# Patient Record
Sex: MALE | Race: WHITE | NOT HISPANIC OR LATINO | Employment: OTHER | URBAN - METROPOLITAN AREA
[De-identification: names, ages, dates, MRNs, and addresses within clinical notes are randomized per-mention and may not be internally consistent; named-entity substitution may affect disease eponyms.]

---

## 2019-09-11 RX ORDER — TRAMADOL HYDROCHLORIDE 50 MG/1
TABLET ORAL
Refills: 0
Start: 2019-09-11 | End: 2019-09-12 | Stop reason: SDUPTHER

## 2019-09-11 RX ORDER — GLIPIZIDE 10 MG/1
10 TABLET ORAL
Start: 2019-09-11 | End: 2019-12-09 | Stop reason: SDUPTHER

## 2019-09-11 RX ORDER — SIMVASTATIN 40 MG
TABLET ORAL
Start: 2019-09-11 | End: 2019-09-12 | Stop reason: SDUPTHER

## 2019-09-11 RX ORDER — NITROGLYCERIN 0.4 MG/1
0.4 TABLET SUBLINGUAL
Refills: 12
Start: 2019-09-11 | End: 2021-06-03 | Stop reason: SDUPTHER

## 2019-09-11 RX ORDER — RAMIPRIL 10 MG/1
10 CAPSULE ORAL DAILY
Qty: 90 CAPSULE | Refills: 1
Start: 2019-09-11 | End: 2019-12-09 | Stop reason: SDUPTHER

## 2019-09-11 RX ORDER — GABAPENTIN 300 MG/1
600 CAPSULE ORAL 3 TIMES DAILY
Start: 2019-09-11 | End: 2019-09-12 | Stop reason: SDUPTHER

## 2019-09-11 RX ORDER — AMLODIPINE BESYLATE 10 MG/1
10 TABLET ORAL DAILY
Qty: 30 TABLET | Refills: 1
Start: 2019-09-11 | End: 2019-12-09 | Stop reason: SDUPTHER

## 2019-09-11 RX ORDER — HYDROCHLOROTHIAZIDE 25 MG/1
25 TABLET ORAL DAILY
Start: 2019-09-11 | End: 2019-12-09 | Stop reason: SDUPTHER

## 2019-09-11 RX ORDER — ALPRAZOLAM 2 MG/1
2 TABLET ORAL NIGHTLY PRN
Start: 2019-09-11 | End: 2019-12-09 | Stop reason: SDUPTHER

## 2019-09-12 ENCOUNTER — OFFICE VISIT (OUTPATIENT)
Dept: FAMILY MEDICINE CLINIC | Facility: CLINIC | Age: 79
End: 2019-09-12

## 2019-09-12 ENCOUNTER — TELEPHONE (OUTPATIENT)
Dept: OTHER | Facility: OTHER | Age: 79
End: 2019-09-12

## 2019-09-12 VITALS
DIASTOLIC BLOOD PRESSURE: 74 MMHG | BODY MASS INDEX: 29.52 KG/M2 | SYSTOLIC BLOOD PRESSURE: 157 MMHG | WEIGHT: 230 LBS | HEART RATE: 73 BPM | HEIGHT: 74 IN | TEMPERATURE: 97.8 F | OXYGEN SATURATION: 91 %

## 2019-09-12 DIAGNOSIS — Z09 HOSPITAL DISCHARGE FOLLOW-UP: ICD-10-CM

## 2019-09-12 DIAGNOSIS — R78.81 MRSA BACTEREMIA: ICD-10-CM

## 2019-09-12 DIAGNOSIS — E08.621 DIABETIC ULCER OF TOE OF RIGHT FOOT ASSOCIATED WITH DIABETES MELLITUS DUE TO UNDERLYING CONDITION, LIMITED TO BREAKDOWN OF SKIN (HCC): ICD-10-CM

## 2019-09-12 DIAGNOSIS — B95.62 MRSA BACTEREMIA: ICD-10-CM

## 2019-09-12 DIAGNOSIS — L97.511 DIABETIC ULCER OF TOE OF RIGHT FOOT ASSOCIATED WITH DIABETES MELLITUS DUE TO UNDERLYING CONDITION, LIMITED TO BREAKDOWN OF SKIN (HCC): ICD-10-CM

## 2019-09-12 DIAGNOSIS — E11.8 TYPE 2 DIABETES MELLITUS WITH COMPLICATION, WITHOUT LONG-TERM CURRENT USE OF INSULIN (HCC): Primary | ICD-10-CM

## 2019-09-12 PROCEDURE — 99495 TRANSJ CARE MGMT MOD F2F 14D: CPT | Performed by: FAMILY MEDICINE

## 2019-09-12 RX ORDER — ASPIRIN 325 MG
325 TABLET ORAL DAILY
COMMUNITY

## 2019-09-12 RX ORDER — GABAPENTIN 300 MG/1
600 CAPSULE ORAL 3 TIMES DAILY
Qty: 180 CAPSULE | Refills: 4 | Status: SHIPPED | OUTPATIENT
Start: 2019-09-12 | End: 2019-12-09 | Stop reason: SDUPTHER

## 2019-09-12 RX ORDER — TRAMADOL HYDROCHLORIDE 50 MG/1
TABLET ORAL
Qty: 450 TABLET | Refills: 0 | Status: SHIPPED | OUTPATIENT
Start: 2019-09-12 | End: 2019-12-09 | Stop reason: SDUPTHER

## 2019-09-12 RX ORDER — SIMVASTATIN 40 MG
TABLET ORAL
Qty: 90 TABLET | Refills: 3 | Status: SHIPPED | OUTPATIENT
Start: 2019-09-12 | End: 2020-05-13 | Stop reason: SDUPTHER

## 2019-09-12 RX ORDER — VANCOMYCIN HYDROCHLORIDE 1 G/20ML
INJECTION, POWDER, LYOPHILIZED, FOR SOLUTION INTRAVENOUS
COMMUNITY
Start: 2019-09-11 | End: 2019-12-09

## 2019-09-12 NOTE — PROGRESS NOTES
Transitional Care Follow Up Visit  Subjective   FU hospitalization of toe of R foot 2nd toe.  Admitted 9/5/19 to 9/11/19.  D/c last night  Eric Kramer is a 79 y.o. male who presents for a transitional care management visit.    Within 48 business hours after discharge our office contacted him via telephone to coordinate his care and needs.     HPI:  Hospital course:  Seen me day after discharge.  D/c summary reviewed. Pt with confusion with it. Noted to have MRSA bacteremia,  Had amputation of second toe by Dr Joseph Sultana after xray suspicious for osteomyelitis..  To have IV vanc for 4 to 6 more weeks followed by Dr Lott.  Picc line place.  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 R lower U/s with no DVT.  ABIs bilaterally normal.  No med changes other than vanc addition.    F/U DM2.  A1C 6.4 in hospital.    FU hyperlipidemia.   from May 2019.     F/u HTN.  No orhtostasis.  Doing well with meds.  Has not been on hctz in hospital.       I reviewed and discussed the details of that call along with the discharge summary, hospital problems, inpatient lab results, inpatient diagnostic studies, and consultation reports with Eric.     Current outpatient and discharge medications have been reconciled for the patient.  Reviewed by: Elder Block MD      No flowsheet data found.  Risk for Readmission (LACE) No Data Recorded     The following portions of the patient's history were reviewed and updated as appropriate: allergies, current medications, past family history, past medical history, past social history, past surgical history and problem list.    Review of Systems   Constitutional: Negative for activity change, appetite change and fatigue.   HENT: Negative for hearing loss and postnasal drip.    Eyes: Negative for discharge and itching.   Respiratory: Negative for cough and shortness of breath.    Cardiovascular: Negative for chest pain and leg swelling.   Gastrointestinal: Negative for  abdominal distention and abdominal pain.   Endocrine: Negative for cold intolerance and heat intolerance.   Genitourinary: Negative for difficulty urinating and flank pain.   Musculoskeletal: Negative for arthralgias and myalgias.   Skin: Negative for color change.   Neurological: Negative for dizziness and facial asymmetry.   Hematological: Negative for adenopathy.   Psychiatric/Behavioral: Negative for agitation and confusion.       Objective   Physical Exam   Constitutional: He appears well-developed and well-nourished.   HENT:   Head: Normocephalic and atraumatic.   Right Ear: External ear normal.   Left Ear: External ear normal.   Nose: Nose normal.   Mouth/Throat: Oropharynx is clear and moist.   Eyes: Conjunctivae and EOM are normal. Pupils are equal, round, and reactive to light. Right eye exhibits no discharge. Left eye exhibits no discharge. No scleral icterus.   Neck: Normal range of motion. Neck supple. No JVD present. No tracheal deviation present. No thyromegaly present.   Cardiovascular: Normal rate, regular rhythm, normal heart sounds and intact distal pulses. Exam reveals no gallop and no friction rub.   No murmur heard.  Pulmonary/Chest: Effort normal and breath sounds normal. No respiratory distress. He has no wheezes. He has no rales. He exhibits no tenderness.   Abdominal: Soft. Bowel sounds are normal. He exhibits no distension and no mass. There is no tenderness. There is no rebound and no guarding. No hernia.   Musculoskeletal: Normal range of motion. He exhibits no edema or tenderness.   Lymphadenopathy:     He has no cervical adenopathy.   Neurological: He displays normal reflexes. No cranial nerve deficit or sensory deficit. He exhibits normal muscle tone. Coordination normal.   Skin: Skin is warm and dry.   R foot wrapped in ace bandage and gauze.  No leg swelling.   Psychiatric: He has a normal mood and affect. His behavior is normal. Judgment and thought content normal.   Nursing note  and vitals reviewed.      Assessment/Plan   Eric was seen today for diabetes, hypertension, hyperlipidemia and anxiety.    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  -     amLODIPine (NORVASC) 10 MG tablet; Take 1 tablet by mouth Daily.  -     Discontinue: aspirin 81 MG tablet; Take 1 tablet by mouth Daily.  -     Discontinue: gabapentin (NEURONTIN) 300 MG capsule; Take 2 capsules by mouth 3 (Three) Times a Day.  -     glipiZIDE (GLUCOTROL) 10 MG tablet; Take 1 tablet by mouth 2 (Two) Times a Day Before Meals.  -     hydrochlorothiazide (HYDRODIURIL) 25 MG tablet; Take 1 tablet by mouth Daily.  -     metFORMIN (GLUCOPHAGE) 1000 MG tablet; Take 1 tablet by mouth 2 (Two) Times a Day With Meals.  -     metoprolol tartrate (LOPRESSOR) 25 MG tablet; Take 1 tablet by mouth 2 (Two) Times a Day.  -     nitroglycerin (NITROSTAT) 0.4 MG SL tablet; Place 1 tablet under the tongue Every 5 (Five) Minutes As Needed for Chest Pain. Take no more than 3 doses in 15 minutes.  -     ramipril (ALTACE) 10 MG capsule; Take 1 capsule by mouth Daily.  -     Discontinue: simvastatin (ZOCOR) 40 MG tablet; One three times a week  -     Discontinue: traMADol (ULTRAM) 50 MG tablet; One to two tablets QID prn pain.  -     simvastatin (ZOCOR) 40 MG tablet; One three times a week  -     gabapentin (NEURONTIN) 300 MG capsule; Take 2 capsules by mouth 3 (Three) Times a Day.  -     traMADol (ULTRAM) 50 MG tablet; One to two tablets QID prn pain.    Taking vanc IV now followed by ID.  Finish 4-6 week IV tx.

## 2019-09-12 NOTE — TELEPHONE ENCOUNTER
Capri from [employer] left voicemail 9/12 at 3:15pm requesting a call back from verbal start of care orders. Skilled nursing picked pt up today, need to be seen 3 times this week for IV infusion, next week 2, and then cut down to once a week. Four PRN visits for wound care and IV status. Physical therapy and occ therapy are also going to be seeing him. She can be reached at [phone]

## 2019-09-13 ENCOUNTER — TELEPHONE (OUTPATIENT)
Dept: FAMILY MEDICINE CLINIC | Facility: CLINIC | Age: 79
End: 2019-09-13

## 2019-09-13 NOTE — TELEPHONE ENCOUNTER
MARTHA FROM [facility] CALLED SHE SAW PT TODAY 9-13-19 AND WANTED TO REPORT PT HAS REFUSED VISITS AND HE IS DOING WELL.

## 2019-09-16 NOTE — TELEPHONE ENCOUNTER
Maria Dolores for [facility] with verbal auth, told her to call back if she needs anything else from me

## 2019-12-09 ENCOUNTER — OFFICE VISIT (OUTPATIENT)
Dept: FAMILY MEDICINE CLINIC | Facility: CLINIC | Age: 79
End: 2019-12-09

## 2019-12-09 VITALS
OXYGEN SATURATION: 94 % | DIASTOLIC BLOOD PRESSURE: 75 MMHG | HEIGHT: 74 IN | TEMPERATURE: 98.1 F | WEIGHT: 270.6 LBS | HEART RATE: 63 BPM | SYSTOLIC BLOOD PRESSURE: 123 MMHG | BODY MASS INDEX: 34.73 KG/M2

## 2019-12-09 DIAGNOSIS — E11.42 DIABETIC PERIPHERAL NEUROPATHY (HCC): ICD-10-CM

## 2019-12-09 DIAGNOSIS — I10 ESSENTIAL HYPERTENSION: ICD-10-CM

## 2019-12-09 DIAGNOSIS — Z00.00 MEDICARE ANNUAL WELLNESS VISIT, SUBSEQUENT: ICD-10-CM

## 2019-12-09 DIAGNOSIS — E11.42 TYPE 2 DIABETES MELLITUS WITH DIABETIC POLYNEUROPATHY, WITHOUT LONG-TERM CURRENT USE OF INSULIN (HCC): Primary | ICD-10-CM

## 2019-12-09 DIAGNOSIS — I25.10 ARTERIOSCLEROSIS OF CORONARY ARTERY: ICD-10-CM

## 2019-12-09 DIAGNOSIS — N40.1 BENIGN PROSTATIC HYPERPLASIA WITH URINARY FREQUENCY: ICD-10-CM

## 2019-12-09 DIAGNOSIS — R35.0 BENIGN PROSTATIC HYPERPLASIA WITH URINARY FREQUENCY: ICD-10-CM

## 2019-12-09 PROCEDURE — 99214 OFFICE O/P EST MOD 30 MIN: CPT | Performed by: FAMILY MEDICINE

## 2019-12-09 PROCEDURE — G0439 PPPS, SUBSEQ VISIT: HCPCS | Performed by: FAMILY MEDICINE

## 2019-12-09 RX ORDER — AMLODIPINE BESYLATE 10 MG/1
10 TABLET ORAL DAILY
Qty: 90 TABLET | Refills: 2 | Status: SHIPPED | OUTPATIENT
Start: 2019-12-09 | End: 2020-05-13 | Stop reason: SDUPTHER

## 2019-12-09 RX ORDER — ALPRAZOLAM 2 MG/1
2 TABLET ORAL NIGHTLY PRN
Qty: 90 TABLET | Refills: 1 | Status: SHIPPED | OUTPATIENT
Start: 2019-12-09 | End: 2020-05-13 | Stop reason: SDUPTHER

## 2019-12-09 RX ORDER — TRAMADOL HYDROCHLORIDE 50 MG/1
TABLET ORAL
Qty: 450 TABLET | Refills: 1 | Status: SHIPPED | OUTPATIENT
Start: 2019-12-09 | End: 2020-05-13 | Stop reason: SDUPTHER

## 2019-12-09 RX ORDER — GLIPIZIDE 10 MG/1
10 TABLET ORAL
Qty: 180 TABLET | Refills: 2 | Status: SHIPPED | OUTPATIENT
Start: 2019-12-09 | End: 2020-05-13 | Stop reason: SDUPTHER

## 2019-12-09 RX ORDER — HYDROCHLOROTHIAZIDE 25 MG/1
25 TABLET ORAL DAILY
Qty: 90 TABLET | Refills: 3 | Status: SHIPPED | OUTPATIENT
Start: 2019-12-09 | End: 2020-05-13 | Stop reason: SDUPTHER

## 2019-12-09 RX ORDER — RAMIPRIL 10 MG/1
10 CAPSULE ORAL DAILY
Qty: 90 CAPSULE | Refills: 2 | Status: SHIPPED | OUTPATIENT
Start: 2019-12-09 | End: 2020-09-21

## 2019-12-09 RX ORDER — GABAPENTIN 300 MG/1
600 CAPSULE ORAL 3 TIMES DAILY
Qty: 180 CAPSULE | Refills: 4 | Status: SHIPPED | OUTPATIENT
Start: 2019-12-09 | End: 2020-05-13 | Stop reason: SDUPTHER

## 2019-12-09 NOTE — PROGRESS NOTES
The ABCs of the Annual Wellness Visit  Subsequent Medicare Wellness Visit    Chief Complaint   Patient presents with   • Diabetes   • Hyperlipidemia   • Anxiety       Subjective   History of Present Illness:  Eric Kramer is a 79 y.o. male who presents for a Subsequent Medicare Wellness Visit.  F?U HTn.  No orthostasis.  Doing well iwht meds.   F/U DM2.  ON meds regularly.  No SE.  No BS checks.    F/u peripheral neuropathy.  Going well with gabapentin. I use alprazolam 2mg at night to help me sleep.  NO Se.  JON appropriate.     HEALTH RISK ASSESSMENT    Recent Hospitalizations:  Recently treated at the following:  Other: [facility]    Current Medical Providers:  Patient Care Team:  Elder Block MD as PCP - General (Family Medicine)    Smoking Status:  Social History     Tobacco Use   Smoking Status Former Smoker   Smokeless Tobacco Never Used       Alcohol Consumption:  Social History     Substance and Sexual Activity   Alcohol Use Yes    Comment: 1-2 a month--Caffeine use-2 reg coffee daily       Depression Screen:   PHQ-2/PHQ-9 Depression Screening 12/9/2019   Little interest or pleasure in doing things 0   Feeling down, depressed, or hopeless 0   Total Score 0       Fall Risk Screen:  STEADI Fall Risk Assessment was completed, and patient is at LOW risk for falls.Assessment completed on:12/9/2019    Health Habits and Functional and Cognitive Screening:  Functional & Cognitive Status 12/9/2019   Do you have difficulty preparing food and eating? No   Do you have difficulty bathing yourself, getting dressed or grooming yourself? No   Do you have difficulty using the toilet? No   Do you have difficulty moving around from place to place? No   Do you have trouble with steps or getting out of a bed or a chair? No   Current Diet Well Balanced Diet   Dental Exam Up to date   Eye Exam Up to date   Exercise (times per week) 0 times per week   Current Exercise Activities Include None   Do you need help using the  phone?  No   Are you deaf or do you have serious difficulty hearing?  No   Do you need help with transportation? No   Do you need help shopping? No   Do you need help preparing meals?  No   Do you need help with housework?  No   Do you need help with laundry? No   Do you need help taking your medications? No   Do you need help managing money? No   Do you ever drive or ride in a car without wearing a seat belt? No   Have you felt unusual stress, anger or loneliness in the last month? No   Who do you live with? Spouse   If you need help, do you have trouble finding someone available to you? No   Have you been bothered in the last four weeks by sexual problems? No   Do you have difficulty concentrating, remembering or making decisions? No         Does the patient have evidence of cognitive impairment? No    Asprin use counseling:Taking ASA appropriately as indicated    Age-appropriate Screening Schedule:  Refer to the list below for future screening recommendations based on patient's age, sex and/or medical conditions. Orders for these recommended tests are listed in the plan section. The patient has been provided with a written plan.    Health Maintenance   Topic Date Due   • URINE MICROALBUMIN  1940   • TDAP/TD VACCINES (1 - Tdap) 04/26/1952   • ZOSTER VACCINE (1 of 2) 04/26/1991   • DIABETIC EYE EXAM  07/19/2019   • INFLUENZA VACCINE  08/01/2019   • LIPID PANEL  09/10/2019   • HEMOGLOBIN A1C  03/07/2020   • PNEUMOCOCCAL VACCINE (65+ HIGH RISK)  Completed          The following portions of the patient's history were reviewed and updated as appropriate: allergies, current medications, past family history, past medical history, past social history, past surgical history and problem list.    Outpatient Medications Prior to Visit   Medication Sig Dispense Refill   • ALPRAZolam (XANAX) 2 MG tablet Take 1 tablet by mouth At Night As Needed for Sleep.     • aspirin 325 MG tablet Take 650 mg by mouth Daily.     •  gabapentin (NEURONTIN) 300 MG capsule Take 2 capsules by mouth 3 (Three) Times a Day. 180 capsule 4   • metoprolol tartrate (LOPRESSOR) 25 MG tablet Take 1 tablet by mouth 2 (Two) Times a Day.     • nitroglycerin (NITROSTAT) 0.4 MG SL tablet Place 1 tablet under the tongue Every 5 (Five) Minutes As Needed for Chest Pain. Take no more than 3 doses in 15 minutes.  12   • simvastatin (ZOCOR) 40 MG tablet One three times a week 90 tablet 3   • traMADol (ULTRAM) 50 MG tablet One to two tablets QID prn pain. 450 tablet 0   • amLODIPine (NORVASC) 10 MG tablet Take 1 tablet by mouth Daily. 30 tablet 1   • glipiZIDE (GLUCOTROL) 10 MG tablet Take 1 tablet by mouth 2 (Two) Times a Day Before Meals.     • hydrochlorothiazide (HYDRODIURIL) 25 MG tablet Take 1 tablet by mouth Daily.     • metFORMIN (GLUCOPHAGE) 1000 MG tablet Take 1 tablet by mouth 2 (Two) Times a Day With Meals.     • ramipril (ALTACE) 10 MG capsule Take 1 capsule by mouth Daily. 90 capsule 1   • vancomycin (VANCOCIN) 1 g injection        No facility-administered medications prior to visit.        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   HENT:  "Negative for hearing loss and postnasal drip.    Eyes: Negative for discharge and itching.   Respiratory: Negative for cough and shortness of breath.    Cardiovascular: Negative for chest pain and leg swelling.   Gastrointestinal: Negative for abdominal distention and abdominal pain.   Endocrine: Negative for cold intolerance and heat intolerance.   Genitourinary: Negative for difficulty urinating and flank pain.   Musculoskeletal: Negative for arthralgias and myalgias.   Skin: Negative for color change.   Neurological: Negative for dizziness and facial asymmetry.   Hematological: Negative for adenopathy.   Psychiatric/Behavioral: Negative for agitation and confusion.       Compared to one year ago, the patient feels his physical health is the same.  Compared to one year ago, the patient feels his mental health is the same.    Reviewed chart for potential of high risk medication in the elderly: yes  Reviewed chart for potential of harmful drug interactions in the elderly:yes    Objective         Vitals:    12/09/19 0749   BP: 123/75   Pulse: 63   Temp: 98.1 °F (36.7 °C)   SpO2: 94%   Weight: 123 kg (270 lb 9.6 oz)   Height: 188 cm (74\")       Body mass index is 34.74 kg/m².  Discussed the patient's BMI with him. The BMI is above average; BMI management plan is completed.    Physical Exam   Constitutional: He appears well-developed and well-nourished.   HENT:   Head: Normocephalic and atraumatic.   Right Ear: External ear normal.   Left Ear: External ear normal.   Nose: Nose normal.   Mouth/Throat: Oropharynx is clear and moist.   Eyes: Pupils are equal, round, and reactive to light. Conjunctivae and EOM are normal. Right eye exhibits no discharge. Left eye exhibits no discharge. No scleral icterus.   Neck: Normal range of motion. Neck supple. No JVD present. No tracheal deviation present. No thyromegaly present.   Cardiovascular: Normal rate, regular rhythm, normal heart sounds and intact distal pulses. Exam reveals " no gallop and no friction rub.   No murmur heard.  Pulmonary/Chest: Effort normal and breath sounds normal. No respiratory distress. He has no wheezes. He has no rales. He exhibits no tenderness.   Abdominal: Soft. Bowel sounds are normal. He exhibits no distension and no mass. There is no tenderness. There is no rebound and no guarding. No hernia.   Musculoskeletal: Normal range of motion. He exhibits no edema or tenderness.   Second toe on R foot removed.  No lesions seen.     Lymphadenopathy:     He has no cervical adenopathy.   Neurological: He displays normal reflexes. No cranial nerve deficit or sensory deficit. He exhibits normal muscle tone. Coordination normal.   Skin: Skin is warm and dry.   Psychiatric: He has a normal mood and affect. His behavior is normal. Judgment and thought content normal.   Nursing note and vitals reviewed.            Assessment/Plan   Medicare Risks and Personalized Health Plan  CMS Preventative Services Quick Reference  Inactivity/Sedentary    The above risks/problems have been discussed with the patient.  Pertinent information has been shared with the patient in the After Visit Summary.  Follow up plans and orders are seen below in the Assessment/Plan Section.    Diagnoses and all orders for this visit:    1. Type 2 diabetes mellitus with diabetic polyneuropathy, without long-term current use of insulin (CMS/[facility]) (Primary)  -     Comprehensive Metabolic Panel  -     Lipid Panel With / Chol / HDL Ratio  -     Hemoglobin A1c    2. Essential hypertension  -     TSH Rfx On Abnormal To Free T4    3. Arteriosclerosis of coronary artery    4. Medicare annual wellness visit, subsequent    5. Benign prostatic hyperplasia with urinary frequency  -     PSA DIAGNOSTIC    6. Diabetic peripheral neuropathy (CMS/[facility])    Other orders  -     ramipril (ALTACE) 10 MG capsule; Take 1 capsule by mouth Daily.  Dispense: 90 capsule; Refill: 2  -     metFORMIN (GLUCOPHAGE) 1000 MG tablet; Take 1 tablet  by mouth 2 (Two) Times a Day With Meals.  Dispense: 180 tablet; Refill: 2  -     amLODIPine (NORVASC) 10 MG tablet; Take 1 tablet by mouth Daily.  Dispense: 90 tablet; Refill: 2  -     glipizide (GLUCOTROL) 10 MG tablet; Take 1 tablet by mouth 2 (Two) Times a Day Before Meals.  Dispense: 180 tablet; Refill: 2  -     hydroCHLOROthiazide (HYDRODIURIL) 25 MG tablet; Take 1 tablet by mouth Daily.  Dispense: 90 tablet; Refill: 3      Follow Up:  Return in about 4 months (around 4/9/2020).     An After Visit Summary and PPPS were given to the patient.     Pt declines flu shot.  Continue statin for CAD.   Increase exercise.

## 2019-12-10 DIAGNOSIS — E03.9 HYPOTHYROIDISM, UNSPECIFIED TYPE: Primary | ICD-10-CM

## 2019-12-10 PROBLEM — E03.4 HYPOTHYROIDISM DUE TO ACQUIRED ATROPHY OF THYROID: Status: ACTIVE | Noted: 2019-12-10

## 2019-12-10 LAB
ALBUMIN SERPL-MCNC: 4.1 G/DL (ref 3.5–5.2)
ALBUMIN/GLOB SERPL: 1.4 G/DL
ALP SERPL-CCNC: 52 U/L (ref 39–117)
ALT SERPL-CCNC: 11 U/L (ref 1–41)
AST SERPL-CCNC: 13 U/L (ref 1–40)
BILIRUB SERPL-MCNC: 0.5 MG/DL (ref 0.2–1.2)
BUN SERPL-MCNC: 27 MG/DL (ref 8–23)
BUN/CREAT SERPL: 18.4 (ref 7–25)
CALCIUM SERPL-MCNC: 9.6 MG/DL (ref 8.6–10.5)
CHLORIDE SERPL-SCNC: 99 MMOL/L (ref 98–107)
CHOLEST SERPL-MCNC: 175 MG/DL (ref 0–200)
CHOLEST/HDLC SERPL: 5 {RATIO}
CO2 SERPL-SCNC: 29.1 MMOL/L (ref 22–29)
CREAT SERPL-MCNC: 1.47 MG/DL (ref 0.76–1.27)
GLOBULIN SER CALC-MCNC: 2.9 GM/DL
GLUCOSE SERPL-MCNC: 87 MG/DL (ref 65–99)
HBA1C MFR BLD: 6.3 % (ref 4.8–5.6)
HDLC SERPL-MCNC: 35 MG/DL (ref 40–60)
LDLC SERPL CALC-MCNC: 99 MG/DL (ref 0–100)
POTASSIUM SERPL-SCNC: 4.2 MMOL/L (ref 3.5–5.2)
PROT SERPL-MCNC: 7 G/DL (ref 6–8.5)
PSA SERPL-MCNC: 2.67 NG/ML (ref 0–4)
SODIUM SERPL-SCNC: 141 MMOL/L (ref 136–145)
T4 FREE SERPL-MCNC: 0.9 NG/DL (ref 0.93–1.7)
TRIGL SERPL-MCNC: 207 MG/DL (ref 0–150)
TSH SERPL DL<=0.005 MIU/L-ACNC: 5.08 UIU/ML (ref 0.27–4.2)
VLDLC SERPL CALC-MCNC: 41.4 MG/DL

## 2019-12-10 RX ORDER — LEVOTHYROXINE SODIUM 0.03 MG/1
25 TABLET ORAL DAILY
Qty: 90 TABLET | Refills: 3 | Status: SHIPPED | OUTPATIENT
Start: 2019-12-10 | End: 2020-12-02 | Stop reason: SDUPTHER

## 2020-01-06 ENCOUNTER — TELEPHONE (OUTPATIENT)
Dept: FAMILY MEDICINE CLINIC | Facility: CLINIC | Age: 80
End: 2020-01-06

## 2020-01-06 NOTE — TELEPHONE ENCOUNTER
S/w Randal from pharmacy in [place] where patient stays during the winter and confirmed that he is still taking all of his controlled substances so that the pharmacy can fill them

## 2020-05-13 ENCOUNTER — OFFICE VISIT (OUTPATIENT)
Dept: FAMILY MEDICINE CLINIC | Facility: CLINIC | Age: 80
End: 2020-05-13

## 2020-05-13 VITALS
HEART RATE: 63 BPM | BODY MASS INDEX: 30.16 KG/M2 | HEIGHT: 74 IN | WEIGHT: 235 LBS | TEMPERATURE: 97.6 F | SYSTOLIC BLOOD PRESSURE: 123 MMHG | DIASTOLIC BLOOD PRESSURE: 75 MMHG

## 2020-05-13 DIAGNOSIS — I10 ESSENTIAL HYPERTENSION: ICD-10-CM

## 2020-05-13 DIAGNOSIS — E03.4 HYPOTHYROIDISM DUE TO ACQUIRED ATROPHY OF THYROID: ICD-10-CM

## 2020-05-13 DIAGNOSIS — E11.42 TYPE 2 DIABETES MELLITUS WITH DIABETIC POLYNEUROPATHY, WITHOUT LONG-TERM CURRENT USE OF INSULIN (HCC): ICD-10-CM

## 2020-05-13 DIAGNOSIS — E11.42 DIABETIC PERIPHERAL NEUROPATHY (HCC): ICD-10-CM

## 2020-05-13 DIAGNOSIS — E11.42 TYPE 2 DIABETES MELLITUS WITH DIABETIC POLYNEUROPATHY, WITHOUT LONG-TERM CURRENT USE OF INSULIN (HCC): Primary | ICD-10-CM

## 2020-05-13 PROCEDURE — 99443 PR PHYS/QHP TELEPHONE EVALUATION 21-30 MIN: CPT | Performed by: FAMILY MEDICINE

## 2020-05-13 RX ORDER — GABAPENTIN 300 MG/1
600 CAPSULE ORAL 3 TIMES DAILY
Qty: 180 CAPSULE | Refills: 4 | Status: SHIPPED | OUTPATIENT
Start: 2020-05-13 | End: 2020-12-02 | Stop reason: SDUPTHER

## 2020-05-13 RX ORDER — AMLODIPINE BESYLATE 10 MG/1
10 TABLET ORAL DAILY
Qty: 90 TABLET | Refills: 3 | Status: SHIPPED | OUTPATIENT
Start: 2020-05-13 | End: 2020-12-02 | Stop reason: SDUPTHER

## 2020-05-13 RX ORDER — TRAMADOL HYDROCHLORIDE 50 MG/1
TABLET ORAL
Qty: 360 TABLET | Refills: 3 | Status: SHIPPED | OUTPATIENT
Start: 2020-05-13 | End: 2020-12-02 | Stop reason: SDUPTHER

## 2020-05-13 RX ORDER — ALPRAZOLAM 2 MG/1
2 TABLET ORAL NIGHTLY PRN
Qty: 90 TABLET | Refills: 1 | Status: SHIPPED | OUTPATIENT
Start: 2020-05-13 | End: 2020-12-02 | Stop reason: SDUPTHER

## 2020-05-13 RX ORDER — HYDROCHLOROTHIAZIDE 25 MG/1
25 TABLET ORAL DAILY
Qty: 90 TABLET | Refills: 3 | Status: SHIPPED | OUTPATIENT
Start: 2020-05-13 | End: 2020-12-02 | Stop reason: SDUPTHER

## 2020-05-13 RX ORDER — GLIPIZIDE 10 MG/1
10 TABLET ORAL
Qty: 180 TABLET | Refills: 3 | Status: SHIPPED | OUTPATIENT
Start: 2020-05-13 | End: 2020-12-02 | Stop reason: SDUPTHER

## 2020-05-13 RX ORDER — SIMVASTATIN 40 MG
TABLET ORAL
Qty: 90 TABLET | Refills: 3 | Status: SHIPPED | OUTPATIENT
Start: 2020-05-13 | End: 2021-06-03 | Stop reason: SDUPTHER

## 2020-05-13 NOTE — PROGRESS NOTES
F/U DM2.   F/U hypothyroidism.      Subjective   Eric Kramer is a 80 y.o. male.     History of Present Illness   Telephone visit due to covid.  Consent obtained.  22 minute visit.     F/U DM2.   BS running 130 s in AM.  On meds regularly.    F/U HTN.  Doing well with meds.    F/U hyperlipidemia.  LDL 99 5months ago.     F/U hypothyroidism.  On levothyroxine 25 once a day.    Started last visit.       The following portions of the patient's history were reviewed and updated as appropriate: allergies, current medications, past family history, past medical history, past social history, past surgical history and problem list.    Review of Systems   Constitutional: Negative for appetite change and fatigue.   HENT: Negative for nosebleeds and sore throat.    Eyes: Negative for blurred vision and visual disturbance.   Respiratory: Negative for shortness of breath and wheezing.    Cardiovascular: Negative for chest pain and leg swelling.   Gastrointestinal: Negative for abdominal distention and abdominal pain.   Endocrine: Negative for cold intolerance and polyuria.   Genitourinary: Negative for dysuria and hematuria.   Musculoskeletal: Negative for arthralgias and myalgias.   Skin: Negative for color change and rash.   Neurological: Negative for weakness and confusion.   Psychiatric/Behavioral: Negative for agitation and depressed mood.       Patient Active Problem List   Diagnosis   • Type 2 diabetes mellitus, without long-term current use of insulin (CMS/HCC)   • Stroke (CMS/HCC)   • Prostatitis   • Osteoarthritis   • Nephrolithiasis   • Memory impairment   • Malignant neoplasm in situ of colon   • Insomnia   • Hypertension   • Diabetic peripheral neuropathy (CMS/HCC)   • Benign enlargement of prostate   • Arteriosclerosis of coronary artery   • Diabetic ulcer of toe of right foot associated with diabetes mellitus due to underlying condition, limited to breakdown of skin (CMS/HCC)   • MRSA bacteremia   • Hospital  discharge follow-up   • Medicare annual wellness visit, subsequent   • Hypothyroidism due to acquired atrophy of thyroid       No Known Allergies      Current Outpatient Medications:   •  ALPRAZolam (XANAX) 2 MG tablet, Take 1 tablet by mouth At Night As Needed for Sleep., Disp: 90 tablet, Rfl: 1  •  amLODIPine (NORVASC) 10 MG tablet, Take 1 tablet by mouth Daily., Disp: 90 tablet, Rfl: 3  •  aspirin 325 MG tablet, Take 325 mg by mouth Daily., Disp: , Rfl:   •  gabapentin (NEURONTIN) 300 MG capsule, Take 2 capsules by mouth 3 (Three) Times a Day., Disp: 180 capsule, Rfl: 4  •  glipizide (Glucotrol) 10 MG tablet, Take 1 tablet by mouth 2 (Two) Times a Day Before Meals., Disp: 180 tablet, Rfl: 3  •  hydroCHLOROthiazide (HYDRODIURIL) 25 MG tablet, Take 1 tablet by mouth Daily., Disp: 90 tablet, Rfl: 3  •  levothyroxine (SYNTHROID, LEVOTHROID) 25 MCG tablet, Take 1 tablet by mouth Daily., Disp: 90 tablet, Rfl: 3  •  metFORMIN (Glucophage) 1000 MG tablet, Take 1 tablet by mouth 2 (Two) Times a Day With Meals., Disp: 180 tablet, Rfl: 3  •  metoprolol tartrate (LOPRESSOR) 25 MG tablet, Take 1 tablet by mouth 2 (Two) Times a Day., Disp: 180 tablet, Rfl: 3  •  nitroglycerin (NITROSTAT) 0.4 MG SL tablet, Place 1 tablet under the tongue Every 5 (Five) Minutes As Needed for Chest Pain. Take no more than 3 doses in 15 minutes., Disp: , Rfl: 12  •  ramipril (ALTACE) 10 MG capsule, Take 1 capsule by mouth Daily., Disp: 90 capsule, Rfl: 2  •  simvastatin (Zocor) 40 MG tablet, One three times a week, Disp: 90 tablet, Rfl: 3  •  traMADol (ULTRAM) 50 MG tablet, One to two tablets QID prn pain., Disp: 360 tablet, Rfl: 3    Past Medical History:   Diagnosis Date   • Acute foot pain, left    • Acute UTI    • Arteriosclerosis of coronary artery    • Basal cell carcinoma (BCC) of face    • Benign enlargement of prostate    • Cataract    • Chest pain    • Diabetic peripheral neuropathy (CMS/HCC)    • Edema    • Hyperlipidemia    •  Hypertension    • Insomnia    • Malignant neoplasm in situ of colon    • Memory impairment     MMSE 27/30-5/20/2019   • Nephrolithiasis    • Osteoarthritis    • Prostatitis    • Renal azotemia    • Stroke (CMS/HCC)     stroke syndrome   • Type 2 diabetes mellitus (CMS/HCC)        Past Surgical History:   Procedure Laterality Date   • ANGIOPLASTY Right     Cath Laser Angioplasty/right ventricle   • APPENDECTOMY     • CATARACT EXTRACTION Left    • COLECTOMY PARTIAL / TOTAL      due to colon cancer, removed 13 inches of colon   • CORONARY ANGIOPLASTY WITH STENT PLACEMENT         Family History   Problem Relation Age of Onset   • Colon cancer Mother    • Hypertension Mother    • Osteoarthritis Mother    • Emphysema Father         smoker/tobacco use   • Hypertension Father    • Osteoarthritis Father    • Rheum arthritis Sister    • Colon cancer Maternal Grandmother    • Cataracts Maternal Grandmother    • Colon cancer Other        Social History     Tobacco Use   • Smoking status: Former Smoker   • Smokeless tobacco: Never Used   Substance Use Topics   • Alcohol use: Yes     Comment: 1-2 a month--Caffeine use-2 reg coffee daily            Objective     Vitals:    05/13/20 0941   BP: 123/75   Pulse:    Temp:      Body mass index is 30.17 kg/m².    Physical Exam   Constitutional: He appears well-developed and well-nourished.   HENT:   Head: Normocephalic and atraumatic.   Mouth/Throat: Oropharynx is clear and moist.   Eyes: Pupils are equal, round, and reactive to light. No scleral icterus.   Neck: No thyromegaly present.   Cardiovascular: Normal rate and regular rhythm. Exam reveals no gallop and no friction rub.   No murmur heard.  Pulmonary/Chest: Effort normal. No respiratory distress. He has no wheezes. He has no rales. He exhibits no tenderness.   Abdominal: Soft. Bowel sounds are normal. He exhibits no distension. There is no tenderness.   Musculoskeletal: Normal range of motion. He exhibits no edema or deformity.    Lymphadenopathy:     He has no cervical adenopathy.   Neurological: No cranial nerve deficit. He exhibits normal muscle tone.   Skin: Skin is warm and dry. No rash noted. He is not diaphoretic.   Vitals reviewed.      Lab Results   Component Value Date    BUN 27 (H) 12/09/2019    CREATININE 1.47 (H) 12/09/2019    EGFRIFNONA 46 (L) 12/09/2019    EGFRIFAFRI 56 (L) 12/09/2019    BCR 18.4 12/09/2019    K 4.2 12/09/2019    CO2 29.1 (H) 12/09/2019    CALCIUM 9.6 12/09/2019    PROTENTOTREF 7.0 12/09/2019    ALBUMIN 4.10 12/09/2019    LABIL2 1.4 12/09/2019    AST 13 12/09/2019    ALT 11 12/09/2019       WBC   Date Value Ref Range Status   09/10/2019 8.27 4.5 - 11.0 10*3/uL Final     RBC   Date Value Ref Range Status   09/10/2019 4.26 (L) 4.5 - 5.9 10*6/uL Final     Hemoglobin   Date Value Ref Range Status   09/10/2019 12.5 (L) 13.5 - 17.5 g/dL Final     Hematocrit   Date Value Ref Range Status   09/10/2019 39.2 (L) 41.0 - 53.0 % Final     MCV   Date Value Ref Range Status   09/10/2019 92.0 80.0 - 100.0 fL Final     MCH   Date Value Ref Range Status   09/10/2019 29.3 26.0 - 34.0 pg Final     MCHC   Date Value Ref Range Status   09/10/2019 31.9 31.0 - 37.0 g/dL Final     RDW   Date Value Ref Range Status   09/10/2019 14.2 12.0 - 16.8 % Final     MPV   Date Value Ref Range Status   09/10/2019 9.0 6.7 - 10.8 fL Final     Platelets   Date Value Ref Range Status   09/10/2019 363 140 - 440 10*3/uL Final     Neutrophil Rel %   Date Value Ref Range Status   09/10/2019 74.3 45 - 80 % Final     Lymphocyte Rel %   Date Value Ref Range Status   09/10/2019 17.2 15 - 50 % Final     Monocyte Rel %   Date Value Ref Range Status   09/10/2019 7.7 0 - 15 % Final     Eosinophil %   Date Value Ref Range Status   09/10/2019 0.4 0 - 7 % Final     Basophil Rel %   Date Value Ref Range Status   09/10/2019 0.2 0 - 2 % Final     Immature Grans %   Date Value Ref Range Status   09/10/2019 0.2 (H) 0 % Final     Neutrophils Absolute   Date Value Ref  Range Status   09/10/2019 6.14 2.0 - 8.8 10*3/uL Final     Lymphocytes Absolute   Date Value Ref Range Status   09/10/2019 1.42 0.7 - 5.5 10*3/uL Final     Monocytes Absolute   Date Value Ref Range Status   09/10/2019 0.64 0.0 - 1.7 10*3/uL Final     Eosinophils Absolute   Date Value Ref Range Status   09/10/2019 0.03 0.0 - 0.8 10*3/uL Final     Basophils Absolute   Date Value Ref Range Status   09/10/2019 0.02 0.0 - 0.2 10*3/uL Final     Immature Grans, Absolute   Date Value Ref Range Status   09/10/2019 0.02 <1 10*3/uL Final     nRBC   Date Value Ref Range Status   09/10/2019 0 0 /100(WBC) Final       Lab Results   Component Value Date    HGBA1C 6.30 (H) 12/09/2019       No results found for: [id number]    TSH   Date Value Ref Range Status   12/09/2019 5.080 (H) 0.270 - 4.200 uIU/mL Final       No results found for: CHOL  Lab Results   Component Value Date    TRIG 207 (H) 12/09/2019     Lab Results   Component Value Date    HDL 35 (L) 12/09/2019     Lab Results   Component Value Date    LDL 99 12/09/2019     Lab Results   Component Value Date    VLDL 41.4 12/09/2019     No results found for: LDLHDL      Procedures    Assessment/Plan   Problems Addressed this Visit        Cardiovascular and Mediastinum    Hypertension    Relevant Medications    metoprolol tartrate (LOPRESSOR) 25 MG tablet    hydroCHLOROthiazide (HYDRODIURIL) 25 MG tablet    amLODIPine (NORVASC) 10 MG tablet       Endocrine    Type 2 diabetes mellitus, without long-term current use of insulin (CMS/[facility]) - Primary    Relevant Medications    glipizide (Glucotrol) 10 MG tablet    metFORMIN (Glucophage) 1000 MG tablet    Other Relevant Orders    Comprehensive Metabolic Panel    Hemoglobin A1c    Hypothyroidism due to acquired atrophy of thyroid    Relevant Medications    metoprolol tartrate (LOPRESSOR) 25 MG tablet    Other Relevant Orders    TSH       Nervous and Auditory    Diabetic peripheral neuropathy (CMS/HCC)    Relevant Medications    ALPRAZolam  (XANAX) 2 MG tablet    traMADol (ULTRAM) 50 MG tablet    gabapentin (NEURONTIN) 300 MG capsule      HTN controlled.  Continue meds.    DM2.  Controlled.  Continue meds.      Orders Placed This Encounter   Procedures   • Comprehensive Metabolic Panel     Standing Status:   Future     Standing Expiration Date:   5/13/2021   • Hemoglobin A1c     Standing Status:   Future     Standing Expiration Date:   5/13/2021   • TSH     Standing Status:   Future     Standing Expiration Date:   5/13/2021       Current Outpatient Medications   Medication Sig Dispense Refill   • ALPRAZolam (XANAX) 2 MG tablet Take 1 tablet by mouth At Night As Needed for Sleep. 90 tablet 1   • amLODIPine (NORVASC) 10 MG tablet Take 1 tablet by mouth Daily. 90 tablet 3   • aspirin 325 MG tablet Take 325 mg by mouth Daily.     • gabapentin (NEURONTIN) 300 MG capsule Take 2 capsules by mouth 3 (Three) Times a Day. 180 capsule 4   • glipizide (Glucotrol) 10 MG tablet Take 1 tablet by mouth 2 (Two) Times a Day Before Meals. 180 tablet 3   • hydroCHLOROthiazide (HYDRODIURIL) 25 MG tablet Take 1 tablet by mouth Daily. 90 tablet 3   • levothyroxine (SYNTHROID, LEVOTHROID) 25 MCG tablet Take 1 tablet by mouth Daily. 90 tablet 3   • metFORMIN (Glucophage) 1000 MG tablet Take 1 tablet by mouth 2 (Two) Times a Day With Meals. 180 tablet 3   • metoprolol tartrate (LOPRESSOR) 25 MG tablet Take 1 tablet by mouth 2 (Two) Times a Day. 180 tablet 3   • nitroglycerin (NITROSTAT) 0.4 MG SL tablet Place 1 tablet under the tongue Every 5 (Five) Minutes As Needed for Chest Pain. Take no more than 3 doses in 15 minutes.  12   • ramipril (ALTACE) 10 MG capsule Take 1 capsule by mouth Daily. 90 capsule 2   • simvastatin (Zocor) 40 MG tablet One three times a week 90 tablet 3   • traMADol (ULTRAM) 50 MG tablet One to two tablets QID prn pain. 360 tablet 3     No current facility-administered medications for this visit.        Eric Nia had no medications administered during  this visit.    Return in about 3 months (around 8/13/2020).    There are no Patient Instructions on file for this visit.

## 2020-05-14 LAB
ALBUMIN SERPL-MCNC: 4.5 G/DL (ref 3.5–5.2)
ALBUMIN/GLOB SERPL: 1.4 G/DL
ALP SERPL-CCNC: 56 U/L (ref 39–117)
ALT SERPL-CCNC: 13 U/L (ref 1–41)
AST SERPL-CCNC: 10 U/L (ref 1–40)
BILIRUB SERPL-MCNC: 0.4 MG/DL (ref 0.2–1.2)
BUN SERPL-MCNC: 23 MG/DL (ref 8–23)
BUN/CREAT SERPL: 15.3 (ref 7–25)
CALCIUM SERPL-MCNC: 10.6 MG/DL (ref 8.6–10.5)
CHLORIDE SERPL-SCNC: 99 MMOL/L (ref 98–107)
CO2 SERPL-SCNC: 30.9 MMOL/L (ref 22–29)
CREAT SERPL-MCNC: 1.5 MG/DL (ref 0.76–1.27)
GLOBULIN SER CALC-MCNC: 3.3 GM/DL
GLUCOSE SERPL-MCNC: 205 MG/DL (ref 65–99)
HBA1C MFR BLD: 6.2 % (ref 4.8–5.6)
POTASSIUM SERPL-SCNC: 4.3 MMOL/L (ref 3.5–5.2)
PROT SERPL-MCNC: 7.8 G/DL (ref 6–8.5)
SODIUM SERPL-SCNC: 141 MMOL/L (ref 136–145)
TSH SERPL DL<=0.005 MIU/L-ACNC: 1.53 UIU/ML (ref 0.27–4.2)

## 2020-05-14 NOTE — PROGRESS NOTES
Tell him his labs look good.  TSH is normal.  Stay on the same plan as we discussed and try to reduce gabapentin to as low of a dose as possible to help with fatigue. (Decrease to bid for 1 week then only once a day).  This may help with his memory as well.

## 2020-09-21 RX ORDER — RAMIPRIL 10 MG/1
CAPSULE ORAL
Qty: 90 CAPSULE | Refills: 0 | Status: SHIPPED | OUTPATIENT
Start: 2020-09-21 | End: 2020-12-02 | Stop reason: SDUPTHER

## 2020-12-02 ENCOUNTER — OFFICE VISIT (OUTPATIENT)
Dept: FAMILY MEDICINE CLINIC | Facility: CLINIC | Age: 80
End: 2020-12-02

## 2020-12-02 VITALS
TEMPERATURE: 98.7 F | HEIGHT: 74 IN | WEIGHT: 241 LBS | BODY MASS INDEX: 30.93 KG/M2 | SYSTOLIC BLOOD PRESSURE: 128 MMHG | DIASTOLIC BLOOD PRESSURE: 74 MMHG | HEART RATE: 59 BPM | OXYGEN SATURATION: 95 %

## 2020-12-02 DIAGNOSIS — E03.4 HYPOTHYROIDISM DUE TO ACQUIRED ATROPHY OF THYROID: ICD-10-CM

## 2020-12-02 DIAGNOSIS — E03.9 HYPOTHYROIDISM, UNSPECIFIED TYPE: ICD-10-CM

## 2020-12-02 DIAGNOSIS — E11.42 DIABETIC PERIPHERAL NEUROPATHY (HCC): ICD-10-CM

## 2020-12-02 DIAGNOSIS — E11.42 TYPE 2 DIABETES MELLITUS WITH DIABETIC POLYNEUROPATHY, WITHOUT LONG-TERM CURRENT USE OF INSULIN (HCC): ICD-10-CM

## 2020-12-02 DIAGNOSIS — F51.01 PRIMARY INSOMNIA: ICD-10-CM

## 2020-12-02 DIAGNOSIS — I10 ESSENTIAL HYPERTENSION: Primary | ICD-10-CM

## 2020-12-02 PROBLEM — E78.49 OTHER HYPERLIPIDEMIA: Status: ACTIVE | Noted: 2020-12-02

## 2020-12-02 PROCEDURE — 99214 OFFICE O/P EST MOD 30 MIN: CPT | Performed by: FAMILY MEDICINE

## 2020-12-02 RX ORDER — LEVOTHYROXINE SODIUM 0.03 MG/1
25 TABLET ORAL DAILY
Qty: 90 TABLET | Refills: 3 | Status: SHIPPED | OUTPATIENT
Start: 2020-12-02 | End: 2021-06-03 | Stop reason: SDUPTHER

## 2020-12-02 RX ORDER — AMLODIPINE BESYLATE 10 MG/1
10 TABLET ORAL DAILY
Qty: 90 TABLET | Refills: 3 | Status: SHIPPED | OUTPATIENT
Start: 2020-12-02 | End: 2021-06-03 | Stop reason: SDUPTHER

## 2020-12-02 RX ORDER — GLIPIZIDE 10 MG/1
10 TABLET ORAL
Qty: 180 TABLET | Refills: 3 | Status: SHIPPED | OUTPATIENT
Start: 2020-12-02 | End: 2021-06-03 | Stop reason: SDUPTHER

## 2020-12-02 RX ORDER — RAMIPRIL 10 MG/1
10 CAPSULE ORAL DAILY
Qty: 90 CAPSULE | Refills: 3 | Status: SHIPPED | OUTPATIENT
Start: 2020-12-02 | End: 2021-06-03 | Stop reason: SDUPTHER

## 2020-12-02 RX ORDER — GABAPENTIN 300 MG/1
600 CAPSULE ORAL 3 TIMES DAILY
Qty: 180 CAPSULE | Refills: 4 | Status: SHIPPED | OUTPATIENT
Start: 2020-12-02 | End: 2020-12-02 | Stop reason: SDUPTHER

## 2020-12-02 RX ORDER — TRAMADOL HYDROCHLORIDE 50 MG/1
TABLET ORAL
Qty: 360 TABLET | Refills: 3 | Status: SHIPPED | OUTPATIENT
Start: 2020-12-02 | End: 2021-06-03 | Stop reason: SDUPTHER

## 2020-12-02 RX ORDER — GABAPENTIN 300 MG/1
600 CAPSULE ORAL 3 TIMES DAILY
Qty: 180 CAPSULE | Refills: 4 | Status: SHIPPED | OUTPATIENT
Start: 2020-12-02 | End: 2021-06-03 | Stop reason: SDUPTHER

## 2020-12-02 RX ORDER — HYDROCHLOROTHIAZIDE 25 MG/1
25 TABLET ORAL DAILY
Qty: 90 TABLET | Refills: 3 | Status: SHIPPED | OUTPATIENT
Start: 2020-12-02 | End: 2021-06-03 | Stop reason: SDUPTHER

## 2020-12-02 RX ORDER — ALPRAZOLAM 2 MG/1
2 TABLET ORAL NIGHTLY PRN
Qty: 90 TABLET | Refills: 1 | Status: SHIPPED | OUTPATIENT
Start: 2020-12-02 | End: 2021-06-03 | Stop reason: SDUPTHER

## 2020-12-02 NOTE — PROGRESS NOTES
Chief Complaint   Patient presents with   • Diabetes       Subjective   Eric Kramer is a 80 y.o. male.     History of Present Illness   F/u dm2.  DOING WELL WITH MEDS.    F/u HYPOTHYROIDISM.   dOING WELL WITH MEDS.  F/U HTN.   Doing wel lwith meds.  Needs refills.    fU peripheral neuropahty.  I take tramadol 50 2 TID to QID.  It does well.    fU insomnia.  Doing well with alprazolam 2mg at night.   I am trying to decrease to 1/2 a night.    The following portions of the patient's history were reviewed and updated as appropriate: allergies, current medications, past family history, past medical history, past social history, past surgical history and problem list.    Review of Systems   Constitutional: Negative for appetite change and fatigue.   HENT: Negative for nosebleeds and sore throat.    Eyes: Negative for blurred vision and visual disturbance.   Respiratory: Negative for shortness of breath and wheezing.    Cardiovascular: Negative for chest pain and leg swelling.   Gastrointestinal: Negative for abdominal distention and abdominal pain.   Endocrine: Negative for cold intolerance and polyuria.   Genitourinary: Negative for dysuria and hematuria.   Musculoskeletal: Negative for arthralgias and myalgias.   Skin: Negative for color change and rash.   Neurological: Negative for weakness and confusion.   Psychiatric/Behavioral: Negative for agitation and depressed mood.       Patient Active Problem List   Diagnosis   • Type 2 diabetes mellitus, without long-term current use of insulin (CMS/[facility])   • Stroke (CMS/HCC)   • Prostatitis   • Osteoarthritis   • Nephrolithiasis   • Memory impairment   • Malignant neoplasm in situ of colon   • Insomnia   • Hypertension   • Diabetic peripheral neuropathy (CMS/HCC)   • Benign enlargement of prostate   • Arteriosclerosis of coronary artery   • Diabetic ulcer of toe of right foot associated with diabetes mellitus due to underlying condition, limited to breakdown of skin  (CMS/[facility])   • MRSA bacteremia   • Hospital discharge follow-up   • Medicare annual wellness visit, subsequent   • Hypothyroidism due to acquired atrophy of thyroid   • Other hyperlipidemia       No Known Allergies      Current Outpatient Medications:   •  ALPRAZolam (XANAX) 2 MG tablet, Take 1 tablet by mouth At Night As Needed for Sleep., Disp: 90 tablet, Rfl: 1  •  amLODIPine (NORVASC) 10 MG tablet, Take 1 tablet by mouth Daily., Disp: 90 tablet, Rfl: 3  •  aspirin 325 MG tablet, Take 325 mg by mouth Daily., Disp: , Rfl:   •  gabapentin (NEURONTIN) 300 MG capsule, Take 2 capsules by mouth 3 (Three) Times a Day., Disp: 180 capsule, Rfl: 4  •  glipizide (Glucotrol) 10 MG tablet, Take 1 tablet by mouth 2 (Two) Times a Day Before Meals., Disp: 180 tablet, Rfl: 3  •  hydroCHLOROthiazide (HYDRODIURIL) 25 MG tablet, Take 1 tablet by mouth Daily., Disp: 90 tablet, Rfl: 3  •  levothyroxine (SYNTHROID, LEVOTHROID) 25 MCG tablet, Take 1 tablet by mouth Daily., Disp: 90 tablet, Rfl: 3  •  metFORMIN (Glucophage) 1000 MG tablet, Take 1 tablet by mouth 2 (Two) Times a Day With Meals., Disp: 180 tablet, Rfl: 3  •  metoprolol tartrate (LOPRESSOR) 25 MG tablet, Take 1 tablet by mouth 2 (Two) Times a Day., Disp: 180 tablet, Rfl: 3  •  nitroglycerin (NITROSTAT) 0.4 MG SL tablet, Place 1 tablet under the tongue Every 5 (Five) Minutes As Needed for Chest Pain. Take no more than 3 doses in 15 minutes., Disp: , Rfl: 12  •  ramipril (ALTACE) 10 MG capsule, Take 1 capsule by mouth Daily., Disp: 90 capsule, Rfl: 3  •  simvastatin (Zocor) 40 MG tablet, One three times a week, Disp: 90 tablet, Rfl: 3  •  traMADol (ULTRAM) 50 MG tablet, One to two tablets QID prn pain., Disp: 360 tablet, Rfl: 3    Past Medical History:   Diagnosis Date   • Acute foot pain, left    • Acute UTI    • Arteriosclerosis of coronary artery    • Basal cell carcinoma (BCC) of face    • Benign enlargement of prostate    • Cataract    • Chest pain    • Diabetic  peripheral neuropathy (CMS/HCC)    • Edema    • Hyperlipidemia    • Hypertension    • Insomnia    • Malignant neoplasm in situ of colon    • Memory impairment     MMSE 27/30-5/20/2019   • Nephrolithiasis    • Osteoarthritis    • Prostatitis    • Renal azotemia    • Stroke (CMS/HCC)     stroke syndrome   • Type 2 diabetes mellitus (CMS/HCC)        Past Surgical History:   Procedure Laterality Date   • ANGIOPLASTY Right     Cath Laser Angioplasty/right ventricle   • APPENDECTOMY     • CATARACT EXTRACTION Left    • COLECTOMY PARTIAL / TOTAL      due to colon cancer, removed 13 inches of colon   • CORONARY ANGIOPLASTY WITH STENT PLACEMENT         Family History   Problem Relation Age of Onset   • Colon cancer Mother    • Hypertension Mother    • Osteoarthritis Mother    • Emphysema Father         smoker/tobacco use   • Hypertension Father    • Osteoarthritis Father    • Rheum arthritis Sister    • Colon cancer Maternal Grandmother    • Cataracts Maternal Grandmother    • Colon cancer Other        Social History     Tobacco Use   • Smoking status: Former Smoker   • Smokeless tobacco: Never Used   Substance Use Topics   • Alcohol use: Yes     Comment: 1-2 a month--Caffeine use-2 reg coffee daily            Objective     Vitals:    12/02/20 0935   BP: 128/74   Pulse: 59   Temp: 98.7 °F (37.1 °C)   SpO2: 95%     Body mass index is 30.93 kg/m².    Physical Exam  Vitals signs reviewed.   Constitutional:       Appearance: He is well-developed. He is not diaphoretic.   HENT:      Head: Normocephalic and atraumatic.   Eyes:      General: No scleral icterus.     Pupils: Pupils are equal, round, and reactive to light.   Neck:      Thyroid: No thyromegaly.   Cardiovascular:      Rate and Rhythm: Normal rate and regular rhythm.      Heart sounds: No murmur. No friction rub. No gallop.    Pulmonary:      Effort: Pulmonary effort is normal. No respiratory distress.      Breath sounds: No wheezing or rales.   Chest:      Chest wall: No  tenderness.   Abdominal:      General: Bowel sounds are normal. There is no distension.      Palpations: Abdomen is soft.      Tenderness: There is no abdominal tenderness.   Musculoskeletal: Normal range of motion.         General: No deformity.   Lymphadenopathy:      Cervical: No cervical adenopathy.   Skin:     General: Skin is warm and dry.      Findings: No rash.   Neurological:      Cranial Nerves: No cranial nerve deficit.      Motor: No abnormal muscle tone.         Lab Results   Component Value Date    BUN 23 05/13/2020    CREATININE 1.50 (H) 05/13/2020    EGFRIFNONA 45 (L) 05/13/2020    EGFRIFAFRI 55 (L) 05/13/2020    BCR 15.3 05/13/2020    K 4.3 05/13/2020    CO2 30.9 (H) 05/13/2020    CALCIUM 10.6 (H) 05/13/2020    PROTENTOTREF 7.8 05/13/2020    ALBUMIN 4.50 05/13/2020    LABIL2 1.4 05/13/2020    AST 10 05/13/2020    ALT 13 05/13/2020       WBC   Date Value Ref Range Status   09/10/2019 8.27 4.5 - 11.0 10*3/uL Final     RBC   Date Value Ref Range Status   09/10/2019 4.26 (L) 4.5 - 5.9 10*6/uL Final     Hemoglobin   Date Value Ref Range Status   09/10/2019 12.5 (L) 13.5 - 17.5 g/dL Final     Hematocrit   Date Value Ref Range Status   09/10/2019 39.2 (L) 41.0 - 53.0 % Final     MCV   Date Value Ref Range Status   09/10/2019 92.0 80.0 - 100.0 fL Final     MCH   Date Value Ref Range Status   09/10/2019 29.3 26.0 - 34.0 pg Final     MCHC   Date Value Ref Range Status   09/10/2019 31.9 31.0 - 37.0 g/dL Final     RDW   Date Value Ref Range Status   09/10/2019 14.2 12.0 - 16.8 % Final     MPV   Date Value Ref Range Status   09/10/2019 9.0 6.7 - 10.8 fL Final     Platelets   Date Value Ref Range Status   09/10/2019 363 140 - 440 10*3/uL Final     Neutrophil Rel %   Date Value Ref Range Status   09/10/2019 74.3 45 - 80 % Final     Lymphocyte Rel %   Date Value Ref Range Status   09/10/2019 17.2 15 - 50 % Final     Monocyte Rel %   Date Value Ref Range Status   09/10/2019 7.7 0 - 15 % Final     Eosinophil %   Date  Value Ref Range Status   09/10/2019 0.4 0 - 7 % Final     Basophil Rel %   Date Value Ref Range Status   09/10/2019 0.2 0 - 2 % Final     Immature Grans %   Date Value Ref Range Status   09/10/2019 0.2 (H) 0 % Final     Neutrophils Absolute   Date Value Ref Range Status   09/10/2019 6.14 2.0 - 8.8 10*3/uL Final     Lymphocytes Absolute   Date Value Ref Range Status   09/10/2019 1.42 0.7 - 5.5 10*3/uL Final     Monocytes Absolute   Date Value Ref Range Status   09/10/2019 0.64 0.0 - 1.7 10*3/uL Final     Eosinophils Absolute   Date Value Ref Range Status   09/10/2019 0.03 0.0 - 0.8 10*3/uL Final     Basophils Absolute   Date Value Ref Range Status   09/10/2019 0.02 0.0 - 0.2 10*3/uL Final     Immature Grans, Absolute   Date Value Ref Range Status   09/10/2019 0.02 <1 10*3/uL Final     nRBC   Date Value Ref Range Status   09/10/2019 0 0 /100(WBC) Final       Lab Results   Component Value Date    HGBA1C 6.20 (H) 05/13/2020       No results found for: [id number]    TSH   Date Value Ref Range Status   05/13/2020 1.530 0.270 - 4.200 uIU/mL Final     Comment:     Results may be falsely decreased if patient taking Biotin       No results found for: CHOL  Lab Results   Component Value Date    TRIG 207 (H) 12/09/2019     Lab Results   Component Value Date    HDL 35 (L) 12/09/2019     Lab Results   Component Value Date    LDL 99 12/09/2019     Lab Results   Component Value Date    VLDL 41.4 12/09/2019     No results found for: LDLHDL      Procedures    Assessment/Plan   Problems Addressed this Visit        Cardiovascular and Mediastinum    Hypertension - Primary    Relevant Medications    ramipril (ALTACE) 10 MG capsule    amLODIPine (NORVASC) 10 MG tablet    hydroCHLOROthiazide (HYDRODIURIL) 25 MG tablet    metoprolol tartrate (LOPRESSOR) 25 MG tablet    Other Relevant Orders    Lipid Panel With / Chol / HDL Ratio       Endocrine    Type 2 diabetes mellitus, without long-term current use of insulin (CMS/[facility])    Relevant  Medications    metFORMIN (Glucophage) 1000 MG tablet    glipizide (Glucotrol) 10 MG tablet    Other Relevant Orders    Lipid Panel With / Chol / HDL Ratio    Comprehensive Metabolic Panel    Hemoglobin A1c    Diabetic peripheral neuropathy (CMS/HCC)    Relevant Medications    metFORMIN (Glucophage) 1000 MG tablet    glipizide (Glucotrol) 10 MG tablet    traMADol (ULTRAM) 50 MG tablet    ALPRAZolam (XANAX) 2 MG tablet    gabapentin (NEURONTIN) 300 MG capsule    Hypothyroidism due to acquired atrophy of thyroid    Relevant Medications    levothyroxine (SYNTHROID, LEVOTHROID) 25 MCG tablet    metoprolol tartrate (LOPRESSOR) 25 MG tablet    Other Relevant Orders    TSH    T4, free       Other    Insomnia      Other Visit Diagnoses     Hypothyroidism, unspecified type        Relevant Medications    levothyroxine (SYNTHROID, LEVOTHROID) 25 MCG tablet    metoprolol tartrate (LOPRESSOR) 25 MG tablet      Diagnoses       Codes Comments    Essential hypertension    -  Primary ICD-10-CM: I10  ICD-9-CM: 401.9     Type 2 diabetes mellitus with diabetic polyneuropathy, without long-term current use of insulin (CMS/[facility])     ICD-10-CM: E11.42  ICD-9-CM: 250.60, 357.2     Hypothyroidism due to acquired atrophy of thyroid     ICD-10-CM: E03.4  ICD-9-CM: 244.8, 246.8     Diabetic peripheral neuropathy (CMS/[facility])     ICD-10-CM: E11.42  ICD-9-CM: 250.60, 357.2     Primary insomnia     ICD-10-CM: F51.01  ICD-9-CM: 307.42     Hypothyroidism, unspecified type     ICD-10-CM: E03.9  ICD-9-CM: 244.9         Decrease alprazolam 2mg1/2 at night.     Orders Placed This Encounter   Procedures   • TSH   • T4, free   • Lipid Panel With / Chol / HDL Ratio   • Comprehensive Metabolic Panel   • Hemoglobin A1c       Current Outpatient Medications   Medication Sig Dispense Refill   • ALPRAZolam (XANAX) 2 MG tablet Take 1 tablet by mouth At Night As Needed for Sleep. 90 tablet 1   • amLODIPine (NORVASC) 10 MG tablet Take 1 tablet by mouth Daily. 90 tablet  3   • aspirin 325 MG tablet Take 325 mg by mouth Daily.     • gabapentin (NEURONTIN) 300 MG capsule Take 2 capsules by mouth 3 (Three) Times a Day. 180 capsule 4   • glipizide (Glucotrol) 10 MG tablet Take 1 tablet by mouth 2 (Two) Times a Day Before Meals. 180 tablet 3   • hydroCHLOROthiazide (HYDRODIURIL) 25 MG tablet Take 1 tablet by mouth Daily. 90 tablet 3   • levothyroxine (SYNTHROID, LEVOTHROID) 25 MCG tablet Take 1 tablet by mouth Daily. 90 tablet 3   • metFORMIN (Glucophage) 1000 MG tablet Take 1 tablet by mouth 2 (Two) Times a Day With Meals. 180 tablet 3   • metoprolol tartrate (LOPRESSOR) 25 MG tablet Take 1 tablet by mouth 2 (Two) Times a Day. 180 tablet 3   • nitroglycerin (NITROSTAT) 0.4 MG SL tablet Place 1 tablet under the tongue Every 5 (Five) Minutes As Needed for Chest Pain. Take no more than 3 doses in 15 minutes.  12   • ramipril (ALTACE) 10 MG capsule Take 1 capsule by mouth Daily. 90 capsule 3   • simvastatin (Zocor) 40 MG tablet One three times a week 90 tablet 3   • traMADol (ULTRAM) 50 MG tablet One to two tablets QID prn pain. 360 tablet 3     No current facility-administered medications for this visit.        Eric Kramer had no medications administered during this visit.    Return in about 6 months (around 6/2/2021).    There are no Patient Instructions on file for this visit.

## 2020-12-03 LAB
ALBUMIN SERPL-MCNC: 4 G/DL (ref 3.5–5.2)
ALBUMIN/GLOB SERPL: 1.5 G/DL
ALP SERPL-CCNC: 58 U/L (ref 39–117)
ALT SERPL-CCNC: 13 U/L (ref 1–41)
AST SERPL-CCNC: 13 U/L (ref 1–40)
BILIRUB SERPL-MCNC: 0.5 MG/DL (ref 0–1.2)
BUN SERPL-MCNC: 32 MG/DL (ref 8–23)
BUN/CREAT SERPL: 20 (ref 7–25)
CALCIUM SERPL-MCNC: 9.8 MG/DL (ref 8.6–10.5)
CHLORIDE SERPL-SCNC: 97 MMOL/L (ref 98–107)
CHOLEST SERPL-MCNC: 189 MG/DL (ref 0–200)
CHOLEST/HDLC SERPL: 4.85 {RATIO}
CO2 SERPL-SCNC: 29.2 MMOL/L (ref 22–29)
CREAT SERPL-MCNC: 1.6 MG/DL (ref 0.76–1.27)
GLOBULIN SER CALC-MCNC: 2.7 GM/DL
GLUCOSE SERPL-MCNC: 129 MG/DL (ref 65–99)
HBA1C MFR BLD: 6.4 % (ref 4.8–5.6)
HDLC SERPL-MCNC: 39 MG/DL (ref 40–60)
LDLC SERPL CALC-MCNC: 115 MG/DL (ref 0–100)
POTASSIUM SERPL-SCNC: 4.3 MMOL/L (ref 3.5–5.2)
PROT SERPL-MCNC: 6.7 G/DL (ref 6–8.5)
SODIUM SERPL-SCNC: 139 MMOL/L (ref 136–145)
T4 FREE SERPL-MCNC: 1.12 NG/DL (ref 0.93–1.7)
TRIGL SERPL-MCNC: 199 MG/DL (ref 0–150)
TSH SERPL DL<=0.005 MIU/L-ACNC: 1.96 UIU/ML (ref 0.27–4.2)
VLDLC SERPL CALC-MCNC: 35 MG/DL (ref 5–40)

## 2021-06-03 ENCOUNTER — OFFICE VISIT (OUTPATIENT)
Dept: FAMILY MEDICINE CLINIC | Facility: CLINIC | Age: 81
End: 2021-06-03

## 2021-06-03 VITALS
BODY MASS INDEX: 31.57 KG/M2 | TEMPERATURE: 97.3 F | HEIGHT: 74 IN | OXYGEN SATURATION: 97 % | HEART RATE: 60 BPM | WEIGHT: 246 LBS | DIASTOLIC BLOOD PRESSURE: 74 MMHG | SYSTOLIC BLOOD PRESSURE: 142 MMHG

## 2021-06-03 DIAGNOSIS — E11.42 TYPE 2 DIABETES MELLITUS WITH DIABETIC POLYNEUROPATHY, WITHOUT LONG-TERM CURRENT USE OF INSULIN (HCC): ICD-10-CM

## 2021-06-03 DIAGNOSIS — Z00.00 MEDICARE ANNUAL WELLNESS VISIT, SUBSEQUENT: Primary | ICD-10-CM

## 2021-06-03 DIAGNOSIS — I10 ESSENTIAL HYPERTENSION: ICD-10-CM

## 2021-06-03 DIAGNOSIS — E78.49 OTHER HYPERLIPIDEMIA: ICD-10-CM

## 2021-06-03 DIAGNOSIS — E03.9 HYPOTHYROIDISM, UNSPECIFIED TYPE: ICD-10-CM

## 2021-06-03 DIAGNOSIS — E11.42 DIABETIC PERIPHERAL NEUROPATHY (HCC): ICD-10-CM

## 2021-06-03 PROCEDURE — G0439 PPPS, SUBSEQ VISIT: HCPCS | Performed by: FAMILY MEDICINE

## 2021-06-03 PROCEDURE — 99214 OFFICE O/P EST MOD 30 MIN: CPT | Performed by: FAMILY MEDICINE

## 2021-06-03 RX ORDER — HYDROCHLOROTHIAZIDE 25 MG/1
25 TABLET ORAL DAILY
Qty: 90 TABLET | Refills: 3 | Status: SHIPPED | OUTPATIENT
Start: 2021-06-03 | End: 2021-12-07 | Stop reason: SDUPTHER

## 2021-06-03 RX ORDER — GABAPENTIN 300 MG/1
CAPSULE ORAL
Qty: 180 CAPSULE | Refills: 3 | Status: SHIPPED | OUTPATIENT
Start: 2021-06-03 | End: 2021-06-03 | Stop reason: SDUPTHER

## 2021-06-03 RX ORDER — GABAPENTIN 300 MG/1
CAPSULE ORAL
Qty: 180 CAPSULE | Refills: 3 | Status: SHIPPED | OUTPATIENT
Start: 2021-06-03 | End: 2021-12-07 | Stop reason: SDUPTHER

## 2021-06-03 RX ORDER — TRAMADOL HYDROCHLORIDE 50 MG/1
TABLET ORAL
Qty: 240 TABLET | Refills: 3 | Status: SHIPPED | OUTPATIENT
Start: 2021-06-03 | End: 2021-12-07 | Stop reason: SDUPTHER

## 2021-06-03 RX ORDER — RAMIPRIL 10 MG/1
10 CAPSULE ORAL DAILY
Qty: 90 CAPSULE | Refills: 3 | Status: SHIPPED | OUTPATIENT
Start: 2021-06-03 | End: 2021-12-07 | Stop reason: SDUPTHER

## 2021-06-03 RX ORDER — GLIPIZIDE 10 MG/1
10 TABLET ORAL
Qty: 180 TABLET | Refills: 3 | Status: SHIPPED | OUTPATIENT
Start: 2021-06-03 | End: 2021-12-07 | Stop reason: SDUPTHER

## 2021-06-03 RX ORDER — NITROGLYCERIN 0.4 MG/1
0.4 TABLET SUBLINGUAL
Qty: 25 TABLET | Refills: 12 | Status: SHIPPED | OUTPATIENT
Start: 2021-06-03

## 2021-06-03 RX ORDER — AMLODIPINE BESYLATE 10 MG/1
10 TABLET ORAL DAILY
Qty: 90 TABLET | Refills: 3 | Status: SHIPPED | OUTPATIENT
Start: 2021-06-03 | End: 2021-12-07 | Stop reason: SDUPTHER

## 2021-06-03 RX ORDER — ALPRAZOLAM 2 MG/1
2 TABLET ORAL NIGHTLY PRN
Qty: 90 TABLET | Refills: 1 | Status: SHIPPED | OUTPATIENT
Start: 2021-06-03 | End: 2021-12-07 | Stop reason: SDUPTHER

## 2021-06-03 RX ORDER — SIMVASTATIN 40 MG
TABLET ORAL
Qty: 90 TABLET | Refills: 3 | Status: SHIPPED | OUTPATIENT
Start: 2021-06-03 | End: 2022-06-08 | Stop reason: SDUPTHER

## 2021-06-03 RX ORDER — LEVOTHYROXINE SODIUM 0.03 MG/1
25 TABLET ORAL DAILY
Qty: 90 TABLET | Refills: 3 | Status: SHIPPED | OUTPATIENT
Start: 2021-06-03 | End: 2021-12-07 | Stop reason: SDUPTHER

## 2021-06-03 NOTE — PROGRESS NOTES
The ABCs of the Annual Wellness Visit  Subsequent Medicare Wellness Visit    Chief Complaint   Patient presents with   • Medicare Wellness-subsequent       Subjective   History of Present Illness:  Eric Kramer is a 81 y.o. male who presents for a Subsequent Medicare Wellness Visit.  F/U HTN.  No orhtostasis.  Doing well with meds.    F/U DM2.   Doing weel with meds.    FU peripheral neuropathy.  Doing well with gabapentin 300 2 BID to TId.  Tramadol 50 2 TID to QID.    F/U hypothyroiidsm.  Doing wel with meds.    HEALTH RISK ASSESSMENT    Recent Hospitalizations:  No hospitalization(s) within the last year.    Current Medical Providers:  Patient Care Team:  Elder Block MD as PCP - General (Family Medicine)    Smoking Status:  Social History     Tobacco Use   Smoking Status Former Smoker   Smokeless Tobacco Never Used       Alcohol Consumption:  Social History     Substance and Sexual Activity   Alcohol Use Yes    Comment: 1-2 a month--Caffeine use-2 reg coffee daily       Depression Screen:   PHQ-2/PHQ-9 Depression Screening 12/9/2019   Little interest or pleasure in doing things 0   Feeling down, depressed, or hopeless 0   Total Score 0       Fall Risk Screen:  STEADI Fall Risk Assessment has not been completed.    Health Habits and Functional and Cognitive Screening:  Functional & Cognitive Status 6/3/2021   Do you have difficulty preparing food and eating? No   Do you have difficulty bathing yourself, getting dressed or grooming yourself? No   Do you have difficulty using the toilet? No   Do you have difficulty moving around from place to place? No   Do you have trouble with steps or getting out of a bed or a chair? No   Current Diet Well Balanced Diet   Dental Exam Not up to date   Eye Exam Not up to date   Exercise (times per week) 3 times per week   Current Exercises Include Yard Work   Current Exercise Activities Include -   Do you need help using the phone?  No   Are you deaf or do you have serious  difficulty hearing?  No   Do you need help with transportation? No   Do you need help shopping? No   Do you need help preparing meals?  No   Do you need help with housework?  No   Do you need help with laundry? No   Do you need help taking your medications? No   Do you need help managing money? No   Do you ever drive or ride in a car without wearing a seat belt? No   Have you felt unusual stress, anger or loneliness in the last month? No   Who do you live with? Spouse   If you need help, do you have trouble finding someone available to you? No   Have you been bothered in the last four weeks by sexual problems? No   Do you have difficulty concentrating, remembering or making decisions? Yes         Does the patient have evidence of cognitive impairment? No    Asprin use counseling:Taking ASA appropriately as indicated    Age-appropriate Screening Schedule:  Refer to the list below for future screening recommendations based on patient's age, sex and/or medical conditions. Orders for these recommended tests are listed in the plan section. The patient has been provided with a written plan.    Health Maintenance   Topic Date Due   • URINE MICROALBUMIN  Never done   • TDAP/TD VACCINES (1 - Tdap) Never done   • ZOSTER VACCINE (1 of 2) Never done   • DIABETIC EYE EXAM  Never done   • HEMOGLOBIN A1C  06/02/2021   • INFLUENZA VACCINE  08/01/2021   • LIPID PANEL  12/02/2021          The following portions of the patient's history were reviewed and updated as appropriate: allergies, current medications, past family history, past medical history, past social history, past surgical history and problem list.    Outpatient Medications Prior to Visit   Medication Sig Dispense Refill   • ALPRAZolam (XANAX) 2 MG tablet Take 1 tablet by mouth At Night As Needed for Sleep. 90 tablet 1   • amLODIPine (NORVASC) 10 MG tablet Take 1 tablet by mouth Daily. 90 tablet 3   • aspirin 325 MG tablet Take 325 mg by mouth Daily.     • gabapentin  (NEURONTIN) 300 MG capsule Take 2 capsules by mouth 3 (Three) Times a Day. 180 capsule 4   • glipizide (Glucotrol) 10 MG tablet Take 1 tablet by mouth 2 (Two) Times a Day Before Meals. 180 tablet 3   • hydroCHLOROthiazide (HYDRODIURIL) 25 MG tablet Take 1 tablet by mouth Daily. 90 tablet 3   • levothyroxine (SYNTHROID, LEVOTHROID) 25 MCG tablet Take 1 tablet by mouth Daily. 90 tablet 3   • metFORMIN (Glucophage) 1000 MG tablet Take 1 tablet by mouth 2 (Two) Times a Day With Meals. 180 tablet 3   • metoprolol tartrate (LOPRESSOR) 25 MG tablet Take 1 tablet by mouth 2 (Two) Times a Day. 180 tablet 3   • nitroglycerin (NITROSTAT) 0.4 MG SL tablet Place 1 tablet under the tongue Every 5 (Five) Minutes As Needed for Chest Pain. Take no more than 3 doses in 15 minutes.  12   • ramipril (ALTACE) 10 MG capsule Take 1 capsule by mouth Daily. 90 capsule 3   • simvastatin (Zocor) 40 MG tablet One three times a week 90 tablet 3   • traMADol (ULTRAM) 50 MG tablet One to two tablets QID prn pain. 360 tablet 3     No facility-administered medications prior to visit.       Patient Active Problem List   Diagnosis   • Type 2 diabetes mellitus, without long-term current use of insulin (CMS/HCC)   • Stroke (CMS/HCC)   • Prostatitis   • Osteoarthritis   • Nephrolithiasis   • Memory impairment   • Malignant neoplasm in situ of colon   • Insomnia   • Hypertension   • Diabetic peripheral neuropathy (CMS/HCC)   • Benign enlargement of prostate   • Arteriosclerosis of coronary artery   • Diabetic ulcer of toe of right foot associated with diabetes mellitus due to underlying condition, limited to breakdown of skin (CMS/HCC)   • MRSA bacteremia   • Hospital discharge follow-up   • Medicare annual wellness visit, subsequent   • Hypothyroidism due to acquired atrophy of thyroid   • Other hyperlipidemia       Advanced Care Planning:  ACP discussion was held with the patient during this visit. Patient has an advance directive (not in EMR), copy  "requested.    Review of Systems   Constitutional: Negative for activity change, appetite change and fatigue.   HENT: Negative for hearing loss and postnasal drip.    Eyes: Negative for discharge and itching.   Respiratory: Negative for cough and shortness of breath.    Cardiovascular: Negative for chest pain and leg swelling.   Gastrointestinal: Negative for abdominal distention and abdominal pain.   Endocrine: Negative for cold intolerance and heat intolerance.   Genitourinary: Negative for difficulty urinating and flank pain.   Musculoskeletal: Negative for arthralgias and myalgias.   Skin: Negative for color change.   Neurological: Positive for numbness. Negative for dizziness and facial asymmetry.   Hematological: Negative for adenopathy.   Psychiatric/Behavioral: Negative for agitation and confusion.       Compared to one year ago, the patient feels his physical health is the same.  Compared to one year ago, the patient feels his mental health is the same.    Reviewed chart for potential of high risk medication in the elderly: yes  Reviewed chart for potential of harmful drug interactions in the elderly:yes    Objective         Vitals:    06/03/21 0902   BP: 142/74   BP Location: Right arm   Patient Position: Sitting   Pulse: 60   Temp: 97.3 °F (36.3 °C)   TempSrc: Temporal   SpO2: 97%   Weight: 112 kg (246 lb)   Height: 188 cm (74.02\")       Body mass index is 31.57 kg/m².  Discussed the patient's BMI with him. The BMI is in the acceptable range.    Physical Exam  Vitals reviewed.   Constitutional:       Appearance: He is well-developed. He is not diaphoretic.   HENT:      Head: Normocephalic and atraumatic.   Eyes:      General: No scleral icterus.     Pupils: Pupils are equal, round, and reactive to light.   Neck:      Thyroid: No thyromegaly.   Cardiovascular:      Rate and Rhythm: Normal rate and regular rhythm.      Heart sounds: No murmur heard.   No friction rub. No gallop.    Pulmonary:      Effort: " Pulmonary effort is normal. No respiratory distress.      Breath sounds: No wheezing or rales.   Chest:      Chest wall: No tenderness.   Abdominal:      General: Bowel sounds are normal. There is no distension.      Palpations: Abdomen is soft.      Tenderness: There is no abdominal tenderness.   Musculoskeletal:         General: No deformity. Normal range of motion.   Lymphadenopathy:      Cervical: No cervical adenopathy.   Skin:     General: Skin is warm and dry.      Findings: No rash.   Neurological:      Cranial Nerves: No cranial nerve deficit.      Motor: No abnormal muscle tone.               Assessment/Plan   Medicare Risks and Personalized Health Plan  CMS Preventative Services Quick Reference  Inactivity/Sedentary    The above risks/problems have been discussed with the patient.  Pertinent information has been shared with the patient in the After Visit Summary.  Follow up plans and orders are seen below in the Assessment/Plan Section.    Diagnoses and all orders for this visit:    1. Medicare annual wellness visit, subsequent (Primary)    2. Type 2 diabetes mellitus with diabetic polyneuropathy, without long-term current use of insulin (CMS/[facility])    3. Other hyperlipidemia    4. Essential hypertension    5. Diabetic peripheral neuropathy (CMS/[facility])    DM2.  Check labs.  RF glipizide 10 BID, cointinue metformin.    Diabetic neuropathy.  Uncontrolled.  RF gabapentin and tramadol. Decrease tramadol to 50 two BID prn pain.   Kesha appropriate today.  Responsible use noted.    HTN.  Controlled.  Continue amlodipine, hctz,  ramipril and metoprolol.     Follow Up:  No follow-ups on file.     An After Visit Summary and PPPS were given to the patient.     Preventive Counseling:  Encouraged to stay active.  Encouraged to see optho and dentis.  Covid utd.  Pneumovax utd.      Medicare wellness portion 12 minutes.    [id number] portion 11 minutes.

## 2021-06-04 LAB
ALBUMIN SERPL-MCNC: 4.4 G/DL (ref 3.5–5.2)
ALBUMIN/GLOB SERPL: 1.6 G/DL
ALP SERPL-CCNC: 63 U/L (ref 39–117)
ALT SERPL-CCNC: 12 U/L (ref 1–41)
AST SERPL-CCNC: 11 U/L (ref 1–40)
BILIRUB SERPL-MCNC: 0.5 MG/DL (ref 0–1.2)
BUN SERPL-MCNC: 23 MG/DL (ref 8–23)
BUN/CREAT SERPL: 14.7 (ref 7–25)
CALCIUM SERPL-MCNC: 9.9 MG/DL (ref 8.6–10.5)
CHLORIDE SERPL-SCNC: 100 MMOL/L (ref 98–107)
CHOLEST SERPL-MCNC: 193 MG/DL (ref 0–200)
CHOLEST/HDLC SERPL: 5.22 {RATIO}
CO2 SERPL-SCNC: 30.5 MMOL/L (ref 22–29)
CREAT SERPL-MCNC: 1.56 MG/DL (ref 0.76–1.27)
GLOBULIN SER CALC-MCNC: 2.7 GM/DL
GLUCOSE SERPL-MCNC: 117 MG/DL (ref 65–99)
HBA1C MFR BLD: 6.5 % (ref 4.8–5.6)
HDLC SERPL-MCNC: 37 MG/DL (ref 40–60)
LDLC SERPL CALC-MCNC: 114 MG/DL (ref 0–100)
POTASSIUM SERPL-SCNC: 4.6 MMOL/L (ref 3.5–5.2)
PROT SERPL-MCNC: 7.1 G/DL (ref 6–8.5)
SODIUM SERPL-SCNC: 140 MMOL/L (ref 136–145)
TRIGL SERPL-MCNC: 242 MG/DL (ref 0–150)
TSH SERPL DL<=0.005 MIU/L-ACNC: 2.81 UIU/ML (ref 0.27–4.2)
VLDLC SERPL CALC-MCNC: 42 MG/DL (ref 5–40)

## 2021-07-09 ENCOUNTER — TELEPHONE (OUTPATIENT)
Dept: FAMILY MEDICINE CLINIC | Facility: CLINIC | Age: 81
End: 2021-07-09

## 2021-07-09 NOTE — TELEPHONE ENCOUNTER
Caller: GEOFF KARIMI    Relationship: Emergency Contact    Best call back number: [phone] (H)    What is the best time to reach you: ANYTIME    Who are you requesting to speak with (clinical staff, provider,  specific staff member): DR BURGOS    What was the call regarding: PATIENT WIFE CALLING IN REGARDS TO RECEIVING A CALL FROM [facility] STATING THAT PATIENT IS NEEDING TO HAVE A CARDIAC SALIVA TEST MAILED TO THEM PATIENT WIFE IS CALLING TO VERIFY FROM DR BURGOS IF HE NEEDS TO HAVE THIS DONE    Do you require a callback: YES

## 2021-07-09 NOTE — TELEPHONE ENCOUNTER
PATIENTS WIFE CALLING STATING  OFFICE ATTENTIONED TO [facility] FOR MEDICAL CLEARANCE FOR SURGERY OF HIS TOE      PLEASE ADVISE  [phone]

## 2021-07-09 NOTE — TELEPHONE ENCOUNTER
PATIENT'S WIFE, GEOFF CALLED AND STATED THAT SHE WOULD LIKE TO DISREGARD THIS MESSAGE, AS THEY HAVE DECIDED TO GO ANOTHER WAY BESIDES THE CARDIAC SALIVA TEST.

## 2021-07-12 ENCOUNTER — TELEPHONE (OUTPATIENT)
Dept: FAMILY MEDICINE CLINIC | Facility: CLINIC | Age: 81
End: 2021-07-12

## 2021-07-12 NOTE — TELEPHONE ENCOUNTER
PATIENTS SPOUSE CALLED IN WANTING TO KNOW IF THE PATIENT CAN GET A WRITTEN CLEARANCE FOR HIS TOE SURGERY OR DOES HE NEED TO BE SEEN?    BEST CALL BACK # [phone]

## 2021-07-12 NOTE — TELEPHONE ENCOUNTER
Surgery clearance paperwork received and per Dr Seb rose to schedule patient. Patient scheduled 7/13/2021.

## 2021-07-13 ENCOUNTER — OFFICE VISIT (OUTPATIENT)
Dept: FAMILY MEDICINE CLINIC | Facility: CLINIC | Age: 81
End: 2021-07-13

## 2021-07-13 VITALS
TEMPERATURE: 97.9 F | OXYGEN SATURATION: 95 % | DIASTOLIC BLOOD PRESSURE: 70 MMHG | HEART RATE: 66 BPM | SYSTOLIC BLOOD PRESSURE: 130 MMHG | HEIGHT: 74 IN | BODY MASS INDEX: 31.57 KG/M2 | WEIGHT: 246 LBS

## 2021-07-13 DIAGNOSIS — E11.01 TYPE 2 DIABETES MELLITUS WITH HYPEROSMOLAR COMA, WITHOUT LONG-TERM CURRENT USE OF INSULIN (HCC): Primary | ICD-10-CM

## 2021-07-13 DIAGNOSIS — E78.49 OTHER HYPERLIPIDEMIA: ICD-10-CM

## 2021-07-13 DIAGNOSIS — I10 ESSENTIAL HYPERTENSION: ICD-10-CM

## 2021-07-13 PROCEDURE — 99214 OFFICE O/P EST MOD 30 MIN: CPT | Performed by: FAMILY MEDICINE

## 2021-07-13 PROCEDURE — 93000 ELECTROCARDIOGRAM COMPLETE: CPT | Performed by: FAMILY MEDICINE

## 2021-07-13 NOTE — PROGRESS NOTES
"Chief Complaint  Surgical Clearance    Subjective          Eric Kramer presents to [facility]  History of Present Illness this is a new pt to me.    PT is having rt foot surgery in few weeks and needs a clearance.  Surgery will be on big toe and last about 30 minutes.    DM- last a1c was 6.5%  HTN-No CP, HA or blurred vision  HLD- no myalgia  Objective   Vital Signs:   /70   Pulse 66   Temp 97.9 °F (36.6 °C)   Ht 188 cm (74.02\")   Wt 112 kg (246 lb)   SpO2 95%   BMI 31.57 kg/m²     Physical Exam  Constitutional:       General: He is not in acute distress.     Appearance: Normal appearance. He is not ill-appearing, toxic-appearing or diaphoretic.   HENT:      Head: Normocephalic and atraumatic.      Nose: Nose normal.      Mouth/Throat:      Mouth: Mucous membranes are moist.      Pharynx: Oropharynx is clear.   Eyes:      Extraocular Movements: Extraocular movements intact.      Conjunctiva/sclera: Conjunctivae normal.      Pupils: Pupils are equal, round, and reactive to light.   Neck:      Vascular: No carotid bruit.   Cardiovascular:      Rate and Rhythm: Normal rate and regular rhythm.      Heart sounds: Normal heart sounds. No murmur heard.     Pulmonary:      Effort: Pulmonary effort is normal. No respiratory distress.      Breath sounds: Normal breath sounds. No stridor. No wheezing, rhonchi or rales.   Chest:      Chest wall: No tenderness.   Abdominal:      Palpations: Abdomen is soft.   Musculoskeletal:      Cervical back: Normal range of motion and neck supple. No rigidity or tenderness.   Lymphadenopathy:      Cervical: No cervical adenopathy.   Neurological:      General: No focal deficit present.      Mental Status: He is alert and oriented to person, place, and time. Mental status is at baseline.      Gait: Gait normal.   Psychiatric:         Mood and Affect: Mood normal.         Thought Content: Thought content normal.         Judgment: Judgment normal. "        Result Review :            ECG 12 Lead    Date/Time: 7/13/2021 1:52 PM  Performed by: Sandra Grigsby MD  Authorized by: Sandra Grigsby MD   Comparison: not compared with previous ECG   Rhythm: sinus rhythm  Rate: normal  Conduction: conduction normal  ST Segments: ST segments normal  T Waves: T waves normal  QRS axis: normal  Other: no other findings    Clinical impression: normal ECG              Assessment and Plan    Diagnoses and all orders for this visit:    1. Type 2 diabetes mellitus with hyperosmolar coma, without long-term current use of insulin (CMS/[facility]) (Primary)    2. Other hyperlipidemia    3. Essential hypertension  -     ECG 12 Lead  -     Basic Metabolic Panel        Follow Up   No follow-ups on file.  Patient was given instructions and counseling regarding his condition or for health maintenance advice. Please see specific information pulled into the AVS if appropriate.     PT is medically cleared for surgery as long as normal labs.  Refilled meds. And reviewed his old records.

## 2021-07-14 ENCOUNTER — TELEPHONE (OUTPATIENT)
Dept: FAMILY MEDICINE CLINIC | Facility: CLINIC | Age: 81
End: 2021-07-14

## 2021-07-14 LAB
BUN SERPL-MCNC: 26 MG/DL (ref 8–23)
BUN/CREAT SERPL: 17 (ref 7–25)
CALCIUM SERPL-MCNC: 10 MG/DL (ref 8.6–10.5)
CHLORIDE SERPL-SCNC: 99 MMOL/L (ref 98–107)
CO2 SERPL-SCNC: 28.3 MMOL/L (ref 22–29)
CREAT SERPL-MCNC: 1.53 MG/DL (ref 0.76–1.27)
GLUCOSE SERPL-MCNC: 143 MG/DL (ref 65–99)
POTASSIUM SERPL-SCNC: 4.7 MMOL/L (ref 3.5–5.2)
SODIUM SERPL-SCNC: 142 MMOL/L (ref 136–145)

## 2021-07-14 NOTE — TELEPHONE ENCOUNTER
Perfecto from [facility] called regarding pre authorization form that was sent over on 07/08.  His surgery is next week and they are needing that please.  Her number is [phone].

## 2021-12-07 ENCOUNTER — OFFICE VISIT (OUTPATIENT)
Dept: FAMILY MEDICINE CLINIC | Facility: CLINIC | Age: 81
End: 2021-12-07

## 2021-12-07 VITALS
OXYGEN SATURATION: 98 % | TEMPERATURE: 98.6 F | DIASTOLIC BLOOD PRESSURE: 72 MMHG | BODY MASS INDEX: 30.54 KG/M2 | HEIGHT: 74 IN | WEIGHT: 238 LBS | HEART RATE: 68 BPM | SYSTOLIC BLOOD PRESSURE: 130 MMHG

## 2021-12-07 DIAGNOSIS — I10 PRIMARY HYPERTENSION: Primary | ICD-10-CM

## 2021-12-07 DIAGNOSIS — E11.42 DIABETIC PERIPHERAL NEUROPATHY (HCC): ICD-10-CM

## 2021-12-07 DIAGNOSIS — E03.9 HYPOTHYROIDISM, UNSPECIFIED TYPE: ICD-10-CM

## 2021-12-07 DIAGNOSIS — E78.49 OTHER HYPERLIPIDEMIA: ICD-10-CM

## 2021-12-07 DIAGNOSIS — E11.01 TYPE 2 DIABETES MELLITUS WITH HYPEROSMOLAR COMA, WITHOUT LONG-TERM CURRENT USE OF INSULIN (HCC): ICD-10-CM

## 2021-12-07 PROCEDURE — 99214 OFFICE O/P EST MOD 30 MIN: CPT | Performed by: FAMILY MEDICINE

## 2021-12-07 RX ORDER — ALPRAZOLAM 2 MG/1
2 TABLET ORAL NIGHTLY PRN
Qty: 90 TABLET | Refills: 1 | Status: SHIPPED | OUTPATIENT
Start: 2021-12-07 | End: 2022-06-08 | Stop reason: SDUPTHER

## 2021-12-07 RX ORDER — RAMIPRIL 10 MG/1
10 CAPSULE ORAL DAILY
Qty: 90 CAPSULE | Refills: 3 | Status: SHIPPED | OUTPATIENT
Start: 2021-12-07 | End: 2022-12-01 | Stop reason: SDUPTHER

## 2021-12-07 RX ORDER — TRAMADOL HYDROCHLORIDE 50 MG/1
TABLET ORAL
Qty: 240 TABLET | Refills: 3 | Status: SHIPPED | OUTPATIENT
Start: 2021-12-07 | End: 2022-06-08 | Stop reason: SDUPTHER

## 2021-12-07 RX ORDER — GABAPENTIN 300 MG/1
CAPSULE ORAL
Qty: 180 CAPSULE | Refills: 3 | Status: SHIPPED | OUTPATIENT
Start: 2021-12-07 | End: 2022-06-08 | Stop reason: SDUPTHER

## 2021-12-07 RX ORDER — LEVOTHYROXINE SODIUM 0.03 MG/1
25 TABLET ORAL DAILY
Qty: 90 TABLET | Refills: 3 | Status: SHIPPED | OUTPATIENT
Start: 2021-12-07 | End: 2022-12-01 | Stop reason: SDUPTHER

## 2021-12-07 RX ORDER — HYDROCHLOROTHIAZIDE 25 MG/1
25 TABLET ORAL DAILY
Qty: 90 TABLET | Refills: 3 | Status: SHIPPED | OUTPATIENT
Start: 2021-12-07 | End: 2022-06-10

## 2021-12-07 RX ORDER — AMLODIPINE BESYLATE 10 MG/1
10 TABLET ORAL DAILY
Qty: 90 TABLET | Refills: 3 | Status: SHIPPED | OUTPATIENT
Start: 2021-12-07 | End: 2022-12-01 | Stop reason: SDUPTHER

## 2021-12-07 RX ORDER — GLIPIZIDE 10 MG/1
10 TABLET ORAL
Qty: 180 TABLET | Refills: 3 | Status: SHIPPED | OUTPATIENT
Start: 2021-12-07 | End: 2022-12-01 | Stop reason: SDUPTHER

## 2021-12-07 NOTE — PROGRESS NOTES
Chief Complaint   Patient presents with   • Diabetes     does not do flu shot       Subjective   Eric Kramer is a 81 y.o. male.     History of Present Illness   F/U DM2.  Doing well with meds.    F/u HTN.  No orthostasis.  Needs refills.   F?u diabetic peripheral neuropathy.  Doing well with meds.  Needs refills.      The following portions of the patient's history were reviewed and updated as appropriate: allergies, current medications, past family history, past medical history, past social history, past surgical history and problem list.    Review of Systems   Constitutional: Negative for appetite change and fatigue.   HENT: Negative for nosebleeds and sore throat.    Eyes: Negative for blurred vision and visual disturbance.   Respiratory: Negative for shortness of breath and wheezing.    Cardiovascular: Negative for chest pain and leg swelling.   Gastrointestinal: Negative for abdominal distention and abdominal pain.   Endocrine: Negative for cold intolerance and polyuria.   Genitourinary: Negative for dysuria and hematuria.   Musculoskeletal: Negative for arthralgias and myalgias.   Skin: Negative for color change and rash.   Neurological: Negative for weakness and confusion.   Psychiatric/Behavioral: Negative for agitation and depressed mood.       Patient Active Problem List   Diagnosis   • Type 2 diabetes mellitus, without long-term current use of insulin (HCC)   • Stroke (HCC)   • Prostatitis   • Osteoarthritis   • Nephrolithiasis   • Memory impairment   • Malignant neoplasm in situ of colon   • Insomnia   • Hypertension   • Diabetic peripheral neuropathy (HCC)   • Benign enlargement of prostate   • Arteriosclerosis of coronary artery   • Diabetic ulcer of toe of right foot associated with diabetes mellitus due to underlying condition, limited to breakdown of skin (HCC)   • MRSA bacteremia   • Hospital discharge follow-up   • Medicare annual wellness visit, subsequent   • Hypothyroidism due to acquired  atrophy of thyroid   • Other hyperlipidemia       No Known Allergies      Current Outpatient Medications:   •  ALPRAZolam (XANAX) 2 MG tablet, Take 1 tablet by mouth At Night As Needed for Sleep., Disp: 90 tablet, Rfl: 1  •  amLODIPine (NORVASC) 10 MG tablet, Take 1 tablet by mouth Daily., Disp: 90 tablet, Rfl: 3  •  aspirin 325 MG tablet, Take 325 mg by mouth Daily., Disp: , Rfl:   •  gabapentin (NEURONTIN) 300 MG capsule, 2 capsules BID, Disp: 180 capsule, Rfl: 3  •  glipizide (Glucotrol) 10 MG tablet, Take 1 tablet by mouth 2 (Two) Times a Day Before Meals., Disp: 180 tablet, Rfl: 3  •  hydroCHLOROthiazide (HYDRODIURIL) 25 MG tablet, Take 1 tablet by mouth Daily., Disp: 90 tablet, Rfl: 3  •  levothyroxine (SYNTHROID, LEVOTHROID) 25 MCG tablet, Take 1 tablet by mouth Daily., Disp: 90 tablet, Rfl: 3  •  metFORMIN (Glucophage) 1000 MG tablet, Take 1 tablet by mouth 2 (Two) Times a Day With Meals., Disp: 180 tablet, Rfl: 3  •  metoprolol tartrate (LOPRESSOR) 25 MG tablet, Take 1 tablet by mouth 2 (Two) Times a Day., Disp: 180 tablet, Rfl: 3  •  nitroglycerin (Nitrostat) 0.4 MG SL tablet, Place 1 tablet under the tongue Every 5 (Five) Minutes As Needed for Chest Pain. Take no more than 3 doses in 15 minutes., Disp: 25 tablet, Rfl: 12  •  ramipril (ALTACE) 10 MG capsule, Take 1 capsule by mouth Daily., Disp: 90 capsule, Rfl: 3  •  simvastatin (Zocor) 40 MG tablet, One three times a week, Disp: 90 tablet, Rfl: 3  •  traMADol (ULTRAM) 50 MG tablet, One to two tablets QID prn pain., Disp: 240 tablet, Rfl: 3    Past Medical History:   Diagnosis Date   • Acute foot pain, left    • Acute UTI    • Arteriosclerosis of coronary artery    • Basal cell carcinoma (BCC) of face    • Benign enlargement of prostate    • Cataract    • Chest pain    • Diabetic peripheral neuropathy (HCC)    • Edema    • Hyperlipidemia    • Hypertension    • Insomnia    • Malignant neoplasm in situ of colon    • Memory impairment     MMSE  27/30-5/20/2019   • Nephrolithiasis    • Osteoarthritis    • Prostatitis    • Renal azotemia    • Stroke (HCC)     stroke syndrome   • Type 2 diabetes mellitus (HCC)        Past Surgical History:   Procedure Laterality Date   • ANGIOPLASTY Right     Cath Laser Angioplasty/right ventricle   • APPENDECTOMY     • CATARACT EXTRACTION Left    • COLECTOMY PARTIAL / TOTAL      due to colon cancer, removed 13 inches of colon   • CORONARY ANGIOPLASTY WITH STENT PLACEMENT     • TOE SURGERY Right 07/2021       Family History   Problem Relation Age of Onset   • Colon cancer Mother    • Hypertension Mother    • Osteoarthritis Mother    • Emphysema Father         smoker/tobacco use   • Hypertension Father    • Osteoarthritis Father    • Rheum arthritis Sister    • Colon cancer Maternal Grandmother    • Cataracts Maternal Grandmother    • Colon cancer Other        Social History     Tobacco Use   • Smoking status: Former Smoker   • Smokeless tobacco: Never Used   Substance Use Topics   • Alcohol use: Yes     Comment: 1-2 a month--Caffeine use-2 reg coffee daily            Objective     Vitals:    12/07/21 0954   BP: 130/72   Pulse: 68   Temp: 98.6 °F (37 °C)   SpO2: 98%     Body mass index is 30.56 kg/m².    Physical Exam  Vitals reviewed.   Constitutional:       Appearance: He is well-developed. He is not diaphoretic.   HENT:      Head: Normocephalic and atraumatic.   Eyes:      General: No scleral icterus.     Pupils: Pupils are equal, round, and reactive to light.   Neck:      Thyroid: No thyromegaly.   Cardiovascular:      Rate and Rhythm: Normal rate and regular rhythm.      Heart sounds: No murmur heard.  No friction rub. No gallop.    Pulmonary:      Effort: Pulmonary effort is normal. No respiratory distress.      Breath sounds: No wheezing or rales.   Chest:      Chest wall: No tenderness.   Abdominal:      General: Bowel sounds are normal. There is no distension.      Palpations: Abdomen is soft.      Tenderness: There  is no abdominal tenderness.   Musculoskeletal:         General: No deformity. Normal range of motion.   Lymphadenopathy:      Cervical: No cervical adenopathy.   Skin:     General: Skin is warm and dry.      Findings: No rash.   Neurological:      Cranial Nerves: No cranial nerve deficit.      Motor: No abnormal muscle tone.         Lab Results   Component Value Date    GLUCOSE 143 (H) 07/13/2021    BUN 26 (H) 07/13/2021    CREATININE 1.53 (H) 07/13/2021    EGFRIFNONA 44 (L) 07/13/2021    EGFRIFAFRI 53 (L) 07/13/2021    BCR 17.0 07/13/2021    K 4.7 07/13/2021    CO2 28.3 07/13/2021    CALCIUM 10.0 07/13/2021    PROTENTOTREF 7.1 06/03/2021    ALBUMIN 4.40 06/03/2021    LABIL2 1.6 06/03/2021    AST 11 06/03/2021    ALT 12 06/03/2021       WBC   Date Value Ref Range Status   09/10/2019 8.27 4.5 - 11.0 10*3/uL Final     RBC   Date Value Ref Range Status   09/10/2019 4.26 (L) 4.5 - 5.9 10*6/uL Final     Hemoglobin   Date Value Ref Range Status   09/10/2019 12.5 (L) 13.5 - 17.5 g/dL Final     Hematocrit   Date Value Ref Range Status   09/10/2019 39.2 (L) 41.0 - 53.0 % Final     MCV   Date Value Ref Range Status   09/10/2019 92.0 80.0 - 100.0 fL Final     MCH   Date Value Ref Range Status   09/10/2019 29.3 26.0 - 34.0 pg Final     MCHC   Date Value Ref Range Status   09/10/2019 31.9 31.0 - 37.0 g/dL Final     RDW   Date Value Ref Range Status   09/10/2019 14.2 12.0 - 16.8 % Final     MPV   Date Value Ref Range Status   09/10/2019 9.0 6.7 - 10.8 fL Final     Platelets   Date Value Ref Range Status   09/10/2019 363 140 - 440 10*3/uL Final     Neutrophil Rel %   Date Value Ref Range Status   09/10/2019 74.3 45 - 80 % Final     Lymphocyte Rel %   Date Value Ref Range Status   09/10/2019 17.2 15 - 50 % Final     Monocyte Rel %   Date Value Ref Range Status   09/10/2019 7.7 0 - 15 % Final     Eosinophil %   Date Value Ref Range Status   09/10/2019 0.4 0 - 7 % Final     Basophil Rel %   Date Value Ref Range Status   09/10/2019  0.2 0 - 2 % Final     Immature Grans %   Date Value Ref Range Status   09/10/2019 0.2 (H) 0 % Final     Neutrophils Absolute   Date Value Ref Range Status   09/10/2019 6.14 2.0 - 8.8 10*3/uL Final     Lymphocytes Absolute   Date Value Ref Range Status   09/10/2019 1.42 0.7 - 5.5 10*3/uL Final     Monocytes Absolute   Date Value Ref Range Status   09/10/2019 0.64 0.0 - 1.7 10*3/uL Final     Eosinophils Absolute   Date Value Ref Range Status   09/10/2019 0.03 0.0 - 0.8 10*3/uL Final     Basophils Absolute   Date Value Ref Range Status   09/10/2019 0.02 0.0 - 0.2 10*3/uL Final     Immature Grans, Absolute   Date Value Ref Range Status   09/10/2019 0.02 <1 10*3/uL Final     nRBC   Date Value Ref Range Status   09/10/2019 0 0 /100(WBC) Final       Lab Results   Component Value Date    HGBA1C 6.50 (H) 06/03/2021       No results found for: [id number]    TSH   Date Value Ref Range Status   06/03/2021 2.810 0.270 - 4.200 uIU/mL Final       No results found for: CHOL  Lab Results   Component Value Date    TRIG 242 (H) 06/03/2021     Lab Results   Component Value Date    HDL 37 (L) 06/03/2021     Lab Results   Component Value Date     (H) 06/03/2021     Lab Results   Component Value Date    VLDL 42 (H) 06/03/2021     No results found for: LDLHDL      Procedures    Assessment/Plan   Problems Addressed this Visit     Diabetic peripheral neuropathy (HCC)    Relevant Medications    gabapentin (NEURONTIN) 300 MG capsule    ALPRAZolam (XANAX) 2 MG tablet    traMADol (ULTRAM) 50 MG tablet    glipizide (Glucotrol) 10 MG tablet    metFORMIN (Glucophage) 1000 MG tablet    Hypertension - Primary    Relevant Medications    amLODIPine (NORVASC) 10 MG tablet    hydroCHLOROthiazide (HYDRODIURIL) 25 MG tablet    metoprolol tartrate (LOPRESSOR) 25 MG tablet    ramipril (ALTACE) 10 MG capsule    Other Relevant Orders    Comprehensive Metabolic Panel    Other hyperlipidemia    Relevant Orders    Comprehensive Metabolic Panel    Lipid  Panel With / Chol / HDL Ratio    TSH Rfx On Abnormal To Free T4    Type 2 diabetes mellitus, without long-term current use of insulin (HCC)    Relevant Medications    glipizide (Glucotrol) 10 MG tablet    metFORMIN (Glucophage) 1000 MG tablet    Other Relevant Orders    Comprehensive Metabolic Panel    Hemoglobin A1c    Vitamin B12      Other Visit Diagnoses     Hypothyroidism, unspecified type        Relevant Medications    levothyroxine (SYNTHROID, LEVOTHROID) 25 MCG tablet    metoprolol tartrate (LOPRESSOR) 25 MG tablet      Diagnoses       Codes Comments    Primary hypertension    -  Primary ICD-10-CM: I10  ICD-9-CM: 401.9     Type 2 diabetes mellitus with hyperosmolar coma, without long-term current use of insulin (HCC)     ICD-10-CM: E11.01  ICD-9-CM: 250.20     Other hyperlipidemia     ICD-10-CM: E78.49  ICD-9-CM: 272.4     Diabetic peripheral neuropathy (HCC)     ICD-10-CM: E11.42  ICD-9-CM: 250.60, 357.2     Hypothyroidism, unspecified type     ICD-10-CM: E03.9  ICD-9-CM: 244.9         HTN.  Controlled.  Continue meds.    DM2.  Check A1c.  Check B12., CMP.  RF glipizide, metformin.    Hyperlipidmeia.  Check FLP.     Orders Placed This Encounter   Procedures   • Comprehensive Metabolic Panel     Order Specific Question:   Release to patient     Answer:   Immediate   • Lipid Panel With / Chol / HDL Ratio     Order Specific Question:   Release to patient     Answer:   Immediate   • TSH Rfx On Abnormal To Free T4     Order Specific Question:   Release to patient     Answer:   Immediate   • Hemoglobin A1c     Order Specific Question:   Release to patient     Answer:   Immediate   • Vitamin B12     Order Specific Question:   Release to patient     Answer:   Immediate       Current Outpatient Medications   Medication Sig Dispense Refill   • ALPRAZolam (XANAX) 2 MG tablet Take 1 tablet by mouth At Night As Needed for Sleep. 90 tablet 1   • amLODIPine (NORVASC) 10 MG tablet Take 1 tablet by mouth Daily. 90 tablet 3    • aspirin 325 MG tablet Take 325 mg by mouth Daily.     • gabapentin (NEURONTIN) 300 MG capsule 2 capsules  capsule 3   • glipizide (Glucotrol) 10 MG tablet Take 1 tablet by mouth 2 (Two) Times a Day Before Meals. 180 tablet 3   • hydroCHLOROthiazide (HYDRODIURIL) 25 MG tablet Take 1 tablet by mouth Daily. 90 tablet 3   • levothyroxine (SYNTHROID, LEVOTHROID) 25 MCG tablet Take 1 tablet by mouth Daily. 90 tablet 3   • metFORMIN (Glucophage) 1000 MG tablet Take 1 tablet by mouth 2 (Two) Times a Day With Meals. 180 tablet 3   • metoprolol tartrate (LOPRESSOR) 25 MG tablet Take 1 tablet by mouth 2 (Two) Times a Day. 180 tablet 3   • nitroglycerin (Nitrostat) 0.4 MG SL tablet Place 1 tablet under the tongue Every 5 (Five) Minutes As Needed for Chest Pain. Take no more than 3 doses in 15 minutes. 25 tablet 12   • ramipril (ALTACE) 10 MG capsule Take 1 capsule by mouth Daily. 90 capsule 3   • simvastatin (Zocor) 40 MG tablet One three times a week 90 tablet 3   • traMADol (ULTRAM) 50 MG tablet One to two tablets QID prn pain. 240 tablet 3     No current facility-administered medications for this visit.       Eric Kramer had no medications administered during this visit.    Return in about 6 months (around 6/7/2022).    There are no Patient Instructions on file for this visit.

## 2021-12-08 LAB
ALBUMIN SERPL-MCNC: 4.3 G/DL (ref 3.7–4.7)
ALBUMIN/GLOB SERPL: 1.4 {RATIO} (ref 1.2–2.2)
ALP SERPL-CCNC: 70 IU/L (ref 44–121)
ALT SERPL-CCNC: 12 IU/L (ref 0–44)
AST SERPL-CCNC: 13 IU/L (ref 0–40)
BILIRUB SERPL-MCNC: 0.6 MG/DL (ref 0–1.2)
BUN SERPL-MCNC: 27 MG/DL (ref 8–27)
BUN/CREAT SERPL: 17 (ref 10–24)
CALCIUM SERPL-MCNC: 10.3 MG/DL (ref 8.6–10.2)
CHLORIDE SERPL-SCNC: 99 MMOL/L (ref 96–106)
CHOLEST SERPL-MCNC: 163 MG/DL (ref 100–199)
CHOLEST/HDLC SERPL: 4.3 RATIO (ref 0–5)
CO2 SERPL-SCNC: 28 MMOL/L (ref 20–29)
CREAT SERPL-MCNC: 1.62 MG/DL (ref 0.76–1.27)
GLOBULIN SER CALC-MCNC: 3.1 G/DL (ref 1.5–4.5)
GLUCOSE SERPL-MCNC: 146 MG/DL (ref 65–99)
HBA1C MFR BLD: 6.8 % (ref 4.8–5.6)
HDLC SERPL-MCNC: 38 MG/DL
LDLC SERPL CALC-MCNC: 98 MG/DL (ref 0–99)
POTASSIUM SERPL-SCNC: 4.6 MMOL/L (ref 3.5–5.2)
PROT SERPL-MCNC: 7.4 G/DL (ref 6–8.5)
SODIUM SERPL-SCNC: 141 MMOL/L (ref 134–144)
TRIGL SERPL-MCNC: 156 MG/DL (ref 0–149)
TSH SERPL DL<=0.005 MIU/L-ACNC: 2.07 UIU/ML (ref 0.45–4.5)
VIT B12 SERPL-MCNC: 305 PG/ML (ref 232–1245)
VLDLC SERPL CALC-MCNC: 27 MG/DL (ref 5–40)

## 2022-03-04 ENCOUNTER — TELEPHONE (OUTPATIENT)
Dept: FAMILY MEDICINE CLINIC | Facility: CLINIC | Age: 82
End: 2022-03-04

## 2022-03-04 NOTE — TELEPHONE ENCOUNTER
Caller: GEOFF KARIMI    Relationship: Emergency Contact    Best call back number: [phone]    What medication are you requesting: ANTIOBIOTICS    What are your current symptoms: BLOOD IN URINE, FREQUENCY, FEELING LIKE GOING TO BATHROOM BUT NOTHING COMING OUT    How long have you been experiencing symptoms: TODAY    Have you had these symptoms before:    [x] Yes  [] No    Have you been treated for these symptoms before:   [x] Yes  [] No     If a prescription is needed, what is your preferred pharmacy and phone number:      [address] [phone] [facility] [phone]         Additional notes: PATIENT'S WIFE STATED PATIENT HAS NEVER PASSED BLOOD IN URINE BEFORE, BUT HAS HAD A UTI BEFORE. PATIENT'S WIFE STATED THAT THEY ARE IN [place] AND CANNOT SEE HIS UROLOGIST THERE UNTIL NEXT WEEK. PATIENT'S WIFE WANTED TO KNOW IF PATIENT CAN GET ENOUGH MEDICATION TO LAST HIM THROUGH THE WEEKEND. PATIENT'S WIFE IS CONCERNED AND STATED THAT PATIENT USUALLY LOSES SOME OF HIS SANITY WHEN HE HAS A UTI AND PATIENT'S WIFE WANTED HIM TO BE TREATED NOW TO AVOID THAT. PLEASE ADVISE.

## 2022-03-07 NOTE — TELEPHONE ENCOUNTER
Patient's wife called back, she has been informed.  She states she has some antibiotics that she has been giving him that seems to help.

## 2022-05-10 ENCOUNTER — TELEPHONE (OUTPATIENT)
Dept: FAMILY MEDICINE CLINIC | Facility: CLINIC | Age: 82
End: 2022-05-10

## 2022-05-10 NOTE — TELEPHONE ENCOUNTER
Caller: GEOFF KARIMI    Relationship to patient: Emergency Contact    Best call back number: [phone]    Chief complaint: POTENTIAL BLADDER CANCER OR INFECTION (PATIENT WAS SEEN IN [place], [place] REGARDING THIS ISSUE AND WAS TOLD TO RECEIVE A BIOPSY)    Type of visit: OFFICE VISIT    Requested date: ASAP     If rescheduling, when is the original appointment: 06/08/2022

## 2022-05-11 NOTE — TELEPHONE ENCOUNTER
FYI            Patient's wife states they were able to get appointment with urologist for tomorrow and would like to keep their original appointment for June 8.

## 2022-06-08 ENCOUNTER — OFFICE VISIT (OUTPATIENT)
Dept: FAMILY MEDICINE CLINIC | Facility: CLINIC | Age: 82
End: 2022-06-08

## 2022-06-08 VITALS
WEIGHT: 244 LBS | BODY MASS INDEX: 31.32 KG/M2 | TEMPERATURE: 98.2 F | DIASTOLIC BLOOD PRESSURE: 70 MMHG | OXYGEN SATURATION: 97 % | HEART RATE: 51 BPM | SYSTOLIC BLOOD PRESSURE: 120 MMHG | HEIGHT: 74 IN

## 2022-06-08 DIAGNOSIS — E03.4 HYPOTHYROIDISM DUE TO ACQUIRED ATROPHY OF THYROID: ICD-10-CM

## 2022-06-08 DIAGNOSIS — E11.01 TYPE 2 DIABETES MELLITUS WITH HYPEROSMOLAR COMA, WITHOUT LONG-TERM CURRENT USE OF INSULIN: ICD-10-CM

## 2022-06-08 DIAGNOSIS — E11.42 DIABETIC PERIPHERAL NEUROPATHY: ICD-10-CM

## 2022-06-08 DIAGNOSIS — Z00.00 MEDICARE ANNUAL WELLNESS VISIT, SUBSEQUENT: Primary | ICD-10-CM

## 2022-06-08 DIAGNOSIS — I10 PRIMARY HYPERTENSION: ICD-10-CM

## 2022-06-08 PROCEDURE — 1170F FXNL STATUS ASSESSED: CPT | Performed by: FAMILY MEDICINE

## 2022-06-08 PROCEDURE — 99214 OFFICE O/P EST MOD 30 MIN: CPT | Performed by: FAMILY MEDICINE

## 2022-06-08 PROCEDURE — G0439 PPPS, SUBSEQ VISIT: HCPCS | Performed by: FAMILY MEDICINE

## 2022-06-08 PROCEDURE — 1159F MED LIST DOCD IN RCRD: CPT | Performed by: FAMILY MEDICINE

## 2022-06-08 RX ORDER — ALPRAZOLAM 2 MG/1
2 TABLET ORAL NIGHTLY PRN
Qty: 90 TABLET | Refills: 1 | Status: SHIPPED | OUTPATIENT
Start: 2022-06-08 | End: 2022-06-08

## 2022-06-08 RX ORDER — SIMVASTATIN 40 MG
TABLET ORAL
Qty: 90 TABLET | Refills: 3 | Status: SHIPPED | OUTPATIENT
Start: 2022-06-08 | End: 2022-12-01 | Stop reason: SDUPTHER

## 2022-06-08 RX ORDER — TAMSULOSIN HYDROCHLORIDE 0.4 MG/1
CAPSULE ORAL
COMMUNITY
Start: 2022-05-16 | End: 2022-12-01 | Stop reason: SDUPTHER

## 2022-06-08 RX ORDER — ALPRAZOLAM 2 MG/1
2 TABLET ORAL NIGHTLY PRN
Qty: 90 TABLET | Refills: 1 | Status: SHIPPED | OUTPATIENT
Start: 2022-06-08 | End: 2022-12-01 | Stop reason: SDUPTHER

## 2022-06-08 RX ORDER — TRAMADOL HYDROCHLORIDE 50 MG/1
TABLET ORAL
Qty: 240 TABLET | Refills: 3 | Status: SHIPPED | OUTPATIENT
Start: 2022-06-08 | End: 2022-06-08

## 2022-06-08 RX ORDER — TRAMADOL HYDROCHLORIDE 50 MG/1
TABLET ORAL
Qty: 240 TABLET | Refills: 3 | Status: SHIPPED | OUTPATIENT
Start: 2022-06-08 | End: 2022-12-01 | Stop reason: SDUPTHER

## 2022-06-08 RX ORDER — GABAPENTIN 300 MG/1
CAPSULE ORAL
Qty: 180 CAPSULE | Refills: 3 | Status: SHIPPED | OUTPATIENT
Start: 2022-06-08 | End: 2022-06-08

## 2022-06-08 RX ORDER — GABAPENTIN 300 MG/1
CAPSULE ORAL
Qty: 180 CAPSULE | Refills: 3 | Status: SHIPPED | OUTPATIENT
Start: 2022-06-08 | End: 2022-12-01 | Stop reason: SDUPTHER

## 2022-06-08 NOTE — PROGRESS NOTES
The ABCs of the Annual Wellness Visit  Subsequent Medicare Wellness Visit    Chief Complaint   Patient presents with   • Medicare Wellness-subsequent   F/U HTN.    FU DM2.    Subjective    History of Present Illness:  Eric Kramer is a 82 y.o. male who presents for a Subsequent Medicare Wellness Visit.  F/U HTN.  No orthostasis.  No orthostasis.  fU DM2.  Doing well with meds.    F/U hyperlipidemia.  No  Myalgias.   On simvastatin 40 once a day.   F/U diabetic peripheral neuropathy.  Doing well with gabapentin and tramadol. Uses tramadol 2 at a time.  Takes gabapentin 2 at night.   No sedation.     The following portions of the patient's history were reviewed and   updated as appropriate: allergies, current medications, past family history, past medical history, past social history, past surgical history and problem list.    Compared to one year ago, the patient feels his physical   health is the same.    Compared to one year ago, the patient feels his mental   health is the same.    Recent Hospitalizations:  He was not admitted to the hospital during the last year.       Current Medical Providers:  Patient Care Team:  Elder Block MD as PCP - General (Family Medicine)    Outpatient Medications Prior to Visit   Medication Sig Dispense Refill   • amLODIPine (NORVASC) 10 MG tablet Take 1 tablet by mouth Daily. 90 tablet 3   • aspirin 325 MG tablet Take 325 mg by mouth Daily.     • glipizide (Glucotrol) 10 MG tablet Take 1 tablet by mouth 2 (Two) Times a Day Before Meals. 180 tablet 3   • hydroCHLOROthiazide (HYDRODIURIL) 25 MG tablet Take 1 tablet by mouth Daily. 90 tablet 3   • levothyroxine (SYNTHROID, LEVOTHROID) 25 MCG tablet Take 1 tablet by mouth Daily. 90 tablet 3   • metFORMIN (Glucophage) 1000 MG tablet Take 1 tablet by mouth 2 (Two) Times a Day With Meals. 180 tablet 3   • metoprolol tartrate (LOPRESSOR) 25 MG tablet Take 1 tablet by mouth 2 (Two) Times a Day. 180 tablet 3   • nitroglycerin  (Nitrostat) 0.4 MG SL tablet Place 1 tablet under the tongue Every 5 (Five) Minutes As Needed for Chest Pain. Take no more than 3 doses in 15 minutes. 25 tablet 12   • ramipril (ALTACE) 10 MG capsule Take 1 capsule by mouth Daily. 90 capsule 3   • tamsulosin (FLOMAX) 0.4 MG capsule 24 hr capsule TAKE 1 CAPSULE BY MOUTH AT BEDTIME (MONITOR BLOOD PRESSURE AND DISCONTINUE WITH DIZZINESS)     • ALPRAZolam (XANAX) 2 MG tablet Take 1 tablet by mouth At Night As Needed for Sleep. 90 tablet 1   • gabapentin (NEURONTIN) 300 MG capsule 2 capsules  capsule 3   • simvastatin (Zocor) 40 MG tablet One three times a week 90 tablet 3   • traMADol (ULTRAM) 50 MG tablet One to two tablets QID prn pain. 240 tablet 3     No facility-administered medications prior to visit.       Opioid medication/s are on active medication list.  and I have evaluated his active treatment plan and pain score trends (see table).  There were no vitals filed for this visit.  I have reviewed the chart for potential of high risk medication and harmful drug interactions in the elderly.            Aspirin is on active medication list. Aspirin use is indicated based on review of current medical condition/s. Pros and cons of this therapy have been discussed today. Benefits of this medication outweigh potential harm.  Patient has been encouraged to continue taking this medication.  .      Patient Active Problem List   Diagnosis   • Type 2 diabetes mellitus, without long-term current use of insulin (HCC)   • Stroke (HCC)   • Prostatitis   • Osteoarthritis   • Nephrolithiasis   • Memory impairment   • Malignant neoplasm in situ of colon   • Insomnia   • Hypertension   • Diabetic peripheral neuropathy (HCC)   • Benign enlargement of prostate   • Arteriosclerosis of coronary artery   • Diabetic ulcer of toe of right foot associated with diabetes mellitus due to underlying condition, limited to breakdown of skin (HCC)   • MRSA bacteremia   • Hospital discharge  "follow-up   • Medicare annual wellness visit, subsequent   • Hypothyroidism due to acquired atrophy of thyroid   • Other hyperlipidemia     Advance Care Planning  Advance Directive is not on file.  ACP discussion was held with the patient during this visit. Patient has an advance directive (not in EMR), copy requested.    Review of Systems   Constitutional: Negative for diaphoresis and fatigue.   HENT: Negative for rhinorrhea.    Respiratory: Negative for shortness of breath and stridor.    Cardiovascular: Negative for chest pain and leg swelling.        Objective    Vitals:    06/08/22 0857   BP: 120/70   BP Location: Right arm   Patient Position: Sitting   Pulse: 51   Temp: 98.2 °F (36.8 °C)   TempSrc: Temporal   SpO2: 97%   Weight: 111 kg (244 lb)   Height: 188 cm (74\")     Estimated body mass index is 31.33 kg/m² as calculated from the following:    Height as of this encounter: 188 cm (74\").    Weight as of this encounter: 111 kg (244 lb).    BMI is >= 30 and <35. (Class 1 Obesity). The following options were offered after discussion;: exercise counseling/recommendations      Does the patient have evidence of cognitive impairment? No    Physical Exam  Vitals reviewed.   Constitutional:       Appearance: He is well-developed. He is not diaphoretic.   HENT:      Head: Normocephalic and atraumatic.   Eyes:      General: No scleral icterus.     Pupils: Pupils are equal, round, and reactive to light.   Neck:      Thyroid: No thyromegaly.   Cardiovascular:      Rate and Rhythm: Normal rate and regular rhythm.      Heart sounds: No murmur heard.    No friction rub. No gallop.   Pulmonary:      Effort: Pulmonary effort is normal. No respiratory distress.      Breath sounds: No wheezing or rales.   Chest:      Chest wall: No tenderness.   Abdominal:      General: Bowel sounds are normal. There is no distension.      Palpations: Abdomen is soft.      Tenderness: There is no abdominal tenderness.   Musculoskeletal:         " General: No deformity. Normal range of motion.   Lymphadenopathy:      Cervical: No cervical adenopathy.   Skin:     General: Skin is warm and dry.      Findings: No rash.   Neurological:      Cranial Nerves: No cranial nerve deficit.      Motor: No abnormal muscle tone.                 HEALTH RISK ASSESSMENT    Smoking Status:  Social History     Tobacco Use   Smoking Status Former Smoker   Smokeless Tobacco Never Used     Alcohol Consumption:  Social History     Substance and Sexual Activity   Alcohol Use Yes    Comment: 1-2 a month--Caffeine use-2 reg coffee daily     Fall Risk Screen:    [facility].    Depression Screening:  PHQ-2/PHQ-9 Depression Screening 6/8/2022   Retired PHQ-9 Total Score -   Retired Total Score -   Little Interest or Pleasure in Doing Things 0-->not at all   Feeling Down, Depressed or Hopeless 0-->not at all   PHQ-9: Brief Depression Severity Measure Score 0       Health Habits and Functional and Cognitive Screening:  Functional & Cognitive Status 6/8/2022   Do you have difficulty preparing food and eating? No   Do you have difficulty bathing yourself, getting dressed or grooming yourself? No   Do you have difficulty using the toilet? No   Do you have difficulty moving around from place to place? No   Do you have trouble with steps or getting out of a bed or a chair? No   Current Diet Unhealthy Diet   Dental Exam Not up to date   Eye Exam Not up to date   Exercise (times per week) 7 times per week   Current Exercises Include Yard Work   Current Exercise Activities Include -   Do you need help using the phone?  No   Are you deaf or do you have serious difficulty hearing?  No   Do you need help with transportation? No   Do you need help shopping? No   Do you need help preparing meals?  No   Do you need help with housework?  No   Do you need help with laundry? No   Do you need help taking your medications? No   Do you need help managing money? No   Do  you ever drive or ride in a car without wearing a seat belt? No   Have you felt unusual stress, anger or loneliness in the last month? No   Who do you live with? Spouse   If you need help, do you have trouble finding someone available to you? No   Have you been bothered in the last four weeks by sexual problems? -   Do you have difficulty concentrating, remembering or making decisions? No       Age-appropriate Screening Schedule:  Refer to the list below for future screening recommendations based on patient's age, sex and/or medical conditions. Orders for these recommended tests are listed in the plan section. The patient has been provided with a written plan.    Health Maintenance   Topic Date Due   • URINE MICROALBUMIN  Never done   • TDAP/TD VACCINES (1 - Tdap) Never done   • ZOSTER VACCINE (1 of 2) Never done   • DIABETIC EYE EXAM  Never done   • HEMOGLOBIN A1C  06/07/2022   • INFLUENZA VACCINE  08/01/2022   • LIPID PANEL  12/07/2022              Assessment & Plan   CMS Preventative Services Quick Reference  Risk Factors Identified During Encounter  Inactivity/Sedentary  The above risks/problems have been discussed with the patient.  Follow up actions/plans if indicated are seen below in the Assessment/Plan Section.  Pertinent information has been shared with the patient in the After Visit Summary.    Diagnoses and all orders for this visit:    1. Medicare annual wellness visit, subsequent (Primary)  -     CBC & Differential    2. Type 2 diabetes mellitus with hyperosmolar coma, without long-term current use of insulin (HCC)  -     Comprehensive Metabolic Panel  -     Lipid Panel With / Chol / HDL Ratio  -     Hemoglobin A1c    3. Primary hypertension    4. Diabetic peripheral neuropathy (HCC)  -     Discontinue: traMADol (ULTRAM) 50 MG tablet; One to two tablets QID prn pain.  Dispense: 240 tablet; Refill: 3  -     Discontinue: ALPRAZolam (XANAX) 2 MG tablet; Take 1 tablet by mouth At Night As Needed for Sleep.   Dispense: 90 tablet; Refill: 1  -     Discontinue: gabapentin (NEURONTIN) 300 MG capsule; 2 capsules BID  Dispense: 180 capsule; Refill: 3  -     ALPRAZolam (XANAX) 2 MG tablet; Take 1 tablet by mouth At Night As Needed for Sleep.  Dispense: 90 tablet; Refill: 1  -     gabapentin (NEURONTIN) 300 MG capsule; 2 capsules BID  Dispense: 180 capsule; Refill: 3  -     traMADol (ULTRAM) 50 MG tablet; One to two tablets QID prn pain.  Dispense: 240 tablet; Refill: 3    5. Hypothyroidism due to acquired atrophy of thyroid  -     TSH    Other orders  -     simvastatin (Zocor) 40 MG tablet; One three times a week  Dispense: 90 tablet; Refill: 3    HTN.  Controlled.  Continue meds.    Hyperlipidemia.  Check CMP, FLP.  Continue simvastatin.  RF today.    Diabetic peripheral neuropathy. Controlled. RF tramadol/gabapentin.   Hypothyroidism.  Check TSH.  Continue replacement.         Preventive Counseling:  Encouraged to stay active.  Covid vaccine UTD. Pneumovax UTD.  Colonoscopy UTD.    Dentist/optho recommended.      Follow Up:   Return in about 6 months (around 12/8/2022).     An After Visit Summary and PPPS were made available to the patient.

## 2022-06-09 ENCOUNTER — TELEPHONE (OUTPATIENT)
Dept: FAMILY MEDICINE CLINIC | Facility: CLINIC | Age: 82
End: 2022-06-09

## 2022-06-09 LAB
ALBUMIN SERPL-MCNC: 4.1 G/DL (ref 3.6–4.6)
ALBUMIN/GLOB SERPL: 1.4 {RATIO} (ref 1.2–2.2)
ALP SERPL-CCNC: 61 IU/L (ref 44–121)
ALT SERPL-CCNC: 8 IU/L (ref 0–44)
AST SERPL-CCNC: 12 IU/L (ref 0–40)
BASOPHILS # BLD AUTO: 0.1 X10E3/UL (ref 0–0.2)
BASOPHILS NFR BLD AUTO: 1 %
BILIRUB SERPL-MCNC: 0.5 MG/DL (ref 0–1.2)
BUN SERPL-MCNC: 22 MG/DL (ref 8–27)
BUN/CREAT SERPL: 15 (ref 10–24)
CALCIUM SERPL-MCNC: 9.9 MG/DL (ref 8.6–10.2)
CHLORIDE SERPL-SCNC: 100 MMOL/L (ref 96–106)
CHOLEST SERPL-MCNC: 161 MG/DL (ref 100–199)
CHOLEST/HDLC SERPL: 4 RATIO (ref 0–5)
CO2 SERPL-SCNC: 26 MMOL/L (ref 20–29)
CREAT SERPL-MCNC: 1.47 MG/DL (ref 0.76–1.27)
EGFRCR SERPLBLD CKD-EPI 2021: 48 ML/MIN/1.73
EOSINOPHIL # BLD AUTO: 0.2 X10E3/UL (ref 0–0.4)
EOSINOPHIL NFR BLD AUTO: 3 %
ERYTHROCYTE [DISTWIDTH] IN BLOOD BY AUTOMATED COUNT: 14.3 % (ref 11.6–15.4)
GLOBULIN SER CALC-MCNC: 3 G/DL (ref 1.5–4.5)
GLUCOSE SERPL-MCNC: 120 MG/DL (ref 65–99)
HBA1C MFR BLD: 6.6 % (ref 4.8–5.6)
HCT VFR BLD AUTO: 42.7 % (ref 37.5–51)
HDLC SERPL-MCNC: 40 MG/DL
HGB BLD-MCNC: 13.5 G/DL (ref 13–17.7)
IMM GRANULOCYTES # BLD AUTO: 0 X10E3/UL (ref 0–0.1)
IMM GRANULOCYTES NFR BLD AUTO: 0 %
LDLC SERPL CALC-MCNC: 94 MG/DL (ref 0–99)
LYMPHOCYTES # BLD AUTO: 2.2 X10E3/UL (ref 0.7–3.1)
LYMPHOCYTES NFR BLD AUTO: 30 %
MCH RBC QN AUTO: 28.5 PG (ref 26.6–33)
MCHC RBC AUTO-ENTMCNC: 31.6 G/DL (ref 31.5–35.7)
MCV RBC AUTO: 90 FL (ref 79–97)
MONOCYTES # BLD AUTO: 0.7 X10E3/UL (ref 0.1–0.9)
MONOCYTES NFR BLD AUTO: 9 %
NEUTROPHILS # BLD AUTO: 4.2 X10E3/UL (ref 1.4–7)
NEUTROPHILS NFR BLD AUTO: 57 %
PLATELET # BLD AUTO: 310 X10E3/UL (ref 150–450)
POTASSIUM SERPL-SCNC: 4.6 MMOL/L (ref 3.5–5.2)
PROT SERPL-MCNC: 7.1 G/DL (ref 6–8.5)
RBC # BLD AUTO: 4.74 X10E6/UL (ref 4.14–5.8)
SODIUM SERPL-SCNC: 140 MMOL/L (ref 134–144)
TRIGL SERPL-MCNC: 153 MG/DL (ref 0–149)
TSH SERPL DL<=0.005 MIU/L-ACNC: 3.57 UIU/ML (ref 0.45–4.5)
VLDLC SERPL CALC-MCNC: 27 MG/DL (ref 5–40)
WBC # BLD AUTO: 7.5 X10E3/UL (ref 3.4–10.8)

## 2022-06-09 NOTE — TELEPHONE ENCOUNTER
Pharmacy sent a message to verify gabapentin 300mg  2BID??? 45 day supply??    Please resend if needed.

## 2022-06-10 RX ORDER — HYDROCHLOROTHIAZIDE 25 MG/1
TABLET ORAL
Qty: 90 TABLET | Refills: 0 | Status: SHIPPED | OUTPATIENT
Start: 2022-06-10 | End: 2022-12-01 | Stop reason: SDUPTHER

## 2022-12-01 ENCOUNTER — OFFICE VISIT (OUTPATIENT)
Dept: FAMILY MEDICINE CLINIC | Facility: CLINIC | Age: 82
End: 2022-12-01

## 2022-12-01 VITALS
SYSTOLIC BLOOD PRESSURE: 142 MMHG | BODY MASS INDEX: 31.73 KG/M2 | WEIGHT: 247.2 LBS | HEART RATE: 70 BPM | DIASTOLIC BLOOD PRESSURE: 78 MMHG | HEIGHT: 74 IN | OXYGEN SATURATION: 92 % | TEMPERATURE: 96 F

## 2022-12-01 DIAGNOSIS — I10 PRIMARY HYPERTENSION: Primary | ICD-10-CM

## 2022-12-01 DIAGNOSIS — E78.49 OTHER HYPERLIPIDEMIA: ICD-10-CM

## 2022-12-01 DIAGNOSIS — E11.42 DIABETIC PERIPHERAL NEUROPATHY: ICD-10-CM

## 2022-12-01 DIAGNOSIS — E11.01 TYPE 2 DIABETES MELLITUS WITH HYPEROSMOLAR COMA, WITHOUT LONG-TERM CURRENT USE OF INSULIN: ICD-10-CM

## 2022-12-01 DIAGNOSIS — E03.9 HYPOTHYROIDISM, UNSPECIFIED TYPE: ICD-10-CM

## 2022-12-01 PROCEDURE — 99214 OFFICE O/P EST MOD 30 MIN: CPT | Performed by: FAMILY MEDICINE

## 2022-12-01 RX ORDER — RAMIPRIL 10 MG/1
10 CAPSULE ORAL DAILY
Qty: 90 CAPSULE | Refills: 3 | Status: SHIPPED | OUTPATIENT
Start: 2022-12-01

## 2022-12-01 RX ORDER — TAMSULOSIN HYDROCHLORIDE 0.4 MG/1
1 CAPSULE ORAL DAILY
Qty: 90 CAPSULE | Refills: 3 | Status: SHIPPED | OUTPATIENT
Start: 2022-12-01

## 2022-12-01 RX ORDER — LEVOTHYROXINE SODIUM 0.03 MG/1
25 TABLET ORAL DAILY
Qty: 90 TABLET | Refills: 3 | Status: SHIPPED | OUTPATIENT
Start: 2022-12-01

## 2022-12-01 RX ORDER — GABAPENTIN 300 MG/1
CAPSULE ORAL
Qty: 180 CAPSULE | Refills: 3 | Status: SHIPPED | OUTPATIENT
Start: 2022-12-01

## 2022-12-01 RX ORDER — ALPRAZOLAM 2 MG/1
2 TABLET ORAL NIGHTLY PRN
Qty: 90 TABLET | Refills: 1 | Status: SHIPPED | OUTPATIENT
Start: 2022-12-01

## 2022-12-01 RX ORDER — SIMVASTATIN 40 MG
TABLET ORAL
Qty: 90 TABLET | Refills: 3 | Status: SHIPPED | OUTPATIENT
Start: 2022-12-01

## 2022-12-01 RX ORDER — GLIPIZIDE 10 MG/1
10 TABLET ORAL
Qty: 180 TABLET | Refills: 3 | Status: SHIPPED | OUTPATIENT
Start: 2022-12-01

## 2022-12-01 RX ORDER — AMLODIPINE BESYLATE 10 MG/1
10 TABLET ORAL DAILY
Qty: 90 TABLET | Refills: 3 | Status: SHIPPED | OUTPATIENT
Start: 2022-12-01

## 2022-12-01 RX ORDER — HYDROCHLOROTHIAZIDE 25 MG/1
25 TABLET ORAL DAILY
Qty: 90 TABLET | Refills: 3 | Status: SHIPPED | OUTPATIENT
Start: 2022-12-01

## 2022-12-01 RX ORDER — TRAMADOL HYDROCHLORIDE 50 MG/1
TABLET ORAL
Qty: 240 TABLET | Refills: 3 | Status: SHIPPED | OUTPATIENT
Start: 2022-12-01

## 2022-12-01 NOTE — PROGRESS NOTES
Chief Complaint   Patient presents with   • Hypertension       Subjective   Eric Kramer is a 82 y.o. male.     History of Present Illness   F/U HTN.  On meds regulalry.    F/U DM2.  On meds regulalry.    F/U hyperlipidemia.  Doing wel [facility] meds.      The following portions of the patient's history were reviewed and updated as appropriate: allergies, current medications, past family history, past medical history, past social history, past surgical history and problem list.    Review of Systems   Constitutional: Negative for appetite change and fatigue.   HENT: Negative for nosebleeds and sore throat.    Eyes: Negative for blurred vision and visual disturbance.   Respiratory: Negative for shortness of breath and wheezing.    Cardiovascular: Negative for chest pain and leg swelling.   Gastrointestinal: Negative for abdominal distention and abdominal pain.   Endocrine: Negative for cold intolerance and polyuria.   Genitourinary: Negative for dysuria and hematuria.   Musculoskeletal: Negative for arthralgias and myalgias.   Skin: Negative for color change and rash.   Neurological: Negative for weakness and confusion.   Psychiatric/Behavioral: Negative for agitation and depressed mood.       Patient Active Problem List   Diagnosis   • Type 2 diabetes mellitus, without long-term current use of insulin (HCC)   • Stroke (HCC)   • Prostatitis   • Osteoarthritis   • Nephrolithiasis   • Memory impairment   • Malignant neoplasm in situ of colon   • Insomnia   • Hypertension   • Diabetic peripheral neuropathy (HCC)   • Benign enlargement of prostate   • Arteriosclerosis of coronary artery   • Diabetic ulcer of toe of right foot associated with diabetes mellitus due to underlying condition, limited to breakdown of skin (HCC)   • MRSA bacteremia   • Hospital discharge follow-up   • Medicare annual wellness visit, subsequent   • Hypothyroidism due to acquired atrophy of thyroid   • Other hyperlipidemia       No Known  Allergies      Current Outpatient Medications:   •  ALPRAZolam (XANAX) 2 MG tablet, Take 1 tablet by mouth At Night As Needed for Sleep., Disp: 90 tablet, Rfl: 1  •  amLODIPine (NORVASC) 10 MG tablet, Take 1 tablet by mouth Daily., Disp: 90 tablet, Rfl: 3  •  aspirin 325 MG tablet, Take 325 mg by mouth Daily., Disp: , Rfl:   •  gabapentin (NEURONTIN) 300 MG capsule, 2 capsules BID, Disp: 180 capsule, Rfl: 3  •  glipizide (Glucotrol) 10 MG tablet, Take 1 tablet by mouth 2 (Two) Times a Day Before Meals., Disp: 180 tablet, Rfl: 3  •  hydroCHLOROthiazide (HYDRODIURIL) 25 MG tablet, Take 1 tablet by mouth Daily., Disp: 90 tablet, Rfl: 3  •  levothyroxine (SYNTHROID, LEVOTHROID) 25 MCG tablet, Take 1 tablet by mouth Daily., Disp: 90 tablet, Rfl: 3  •  metFORMIN (Glucophage) 1000 MG tablet, Take 1 tablet by mouth 2 (Two) Times a Day With Meals., Disp: 180 tablet, Rfl: 3  •  metoprolol tartrate (LOPRESSOR) 25 MG tablet, Take 1 tablet by mouth 2 (Two) Times a Day., Disp: 180 tablet, Rfl: 3  •  nitroglycerin (Nitrostat) 0.4 MG SL tablet, Place 1 tablet under the tongue Every 5 (Five) Minutes As Needed for Chest Pain. Take no more than 3 doses in 15 minutes., Disp: 25 tablet, Rfl: 12  •  ramipril (ALTACE) 10 MG capsule, Take 1 capsule by mouth Daily., Disp: 90 capsule, Rfl: 3  •  simvastatin (Zocor) 40 MG tablet, One three times a week, Disp: 90 tablet, Rfl: 3  •  tamsulosin (FLOMAX) 0.4 MG capsule 24 hr capsule, Take 1 capsule by mouth Daily., Disp: 90 capsule, Rfl: 3  •  traMADol (ULTRAM) 50 MG tablet, One to two tablets QID prn pain., Disp: 240 tablet, Rfl: 3    Past Medical History:   Diagnosis Date   • Acute foot pain, left    • Acute UTI    • Arteriosclerosis of coronary artery    • Basal cell carcinoma (BCC) of face    • Benign enlargement of prostate    • Cataract    • Chest pain    • Diabetic peripheral neuropathy (HCC)    • Edema    • Hyperlipidemia    • Hypertension    • Insomnia    • Malignant neoplasm in situ of  colon    • Memory impairment     MMSE 27/30-5/20/2019   • Nephrolithiasis    • Osteoarthritis    • Prostatitis    • Renal azotemia    • Stroke (HCC)     stroke syndrome   • Type 2 diabetes mellitus (HCC)        Past Surgical History:   Procedure Laterality Date   • ANGIOPLASTY Right     Cath Laser Angioplasty/right ventricle   • APPENDECTOMY     • CATARACT EXTRACTION Left    • COLECTOMY PARTIAL / TOTAL      due to colon cancer, removed 13 inches of colon   • CORONARY ANGIOPLASTY WITH STENT PLACEMENT     • TOE SURGERY Right 07/2021       Family History   Problem Relation Age of Onset   • Colon cancer Mother    • Hypertension Mother    • Osteoarthritis Mother    • Emphysema Father         smoker/tobacco use   • Hypertension Father    • Osteoarthritis Father    • Rheum arthritis Sister    • Colon cancer Maternal Grandmother    • Cataracts Maternal Grandmother    • Colon cancer Other        Social History     Tobacco Use   • Smoking status: Former   • Smokeless tobacco: Never   Substance Use Topics   • Alcohol use: Yes     Comment: 1-2 a month--Caffeine use-2 reg coffee daily            Objective     Vitals:    12/01/22 0746   BP: 142/78   Pulse: 70   Temp: 96 °F (35.6 °C)   SpO2: 92%     Body mass index is 31.72 kg/m².    Physical Exam  Vitals reviewed.   Constitutional:       Appearance: He is well-developed. He is not diaphoretic.   HENT:      Head: Normocephalic and atraumatic.   Eyes:      General: No scleral icterus.     Pupils: Pupils are equal, round, and reactive to light.   Neck:      Thyroid: No thyromegaly.   Cardiovascular:      Rate and Rhythm: Normal rate and regular rhythm.      Heart sounds: No murmur heard.    No friction rub. No gallop.   Pulmonary:      Effort: Pulmonary effort is normal. No respiratory distress.      Breath sounds: No wheezing or rales.   Chest:      Chest wall: No tenderness.   Abdominal:      General: Bowel sounds are normal. There is no distension.      Palpations: Abdomen is  soft.      Tenderness: There is no abdominal tenderness.   Musculoskeletal:         General: No deformity. Normal range of motion.   Lymphadenopathy:      Cervical: No cervical adenopathy.   Skin:     General: Skin is warm and dry.      Findings: No rash.   Neurological:      Cranial Nerves: No cranial nerve deficit.      Motor: No abnormal muscle tone.         Lab Results   Component Value Date    GLUCOSE 120 (H) 06/08/2022    BUN 22 06/08/2022    CREATININE 1.47 (H) 06/08/2022    EGFRIFNONA 39 (L) 12/07/2021    EGFRIFAFRI 46 (L) 12/07/2021    BCR 15 06/08/2022    K 4.6 06/08/2022    CO2 26 06/08/2022    CALCIUM 9.9 06/08/2022    PROTENTOTREF 7.1 06/08/2022    ALBUMIN 4.1 06/08/2022    LABIL2 1.4 06/08/2022    AST 12 06/08/2022    ALT 8 06/08/2022       WBC   Date Value Ref Range Status   06/08/2022 7.5 3.4 - 10.8 x10E3/uL Final   09/10/2019 8.27 4.5 - 11.0 10*3/uL Final     RBC   Date Value Ref Range Status   06/08/2022 4.74 4.14 - 5.80 x10E6/uL Final   09/10/2019 4.26 (L) 4.5 - 5.9 10*6/uL Final     Hemoglobin   Date Value Ref Range Status   06/08/2022 13.5 13.0 - 17.7 g/dL Final   09/10/2019 12.5 (L) 13.5 - 17.5 g/dL Final     Hematocrit   Date Value Ref Range Status   06/08/2022 42.7 37.5 - 51.0 % Final   09/10/2019 39.2 (L) 41.0 - 53.0 % Final     MCV   Date Value Ref Range Status   06/08/2022 90 79 - 97 fL Final   09/10/2019 92.0 80.0 - 100.0 fL Final     MCH   Date Value Ref Range Status   06/08/2022 28.5 26.6 - 33.0 pg Final   09/10/2019 29.3 26.0 - 34.0 pg Final     MCHC   Date Value Ref Range Status   06/08/2022 31.6 31.5 - 35.7 g/dL Final   09/10/2019 31.9 31.0 - 37.0 g/dL Final     RDW   Date Value Ref Range Status   06/08/2022 14.3 11.6 - 15.4 % Final   09/10/2019 14.2 12.0 - 16.8 % Final     MPV   Date Value Ref Range Status   09/10/2019 9.0 6.7 - 10.8 fL Final     Platelets   Date Value Ref Range Status   06/08/2022 310 150 - 450 x10E3/uL Final   09/10/2019 363 140 - 440 10*3/uL Final     Neutrophil  Rel %   Date Value Ref Range Status   06/08/2022 57 Not Estab. % Final   09/10/2019 74.3 45 - 80 % Final     Lymphocyte Rel %   Date Value Ref Range Status   06/08/2022 30 Not Estab. % Final   09/10/2019 17.2 15 - 50 % Final     Monocyte Rel %   Date Value Ref Range Status   06/08/2022 9 Not Estab. % Final   09/10/2019 7.7 0 - 15 % Final     Eosinophil Rel %   Date Value Ref Range Status   06/08/2022 3 Not Estab. % Final     Eosinophil %   Date Value Ref Range Status   09/10/2019 0.4 0 - 7 % Final     Basophil Rel %   Date Value Ref Range Status   06/08/2022 1 Not Estab. % Final   09/10/2019 0.2 0 - 2 % Final     Immature Grans %   Date Value Ref Range Status   09/10/2019 0.2 (H) 0 % Final     Neutrophils Absolute   Date Value Ref Range Status   06/08/2022 4.2 1.4 - 7.0 x10E3/uL Final   09/10/2019 6.14 2.0 - 8.8 10*3/uL Final     Lymphocytes Absolute   Date Value Ref Range Status   06/08/2022 2.2 0.7 - 3.1 x10E3/uL Final   09/10/2019 1.42 0.7 - 5.5 10*3/uL Final     Monocytes Absolute   Date Value Ref Range Status   06/08/2022 0.7 0.1 - 0.9 x10E3/uL Final   09/10/2019 0.64 0.0 - 1.7 10*3/uL Final     Eosinophils Absolute   Date Value Ref Range Status   06/08/2022 0.2 0.0 - 0.4 x10E3/uL Final   09/10/2019 0.03 0.0 - 0.8 10*3/uL Final     Basophils Absolute   Date Value Ref Range Status   06/08/2022 0.1 0.0 - 0.2 x10E3/uL Final   09/10/2019 0.02 0.0 - 0.2 10*3/uL Final     Immature Grans, Absolute   Date Value Ref Range Status   09/10/2019 0.02 <1 10*3/uL Final     nRBC   Date Value Ref Range Status   09/10/2019 0 0 /100(WBC) Final       Lab Results   Component Value Date    HGBA1C 6.6 (H) 06/08/2022       Lab Results   Component Value Date    [id number] 305 12/07/2021       TSH   Date Value Ref Range Status   06/08/2022 3.570 0.450 - 4.500 uIU/mL Final       No results found for: CHOL  Lab Results   Component Value Date    TRIG 153 (H) 06/08/2022     Lab Results   Component Value Date    HDL 40 06/08/2022     Lab  Results   Component Value Date    LDL 94 06/08/2022     Lab Results   Component Value Date    VLDL 27 06/08/2022     No results found for: LDLHDL      Procedures    Assessment & Plan   Problems Addressed this Visit     Diabetic peripheral neuropathy (HCC)    Relevant Medications    ALPRAZolam (XANAX) 2 MG tablet    gabapentin (NEURONTIN) 300 MG capsule    traMADol (ULTRAM) 50 MG tablet    glipizide (Glucotrol) 10 MG tablet    metFORMIN (Glucophage) 1000 MG tablet    Hypertension - Primary    Relevant Medications    amLODIPine (NORVASC) 10 MG tablet    ramipril (ALTACE) 10 MG capsule    hydroCHLOROthiazide (HYDRODIURIL) 25 MG tablet    metoprolol tartrate (LOPRESSOR) 25 MG tablet    Other Relevant Orders    Comprehensive Metabolic Panel    Other hyperlipidemia    Relevant Medications    simvastatin (Zocor) 40 MG tablet    Other Relevant Orders    Comprehensive Metabolic Panel    Lipid Panel With / Chol / HDL Ratio    Type 2 diabetes mellitus, without long-term current use of insulin (HCC)    Relevant Medications    glipizide (Glucotrol) 10 MG tablet    metFORMIN (Glucophage) 1000 MG tablet    Other Relevant Orders    Hemoglobin A1c   Other Visit Diagnoses     Hypothyroidism, unspecified type        Relevant Medications    levothyroxine (SYNTHROID, LEVOTHROID) 25 MCG tablet    metoprolol tartrate (LOPRESSOR) 25 MG tablet      Diagnoses       Codes Comments    Primary hypertension    -  Primary ICD-10-CM: I10  ICD-9-CM: 401.9     Type 2 diabetes mellitus with hyperosmolar coma, without long-term current use of insulin (HCC)     ICD-10-CM: E11.01  ICD-9-CM: 250.20     Other hyperlipidemia     ICD-10-CM: E78.49  ICD-9-CM: 272.4     Diabetic peripheral neuropathy (HCC)     ICD-10-CM: E11.42  ICD-9-CM: 250.60, 357.2     Hypothyroidism, unspecified type     ICD-10-CM: E03.9  ICD-9-CM: 244.9         HTN.  Controlled.  RF ramipril, amlodipine.    Hyperlipidmeia.  Controlled.  Check FLP.  Check CMP.    Hypothyroidism.  TSh  normal form 5 months ago.    DM2.  Check A1c. Continue meds.    Pt declines flu shot.     Orders Placed This Encounter   Procedures   • Comprehensive Metabolic Panel     Order Specific Question:   Release to patient     Answer:   Routine Release   • Lipid Panel With / Chol / HDL Ratio     Order Specific Question:   Release to patient     Answer:   Routine Release   • Hemoglobin A1c     Order Specific Question:   Release to patient     Answer:   Routine Release       Current Outpatient Medications   Medication Sig Dispense Refill   • ALPRAZolam (XANAX) 2 MG tablet Take 1 tablet by mouth At Night As Needed for Sleep. 90 tablet 1   • amLODIPine (NORVASC) 10 MG tablet Take 1 tablet by mouth Daily. 90 tablet 3   • aspirin 325 MG tablet Take 325 mg by mouth Daily.     • gabapentin (NEURONTIN) 300 MG capsule 2 capsules  capsule 3   • glipizide (Glucotrol) 10 MG tablet Take 1 tablet by mouth 2 (Two) Times a Day Before Meals. 180 tablet 3   • hydroCHLOROthiazide (HYDRODIURIL) 25 MG tablet Take 1 tablet by mouth Daily. 90 tablet 3   • levothyroxine (SYNTHROID, LEVOTHROID) 25 MCG tablet Take 1 tablet by mouth Daily. 90 tablet 3   • metFORMIN (Glucophage) 1000 MG tablet Take 1 tablet by mouth 2 (Two) Times a Day With Meals. 180 tablet 3   • metoprolol tartrate (LOPRESSOR) 25 MG tablet Take 1 tablet by mouth 2 (Two) Times a Day. 180 tablet 3   • nitroglycerin (Nitrostat) 0.4 MG SL tablet Place 1 tablet under the tongue Every 5 (Five) Minutes As Needed for Chest Pain. Take no more than 3 doses in 15 minutes. 25 tablet 12   • ramipril (ALTACE) 10 MG capsule Take 1 capsule by mouth Daily. 90 capsule 3   • simvastatin (Zocor) 40 MG tablet One three times a week 90 tablet 3   • tamsulosin (FLOMAX) 0.4 MG capsule 24 hr capsule Take 1 capsule by mouth Daily. 90 capsule 3   • traMADol (ULTRAM) 50 MG tablet One to two tablets QID prn pain. 240 tablet 3     No current facility-administered medications for this visit.       Eric  Nia had no medications administered during this visit.    No follow-ups on file.    There are no Patient Instructions on file for this visit.

## 2022-12-02 LAB
ALBUMIN SERPL-MCNC: 4.1 G/DL (ref 3.5–5.2)
ALBUMIN/GLOB SERPL: 1.4 G/DL
ALP SERPL-CCNC: 62 U/L (ref 39–117)
ALT SERPL-CCNC: 9 U/L (ref 1–41)
AST SERPL-CCNC: 10 U/L (ref 1–40)
BILIRUB SERPL-MCNC: 0.5 MG/DL (ref 0–1.2)
BUN SERPL-MCNC: 24 MG/DL (ref 8–23)
BUN/CREAT SERPL: 15.9 (ref 7–25)
CALCIUM SERPL-MCNC: 10.3 MG/DL (ref 8.6–10.5)
CHLORIDE SERPL-SCNC: 100 MMOL/L (ref 98–107)
CHOLEST SERPL-MCNC: 172 MG/DL (ref 0–200)
CHOLEST/HDLC SERPL: 4.3 {RATIO}
CO2 SERPL-SCNC: 32.9 MMOL/L (ref 22–29)
CREAT SERPL-MCNC: 1.51 MG/DL (ref 0.76–1.27)
EGFRCR SERPLBLD CKD-EPI 2021: 46.1 ML/MIN/1.73
GLOBULIN SER CALC-MCNC: 3 GM/DL
GLUCOSE SERPL-MCNC: 109 MG/DL (ref 65–99)
HBA1C MFR BLD: 6.5 % (ref 4.8–5.6)
HDLC SERPL-MCNC: 40 MG/DL (ref 40–60)
LDLC SERPL CALC-MCNC: 108 MG/DL (ref 0–100)
POTASSIUM SERPL-SCNC: 4.4 MMOL/L (ref 3.5–5.2)
PROT SERPL-MCNC: 7.1 G/DL (ref 6–8.5)
SODIUM SERPL-SCNC: 142 MMOL/L (ref 136–145)
TRIGL SERPL-MCNC: 137 MG/DL (ref 0–150)
VLDLC SERPL CALC-MCNC: 24 MG/DL (ref 5–40)

## 2023-06-05 ENCOUNTER — OFFICE VISIT (OUTPATIENT)
Dept: FAMILY MEDICINE CLINIC | Facility: CLINIC | Age: 83
End: 2023-06-05
Payer: MEDICARE

## 2023-06-05 VITALS
HEART RATE: 58 BPM | BODY MASS INDEX: 30.98 KG/M2 | SYSTOLIC BLOOD PRESSURE: 138 MMHG | HEIGHT: 74 IN | WEIGHT: 241.4 LBS | DIASTOLIC BLOOD PRESSURE: 80 MMHG | TEMPERATURE: 97.8 F | OXYGEN SATURATION: 98 %

## 2023-06-05 DIAGNOSIS — Z79.899 HIGH RISK MEDICATION USE: ICD-10-CM

## 2023-06-05 DIAGNOSIS — E78.49 OTHER HYPERLIPIDEMIA: ICD-10-CM

## 2023-06-05 DIAGNOSIS — E11.01 TYPE 2 DIABETES MELLITUS WITH HYPEROSMOLAR COMA, WITHOUT LONG-TERM CURRENT USE OF INSULIN: Primary | ICD-10-CM

## 2023-06-05 DIAGNOSIS — F32.89 OTHER DEPRESSION: ICD-10-CM

## 2023-06-05 DIAGNOSIS — E03.9 HYPOTHYROIDISM, UNSPECIFIED TYPE: ICD-10-CM

## 2023-06-05 DIAGNOSIS — E11.42 DIABETIC PERIPHERAL NEUROPATHY: ICD-10-CM

## 2023-06-05 DIAGNOSIS — I10 PRIMARY HYPERTENSION: ICD-10-CM

## 2023-06-05 PROCEDURE — 99214 OFFICE O/P EST MOD 30 MIN: CPT | Performed by: FAMILY MEDICINE

## 2023-06-05 PROCEDURE — 3079F DIAST BP 80-89 MM HG: CPT | Performed by: FAMILY MEDICINE

## 2023-06-05 PROCEDURE — 3075F SYST BP GE 130 - 139MM HG: CPT | Performed by: FAMILY MEDICINE

## 2023-06-05 RX ORDER — GLIPIZIDE 10 MG/1
10 TABLET ORAL
Qty: 180 TABLET | Refills: 3 | Status: SHIPPED | OUTPATIENT
Start: 2023-06-05 | End: 2023-06-06 | Stop reason: SDUPTHER

## 2023-06-05 RX ORDER — ALPRAZOLAM 2 MG/1
2 TABLET ORAL NIGHTLY PRN
Qty: 90 TABLET | Refills: 1 | Status: SHIPPED | OUTPATIENT
Start: 2023-06-05

## 2023-06-05 RX ORDER — AMLODIPINE BESYLATE 10 MG/1
10 TABLET ORAL DAILY
Qty: 90 TABLET | Refills: 3 | Status: SHIPPED | OUTPATIENT
Start: 2023-06-05

## 2023-06-05 RX ORDER — HYDROCHLOROTHIAZIDE 25 MG/1
25 TABLET ORAL DAILY
Qty: 90 TABLET | Refills: 3 | Status: SHIPPED | OUTPATIENT
Start: 2023-06-05

## 2023-06-05 RX ORDER — GABAPENTIN 300 MG/1
CAPSULE ORAL
Qty: 180 CAPSULE | Refills: 3 | Status: SHIPPED | OUTPATIENT
Start: 2023-06-05

## 2023-06-05 RX ORDER — TRAMADOL HYDROCHLORIDE 50 MG/1
TABLET ORAL
Qty: 240 TABLET | Refills: 3 | Status: SHIPPED | OUTPATIENT
Start: 2023-06-05

## 2023-06-05 RX ORDER — DULOXETIN HYDROCHLORIDE 30 MG/1
30 CAPSULE, DELAYED RELEASE ORAL DAILY
Qty: 90 CAPSULE | Refills: 3 | Status: SHIPPED | OUTPATIENT
Start: 2023-06-05

## 2023-06-05 RX ORDER — RAMIPRIL 10 MG/1
10 CAPSULE ORAL DAILY
Qty: 90 CAPSULE | Refills: 0 | Status: SHIPPED | OUTPATIENT
Start: 2023-06-05

## 2023-06-05 RX ORDER — LEVOTHYROXINE SODIUM 0.03 MG/1
25 TABLET ORAL DAILY
Qty: 90 TABLET | Refills: 3 | Status: SHIPPED | OUTPATIENT
Start: 2023-06-05

## 2023-06-05 NOTE — PROGRESS NOTES
Chief Complaint   Patient presents with    Hypertension       Subjective   Eric Kramer is a 83 y.o. male.     History of Present Illness   F?u HTN.  No orthostasis.    C/o memory impairment.  Going on for months.    C/o trouble with mood.  Low mood.    The following portions of the patient's history were reviewed and updated as appropriate: allergies, current medications, past family history, past medical history, past social history, past surgical history and problem list.    Review of Systems   Constitutional:  Negative for appetite change and fatigue.   HENT:  Negative for nosebleeds and sore throat.    Eyes:  Negative for blurred vision and visual disturbance.   Respiratory:  Negative for shortness of breath and wheezing.    Cardiovascular:  Negative for chest pain and leg swelling.   Gastrointestinal:  Negative for abdominal distention and abdominal pain.   Endocrine: Negative for cold intolerance and polyuria.   Genitourinary:  Negative for dysuria and hematuria.   Musculoskeletal:  Negative for arthralgias and myalgias.   Skin:  Negative for color change and rash.   Neurological:  Positive for numbness. Negative for weakness and confusion.   Psychiatric/Behavioral:  Positive for dysphoric mood. Negative for agitation and depressed mood.      Patient Active Problem List   Diagnosis    Type 2 diabetes mellitus, without long-term current use of insulin    Stroke    Prostatitis    Osteoarthritis    Nephrolithiasis    Memory impairment    Malignant neoplasm in situ of colon    Insomnia    Hypertension    Diabetic peripheral neuropathy    Benign enlargement of prostate    Arteriosclerosis of coronary artery    Diabetic ulcer of toe of right foot associated with diabetes mellitus due to underlying condition, limited to breakdown of skin    MRSA bacteremia    Hospital discharge follow-up    Medicare annual wellness visit, subsequent    Hypothyroidism due to acquired atrophy of thyroid    Other hyperlipidemia     High risk medication use       No Known Allergies      Current Outpatient Medications:     aspirin 325 MG tablet, Take 1 tablet by mouth Daily., Disp: , Rfl:     metFORMIN (Glucophage) 1000 MG tablet, Take 1 tablet by mouth 2 (Two) Times a Day With Meals., Disp: 180 tablet, Rfl: 3    nitroglycerin (Nitrostat) 0.4 MG SL tablet, Place 1 tablet under the tongue Every 5 (Five) Minutes As Needed for Chest Pain. Take no more than 3 doses in 15 minutes., Disp: 25 tablet, Rfl: 12    ramipril (ALTACE) 10 MG capsule, Take 1 capsule by mouth Daily., Disp: 90 capsule, Rfl: 3    simvastatin (Zocor) 40 MG tablet, One three times a week, Disp: 90 tablet, Rfl: 3    ALPRAZolam (XANAX) 2 MG tablet, Take 1 tablet by mouth At Night As Needed for Sleep., Disp: 90 tablet, Rfl: 1    amLODIPine (NORVASC) 10 MG tablet, Take 1 tablet by mouth Daily., Disp: 90 tablet, Rfl: 3    DULoxetine (CYMBALTA) 30 MG capsule, Take 1 capsule by mouth Daily., Disp: 90 capsule, Rfl: 3    gabapentin (NEURONTIN) 300 MG capsule, 2 capsules BID, Disp: 180 capsule, Rfl: 3    glipizide (Glucotrol) 10 MG tablet, Take 1 tablet by mouth 2 (Two) Times a Day Before Meals., Disp: 180 tablet, Rfl: 3    hydroCHLOROthiazide (HYDRODIURIL) 25 MG tablet, Take 1 tablet by mouth Daily., Disp: 90 tablet, Rfl: 3    levothyroxine (SYNTHROID, LEVOTHROID) 25 MCG tablet, Take 1 tablet by mouth Daily., Disp: 90 tablet, Rfl: 3    metoprolol tartrate (LOPRESSOR) 25 MG tablet, Take 1 tablet by mouth 2 (Two) Times a Day., Disp: 180 tablet, Rfl: 3    traMADol (ULTRAM) 50 MG tablet, One to two tablets QID prn pain., Disp: 240 tablet, Rfl: 3    Past Medical History:   Diagnosis Date    Acute foot pain, left     Acute UTI     Arteriosclerosis of coronary artery     Basal cell carcinoma (BCC) of face     Benign enlargement of prostate     Cataract     Chest pain     Diabetic peripheral neuropathy     Edema     Hyperlipidemia     Hypertension     Insomnia     Malignant neoplasm in situ of  colon     Memory impairment     MMSE 27/30-5/20/2019    Nephrolithiasis     Osteoarthritis     Prostatitis     Renal azotemia     Stroke     stroke syndrome    Type 2 diabetes mellitus        Past Surgical History:   Procedure Laterality Date    ANGIOPLASTY Right     Cath Laser Angioplasty/right ventricle    APPENDECTOMY      CATARACT EXTRACTION Left     COLECTOMY PARTIAL / TOTAL      due to colon cancer, removed 13 inches of colon    CORONARY ANGIOPLASTY WITH STENT PLACEMENT      TOE SURGERY Right 07/2021       Family History   Problem Relation Age of Onset    Colon cancer Mother     Hypertension Mother     Osteoarthritis Mother     Emphysema Father         smoker/tobacco use    Hypertension Father     Osteoarthritis Father     Rheum arthritis Sister     Colon cancer Maternal Grandmother     Cataracts Maternal Grandmother     Colon cancer Other        Social History     Tobacco Use    Smoking status: Former    Smokeless tobacco: Never   Substance Use Topics    Alcohol use: Yes     Comment: 1-2 a month--Caffeine use-2 reg coffee daily            Objective     Vitals:    06/05/23 0755   BP: 138/80   Pulse: 58   Temp: 97.8 °F (36.6 °C)   SpO2: 98%     Body mass index is 30.98 kg/m².    Physical Exam  Vitals reviewed.   Constitutional:       Appearance: He is well-developed. He is not diaphoretic.   HENT:      Head: Normocephalic and atraumatic.   Eyes:      General: No scleral icterus.     Pupils: Pupils are equal, round, and reactive to light.   Neck:      Thyroid: No thyromegaly.   Cardiovascular:      Rate and Rhythm: Normal rate and regular rhythm.      Heart sounds: No murmur heard.    No friction rub. No gallop.   Pulmonary:      Effort: Pulmonary effort is normal. No respiratory distress.      Breath sounds: No wheezing or rales.   Chest:      Chest wall: No tenderness.   Abdominal:      General: Bowel sounds are normal. There is no distension.      Palpations: Abdomen is soft.      Tenderness: There is no  abdominal tenderness.   Musculoskeletal:         General: No deformity. Normal range of motion.   Lymphadenopathy:      Cervical: No cervical adenopathy.   Skin:     General: Skin is warm and dry.      Findings: No rash.   Neurological:      Cranial Nerves: No cranial nerve deficit.      Motor: No abnormal muscle tone.       Lab Results   Component Value Date    GLUCOSE 109 (H) 12/01/2022    BUN 24 (H) 12/01/2022    CREATININE 1.51 (H) 12/01/2022    EGFRIFNONA 39 (L) 12/07/2021    EGFRIFAFRI 46 (L) 12/07/2021    BCR 15.9 12/01/2022    K 4.4 12/01/2022    CO2 32.9 (H) 12/01/2022    CALCIUM 10.3 12/01/2022    PROTENTOTREF 7.1 12/01/2022    ALBUMIN 4.10 12/01/2022    LABIL2 1.4 12/01/2022    AST 10 12/01/2022    ALT 9 12/01/2022       WBC   Date Value Ref Range Status   06/08/2022 7.5 3.4 - 10.8 x10E3/uL Final   09/10/2019 8.27 4.5 - 11.0 10*3/uL Final     RBC   Date Value Ref Range Status   06/08/2022 4.74 4.14 - 5.80 x10E6/uL Final   09/10/2019 4.26 (L) 4.5 - 5.9 10*6/uL Final     Hemoglobin   Date Value Ref Range Status   06/08/2022 13.5 13.0 - 17.7 g/dL Final   09/10/2019 12.5 (L) 13.5 - 17.5 g/dL Final     Hematocrit   Date Value Ref Range Status   06/08/2022 42.7 37.5 - 51.0 % Final   09/10/2019 39.2 (L) 41.0 - 53.0 % Final     MCV   Date Value Ref Range Status   06/08/2022 90 79 - 97 fL Final   09/10/2019 92.0 80.0 - 100.0 fL Final     MCH   Date Value Ref Range Status   06/08/2022 28.5 26.6 - 33.0 pg Final   09/10/2019 29.3 26.0 - 34.0 pg Final     MCHC   Date Value Ref Range Status   06/08/2022 31.6 31.5 - 35.7 g/dL Final   09/10/2019 31.9 31.0 - 37.0 g/dL Final     RDW   Date Value Ref Range Status   06/08/2022 14.3 11.6 - 15.4 % Final   09/10/2019 14.2 12.0 - 16.8 % Final     MPV   Date Value Ref Range Status   09/10/2019 9.0 6.7 - 10.8 fL Final     Platelets   Date Value Ref Range Status   06/08/2022 310 150 - 450 x10E3/uL Final   09/10/2019 363 140 - 440 10*3/uL Final     Neutrophil Rel %   Date Value Ref  Range Status   06/08/2022 57 Not Estab. % Final   09/10/2019 74.3 45 - 80 % Final     Lymphocyte Rel %   Date Value Ref Range Status   06/08/2022 30 Not Estab. % Final   09/10/2019 17.2 15 - 50 % Final     Monocyte Rel %   Date Value Ref Range Status   06/08/2022 9 Not Estab. % Final   09/10/2019 7.7 0 - 15 % Final     Eosinophil Rel %   Date Value Ref Range Status   06/08/2022 3 Not Estab. % Final     Eosinophil %   Date Value Ref Range Status   09/10/2019 0.4 0 - 7 % Final     Basophil Rel %   Date Value Ref Range Status   06/08/2022 1 Not Estab. % Final   09/10/2019 0.2 0 - 2 % Final     Immature Grans %   Date Value Ref Range Status   09/10/2019 0.2 (H) 0 % Final     Neutrophils Absolute   Date Value Ref Range Status   06/08/2022 4.2 1.4 - 7.0 x10E3/uL Final   09/10/2019 6.14 2.0 - 8.8 10*3/uL Final     Lymphocytes Absolute   Date Value Ref Range Status   06/08/2022 2.2 0.7 - 3.1 x10E3/uL Final   09/10/2019 1.42 0.7 - 5.5 10*3/uL Final     Monocytes Absolute   Date Value Ref Range Status   06/08/2022 0.7 0.1 - 0.9 x10E3/uL Final   09/10/2019 0.64 0.0 - 1.7 10*3/uL Final     Eosinophils Absolute   Date Value Ref Range Status   06/08/2022 0.2 0.0 - 0.4 x10E3/uL Final   09/10/2019 0.03 0.0 - 0.8 10*3/uL Final     Basophils Absolute   Date Value Ref Range Status   06/08/2022 0.1 0.0 - 0.2 x10E3/uL Final   09/10/2019 0.02 0.0 - 0.2 10*3/uL Final     Immature Grans, Absolute   Date Value Ref Range Status   09/10/2019 0.02 <1 10*3/uL Final     nRBC   Date Value Ref Range Status   09/10/2019 0 0 /100(WBC) Final       Lab Results   Component Value Date    HGBA1C 6.50 (H) 12/01/2022       Lab Results   Component Value Date    KPCRQPIU80 305 12/07/2021       TSH   Date Value Ref Range Status   06/08/2022 3.570 0.450 - 4.500 uIU/mL Final       No results found for: CHOL  Lab Results   Component Value Date    TRIG 137 12/01/2022     Lab Results   Component Value Date    HDL 40 12/01/2022     Lab Results   Component Value  Date     (H) 12/01/2022     Lab Results   Component Value Date    VLDL 24 12/01/2022     No results found for: LDLHDL      Procedures    Assessment & Plan   Problems Addressed this Visit       Diabetic peripheral neuropathy    Relevant Medications    gabapentin (NEURONTIN) 300 MG capsule    traMADol (ULTRAM) 50 MG tablet    ALPRAZolam (XANAX) 2 MG tablet    glipizide (Glucotrol) 10 MG tablet    DULoxetine (CYMBALTA) 30 MG capsule    Other Relevant Orders    Microalbumin / Creatinine Urine Ratio - Urine, Clean Catch    High risk medication use    Relevant Orders    Vitamin B12    Hypertension    Relevant Medications    metoprolol tartrate (LOPRESSOR) 25 MG tablet    hydroCHLOROthiazide (HYDRODIURIL) 25 MG tablet    amLODIPine (NORVASC) 10 MG tablet    Other hyperlipidemia    Type 2 diabetes mellitus, without long-term current use of insulin - Primary    Relevant Medications    glipizide (Glucotrol) 10 MG tablet    Other Relevant Orders    Microalbumin / Creatinine Urine Ratio - Urine, Clean Catch    Hemoglobin A1c    Lipid Panel With / Chol / HDL Ratio    Vitamin B12     Other Visit Diagnoses       Hypothyroidism, unspecified type        Relevant Medications    levothyroxine (SYNTHROID, LEVOTHROID) 25 MCG tablet    metoprolol tartrate (LOPRESSOR) 25 MG tablet    Other Relevant Orders    TSH Rfx On Abnormal To Free T4    Other depression        Relevant Medications    ALPRAZolam (XANAX) 2 MG tablet    DULoxetine (CYMBALTA) 30 MG capsule    Other Relevant Orders    CBC & Differential    Comprehensive Metabolic Panel          Diagnoses         Codes Comments    Type 2 diabetes mellitus with hyperosmolar coma, without long-term current use of insulin    -  Primary ICD-10-CM: E11.01  ICD-9-CM: 250.20     Diabetic peripheral neuropathy     ICD-10-CM: E11.42  ICD-9-CM: 250.60, 357.2     Hypothyroidism, unspecified type     ICD-10-CM: E03.9  ICD-9-CM: 244.9     Other hyperlipidemia     ICD-10-CM: E78.49  ICD-9-CM:  272.4     Primary hypertension     ICD-10-CM: I10  ICD-9-CM: 401.9     High risk medication use     ICD-10-CM: Z79.899  ICD-9-CM: V58.69     Other depression     ICD-10-CM: F32.89  ICD-9-CM: 311           DM2.  Check A1c, Urine micro.  CMP.  B12 level.    Memory impairment.  New problem.  Check CBC, CMP, B12, TSH.     Hyperlipidmeia.  Check FLP.  Continue statin.      Orders Placed This Encounter   Procedures    Comprehensive Metabolic Panel     Order Specific Question:   Release to patient     Answer:   Routine Release    Microalbumin / Creatinine Urine Ratio - Urine, Clean Catch     Order Specific Question:   Release to patient     Answer:   Routine Release    Hemoglobin A1c     Order Specific Question:   Release to patient     Answer:   Routine Release    Lipid Panel With / Chol / HDL Ratio     Order Specific Question:   Release to patient     Answer:   Routine Release    Vitamin B12     Order Specific Question:   Release to patient     Answer:   Routine Release    TSH Rfx On Abnormal To Free T4     Order Specific Question:   Release to patient     Answer:   Routine Release    CBC & Differential     Order Specific Question:   Manual Differential     Answer:   No       Current Outpatient Medications   Medication Sig Dispense Refill    ALPRAZolam (XANAX) 2 MG tablet Take 1 tablet by mouth At Night As Needed for Sleep. 90 tablet 1    amLODIPine (NORVASC) 10 MG tablet Take 1 tablet by mouth Daily. 90 tablet 3    aspirin 325 MG tablet Take 1 tablet by mouth Daily.      gabapentin (NEURONTIN) 300 MG capsule 2 capsules  capsule 3    glipizide (Glucotrol) 10 MG tablet Take 1 tablet by mouth 2 (Two) Times a Day Before Meals. 180 tablet 3    hydroCHLOROthiazide (HYDRODIURIL) 25 MG tablet Take 1 tablet by mouth Daily. 90 tablet 3    levothyroxine (SYNTHROID, LEVOTHROID) 25 MCG tablet Take 1 tablet by mouth Daily. 90 tablet 3    metFORMIN (Glucophage) 1000 MG tablet Take 1 tablet by mouth 2 (Two) Times a Day With  Meals. 180 tablet 3    metoprolol tartrate (LOPRESSOR) 25 MG tablet Take 1 tablet by mouth 2 (Two) Times a Day. 180 tablet 3    nitroglycerin (Nitrostat) 0.4 MG SL tablet Place 1 tablet under the tongue Every 5 (Five) Minutes As Needed for Chest Pain. Take no more than 3 doses in 15 minutes. 25 tablet 12    ramipril (ALTACE) 10 MG capsule Take 1 capsule by mouth Daily. 90 capsule 3    simvastatin (Zocor) 40 MG tablet One three times a week 90 tablet 3    traMADol (ULTRAM) 50 MG tablet One to two tablets QID prn pain. 240 tablet 3    DULoxetine (CYMBALTA) 30 MG capsule Take 1 capsule by mouth Daily. 90 capsule 3     No current facility-administered medications for this visit.       Eric Kramer had no medications administered during this visit.    No follow-ups on file.    There are no Patient Instructions on file for this visit.

## 2023-06-06 ENCOUNTER — TELEPHONE (OUTPATIENT)
Dept: FAMILY MEDICINE CLINIC | Facility: CLINIC | Age: 83
End: 2023-06-06
Payer: MEDICARE

## 2023-06-06 LAB
ALBUMIN SERPL-MCNC: 4.4 G/DL (ref 3.5–5.2)
ALBUMIN/CREAT UR: 150 MG/G CREAT (ref 0–29)
ALBUMIN/GLOB SERPL: 1.4 G/DL
ALP SERPL-CCNC: 69 U/L (ref 39–117)
ALT SERPL-CCNC: 9 U/L (ref 1–41)
AST SERPL-CCNC: 12 U/L (ref 1–40)
BASOPHILS # BLD AUTO: 0.06 10*3/MM3 (ref 0–0.2)
BASOPHILS NFR BLD AUTO: 0.8 % (ref 0–1.5)
BILIRUB SERPL-MCNC: 0.5 MG/DL (ref 0–1.2)
BUN SERPL-MCNC: 28 MG/DL (ref 8–23)
BUN/CREAT SERPL: 17.4 (ref 7–25)
CALCIUM SERPL-MCNC: 10.7 MG/DL (ref 8.6–10.5)
CHLORIDE SERPL-SCNC: 97 MMOL/L (ref 98–107)
CHOLEST SERPL-MCNC: 187 MG/DL (ref 0–200)
CHOLEST/HDLC SERPL: 5.5 {RATIO}
CO2 SERPL-SCNC: 29.6 MMOL/L (ref 22–29)
CREAT SERPL-MCNC: 1.61 MG/DL (ref 0.76–1.27)
CREAT UR-MCNC: 98.3 MG/DL
EGFRCR SERPLBLD CKD-EPI 2021: 42.4 ML/MIN/1.73
EOSINOPHIL # BLD AUTO: 0.07 10*3/MM3 (ref 0–0.4)
EOSINOPHIL NFR BLD AUTO: 1 % (ref 0.3–6.2)
ERYTHROCYTE [DISTWIDTH] IN BLOOD BY AUTOMATED COUNT: 16.8 % (ref 12.3–15.4)
GLOBULIN SER CALC-MCNC: 3.1 GM/DL
GLUCOSE SERPL-MCNC: 46 MG/DL (ref 65–99)
HBA1C MFR BLD: 6.9 % (ref 4.8–5.6)
HCT VFR BLD AUTO: 46.2 % (ref 37.5–51)
HDLC SERPL-MCNC: 34 MG/DL (ref 40–60)
HGB BLD-MCNC: 14.1 G/DL (ref 13–17.7)
IMM GRANULOCYTES # BLD AUTO: 0.01 10*3/MM3 (ref 0–0.05)
IMM GRANULOCYTES NFR BLD AUTO: 0.1 % (ref 0–0.5)
LDLC SERPL CALC-MCNC: 113 MG/DL (ref 0–100)
LYMPHOCYTES # BLD AUTO: 2.19 10*3/MM3 (ref 0.7–3.1)
LYMPHOCYTES NFR BLD AUTO: 30.5 % (ref 19.6–45.3)
MCH RBC QN AUTO: 26.4 PG (ref 26.6–33)
MCHC RBC AUTO-ENTMCNC: 30.5 G/DL (ref 31.5–35.7)
MCV RBC AUTO: 86.4 FL (ref 79–97)
MICROALBUMIN UR-MCNC: 147.1 UG/ML
MONOCYTES # BLD AUTO: 0.61 10*3/MM3 (ref 0.1–0.9)
MONOCYTES NFR BLD AUTO: 8.5 % (ref 5–12)
NEUTROPHILS # BLD AUTO: 4.24 10*3/MM3 (ref 1.7–7)
NEUTROPHILS NFR BLD AUTO: 59.1 % (ref 42.7–76)
NRBC BLD AUTO-RTO: 0 /100 WBC (ref 0–0.2)
PLATELET # BLD AUTO: 344 10*3/MM3 (ref 140–450)
POTASSIUM SERPL-SCNC: 4.7 MMOL/L (ref 3.5–5.2)
PROT SERPL-MCNC: 7.5 G/DL (ref 6–8.5)
RBC # BLD AUTO: 5.35 10*6/MM3 (ref 4.14–5.8)
SODIUM SERPL-SCNC: 140 MMOL/L (ref 136–145)
TRIGL SERPL-MCNC: 228 MG/DL (ref 0–150)
TSH SERPL DL<=0.005 MIU/L-ACNC: 2.23 UIU/ML (ref 0.27–4.2)
VIT B12 SERPL-MCNC: 219 PG/ML (ref 211–946)
VLDLC SERPL CALC-MCNC: 40 MG/DL (ref 5–40)
WBC # BLD AUTO: 7.18 10*3/MM3 (ref 3.4–10.8)

## 2023-06-06 RX ORDER — GLIPIZIDE 10 MG/1
5 TABLET ORAL
Qty: 90 TABLET | Refills: 3 | Status: SHIPPED | OUTPATIENT
Start: 2023-06-06

## 2023-06-06 RX ORDER — MAGNESIUM 200 MG
TABLET ORAL
Qty: 90 EACH | Refills: 3 | Status: SHIPPED | OUTPATIENT
Start: 2023-06-06

## 2023-06-06 NOTE — TELEPHONE ENCOUNTER
SPOKE WITH PATIENT HE WANTED HIS RESULTS SENT TO HIM IN A LETTER     Tell him his blood sugars are too low with a blood sugar down to 46.  Decrease glipizide to 10mg 1/2 tablet with largest meal only.  Also B12 is low.  I am sending over a B12 tablet to take daily.  All other labs look great.

## 2023-06-06 NOTE — TELEPHONE ENCOUNTER
[facility] lab called with critical lab result; blood sugar was 46 on labs drawn earlier today; tried to reach patient to discuss; left message to call back.

## 2023-08-23 RX ORDER — RAMIPRIL 10 MG/1
10 CAPSULE ORAL DAILY
Qty: 90 CAPSULE | Refills: 3 | Status: SHIPPED | OUTPATIENT
Start: 2023-08-23

## 2023-10-11 ENCOUNTER — OFFICE VISIT (OUTPATIENT)
Dept: FAMILY MEDICINE CLINIC | Facility: CLINIC | Age: 83
End: 2023-10-11
Payer: MEDICARE

## 2023-10-11 VITALS
WEIGHT: 240 LBS | SYSTOLIC BLOOD PRESSURE: 100 MMHG | OXYGEN SATURATION: 90 % | BODY MASS INDEX: 30.8 KG/M2 | HEART RATE: 52 BPM | TEMPERATURE: 98.5 F | DIASTOLIC BLOOD PRESSURE: 80 MMHG | HEIGHT: 74 IN

## 2023-10-11 DIAGNOSIS — E11.01 TYPE 2 DIABETES MELLITUS WITH HYPEROSMOLAR COMA, WITHOUT LONG-TERM CURRENT USE OF INSULIN: ICD-10-CM

## 2023-10-11 DIAGNOSIS — E53.8 B12 DEFICIENCY: ICD-10-CM

## 2023-10-11 DIAGNOSIS — Z23 ENCOUNTER FOR IMMUNIZATION: ICD-10-CM

## 2023-10-11 DIAGNOSIS — L82.1 SEBORRHEIC KERATOSIS: ICD-10-CM

## 2023-10-11 DIAGNOSIS — M19.90 OSTEOARTHRITIS, UNSPECIFIED OSTEOARTHRITIS TYPE, UNSPECIFIED SITE: ICD-10-CM

## 2023-10-11 DIAGNOSIS — Z00.00 MEDICARE ANNUAL WELLNESS VISIT, SUBSEQUENT: Primary | ICD-10-CM

## 2023-10-11 DIAGNOSIS — E11.42 DIABETIC PERIPHERAL NEUROPATHY: ICD-10-CM

## 2023-10-11 RX ORDER — DULOXETIN HYDROCHLORIDE 60 MG/1
60 CAPSULE, DELAYED RELEASE ORAL DAILY
Qty: 90 CAPSULE | Refills: 3 | Status: SHIPPED | OUTPATIENT
Start: 2023-10-11

## 2023-10-11 RX ORDER — GLIPIZIDE 10 MG/1
10 TABLET ORAL
Qty: 90 TABLET | Refills: 3 | Status: SHIPPED | OUTPATIENT
Start: 2023-10-11

## 2023-10-11 NOTE — PROGRESS NOTES
The ABCs of the Annual Wellness Visit  Subsequent Medicare Wellness Visit    Subjective    Eric Kramer is a 83 y.o. male who presents for a Subsequent Medicare Wellness Visit.    The following portions of the patient's history were reviewed and   updated as appropriate: allergies, current medications, past family history, past medical history, past social history, past surgical history, and problem list.    Compared to one year ago, the patient feels his physical   health is the same.    Compared to one year ago, the patient feels his mental   health is the same.    Recent Hospitalizations:  He was not admitted to the hospital during the last year.       Current Medical Providers:  Patient Care Team:  Elder Block MD as PCP - General (Family Medicine)    Outpatient Medications Prior to Visit   Medication Sig Dispense Refill    ALPRAZolam (XANAX) 2 MG tablet Take 1 tablet by mouth At Night As Needed for Sleep. 90 tablet 1    amLODIPine (NORVASC) 10 MG tablet Take 1 tablet by mouth Daily. 90 tablet 3    aspirin 325 MG tablet Take 1 tablet by mouth Daily.      Cyanocobalamin (B-12) 1000 MCG sublingual tablet One sublingual tab dissolved on tongue daily 90 each 3    gabapentin (NEURONTIN) 300 MG capsule 2 capsules  capsule 3    hydroCHLOROthiazide (HYDRODIURIL) 25 MG tablet Take 1 tablet by mouth Daily. 90 tablet 3    levothyroxine (SYNTHROID, LEVOTHROID) 25 MCG tablet Take 1 tablet by mouth Daily. 90 tablet 3    metFORMIN (Glucophage) 1000 MG tablet Take 1 tablet by mouth 2 (Two) Times a Day With Meals. 180 tablet 3    metoprolol tartrate (LOPRESSOR) 25 MG tablet Take 1 tablet by mouth 2 (Two) Times a Day. 180 tablet 3    nitroglycerin (Nitrostat) 0.4 MG SL tablet Place 1 tablet under the tongue Every 5 (Five) Minutes As Needed for Chest Pain. Take no more than 3 doses in 15 minutes. 25 tablet 12    ramipril (ALTACE) 10 MG capsule Take 1 capsule by mouth once daily 90 capsule 3    simvastatin (Zocor) 40  MG tablet One three times a week 90 tablet 3    traMADol (ULTRAM) 50 MG tablet One to two tablets QID prn pain. 240 tablet 3    DULoxetine (CYMBALTA) 30 MG capsule Take 1 capsule by mouth Daily. 90 capsule 3    glipizide (Glucotrol) 10 MG tablet Take 0.5 tablets by mouth Daily With Breakfast. NOTE NEW DIRECTIONS!!! 90 tablet 3     No facility-administered medications prior to visit.       Opioid medication/s are on active medication list.  and I have evaluated his active treatment plan and pain score trends (see table).  There were no vitals filed for this visit.  I have reviewed the chart for potential of high risk medication and harmful drug interactions in the elderly.          Aspirin is on active medication list. Aspirin use is indicated based on review of current medical condition/s. Pros and cons of this therapy have been discussed today. Benefits of this medication outweigh potential harm.  Patient has been encouraged to continue taking this medication.  .      Patient Active Problem List   Diagnosis    Type 2 diabetes mellitus, without long-term current use of insulin    Stroke    Prostatitis    Osteoarthritis    Nephrolithiasis    Memory impairment    Malignant neoplasm in situ of colon    Insomnia    Hypertension    Diabetic peripheral neuropathy    Benign enlargement of prostate    Arteriosclerosis of coronary artery    Diabetic ulcer of toe of right foot associated with diabetes mellitus due to underlying condition, limited to breakdown of skin    MRSA bacteremia    Hospital discharge follow-up    Medicare annual wellness visit, subsequent    Hypothyroidism due to acquired atrophy of thyroid    Other hyperlipidemia    High risk medication use    B12 deficiency    Seborrheic keratosis     Advance Care Planning   Advance Care Planning     Advance Directive is not on file.  ACP discussion was held with the patient during this visit. Patient has an advance directive (not in EMR), copy requested.    "  Objective    Vitals:    10/11/23 0831   BP: 100/80   BP Location: Left arm   Patient Position: Sitting   Cuff Size: Adult   Pulse: (!) 45   Temp: 98.5 øF (36.9 øC)   SpO2: 90%   Weight: 109 kg (240 lb)   Height: 188 cm (74.02\")     Estimated body mass index is 30.8 kg/mý as calculated from the following:    Height as of this encounter: 188 cm (74.02\").    Weight as of this encounter: 109 kg (240 lb).           Does the patient have evidence of cognitive impairment? No          HEALTH RISK ASSESSMENT    Smoking Status:  Social History     Tobacco Use   Smoking Status Former   Smokeless Tobacco Never     Alcohol Consumption:  Social History     Substance and Sexual Activity   Alcohol Use Yes    Comment: 1-2 a month--Caffeine use-2 reg coffee daily     Fall Risk Screen:    GLADYS Fall Risk Assessment has not been completed.    Depression Screening:      10/11/2023     8:00 AM   PHQ-2/PHQ-9 Depression Screening   Little Interest or Pleasure in Doing Things 0-->not at all   PHQ-9: Brief Depression Severity Measure Score 0       Health Habits and Functional and Cognitive Screening:      10/11/2023     8:00 AM   Functional & Cognitive Status   Do you have difficulty preparing food and eating? No   Do you have difficulty bathing yourself, getting dressed or grooming yourself? No   Do you have difficulty using the toilet? No   Do you have difficulty moving around from place to place? No   Do you have trouble with steps or getting out of a bed or a chair? No   Current Diet Well Balanced Diet   Dental Exam Up to date   Eye Exam Up to date   Exercise (times per week) 0 times per week   Current Exercises Include No Regular Exercise   Do you need help using the phone?  No   Are you deaf or do you have serious difficulty hearing?  Yes   Do you need help to go to places out of walking distance? No   Do you need help shopping? No   Do you need help preparing meals?  No   Do you need help with housework?  No   Do you need help with " laundry? No   Do you need help taking your medications? No   Do you need help managing money? No   Do you ever drive or ride in a car without wearing a seat belt? No   Have you felt unusual stress, anger or loneliness in the last month? No   Who do you live with? Spouse   If you need help, do you have trouble finding someone available to you? No   Do you have difficulty concentrating, remembering or making decisions? No       Age-appropriate Screening Schedule:  Refer to the list below for future screening recommendations based on patient's age, sex and/or medical conditions. Orders for these recommended tests are listed in the plan section. The patient has been provided with a written plan.    Health Maintenance   Topic Date Due    BMI FOLLOWUP  Never done    TDAP/TD VACCINES (1 - Tdap) Never done    ZOSTER VACCINE (1 of 2) Never done    DIABETIC EYE EXAM  Never done    INFLUENZA VACCINE  03/31/2024 (Originally 8/1/2023)    HEMOGLOBIN A1C  12/05/2023    LIPID PANEL  06/05/2024    URINE MICROALBUMIN  06/05/2024    ANNUAL WELLNESS VISIT  10/11/2024    COVID-19 Vaccine  Completed    Pneumococcal Vaccine 65+  Completed                  CMS Preventative Services Quick Reference  Risk Factors Identified During Encounter  Inactivity/Sedentary: Patient was advised to exercise at least 150 minutes a week per CDC recommendations.  The above risks/problems have been discussed with the patient.  Pertinent information has been shared with the patient in the After Visit Summary.  An After Visit Summary and PPPS were made available to the patient.    Follow Up:   Next Medicare Wellness visit to be scheduled in 1 year.       Additional E&M Note during same encounter follows:  Patient has multiple medical problems which are significant and separately identifiable that require additional work above and beyond the Medicare Wellness Visit.      Chief Complaint  Medicare Wellness-subsequent    Subjective        HPI  Eric Kramer is  "also being seen today for spots on forehead that have grew.    C/o increased pain in joints.  Going on months.      Review of Systems   Constitutional:  Negative for chills, diaphoresis and fatigue.   HENT:  Negative for rhinorrhea.    Respiratory:  Negative for cough and shortness of breath.    Cardiovascular:  Negative for chest pain and leg swelling.   Gastrointestinal:  Negative for abdominal distention and abdominal pain.   Musculoskeletal:  Positive for arthralgias. Negative for back pain.   Skin:  Negative for rash.       Objective   Vital Signs:  /80 (BP Location: Left arm, Patient Position: Sitting, Cuff Size: Adult)   Pulse (!) 45   Temp 98.5 øF (36.9 øC)   Ht 188 cm (74.02\")   Wt 109 kg (240 lb)   SpO2 90%   BMI 30.80 kg/mý     Physical Exam  Vitals reviewed.   Constitutional:       Appearance: He is well-developed. He is not diaphoretic.   HENT:      Head: Normocephalic and atraumatic.   Eyes:      General: No scleral icterus.     Pupils: Pupils are equal, round, and reactive to light.   Neck:      Thyroid: No thyromegaly.   Cardiovascular:      Rate and Rhythm: Normal rate and regular rhythm.      Heart sounds: No murmur heard.     No friction rub. No gallop.   Pulmonary:      Effort: Pulmonary effort is normal. No respiratory distress.      Breath sounds: No wheezing or rales.   Chest:      Chest wall: No tenderness.   Abdominal:      General: Bowel sounds are normal. There is no distension.      Palpations: Abdomen is soft.      Tenderness: There is no abdominal tenderness.   Musculoskeletal:         General: No deformity. Normal range of motion.   Lymphadenopathy:      Cervical: No cervical adenopathy.   Skin:     General: Skin is warm and dry.      Findings: No rash.   Neurological:      Cranial Nerves: No cranial nerve deficit.      Motor: No abnormal muscle tone.                         Assessment and Plan   Diagnoses and all orders for this visit:    1. Medicare annual wellness visit, " subsequent (Primary)    2. Osteoarthritis, unspecified osteoarthritis type, unspecified site    3. Type 2 diabetes mellitus with hyperosmolar coma, without long-term current use of insulin  -     Pneumococcal Conjugate Vaccine 20-Valent All  -     Comprehensive Metabolic Panel  -     Hemoglobin A1c    4. B12 deficiency  -     Vitamin B12    5. Diabetic peripheral neuropathy  -     DULoxetine (CYMBALTA) 60 MG capsule; Take 1 capsule by mouth Daily.  Dispense: 90 capsule; Refill: 3    6. Seborrheic keratosis    7. Encounter for immunization  -     Pneumococcal Conjugate Vaccine 20-Valent All    Other orders  -     glipizide (Glucotrol) 10 MG tablet; Take 1 tablet by mouth Daily With Breakfast. NOTE NEW DIRECTIONS!!!  Dispense: 90 tablet; Refill: 3        Seborheic keratosis.  New problem.  Use vaseline at night.    OA.  Uncontrolled.  Increase cymbalta to 60 a day.      Preventive Counseling:  Encouraged to stay active.  Covid vaccine UTD. Flu declined.   HEP A UTD.  Prevnar 20 today.  Colonoscopy UTD.    Dentist UTD.  Optho UTD.           Follow Up   Return in about 4 months (around 2/11/2024).  Patient was given instructions and counseling regarding his condition or for health maintenance advice. Please see specific information pulled into the AVS if appropriate.

## 2023-10-12 LAB
ALBUMIN SERPL-MCNC: 4.6 G/DL (ref 3.5–5.2)
ALBUMIN/GLOB SERPL: 1.8 G/DL
ALP SERPL-CCNC: 65 U/L (ref 39–117)
ALT SERPL-CCNC: 8 U/L (ref 1–41)
AST SERPL-CCNC: 10 U/L (ref 1–40)
BILIRUB SERPL-MCNC: 0.5 MG/DL (ref 0–1.2)
BUN SERPL-MCNC: 27 MG/DL (ref 8–23)
BUN/CREAT SERPL: 18.9 (ref 7–25)
CALCIUM SERPL-MCNC: 10.3 MG/DL (ref 8.6–10.5)
CHLORIDE SERPL-SCNC: 100 MMOL/L (ref 98–107)
CO2 SERPL-SCNC: 32 MMOL/L (ref 22–29)
CREAT SERPL-MCNC: 1.43 MG/DL (ref 0.76–1.27)
EGFRCR SERPLBLD CKD-EPI 2021: 48.9 ML/MIN/1.73
GLOBULIN SER CALC-MCNC: 2.6 GM/DL
GLUCOSE SERPL-MCNC: 131 MG/DL (ref 65–99)
HBA1C MFR BLD: 6.6 % (ref 4.8–5.6)
POTASSIUM SERPL-SCNC: 4.5 MMOL/L (ref 3.5–5.2)
PROT SERPL-MCNC: 7.2 G/DL (ref 6–8.5)
SODIUM SERPL-SCNC: 142 MMOL/L (ref 136–145)
VIT B12 SERPL-MCNC: 656 PG/ML (ref 211–946)

## 2023-12-05 DIAGNOSIS — E11.42 DIABETIC PERIPHERAL NEUROPATHY: ICD-10-CM

## 2023-12-05 RX ORDER — TRAMADOL HYDROCHLORIDE 50 MG/1
TABLET ORAL
Qty: 240 TABLET | Refills: 3 | Status: SHIPPED | OUTPATIENT
Start: 2023-12-05

## 2023-12-12 ENCOUNTER — APPOINTMENT (OUTPATIENT)
Dept: GENERAL RADIOLOGY | Facility: HOSPITAL | Age: 83
End: 2023-12-12
Payer: MEDICARE

## 2023-12-12 ENCOUNTER — HOSPITAL ENCOUNTER (INPATIENT)
Facility: HOSPITAL | Age: 83
LOS: 8 days | Discharge: HOME HEALTH | End: 2023-12-20
Attending: STUDENT IN AN ORGANIZED HEALTH CARE EDUCATION/TRAINING PROGRAM | Admitting: INTERNAL MEDICINE
Payer: MEDICARE

## 2023-12-12 DIAGNOSIS — J96.01 ACUTE RESPIRATORY FAILURE WITH HYPOXIA AND HYPERCAPNIA: ICD-10-CM

## 2023-12-12 DIAGNOSIS — J18.9 PNEUMONIA OF RIGHT UPPER LOBE DUE TO INFECTIOUS ORGANISM: Primary | ICD-10-CM

## 2023-12-12 DIAGNOSIS — J96.02 ACUTE RESPIRATORY FAILURE WITH HYPOXIA AND HYPERCAPNIA: ICD-10-CM

## 2023-12-12 PROBLEM — J96.90 RESPIRATORY FAILURE: Status: ACTIVE | Noted: 2023-12-12

## 2023-12-12 LAB
ALBUMIN SERPL-MCNC: 3.6 G/DL (ref 3.5–5.2)
ALBUMIN/GLOB SERPL: 1 G/DL
ALP SERPL-CCNC: 54 U/L (ref 39–117)
ALT SERPL W P-5'-P-CCNC: 9 U/L (ref 1–41)
ANION GAP SERPL CALCULATED.3IONS-SCNC: 12 MMOL/L (ref 5–15)
ARTERIAL PATENCY WRIST A: POSITIVE
ARTERIAL PATENCY WRIST A: POSITIVE
AST SERPL-CCNC: 13 U/L (ref 1–40)
ATMOSPHERIC PRESS: 758.3 MMHG
ATMOSPHERIC PRESS: 759.6 MMHG
B PARAPERT DNA SPEC QL NAA+PROBE: NOT DETECTED
B PERT DNA SPEC QL NAA+PROBE: NOT DETECTED
BACTERIA UR QL AUTO: NORMAL /HPF
BASE EXCESS BLDA CALC-SCNC: 1.8 MMOL/L (ref 0–2)
BASE EXCESS BLDA CALC-SCNC: 2.4 MMOL/L (ref 0–2)
BASOPHILS # BLD AUTO: 0.04 10*3/MM3 (ref 0–0.2)
BASOPHILS NFR BLD AUTO: 0.5 % (ref 0–1.5)
BDY SITE: ABNORMAL
BDY SITE: ABNORMAL
BILIRUB SERPL-MCNC: 0.5 MG/DL (ref 0–1.2)
BILIRUB UR QL STRIP: NEGATIVE
BUN SERPL-MCNC: 28 MG/DL (ref 8–23)
BUN/CREAT SERPL: 14.2 (ref 7–25)
C PNEUM DNA NPH QL NAA+NON-PROBE: NOT DETECTED
CALCIUM SPEC-SCNC: 9.6 MG/DL (ref 8.6–10.5)
CHLORIDE SERPL-SCNC: 99 MMOL/L (ref 98–107)
CK SERPL-CCNC: 65 U/L (ref 20–200)
CLARITY UR: CLEAR
CO2 BLDA-SCNC: 31.3 MMOL/L (ref 23–27)
CO2 BLDA-SCNC: 32.2 MMOL/L (ref 23–27)
CO2 SERPL-SCNC: 26 MMOL/L (ref 22–29)
COLOR UR: ABNORMAL
CREAT SERPL-MCNC: 1.97 MG/DL (ref 0.76–1.27)
CREAT UR-MCNC: 253.3 MG/DL
D-LACTATE SERPL-SCNC: 1.9 MMOL/L (ref 0.5–2)
DEPRECATED RDW RBC AUTO: 48.3 FL (ref 37–54)
DEVICE COMMENT: ABNORMAL
EGFRCR SERPLBLD CKD-EPI 2021: 33.3 ML/MIN/1.73
EOSINOPHIL # BLD AUTO: 0.03 10*3/MM3 (ref 0–0.4)
EOSINOPHIL NFR BLD AUTO: 0.4 % (ref 0.3–6.2)
ERYTHROCYTE [DISTWIDTH] IN BLOOD BY AUTOMATED COUNT: 14.1 % (ref 12.3–15.4)
FLUAV SUBTYP SPEC NAA+PROBE: NOT DETECTED
FLUBV RNA ISLT QL NAA+PROBE: NOT DETECTED
GAS FLOW AIRWAY: 12 LPM
GEN 5 2HR TROPONIN T REFLEX: 70 NG/L
GLOBULIN UR ELPH-MCNC: 3.5 GM/DL
GLUCOSE BLDC GLUCOMTR-MCNC: 79 MG/DL (ref 70–130)
GLUCOSE BLDC GLUCOMTR-MCNC: 82 MG/DL (ref 70–130)
GLUCOSE SERPL-MCNC: 181 MG/DL (ref 65–99)
GLUCOSE UR STRIP-MCNC: NEGATIVE MG/DL
HADV DNA SPEC NAA+PROBE: NOT DETECTED
HCO3 BLDA-SCNC: 29.6 MMOL/L (ref 22–28)
HCO3 BLDA-SCNC: 30.3 MMOL/L (ref 22–28)
HCOV 229E RNA SPEC QL NAA+PROBE: NOT DETECTED
HCOV HKU1 RNA SPEC QL NAA+PROBE: NOT DETECTED
HCOV NL63 RNA SPEC QL NAA+PROBE: NOT DETECTED
HCOV OC43 RNA SPEC QL NAA+PROBE: NOT DETECTED
HCT VFR BLD AUTO: 41.1 % (ref 37.5–51)
HEMODILUTION: NO
HEMODILUTION: NO
HGB BLD-MCNC: 13.4 G/DL (ref 13–17.7)
HGB UR QL STRIP.AUTO: NEGATIVE
HMPV RNA NPH QL NAA+NON-PROBE: NOT DETECTED
HPIV1 RNA ISLT QL NAA+PROBE: NOT DETECTED
HPIV2 RNA SPEC QL NAA+PROBE: NOT DETECTED
HPIV3 RNA NPH QL NAA+PROBE: NOT DETECTED
HPIV4 P GENE NPH QL NAA+PROBE: NOT DETECTED
HYALINE CASTS UR QL AUTO: NORMAL /LPF
IMM GRANULOCYTES # BLD AUTO: 0.02 10*3/MM3 (ref 0–0.05)
IMM GRANULOCYTES NFR BLD AUTO: 0.3 % (ref 0–0.5)
INHALED O2 CONCENTRATION: 40 %
INSPIRATORY TIME: 1
KETONES UR QL STRIP: ABNORMAL
LEUKOCYTE ESTERASE UR QL STRIP.AUTO: NEGATIVE
LYMPHOCYTES # BLD AUTO: 1.48 10*3/MM3 (ref 0.7–3.1)
LYMPHOCYTES NFR BLD AUTO: 19.5 % (ref 19.6–45.3)
Lab: ABNORMAL
M PNEUMO IGG SER IA-ACNC: NOT DETECTED
MCH RBC QN AUTO: 30.6 PG (ref 26.6–33)
MCHC RBC AUTO-ENTMCNC: 32.6 G/DL (ref 31.5–35.7)
MCV RBC AUTO: 93.8 FL (ref 79–97)
MODALITY: ABNORMAL
MODALITY: ABNORMAL
MONOCYTES # BLD AUTO: 1.16 10*3/MM3 (ref 0.1–0.9)
MONOCYTES NFR BLD AUTO: 15.3 % (ref 5–12)
NEUTROPHILS NFR BLD AUTO: 4.86 10*3/MM3 (ref 1.7–7)
NEUTROPHILS NFR BLD AUTO: 64 % (ref 42.7–76)
NITRITE UR QL STRIP: NEGATIVE
NOTIFIED WHO: ABNORMAL
NRBC BLD AUTO-RTO: 0 /100 WBC (ref 0–0.2)
NT-PROBNP SERPL-MCNC: 810 PG/ML (ref 0–1800)
O2 A-A PPRESDIFF RESPIRATORY: 0.3 MMHG
PAW @ PEAK INSP FLOW SETTING VENT: 25 CMH2O
PCO2 BLDA: 54.9 MM HG (ref 35–45)
PCO2 BLDA: 62.6 MM HG (ref 35–45)
PH BLDA: 7.29 PH UNITS (ref 7.35–7.45)
PH BLDA: 7.34 PH UNITS (ref 7.35–7.45)
PH UR STRIP.AUTO: <=5 [PH] (ref 5–8)
PLATELET # BLD AUTO: 256 10*3/MM3 (ref 140–450)
PMV BLD AUTO: 9.6 FL (ref 6–12)
PO2 BLDA: 71.2 MM HG (ref 80–100)
PO2 BLDA: 72.3 MM HG (ref 80–100)
POTASSIUM SERPL-SCNC: 3.8 MMOL/L (ref 3.5–5.2)
PROT SERPL-MCNC: 7.1 G/DL (ref 6–8.5)
PROT UR QL STRIP: ABNORMAL
RBC # BLD AUTO: 4.38 10*6/MM3 (ref 4.14–5.8)
RBC # UR STRIP: NORMAL /HPF
READ BACK: NO
REF LAB TEST METHOD: NORMAL
RHINOVIRUS RNA SPEC NAA+PROBE: NOT DETECTED
RSV RNA NPH QL NAA+NON-PROBE: NOT DETECTED
SAO2 % BLDCOA: 91.4 % (ref 92–98.5)
SAO2 % BLDCOA: 92.9 % (ref 92–98.5)
SARS-COV-2 RNA NPH QL NAA+NON-PROBE: NOT DETECTED
SET MECH RESP RATE: 20
SODIUM SERPL-SCNC: 137 MMOL/L (ref 136–145)
SODIUM UR-SCNC: 23 MMOL/L
SP GR UR STRIP: 1.02 (ref 1–1.03)
SQUAMOUS #/AREA URNS HPF: NORMAL /HPF
TOTAL RATE: 16 BREATHS/MINUTE
TOTAL RATE: 22 BREATHS/MINUTE
TROPONIN T DELTA: 11 NG/L
TROPONIN T SERPL HS-MCNC: 59 NG/L
URATE SERPL-MCNC: 9.4 MG/DL (ref 3.4–7)
UROBILINOGEN UR QL STRIP: ABNORMAL
VENTILATOR MODE: ABNORMAL
WBC # UR STRIP: NORMAL /HPF
WBC NRBC COR # BLD AUTO: 7.59 10*3/MM3 (ref 3.4–10.8)

## 2023-12-12 PROCEDURE — 82570 ASSAY OF URINE CREATININE: CPT | Performed by: INTERNAL MEDICINE

## 2023-12-12 PROCEDURE — 84550 ASSAY OF BLOOD/URIC ACID: CPT | Performed by: INTERNAL MEDICINE

## 2023-12-12 PROCEDURE — 36415 COLL VENOUS BLD VENIPUNCTURE: CPT

## 2023-12-12 PROCEDURE — 83880 ASSAY OF NATRIURETIC PEPTIDE: CPT | Performed by: STUDENT IN AN ORGANIZED HEALTH CARE EDUCATION/TRAINING PROGRAM

## 2023-12-12 PROCEDURE — 82803 BLOOD GASES ANY COMBINATION: CPT

## 2023-12-12 PROCEDURE — 84300 ASSAY OF URINE SODIUM: CPT | Performed by: INTERNAL MEDICINE

## 2023-12-12 PROCEDURE — 99285 EMERGENCY DEPT VISIT HI MDM: CPT

## 2023-12-12 PROCEDURE — 84484 ASSAY OF TROPONIN QUANT: CPT | Performed by: STUDENT IN AN ORGANIZED HEALTH CARE EDUCATION/TRAINING PROGRAM

## 2023-12-12 PROCEDURE — 81001 URINALYSIS AUTO W/SCOPE: CPT | Performed by: INTERNAL MEDICINE

## 2023-12-12 PROCEDURE — 94799 UNLISTED PULMONARY SVC/PX: CPT

## 2023-12-12 PROCEDURE — 36600 WITHDRAWAL OF ARTERIAL BLOOD: CPT

## 2023-12-12 PROCEDURE — 80053 COMPREHEN METABOLIC PANEL: CPT | Performed by: STUDENT IN AN ORGANIZED HEALTH CARE EDUCATION/TRAINING PROGRAM

## 2023-12-12 PROCEDURE — 94642 AEROSOL INHALATION TREATMENT: CPT

## 2023-12-12 PROCEDURE — 82948 REAGENT STRIP/BLOOD GLUCOSE: CPT

## 2023-12-12 PROCEDURE — 25010000002 CEFTRIAXONE PER 250 MG: Performed by: STUDENT IN AN ORGANIZED HEALTH CARE EDUCATION/TRAINING PROGRAM

## 2023-12-12 PROCEDURE — 83605 ASSAY OF LACTIC ACID: CPT | Performed by: STUDENT IN AN ORGANIZED HEALTH CARE EDUCATION/TRAINING PROGRAM

## 2023-12-12 PROCEDURE — 94761 N-INVAS EAR/PLS OXIMETRY MLT: CPT

## 2023-12-12 PROCEDURE — 0202U NFCT DS 22 TRGT SARS-COV-2: CPT | Performed by: STUDENT IN AN ORGANIZED HEALTH CARE EDUCATION/TRAINING PROGRAM

## 2023-12-12 PROCEDURE — 87449 NOS EACH ORGANISM AG IA: CPT | Performed by: INTERNAL MEDICINE

## 2023-12-12 PROCEDURE — 94660 CPAP INITIATION&MGMT: CPT

## 2023-12-12 PROCEDURE — 25010000002 AZITHROMYCIN PER 500 MG: Performed by: STUDENT IN AN ORGANIZED HEALTH CARE EDUCATION/TRAINING PROGRAM

## 2023-12-12 PROCEDURE — 82550 ASSAY OF CK (CPK): CPT | Performed by: INTERNAL MEDICINE

## 2023-12-12 PROCEDURE — 87040 BLOOD CULTURE FOR BACTERIA: CPT | Performed by: STUDENT IN AN ORGANIZED HEALTH CARE EDUCATION/TRAINING PROGRAM

## 2023-12-12 PROCEDURE — 93010 ELECTROCARDIOGRAM REPORT: CPT | Performed by: INTERNAL MEDICINE

## 2023-12-12 PROCEDURE — 25810000003 SODIUM CHLORIDE 0.9 % SOLUTION 250 ML FLEX CONT: Performed by: STUDENT IN AN ORGANIZED HEALTH CARE EDUCATION/TRAINING PROGRAM

## 2023-12-12 PROCEDURE — 93005 ELECTROCARDIOGRAM TRACING: CPT | Performed by: STUDENT IN AN ORGANIZED HEALTH CARE EDUCATION/TRAINING PROGRAM

## 2023-12-12 PROCEDURE — 71045 X-RAY EXAM CHEST 1 VIEW: CPT

## 2023-12-12 PROCEDURE — 85025 COMPLETE CBC W/AUTO DIFF WBC: CPT | Performed by: STUDENT IN AN ORGANIZED HEALTH CARE EDUCATION/TRAINING PROGRAM

## 2023-12-12 PROCEDURE — 25010000002 HEPARIN (PORCINE) PER 1000 UNITS: Performed by: INTERNAL MEDICINE

## 2023-12-12 RX ORDER — DEXTROSE MONOHYDRATE 25 G/50ML
25 INJECTION, SOLUTION INTRAVENOUS
Status: DISCONTINUED | OUTPATIENT
Start: 2023-12-12 | End: 2023-12-20 | Stop reason: HOSPADM

## 2023-12-12 RX ORDER — BISACODYL 10 MG
10 SUPPOSITORY, RECTAL RECTAL DAILY PRN
Status: DISCONTINUED | OUTPATIENT
Start: 2023-12-12 | End: 2023-12-20 | Stop reason: HOSPADM

## 2023-12-12 RX ORDER — SODIUM CHLORIDE 0.9 % (FLUSH) 0.9 %
10 SYRINGE (ML) INJECTION EVERY 12 HOURS SCHEDULED
Status: DISCONTINUED | OUTPATIENT
Start: 2023-12-12 | End: 2023-12-20 | Stop reason: HOSPADM

## 2023-12-12 RX ORDER — HEPARIN SODIUM 5000 [USP'U]/ML
5000 INJECTION, SOLUTION INTRAVENOUS; SUBCUTANEOUS EVERY 8 HOURS SCHEDULED
Status: DISCONTINUED | OUTPATIENT
Start: 2023-12-12 | End: 2023-12-15

## 2023-12-12 RX ORDER — AMOXICILLIN 250 MG
2 CAPSULE ORAL 2 TIMES DAILY
Status: DISCONTINUED | OUTPATIENT
Start: 2023-12-12 | End: 2023-12-20 | Stop reason: HOSPADM

## 2023-12-12 RX ORDER — NICOTINE POLACRILEX 4 MG
15 LOZENGE BUCCAL
Status: DISCONTINUED | OUTPATIENT
Start: 2023-12-12 | End: 2023-12-20 | Stop reason: HOSPADM

## 2023-12-12 RX ORDER — IBUPROFEN 600 MG/1
1 TABLET ORAL
Status: DISCONTINUED | OUTPATIENT
Start: 2023-12-12 | End: 2023-12-20 | Stop reason: HOSPADM

## 2023-12-12 RX ORDER — NITROGLYCERIN 0.4 MG/1
0.4 TABLET SUBLINGUAL
Status: DISCONTINUED | OUTPATIENT
Start: 2023-12-12 | End: 2023-12-20 | Stop reason: HOSPADM

## 2023-12-12 RX ORDER — ACETAMINOPHEN 325 MG/1
650 TABLET ORAL EVERY 6 HOURS PRN
Status: DISCONTINUED | OUTPATIENT
Start: 2023-12-12 | End: 2023-12-20 | Stop reason: HOSPADM

## 2023-12-12 RX ORDER — SODIUM CHLORIDE 9 MG/ML
40 INJECTION, SOLUTION INTRAVENOUS AS NEEDED
Status: DISCONTINUED | OUTPATIENT
Start: 2023-12-12 | End: 2023-12-20 | Stop reason: HOSPADM

## 2023-12-12 RX ORDER — NOREPINEPHRINE BITARTRATE 0.03 MG/ML
.02-.3 INJECTION, SOLUTION INTRAVENOUS
Status: DISCONTINUED | OUTPATIENT
Start: 2023-12-12 | End: 2023-12-14

## 2023-12-12 RX ORDER — POLYETHYLENE GLYCOL 3350 17 G/17G
17 POWDER, FOR SOLUTION ORAL DAILY PRN
Status: DISCONTINUED | OUTPATIENT
Start: 2023-12-12 | End: 2023-12-20 | Stop reason: HOSPADM

## 2023-12-12 RX ORDER — BISACODYL 5 MG/1
5 TABLET, DELAYED RELEASE ORAL DAILY PRN
Status: DISCONTINUED | OUTPATIENT
Start: 2023-12-12 | End: 2023-12-20 | Stop reason: HOSPADM

## 2023-12-12 RX ORDER — IPRATROPIUM BROMIDE AND ALBUTEROL SULFATE 2.5; .5 MG/3ML; MG/3ML
3 SOLUTION RESPIRATORY (INHALATION)
Status: DISCONTINUED | OUTPATIENT
Start: 2023-12-12 | End: 2023-12-20 | Stop reason: HOSPADM

## 2023-12-12 RX ORDER — SODIUM CHLORIDE 0.9 % (FLUSH) 0.9 %
10 SYRINGE (ML) INJECTION AS NEEDED
Status: DISCONTINUED | OUTPATIENT
Start: 2023-12-12 | End: 2023-12-20 | Stop reason: HOSPADM

## 2023-12-12 RX ADMIN — IPRATROPIUM BROMIDE AND ALBUTEROL SULFATE 3 ML: 2.5; .5 SOLUTION RESPIRATORY (INHALATION) at 19:17

## 2023-12-12 RX ADMIN — AZITHROMYCIN MONOHYDRATE 500 MG: 500 INJECTION, POWDER, LYOPHILIZED, FOR SOLUTION INTRAVENOUS at 15:39

## 2023-12-12 RX ADMIN — CEFTRIAXONE 2000 MG: 2 INJECTION, POWDER, FOR SOLUTION INTRAMUSCULAR; INTRAVENOUS at 14:53

## 2023-12-12 RX ADMIN — HEPARIN SODIUM 5000 UNITS: 5000 INJECTION INTRAVENOUS; SUBCUTANEOUS at 21:00

## 2023-12-12 RX ADMIN — Medication 10 ML: at 20:44

## 2023-12-12 NOTE — ED TRIAGE NOTES
Pt from [facility] via ems, called for low sats. Wife took pt there for confusion for several days, found with RA sats of 79%, wheezing to RUL, lower ext edema, pt reports gen weakness/aching all over

## 2023-12-12 NOTE — ED NOTES
Nursing report ED to floor  Eric Kramer  83 y.o.  male    HPI :   Chief Complaint   Patient presents with    Low Sat       Admitting doctor:   Eugene Carter MD    Admitting diagnosis:   The primary encounter diagnosis was Pneumonia of right upper lobe due to infectious organism. A diagnosis of Acute respiratory failure with hypoxia and hypercapnia was also pertinent to this visit.    Code status:   Current Code Status       Date Active Code Status Order ID Comments User Context       Not on file            Allergies:   Patient has no known allergies.    Isolation:   No active isolations    Intake and Output  No intake or output data in the 24 hours ending 12/12/23 1643    Weight:       12/12/23  1233   Weight: 109 kg (240 lb)       Most recent vitals:   Vitals:    12/12/23 1243 12/12/23 1244 12/12/23 1343 12/12/23 1457   BP:  114/70  105/62   Pulse: 70 70 68 68   Resp:       Temp:       SpO2: (!) 85% 92% 92% 93%   Weight:       Height:           Active LDAs/IV Access:   Lines, Drains & Airways       Active LDAs       Name Placement date Placement time Site Days    Peripheral IV 12/12/23 1235 Left Antecubital 12/12/23  1235  Antecubital  less than 1    Peripheral IV 12/12/23 1456 Right Antecubital 12/12/23  1456  Antecubital  less than 1                    Labs (abnormal labs have a star):   Labs Reviewed   COMPREHENSIVE METABOLIC PANEL - Abnormal; Notable for the following components:       Result Value    Glucose 181 (*)     BUN 28 (*)     Creatinine 1.97 (*)     eGFR 33.3 (*)     All other components within normal limits    Narrative:     GFR Normal >60  Chronic Kidney Disease <60  Kidney Failure <15    The GFR formula is only valid for adults with stable renal function between ages 18 and 70.   SINGLE HSTROPONIN T - Abnormal; Notable for the following components:    HS Troponin T 59 (*)     All other components within normal limits    Narrative:     High Sensitive Troponin T Reference Range:  <14.0 ng/L-  Negative Female for AMI  <22.0 ng/L- Negative Male for AMI  >=14 - Abnormal Female indicating possible myocardial injury.  >=22 - Abnormal Male indicating possible myocardial injury.   Clinicians would have to utilize clinical acumen, EKG, Troponin, and serial changes to determine if it is an Acute Myocardial Infarction or myocardial injury due to an underlying chronic condition.        CBC WITH AUTO DIFFERENTIAL - Abnormal; Notable for the following components:    Lymphocyte % 19.5 (*)     Monocyte % 15.3 (*)     Monocytes, Absolute 1.16 (*)     All other components within normal limits   HIGH SENSITIVITIY TROPONIN T 2HR - Abnormal; Notable for the following components:    HS Troponin T 70 (*)     Troponin T Delta 11 (*)     All other components within normal limits    Narrative:     High Sensitive Troponin T Reference Range:  <14.0 ng/L- Negative Female for AMI  <22.0 ng/L- Negative Male for AMI  >=14 - Abnormal Female indicating possible myocardial injury.  >=22 - Abnormal Male indicating possible myocardial injury.   Clinicians would have to utilize clinical acumen, EKG, Troponin, and serial changes to determine if it is an Acute Myocardial Infarction or myocardial injury due to an underlying chronic condition.        BLOOD GAS, ARTERIAL - Abnormal; Notable for the following components:    pH, Arterial 7.293 (*)     pCO2, Arterial 62.6 (*)     pO2, Arterial 71.2 (*)     HCO3, Arterial 30.3 (*)     O2 Saturation, Arterial 91.4 (*)     CO2 Content 32.2 (*)     All other components within normal limits   RESPIRATORY PANEL PCR W/ COVID-19 (SARS-COV-2), NP SWAB IN UTM/VTP, 2 HR TAT - Normal    Narrative:     In the setting of a positive respiratory panel with a viral infection PLUS a negative procalcitonin without other underlying concern for bacterial infection, consider observing off antibiotics or discontinuation of antibiotics and continue supportive care. If the respiratory panel is positive for atypical  bacterial infection (Bordetella pertussis, Chlamydophila pneumoniae, or Mycoplasma pneumoniae), consider antibiotic de-escalation to target atypical bacterial infection.   BNP (IN-HOUSE) - Normal    Narrative:     This assay is used as an aid in the diagnosis of individuals suspected of having heart failure. It can be used as an aid in the diagnosis of acute decompensated heart failure (ADHF) in patients presenting with signs and symptoms of ADHF to the emergency department (ED). In addition, NT-proBNP of <300 pg/mL indicates ADHF is not likely.    Age Range Result Interpretation  NT-proBNP Concentration (pg/mL:      <50             Positive            >450                   Gray                 300-450                    Negative             <300    50-75           Positive            >900                  Gray                300-900                  Negative            <300      >75             Positive            >1800                  Gray                300-1800                  Negative            <300   LACTIC ACID, PLASMA - Normal   BLOOD CULTURE   BLOOD CULTURE   BLOOD GAS, ARTERIAL   CBC AND DIFFERENTIAL    Narrative:     The following orders were created for panel order CBC & Differential.  Procedure                               Abnormality         Status                     ---------                               -----------         ------                     CBC Auto Differential[[id number]]        Abnormal            Final result                 Please view results for these tests on the individual orders.       EKG:   ECG 12 Lead Dyspnea   Preliminary Result   HEART RATE= 65  bpm   RR Interval= 923  ms   WY Interval= 206  ms   P Horizontal Axis= 18  deg   P Front Axis= 31  deg   QRSD Interval= 106  ms   QT Interval= 381  ms   QTcB= 397  ms   QRS Axis= 27  deg   T Wave Axis= 53  deg   - ABNORMAL ECG -   Sinus rhythm   Anterior infarct, old   Electronically Signed By:    Date and Time of Study:  2023-12-12 13:46:59          Meds given in ED:   Medications   cefTRIAXone (ROCEPHIN) 2,000 mg in sodium chloride 0.9 % 100 mL IVPB-VTB (0 mg Intravenous Stopped 12/12/23 1530)   azithromycin (ZITHROMAX) 500 mg in sodium chloride 0.9 % 250 mL IVPB-VTB (500 mg Intravenous New Bag 12/12/23 1539)       Imaging results:  XR Chest 1 View    Result Date: 12/12/2023  1. Borderline cardiomegaly. 2. Moderately severe right upper lobe infiltrate consistent with pneumonia. Follow-up films recommended to assess clearing.   This report was finalized on 12/12/2023 1:29 PM by Dr. Pravin Santana M.D on Workstation: [id number]       Ambulatory status:   - bedrest    Social issues:   Social History     Socioeconomic History    Marital status:    Tobacco Use    Smoking status: Former    Smokeless tobacco: Never   Vaping Use    Vaping Use: Never used   Substance and Sexual Activity    Alcohol use: Yes     Comment: 1-2 a month--Caffeine use-2 reg coffee daily    Drug use: No    Sexual activity: Defer       NIH Stroke Scale:       Yaya Garner RN  12/12/23 16:43 EST

## 2023-12-12 NOTE — PLAN OF CARE
Goal Outcome Evaluation:      Pt arrived to the unit on 6L hi flow with O2 satsd stable.  ED RT informed this RN that the patient's ABG was improved from the previous one.  Pt Aox3, follows commands, and VSS.

## 2023-12-12 NOTE — H&P
Patient Care Team:  Elder Block MD as PCP - General (Family Medicine)    Chief complaint:  Weakness and confusion.    History of present illness:  Subjective   This is an 83-year-old male patient, former smoker (40 packs year) with no prior history of COPD or lung disease.    Patient was transferred from urgent care clinic due to hypoxia.  Per wife, patient has been confused for several days.  His SpO2 at the urgent care clinic was 79%.    He received Rocephin and azithromycin in the ED and was placed on NIPPV.    I interviewed the patient in the ED when he was on NIPPV.  He had a soft blood pressure and was relatively bradycardic.  He was able to tell me that he has been feeling sick for 2 days with dyspnea and cough but no fever and no sick contact.  He was not really elaborate more.  He stated that he has been taking his medications regularly.  His medications list include alprazolam 2 mg and tramadol.    Labs: Normal WBC.  Lactic acid normal.  AB.29/62/71.    Review of Systems:  Cannot obtain due to confusion.    History  Past Medical History:   Diagnosis Date    Acute foot pain, left     Acute UTI     Arteriosclerosis of coronary artery     Basal cell carcinoma (BCC) of face     Benign enlargement of prostate     Cataract     Chest pain     Diabetic peripheral neuropathy     Edema     Hyperlipidemia     Hypertension     Insomnia     Malignant neoplasm in situ of colon     Memory impairment     MMSE -2019    Nephrolithiasis     Osteoarthritis     Prostatitis     Renal azotemia     Stroke     stroke syndrome    Type 2 diabetes mellitus      Past Surgical History:   Procedure Laterality Date    ANGIOPLASTY Right     Cath Laser Angioplasty/right ventricle    APPENDECTOMY      CATARACT EXTRACTION Left     COLECTOMY PARTIAL / TOTAL      due to colon cancer, removed 13 inches of colon    CORONARY ANGIOPLASTY WITH STENT PLACEMENT      TOE SURGERY Right 2021     Family History    Problem Relation Age of Onset    Colon cancer Mother     Hypertension Mother     Osteoarthritis Mother     Emphysema Father         smoker/tobacco use    Hypertension Father     Osteoarthritis Father     Rheum arthritis Sister     Colon cancer Maternal Grandmother     Cataracts Maternal Grandmother     Colon cancer Other      Social History     Tobacco Use    Smoking status: Former    Smokeless tobacco: Never   Vaping Use    Vaping Use: Never used   Substance Use Topics    Alcohol use: Yes     Comment: 1-2 a month--Caffeine use-2 reg coffee daily    Drug use: No     E-cigarette/Vaping    E-cigarette/Vaping Use Never User      E-cigarette/Vaping Substances     (Not in a hospital admission)   Allergies:  Patient has no known allergies.    Objective   Vital Signs  Temp:  [97.5 °F (36.4 °C)-99.6 °F (37.6 °C)] 99.6 °F (37.6 °C)  Heart Rate:  [68-75] 68  Resp:  [16-22] 16  BP: (105-118)/(62-74) 105/62    PPE used per hospital policy    Physical Exam:  Constitutional: On NIPPV.  Not in acute distress.  Eyes: Injected conjunctivae, EOMI. Pupils equal and reactive to light.   ENMT: Becerra 3. No oral thrush. Tonsils grade  Neck: No thyromegaly.  Trachea midline  Heart: RRR, no murmur.  Grade 2 pitting edema in legs  Lungs: Equal but diminished air entry throughout.  Minimal crackles at the bases.  Abdomen: Obese. Soft. No tenderness or dullness.  Positive bowel sounds  Extremities: No cyanosis, clubbing. Moves all extremities.  Warm extremities and well-perfused  Neuro: Looks slightly somnolent but easily arousable.  Moves all extremities with symmetrical strength.  Psych: Cannot assess.  Integumentary: No rash or ecchymosis  Lymphatic: No palpable cervical or supraclavicular lymph nodes.            Diagnostic imaging:  I personally and independently reviewed the following images:   CXR 12/20/2023: RUL pulmonary consolidation.  Increased pulmonary vascular markings.  Cardiomegaly      Laboratory workup:  Results from last  7 days   Lab Units 12/12/23  1247   SODIUM mmol/L 137   POTASSIUM mmol/L 3.8   CHLORIDE mmol/L 99   CO2 mmol/L 26.0   BUN mg/dL 28*   CREATININE mg/dL 1.97*   GLUCOSE mg/dL 181*   CALCIUM mg/dL 9.6     Results from last 7 days   Lab Units 12/12/23  1450 12/12/23  1247   HSTROP T ng/L 70* 59*     Results from last 7 days   Lab Units 12/12/23  1247   WBC 10*3/mm3 7.59   HEMOGLOBIN g/dL 13.4   HEMATOCRIT % 41.1   PLATELETS 10*3/mm3 256         Results from last 7 days   Lab Units 12/12/23  1247   PROBNP pg/mL 810.0       Assessment     Acute hypercapnic and hypoxic respiratory failure, secondary to below and possibly the use of alprazolam and tramadol  Right upper lobe pneumonia  Pulmonary vascular congestion/mild interstitial edema  Mild left pleural effusion  KAREN on CKD 3      DM type II  HTN  Former smoker    Plan:  NIPPV: Settings reviewed and adjusted.  Continue NIPPV for now.  I think he would improve with therapy.  Antibiotics with Rocephin and azithromycin  Check Pro-Ba.  RVP negative.  Volume status difficult to determine clinically.  We will not administer more IV fluid for now as he appears to be congested on imaging but does not have a history of CHF.  Levophed to keep MAP >65.  Check an echocardiogram.  Urine lites, CK and uric acid  DuoNeb 4 times a day.  N.p.o. for now while on continuous NIPPV.  Accu-Chek every 6 hours and insulin sliding scale per protocol.    Eugene Carter MD  12/12/23  16:21 EST    Time: Critical care 45 min      This note was dictated utilizing Dragon dictation

## 2023-12-12 NOTE — ED PROVIDER NOTES
EMERGENCY DEPARTMENT ENCOUNTER    Room Number:  21/21  PCP: Elder Block MD  Historian: Patient, family member at bedside      HPI:  Chief Complaint: Shortness of breath  Context: Eric Kramer is a 83 y.o. male who presents to the ED c/o shortness of breath.  Patient initially went to urgent care where he was found to have O2 saturations of 79% which improved after administration of O2.  Wife states patient has had confusion and lethargy for 3 days.  Patient is mildly somnolent but does arouse to stimuli but cannot provide significant history.  Wife states has had chills but no fevers.          PAST MEDICAL HISTORY  Active Ambulatory Problems     Diagnosis Date Noted    Type 2 diabetes mellitus, without long-term current use of insulin     Stroke     Prostatitis     Osteoarthritis     Nephrolithiasis     Memory impairment     Malignant neoplasm in situ of colon     Insomnia     Hypertension     Diabetic peripheral neuropathy     Benign enlargement of prostate     Arteriosclerosis of coronary artery     Diabetic ulcer of toe of right foot associated with diabetes mellitus due to underlying condition, limited to breakdown of skin 09/06/2019    MRSA bacteremia 09/07/2019    Hospital discharge follow-up 09/12/2019    Medicare annual wellness visit, subsequent 12/09/2019    Hypothyroidism due to acquired atrophy of thyroid 12/10/2019    Other hyperlipidemia 12/02/2020    High risk medication use 06/05/2023    B12 deficiency 10/11/2023    Seborrheic keratosis 10/11/2023     Resolved Ambulatory Problems     Diagnosis Date Noted    No Resolved Ambulatory Problems     Past Medical History:   Diagnosis Date    Acute foot pain, left     Acute UTI     Basal cell carcinoma (BCC) of face     Cataract     Chest pain     Edema     Hyperlipidemia     Renal azotemia     Type 2 diabetes mellitus          PAST SURGICAL HISTORY  Past Surgical History:   Procedure Laterality Date    ANGIOPLASTY Right     Cath Laser  Angioplasty/right ventricle    APPENDECTOMY      CATARACT EXTRACTION Left     COLECTOMY PARTIAL / TOTAL      due to colon cancer, removed 13 inches of colon    CORONARY ANGIOPLASTY WITH STENT PLACEMENT      TOE SURGERY Right 07/2021         FAMILY HISTORY  Family History   Problem Relation Age of Onset    Colon cancer Mother     Hypertension Mother     Osteoarthritis Mother     Emphysema Father         smoker/tobacco use    Hypertension Father     Osteoarthritis Father     Rheum arthritis Sister     Colon cancer Maternal Grandmother     Cataracts Maternal Grandmother     Colon cancer Other          SOCIAL HISTORY  Social History     Socioeconomic History    Marital status:    Tobacco Use    Smoking status: Former    Smokeless tobacco: Never   Vaping Use    Vaping Use: Never used   Substance and Sexual Activity    Alcohol use: Yes     Comment: 1-2 a month--Caffeine use-2 reg coffee daily    Drug use: No    Sexual activity: Defer         ALLERGIES  Patient has no known allergies.        REVIEW OF SYSTEMS  Review of Systems   Respiratory:  Positive for cough and shortness of breath.    All other systems reviewed and are negative.         PHYSICAL EXAM  ED Triage Vitals [12/12/23 1233]   Temp Heart Rate Resp BP SpO2   99.6 °F (37.6 °C) 71 16 118/74 94 %      Temp src Heart Rate Source Patient Position BP Location FiO2 (%)   -- -- -- -- --       Physical Exam      GENERAL: Lethargic  HENT: nares patent  EYES: no scleral icterus  CV: regular rhythm, normal rate  RESPIRATORY: normal effort, coarse breath sounds  ABDOMEN: soft  MUSCULOSKELETAL: no deformity  NEURO: alert, moves all extremities, follows commands  PSYCH:  calm, cooperative  SKIN: warm, dry    Vital signs and nursing notes reviewed.          LAB RESULTS  Recent Results (from the past 24 hour(s))   POC Urinalysis Dipstick, Multipro (Automated Dipstick)    Collection Time: 12/12/23 11:24 AM    Specimen: Urine   Result Value Ref Range    Color Yellow  Yellow, Straw, Dark Yellow, Xiomy    Clarity, UA Clear Clear    Glucose, UA Negative Negative mg/dL    Bilirubin Negative Negative    Ketones, UA Trace (A) Negative    Specific Gravity  1.030 1.005 - 1.030    Blood, UA Trace (A) Negative    pH, Urine 5.5 5.0 - 8.0    Protein,  mg/dL (A) Negative mg/dL    Urobilinogen, UA Normal Normal, 0.2 E.U./dL    Nitrite, UA Negative Negative    Leukocytes Negative Negative   Comprehensive Metabolic Panel    Collection Time: 12/12/23 12:47 PM    Specimen: Blood   Result Value Ref Range    Glucose 181 (H) 65 - 99 mg/dL    BUN 28 (H) 8 - 23 mg/dL    Creatinine 1.97 (H) 0.76 - 1.27 mg/dL    Sodium 137 136 - 145 mmol/L    Potassium 3.8 3.5 - 5.2 mmol/L    Chloride 99 98 - 107 mmol/L    CO2 26.0 22.0 - 29.0 mmol/L    Calcium 9.6 8.6 - 10.5 mg/dL    Total Protein 7.1 6.0 - 8.5 g/dL    Albumin 3.6 3.5 - 5.2 g/dL    ALT (SGPT) 9 1 - 41 U/L    AST (SGOT) 13 1 - 40 U/L    Alkaline Phosphatase 54 39 - 117 U/L    Total Bilirubin 0.5 0.0 - 1.2 mg/dL    Globulin 3.5 gm/dL    A/G Ratio 1.0 g/dL    BUN/Creatinine Ratio 14.2 7.0 - 25.0    Anion Gap 12.0 5.0 - 15.0 mmol/L    eGFR 33.3 (L) >60.0 mL/min/1.73   BNP    Collection Time: 12/12/23 12:47 PM    Specimen: Blood   Result Value Ref Range    proBNP 810.0 0.0 - 1,800.0 pg/mL   Single High Sensitivity Troponin T    Collection Time: 12/12/23 12:47 PM    Specimen: Blood   Result Value Ref Range    HS Troponin T 59 (C) <22 ng/L   CBC Auto Differential    Collection Time: 12/12/23 12:47 PM    Specimen: Blood   Result Value Ref Range    WBC 7.59 3.40 - 10.80 10*3/mm3    RBC 4.38 4.14 - 5.80 10*6/mm3    Hemoglobin 13.4 13.0 - 17.7 g/dL    Hematocrit 41.1 37.5 - 51.0 %    MCV 93.8 79.0 - 97.0 fL    MCH 30.6 26.6 - 33.0 pg    MCHC 32.6 31.5 - 35.7 g/dL    RDW 14.1 12.3 - 15.4 %    RDW-SD 48.3 37.0 - 54.0 fl    MPV 9.6 6.0 - 12.0 fL    Platelets 256 140 - 450 10*3/mm3    Neutrophil % 64.0 42.7 - 76.0 %    Lymphocyte % 19.5 (L) 19.6 - 45.3 %     Monocyte % 15.3 (H) 5.0 - 12.0 %    Eosinophil % 0.4 0.3 - 6.2 %    Basophil % 0.5 0.0 - 1.5 %    Immature Grans % 0.3 0.0 - 0.5 %    Neutrophils, Absolute 4.86 1.70 - 7.00 10*3/mm3    Lymphocytes, Absolute 1.48 0.70 - 3.10 10*3/mm3    Monocytes, Absolute 1.16 (H) 0.10 - 0.90 10*3/mm3    Eosinophils, Absolute 0.03 0.00 - 0.40 10*3/mm3    Basophils, Absolute 0.04 0.00 - 0.20 10*3/mm3    Immature Grans, Absolute 0.02 0.00 - 0.05 10*3/mm3    nRBC 0.0 0.0 - 0.2 /100 WBC   Respiratory Panel PCR w/COVID-19(SARS-CoV-2) PRASANNA/AARON/MADELINE/PAD/COR/JORGE In-House, NP Swab in UTM/VTM, 2 HR TAT - Swab, Nasopharynx    Collection Time: 12/12/23 12:48 PM    Specimen: Nasopharynx; Swab   Result Value Ref Range    ADENOVIRUS, PCR Not Detected Not Detected    Coronavirus 229E Not Detected Not Detected    Coronavirus HKU1 Not Detected Not Detected    Coronavirus NL63 Not Detected Not Detected    Coronavirus OC43 Not Detected Not Detected    COVID19 Not Detected Not Detected - Ref. Range    Human Metapneumovirus Not Detected Not Detected    Human Rhinovirus/Enterovirus Not Detected Not Detected    Influenza A PCR Not Detected Not Detected    Influenza B PCR Not Detected Not Detected    Parainfluenza Virus 1 Not Detected Not Detected    Parainfluenza Virus 2 Not Detected Not Detected    Parainfluenza Virus 3 Not Detected Not Detected    Parainfluenza Virus 4 Not Detected Not Detected    RSV, PCR Not Detected Not Detected    Bordetella pertussis pcr Not Detected Not Detected    Bordetella parapertussis PCR Not Detected Not Detected    Chlamydophila pneumoniae PCR Not Detected Not Detected    Mycoplasma pneumo by PCR Not Detected Not Detected   ECG 12 Lead Dyspnea    Collection Time: 12/12/23  1:46 PM   Result Value Ref Range    QT Interval 381 ms    QTC Interval 397 ms   High Sensitivity Troponin T 2Hr    Collection Time: 12/12/23  2:50 PM    Specimen: Blood   Result Value Ref Range    HS Troponin T 70 (C) <22 ng/L    Troponin T Delta 11 (C) >=-4  - <+4 ng/L   Lactic Acid, Plasma    Collection Time: 12/12/23  2:50 PM    Specimen: Blood   Result Value Ref Range    Lactate 1.9 0.5 - 2.0 mmol/L   Blood Gas, Arterial -    Collection Time: 12/12/23  3:50 PM    Specimen: Arterial Blood   Result Value Ref Range    Site Arterial Line     Danny's Test Positive     pH, Arterial 7.293 (C) 7.350 - 7.450 pH units    pCO2, Arterial 62.6 (C) 35.0 - 45.0 mm Hg    pO2, Arterial 71.2 (L) 80.0 - 100.0 mm Hg    HCO3, Arterial 30.3 (H) 22.0 - 28.0 mmol/L    Base Excess, Arterial 1.8 0.0 - 2.0 mmol/L    O2 Saturation, Arterial 91.4 (L) 92.0 - 98.5 %    CO2 Content 32.2 (H) 23 - 27 mmol/L    Barometric Pressure for Blood Gas 759.6000 mmHg    Modality Cannula     Flow Rate 12.0000 lpm    Rate 16 Breaths/minute    Notified Who dr kuhn     Read Back No     Notified Time      Hemodilution No        Ordered the above labs and reviewed the results.        RADIOLOGY  XR Chest 1 View    Result Date: 12/12/2023  XR CHEST 1 VW-12/12/2023  HISTORY: Shortness of breath.  Heart size is at the upper limits of normal. There is moderately severe ill-defined increased density in the right upper to midlung. Left lung appears clear. Lungs are underinflated. There is some aortic calcification.      1. Borderline cardiomegaly. 2. Moderately severe right upper lobe infiltrate consistent with pneumonia. Follow-up films recommended to assess clearing.   This report was finalized on 12/12/2023 1:29 PM by Dr. Pravin Santana M.D on Workstation: [id number]       Ordered the above noted radiological studies. Reviewed by me in PACS.            PROCEDURES  Critical Care    Performed by: Kaiser Kuhn MD  Authorized by: Kaiser Kuhn MD    Critical care provider statement:     Critical care time (minutes):  35    Critical care was necessary to treat or prevent imminent or life-threatening deterioration of the following conditions:  Respiratory failure    Critical care was time spent personally by me  on the following activities:  Discussions with consultants, development of treatment plan with patient or surrogate, evaluation of patient's response to treatment, examination of patient, obtaining history from patient or surrogate, ordering and performing treatments and interventions, ordering and review of laboratory studies, ordering and review of radiographic studies, pulse oximetry, re-evaluation of patient's condition and review of old charts    Care discussed with: admitting provider            MEDICATIONS GIVEN IN ER  Medications   norepinephrine (LEVOPHED) 8 mg in 250 mL NS infusion (premix) (has no administration in time range)   nitroglycerin (NITROSTAT) SL tablet 0.4 mg (has no administration in time range)   sodium chloride 0.9 % flush 10 mL (has no administration in time range)   sodium chloride 0.9 % flush 10 mL (has no administration in time range)   sodium chloride 0.9 % infusion 40 mL (has no administration in time range)   sennosides-docusate (PERICOLACE) 8.6-50 MG per tablet 2 tablet (has no administration in time range)     And   polyethylene glycol (MIRALAX) packet 17 g (has no administration in time range)     And   bisacodyl (DULCOLAX) EC tablet 5 mg (has no administration in time range)     And   bisacodyl (DULCOLAX) suppository 10 mg (has no administration in time range)   heparin (porcine) 5000 UNIT/ML injection 5,000 Units (has no administration in time range)   ipratropium-albuterol (DUO-NEB) nebulizer solution 3 mL (has no administration in time range)   dextrose (GLUTOSE) oral gel 15 g (has no administration in time range)   dextrose (D50W) (25 g/50 mL) IV injection 25 g (has no administration in time range)   glucagon (GLUCAGEN) injection 1 mg (has no administration in time range)   insulin regular (humuLIN R,novoLIN R) injection 2-7 Units (has no administration in time range)   acetaminophen (TYLENOL) tablet 650 mg (has no administration in time range)   Potassium Replacement -  Follow Nurse / BPA Driven Protocol (has no administration in time range)   Magnesium Cardiology Dose Replacement - Follow Nurse / BPA Driven Protocol (has no administration in time range)   Phosphorus Replacement - Follow Nurse / BPA Driven Protocol (has no administration in time range)   Calcium Replacement - Follow Nurse / BPA Driven Protocol (has no administration in time range)   cefTRIAXone (ROCEPHIN) 2,000 mg in sodium chloride 0.9 % 100 mL IVPB-VTB (0 mg Intravenous Stopped 12/12/23 1530)   azithromycin (ZITHROMAX) 500 mg in sodium chloride 0.9 % 250 mL IVPB-VTB (500 mg Intravenous New Bag 12/12/23 1539)                   MEDICAL DECISION MAKING, PROGRESS, and CONSULTS    All labs have been independently reviewed by me.  All radiology studies have been reviewed by me and I have also reviewed the radiology report.   EKG's independently viewed and interpreted by me.  Discussion below represents my analysis of pertinent findings related to patient's condition, differential diagnosis, treatment plan and final disposition.      Additional sources:  - Discussed/ obtained information from independent historians:  Wife at bedside    - External (non-ED) record review: Office visit urgent care reviewed and notable for altered mental status and hypoxia.  Patient sent to ER for further evaluation    - Chronic or social conditions impacting care: Diabetes    - Shared decision making: Utilizing shared decision-making techniques we elected to proceed with inpatient management      Orders placed during this visit:  Orders Placed This Encounter   Procedures    Critical Care    Respiratory Panel PCR w/COVID-19(SARS-CoV-2) PRASANNA/AARON/MADELINE/PAD/COR/JORGE In-House, NP Swab in UTM/VTM, 2 HR TAT - Swab, Nasopharynx    Blood Culture - Blood,    Blood Culture - Blood,    XR Chest 1 View    Comprehensive Metabolic Panel    BNP    Single High Sensitivity Troponin T    CBC Auto Differential    High Sensitivity Troponin T 2Hr    Lactic Acid,  Plasma    Blood Gas, Arterial -    Blood Gas, Arterial -    Blood Gas, Arterial -    Renal Function Panel    Sodium, Urine, Random - Urine, Clean Catch    Creatinine Urine Random (kidney function) GFR component - Urine, Clean Catch    Uric Acid    Urinalysis With Microscopic If Indicated (No Culture) - Urine, Clean Catch    CK    Blood Gas, Arterial -    NPO Diet NPO Type: Sips with Meds, Ice Chips    Vital Signs Every Hour and Per Hospital Policy Based on Patient Condition    Telemetry - Place Orders & Notify Provider of Results When Patient Experiences Acute Chest Pain, Dysrhythmia or Respiratory Distress    Continuous Pulse Oximetry    Height & Weight    Daily Weights    Intake & Output    Oral Care - Patient Not on NPPV & Not Intubated    Target Arousal Level RASS 0 to -1    Use Mobility Guidelines for Advancement of Activity    Saline Lock & Maintain IV Access    Code Status and Medical Interventions:    Pulmonology (on-call MD unless specified)    NIPPV (CPAP or BIPAP)    POC Glucose Q6H    ECG 12 Lead Dyspnea    Adult Transthoracic Echo Complete W/ Cont if Necessary Per Protocol    Insert Peripheral IV    Inpatient Admission    CBC & Differential    CBC & Differential       Differential diagnosis includes but is not limited to:    Hypercapnic respiratory failure, pneumonia, encephalopathy      Independent interpretation of labs, radiology studies, and discussions with consultants:  ED Course as of 12/12/23 1739 Tue Dec 12, 2023   1419 Chest x-ray interpreted by me and notable for right upper lobe infiltrate consistent with pneumonia. [MW]   1627 EKG interpreted by me demonstrates sinus rhythm, rate 65, no OH/QT prolongation, no ST elevation [MW]   1627 pCO2, Arterial(!!): 62.6 [MW]   1627 HS Troponin T(!!): 70 [MW]   1627 HS Troponin T(!!): 59 [MW]   1627 Creatinine(!): 1.97  Patient noted to have CO2 retention with increasing somnolence.  Placed on BiPAP with close monitoring [MW]   1738 On reevaluation  patient has not improved mental status following noninvasive positive pressure ventilation. [MW]   173 Discussed with Dr. Carter who agrees to admission to the ICU [MW]      ED Course User Index  [MW] Kaiser Belcher MD           DIAGNOSIS  Final diagnoses:   Pneumonia of right upper lobe due to infectious organism   Acute respiratory failure with hypoxia and hypercapnia         DISPOSITION  ED Disposition       ED Disposition   Decision to Admit    Condition   --    Comment   Level of Care: Critical Care [6]   Diagnosis: Respiratory failure [018922]   Admitting Physician: DIANA CARTER [[id number]]   Attending Physician: DIANA CARTER [[id number]]   Certification: I Certify That Inpatient Hospital Services Are Medically Necessary For Greater Than 2 Midnights                           Latest Documented Vital Signs:  As of 17:39 EST  BP- 96/60 HR- 57 Temp- 99.6 °F (37.6 °C) O2 sat- 95%              --    Please note that portions of this were completed with a voice recognition program.       Note Disclaimer: At [facility], we believe that sharing information builds trust and better relationships. You are receiving this note because you are receiving care at [facility] or recently visited. It is possible you will see health information before a provider has talked with you about it. This kind of information can be easy to misunderstand. To help you fully understand what it means for your health, we urge you to discuss this note with your provider.             Kaiser Belcher MD  12/12/23 2751

## 2023-12-13 ENCOUNTER — APPOINTMENT (OUTPATIENT)
Dept: CARDIOLOGY | Facility: HOSPITAL | Age: 83
End: 2023-12-13
Payer: MEDICARE

## 2023-12-13 LAB
ALBUMIN SERPL-MCNC: 3.3 G/DL (ref 3.5–5.2)
ANION GAP SERPL CALCULATED.3IONS-SCNC: 8 MMOL/L (ref 5–15)
AORTIC DIMENSIONLESS INDEX: 0.8 (DI)
ARTERIAL PATENCY WRIST A: POSITIVE
ARTERIAL PATENCY WRIST A: POSITIVE
ASCENDING AORTA: 3.9 CM
ATMOSPHERIC PRESS: 765.3 MMHG
ATMOSPHERIC PRESS: 767.3 MMHG
BASE EXCESS BLDA CALC-SCNC: 2.9 MMOL/L (ref 0–2)
BASE EXCESS BLDA CALC-SCNC: 4 MMOL/L (ref 0–2)
BASOPHILS # BLD AUTO: 0.04 10*3/MM3 (ref 0–0.2)
BASOPHILS NFR BLD AUTO: 0.7 % (ref 0–1.5)
BDY SITE: ABNORMAL
BDY SITE: ABNORMAL
BH CV ECHO MEAS - ACS: 1.9 CM
BH CV ECHO MEAS - AO MAX PG: 12.3 MMHG
BH CV ECHO MEAS - AO MEAN PG: 7 MMHG
BH CV ECHO MEAS - AO ROOT DIAM: 3.6 CM
BH CV ECHO MEAS - AO V2 MAX: 175 CM/SEC
BH CV ECHO MEAS - AO V2 VTI: 35.3 CM
BH CV ECHO MEAS - AVA(I,D): 2.7 CM2
BH CV ECHO MEAS - EDV(CUBED): 117.6 ML
BH CV ECHO MEAS - EDV(MOD-SP2): 68 ML
BH CV ECHO MEAS - EDV(MOD-SP4): 66 ML
BH CV ECHO MEAS - EF(MOD-BP): 62.6 %
BH CV ECHO MEAS - EF(MOD-SP2): 63.2 %
BH CV ECHO MEAS - EF(MOD-SP4): 63.6 %
BH CV ECHO MEAS - ESV(CUBED): 35.9 ML
BH CV ECHO MEAS - ESV(MOD-SP2): 25 ML
BH CV ECHO MEAS - ESV(MOD-SP4): 24 ML
BH CV ECHO MEAS - FS: 32.7 %
BH CV ECHO MEAS - IVS/LVPW: 1 CM
BH CV ECHO MEAS - IVSD: 1.1 CM
BH CV ECHO MEAS - LAT PEAK E' VEL: 10 CM/SEC
BH CV ECHO MEAS - LV MASS(C)D: 200.5 GRAMS
BH CV ECHO MEAS - LV MAX PG: 7.4 MMHG
BH CV ECHO MEAS - LV MEAN PG: 4 MMHG
BH CV ECHO MEAS - LV V1 MAX: 136 CM/SEC
BH CV ECHO MEAS - LV V1 VTI: 29.9 CM
BH CV ECHO MEAS - LVIDD: 4.9 CM
BH CV ECHO MEAS - LVIDS: 3.3 CM
BH CV ECHO MEAS - LVOT AREA: 3.1 CM2
BH CV ECHO MEAS - LVOT DIAM: 2 CM
BH CV ECHO MEAS - LVPWD: 1.1 CM
BH CV ECHO MEAS - MED PEAK E' VEL: 5.3 CM/SEC
BH CV ECHO MEAS - MR MAX PG: 26.4 MMHG
BH CV ECHO MEAS - MR MAX VEL: 257 CM/SEC
BH CV ECHO MEAS - MV A DUR: 0.14 SEC
BH CV ECHO MEAS - MV A MAX VEL: 93.1 CM/SEC
BH CV ECHO MEAS - MV DEC SLOPE: 177 CM/SEC2
BH CV ECHO MEAS - MV DEC TIME: 0.33 SEC
BH CV ECHO MEAS - MV E MAX VEL: 65.7 CM/SEC
BH CV ECHO MEAS - MV E/A: 0.71
BH CV ECHO MEAS - MV MAX PG: 3.6 MMHG
BH CV ECHO MEAS - MV MEAN PG: 1 MMHG
BH CV ECHO MEAS - MV P1/2T: 122.5 MSEC
BH CV ECHO MEAS - MV V2 VTI: 29.6 CM
BH CV ECHO MEAS - MVA(P1/2T): 1.8 CM2
BH CV ECHO MEAS - MVA(VTI): 3.2 CM2
BH CV ECHO MEAS - PA ACC TIME: 0.07 SEC
BH CV ECHO MEAS - PA V2 MAX: 97.7 CM/SEC
BH CV ECHO MEAS - PULM A REVS DUR: 0.13 SEC
BH CV ECHO MEAS - PULM A REVS VEL: 37.5 CM/SEC
BH CV ECHO MEAS - PULM DIAS VEL: 33.6 CM/SEC
BH CV ECHO MEAS - PULM S/D: 1.49
BH CV ECHO MEAS - PULM SYS VEL: 50.1 CM/SEC
BH CV ECHO MEAS - RAP SYSTOLE: 3 MMHG
BH CV ECHO MEAS - RV MAX PG: 4.5 MMHG
BH CV ECHO MEAS - RV V1 MAX: 106 CM/SEC
BH CV ECHO MEAS - RV V1 VTI: 18.3 CM
BH CV ECHO MEAS - RVSP: 23.3 MMHG
BH CV ECHO MEAS - SUP REN AO DIAM: 2.1 CM
BH CV ECHO MEAS - SV(LVOT): 93.9 ML
BH CV ECHO MEAS - SV(MOD-SP2): 43 ML
BH CV ECHO MEAS - SV(MOD-SP4): 42 ML
BH CV ECHO MEAS - TR MAX PG: 20.3 MMHG
BH CV ECHO MEAS - TR MAX VEL: 225 CM/SEC
BH CV ECHO MEASUREMENTS AVERAGE E/E' RATIO: 8.59
BH CV XLRA - RV BASE: 3.2 CM
BH CV XLRA - RV LENGTH: 7.6 CM
BH CV XLRA - RV MID: 1.67 CM
BH CV XLRA - TDI S': 17.3 CM/SEC
BUN SERPL-MCNC: 32 MG/DL (ref 8–23)
BUN/CREAT SERPL: 18.7 (ref 7–25)
CALCIUM SPEC-SCNC: 9.2 MG/DL (ref 8.6–10.5)
CHLORIDE SERPL-SCNC: 103 MMOL/L (ref 98–107)
CO2 BLDA-SCNC: 31.1 MMOL/L (ref 23–27)
CO2 BLDA-SCNC: 32.8 MMOL/L (ref 23–27)
CO2 SERPL-SCNC: 28 MMOL/L (ref 22–29)
CREAT SERPL-MCNC: 1.71 MG/DL (ref 0.76–1.27)
DEPRECATED RDW RBC AUTO: 48 FL (ref 37–54)
DEVICE COMMENT: ABNORMAL
DEVICE COMMENT: ABNORMAL
EGFRCR SERPLBLD CKD-EPI 2021: 39.5 ML/MIN/1.73
EOSINOPHIL # BLD AUTO: 0.14 10*3/MM3 (ref 0–0.4)
EOSINOPHIL NFR BLD AUTO: 2.5 % (ref 0.3–6.2)
ERYTHROCYTE [DISTWIDTH] IN BLOOD BY AUTOMATED COUNT: 13.9 % (ref 12.3–15.4)
GAS FLOW AIRWAY: 12 LPM
GLUCOSE BLDC GLUCOMTR-MCNC: 100 MG/DL (ref 70–130)
GLUCOSE BLDC GLUCOMTR-MCNC: 108 MG/DL (ref 70–130)
GLUCOSE BLDC GLUCOMTR-MCNC: 70 MG/DL (ref 70–130)
GLUCOSE BLDC GLUCOMTR-MCNC: 95 MG/DL (ref 70–130)
GLUCOSE BLDC GLUCOMTR-MCNC: 96 MG/DL (ref 70–130)
GLUCOSE BLDC GLUCOMTR-MCNC: 96 MG/DL (ref 70–130)
GLUCOSE SERPL-MCNC: 101 MG/DL (ref 65–99)
HCO3 BLDA-SCNC: 29.6 MMOL/L (ref 22–28)
HCO3 BLDA-SCNC: 30.9 MMOL/L (ref 22–28)
HCT VFR BLD AUTO: 39.1 % (ref 37.5–51)
HEMODILUTION: NO
HEMODILUTION: NO
HGB BLD-MCNC: 12.8 G/DL (ref 13–17.7)
IMM GRANULOCYTES # BLD AUTO: 0.01 10*3/MM3 (ref 0–0.05)
IMM GRANULOCYTES NFR BLD AUTO: 0.2 % (ref 0–0.5)
INHALED O2 CONCENTRATION: 40 %
L PNEUMO1 AG UR QL IA: NEGATIVE
LEFT ATRIUM VOLUME INDEX: 20.4 ML/M2
LYMPHOCYTES # BLD AUTO: 1.36 10*3/MM3 (ref 0.7–3.1)
LYMPHOCYTES NFR BLD AUTO: 23.9 % (ref 19.6–45.3)
Lab: ABNORMAL
MCH RBC QN AUTO: 30.8 PG (ref 26.6–33)
MCHC RBC AUTO-ENTMCNC: 32.7 G/DL (ref 31.5–35.7)
MCV RBC AUTO: 94 FL (ref 79–97)
MODALITY: ABNORMAL
MODALITY: ABNORMAL
MONOCYTES # BLD AUTO: 0.86 10*3/MM3 (ref 0.1–0.9)
MONOCYTES NFR BLD AUTO: 15.1 % (ref 5–12)
NEUTROPHILS NFR BLD AUTO: 3.27 10*3/MM3 (ref 1.7–7)
NEUTROPHILS NFR BLD AUTO: 57.6 % (ref 42.7–76)
NOTIFIED WHO: ABNORMAL
NRBC BLD AUTO-RTO: 0 /100 WBC (ref 0–0.2)
O2 A-A PPRESDIFF RESPIRATORY: 0.4 MMHG
PCO2 BLDA: 47.5 MM HG (ref 35–45)
PCO2 BLDA: 61.5 MM HG (ref 35–45)
PH BLDA: 7.31 PH UNITS (ref 7.35–7.45)
PH BLDA: 7.4 PH UNITS (ref 7.35–7.45)
PHOSPHATE SERPL-MCNC: 3.5 MG/DL (ref 2.5–4.5)
PLATELET # BLD AUTO: 231 10*3/MM3 (ref 140–450)
PMV BLD AUTO: 9.9 FL (ref 6–12)
PO2 BLDA: 61.4 MM HG (ref 80–100)
PO2 BLDA: 95.4 MM HG (ref 80–100)
POTASSIUM SERPL-SCNC: 3.8 MMOL/L (ref 3.5–5.2)
QT INTERVAL: 381 MS
QTC INTERVAL: 397 MS
RBC # BLD AUTO: 4.16 10*6/MM3 (ref 4.14–5.8)
READ BACK: YES
S PNEUM AG SPEC QL LA: NEGATIVE
SAO2 % BLDCOA: 87.9 % (ref 92–98.5)
SAO2 % BLDCOA: 97.3 % (ref 92–98.5)
SINUS: 3 CM
SODIUM SERPL-SCNC: 139 MMOL/L (ref 136–145)
STJ: 2.8 CM
TOTAL RATE: 12 BREATHS/MINUTE
TOTAL RATE: 19 BREATHS/MINUTE
WBC NRBC COR # BLD AUTO: 5.68 10*3/MM3 (ref 3.4–10.8)

## 2023-12-13 PROCEDURE — 25810000003 SODIUM CHLORIDE 0.9 % SOLUTION 250 ML FLEX CONT: Performed by: INTERNAL MEDICINE

## 2023-12-13 PROCEDURE — 82948 REAGENT STRIP/BLOOD GLUCOSE: CPT

## 2023-12-13 PROCEDURE — 25010000002 HEPARIN (PORCINE) PER 1000 UNITS: Performed by: INTERNAL MEDICINE

## 2023-12-13 PROCEDURE — 94799 UNLISTED PULMONARY SVC/PX: CPT

## 2023-12-13 PROCEDURE — 94760 N-INVAS EAR/PLS OXIMETRY 1: CPT

## 2023-12-13 PROCEDURE — 93306 TTE W/DOPPLER COMPLETE: CPT

## 2023-12-13 PROCEDURE — 25810000003 LACTATED RINGERS PER 1000 ML: Performed by: INTERNAL MEDICINE

## 2023-12-13 PROCEDURE — 80069 RENAL FUNCTION PANEL: CPT | Performed by: INTERNAL MEDICINE

## 2023-12-13 PROCEDURE — 85025 COMPLETE CBC W/AUTO DIFF WBC: CPT | Performed by: INTERNAL MEDICINE

## 2023-12-13 PROCEDURE — 25010000002 AZITHROMYCIN PER 500 MG: Performed by: INTERNAL MEDICINE

## 2023-12-13 PROCEDURE — 94660 CPAP INITIATION&MGMT: CPT

## 2023-12-13 PROCEDURE — 94761 N-INVAS EAR/PLS OXIMETRY MLT: CPT

## 2023-12-13 PROCEDURE — 82803 BLOOD GASES ANY COMBINATION: CPT

## 2023-12-13 PROCEDURE — 36600 WITHDRAWAL OF ARTERIAL BLOOD: CPT

## 2023-12-13 PROCEDURE — 93306 TTE W/DOPPLER COMPLETE: CPT | Performed by: INTERNAL MEDICINE

## 2023-12-13 PROCEDURE — 25010000002 CEFTRIAXONE PER 250 MG: Performed by: INTERNAL MEDICINE

## 2023-12-13 PROCEDURE — 94664 DEMO&/EVAL PT USE INHALER: CPT

## 2023-12-13 RX ORDER — SODIUM CHLORIDE, SODIUM LACTATE, POTASSIUM CHLORIDE, CALCIUM CHLORIDE 600; 310; 30; 20 MG/100ML; MG/100ML; MG/100ML; MG/100ML
75 INJECTION, SOLUTION INTRAVENOUS CONTINUOUS
Status: DISCONTINUED | OUTPATIENT
Start: 2023-12-13 | End: 2023-12-14

## 2023-12-13 RX ADMIN — IPRATROPIUM BROMIDE AND ALBUTEROL SULFATE 3 ML: 2.5; .5 SOLUTION RESPIRATORY (INHALATION) at 07:35

## 2023-12-13 RX ADMIN — HEPARIN SODIUM 5000 UNITS: 5000 INJECTION INTRAVENOUS; SUBCUTANEOUS at 13:29

## 2023-12-13 RX ADMIN — CEFTRIAXONE 2000 MG: 2 INJECTION, POWDER, FOR SOLUTION INTRAMUSCULAR; INTRAVENOUS at 09:55

## 2023-12-13 RX ADMIN — IPRATROPIUM BROMIDE AND ALBUTEROL SULFATE 3 ML: 2.5; .5 SOLUTION RESPIRATORY (INHALATION) at 19:45

## 2023-12-13 RX ADMIN — Medication 10 ML: at 08:44

## 2023-12-13 RX ADMIN — AZITHROMYCIN MONOHYDRATE 500 MG: 500 INJECTION, POWDER, LYOPHILIZED, FOR SOLUTION INTRAVENOUS at 13:29

## 2023-12-13 RX ADMIN — SODIUM CHLORIDE, POTASSIUM CHLORIDE, SODIUM LACTATE AND CALCIUM CHLORIDE 75 ML/HR: 600; 310; 30; 20 INJECTION, SOLUTION INTRAVENOUS at 09:55

## 2023-12-13 RX ADMIN — IPRATROPIUM BROMIDE AND ALBUTEROL SULFATE 3 ML: 2.5; .5 SOLUTION RESPIRATORY (INHALATION) at 11:38

## 2023-12-13 RX ADMIN — HEPARIN SODIUM 5000 UNITS: 5000 INJECTION INTRAVENOUS; SUBCUTANEOUS at 21:05

## 2023-12-13 RX ADMIN — Medication 10 ML: at 21:05

## 2023-12-13 RX ADMIN — IPRATROPIUM BROMIDE AND ALBUTEROL SULFATE 3 ML: 2.5; .5 SOLUTION RESPIRATORY (INHALATION) at 15:39

## 2023-12-13 RX ADMIN — HEPARIN SODIUM 5000 UNITS: 5000 INJECTION INTRAVENOUS; SUBCUTANEOUS at 05:40

## 2023-12-13 NOTE — NURSING NOTE
Pt titrated up on hi flow NC and eventually requiring bipap. See am ABG and labs. Pt Aox3. Care ongoing

## 2023-12-13 NOTE — CASE MANAGEMENT/SOCIAL WORK
Discharge Planning Assessment  [facility]     Patient Name: Eric Kramer  MRN: [id number]  Today's Date: 12/13/2023    Admit Date: 12/12/2023    Plan: Plan is home  [facility] referral for possible oxygen cpap or bi pap at DC   Discharge Needs Assessment       Row Name 12/13/23 1430       Living Environment    People in Home spouse    Current Living Arrangements home    Potentially Unsafe Housing Conditions none    In the past 12 months has the electric, gas, oil, or water company threatened to shut off services in your home? No    Primary Care Provided by self    Provides Primary Care For no one    Family Caregiver if Needed spouse    Quality of Family Relationships involved;supportive    Able to Return to Prior Arrangements yes       Resource/Environmental Concerns    Resource/Environmental Concerns none    Transportation Concerns none       Food Insecurity    Within the past 12 months, you worried that your food would run out before you got the money to buy more. Never true       Transition Planning    Patient/Family Anticipates Transition to home with family    Patient/Family Anticipated Services at Transition none    Transportation Anticipated family or friend will provide       Discharge Needs Assessment    Current Outpatient/Agency/Support Group homecare agency    Equipment Currently Used at Home none    Concerns to be Addressed discharge planning    Anticipated Changes Related to Illness none    Equipment Needed After Discharge none    Discharge Facility/Level of Care Needs home with home health                   Discharge Plan       Row Name 12/13/23 9963       Plan    Plan Comments CCP spoke with patient's wife, Autumn,  [phone] .  CCP role explainedd  Fce sheet verified.  DC plan discussed.  Pt lives i a quad level house with his wife.  he has no difficulty uswing the stairs.  He does not use any assistive devices to ambulate.  He has no history of HH or rehab stays.  Pt's wife thinks he will  discharge with either a C PAP or bipap  They would like [facility] for vendor.  CCP following for needs      Row Name 12/13/23 6140       Plan    Plan Plan is home  [facility] referral for possible oxygen cpap or bi pap at [place]                  Continued Care and Services - Admitted Since 12/12/2023       Durable Medical Equipment       Service Provider Request Status Selected Services Address Phone Fax Patient Preferred    [facility] Accepted N/A [id number], [place] [id number] [phone] --                     Demographic Summary    No documentation.                  Functional Status    No documentation.                  Psychosocial    No documentation.                  Abuse/Neglect    No documentation.                  Legal    No documentation.                  Substance Abuse    No documentation.                  Patient Forms    No documentation.                     Miranda Avilez RN

## 2023-12-13 NOTE — PLAN OF CARE
Goal Outcome Evaluation:     Pt remained alert and oriented throughout shift. Pt was educated profusely on the importance of BIPAP compliance. Pt placed on hi flow with improved ABG's see chart. Family educated at bedside . Md will keep Pt NPO for one more night pending blood gases.

## 2023-12-13 NOTE — PROGRESS NOTES
LOS: 1 day   Patient Care Team:  Elder Block MD as PCP - General (Family Medicine)    Subjective     Patient is new to me today I am picking up coverage from Dr. Carter I have reviewed his records and others this 83-year-old former smoker with no prior known history of lung disease transferred in from urgent care clinic with hypoxia and several days of confusion he apparently had sats of 79% in the emergency care clinic he has been on a noninvasive ventilator and found to have a right upper lobe pneumonia some questionable pulmonary edema and acute kidney injury on chronic kidney disease  Patient is on noninvasive ventilation he is getting tired of the mask he denies any pain he does not really feel short of breath he is not coughing much he is thirsty.  Review of Systems:   No nausea, no diarrhea       Objective     Vital Signs  Vital Sign Min/Max for last 24 hours  Temp  Min: 97.5 °F (36.4 °C)  Max: 99.6 °F (37.6 °C)   BP  Min: 72/61  Max: 130/75   Pulse  Min: 57  Max: 75   Resp  Min: 16  Max: 22   SpO2  Min: 80 %  Max: 96 %   Flow (L/min)  Min: 4  Max: 12   Weight  Min: 109 kg (240 lb)  Max: 109 kg (240 lb 4.8 oz)        Ventilator/Non-Invasive Ventilation Settings (From admission, onward)       Start     Ordered    12/12/23 1555  NIPPV (CPAP or BIPAP)  Until Discontinued        Question:  Type  Answer:  AutoBIPAP    12/12/23 1554                                 Body mass index is 30.85 kg/m².  No intake/output data recorded.  I/O this shift:  In: -   Out: 215 [Urine:215]        Physical Exam:  General Appearance: Well-developed older white male on noninvasive ventilator he is resting reasonably comfortable he is constantly trying to adjust the mask  Eyes: Conjunctivae are clear and anicteric  ENT: Mucous membranes are moist no erythema or exudates  Neck: No adenopathy or thyromegaly no jugular venous tension and trachea midline  Lungs: On noninvasive ventilator he is bronchial sounding on the  right particular more in the upper chest than the lower the left is pretty clear he is not laboring on the noninvasive ventilator he is get an in-store pressure 24 and expiratory pressure with an FiO2 of 40% his SpO2 is 98%  Cardiac: Regular rate rhythm no murmur  Abdomen: A little obese soft nontender no palpable hepatosplenomegaly or masses  : Not examined  Musculoskeletal: Grossly normal  Skin: Warm and dry no jaundice no petechiae no cyanosis  Neuro: He is alert he is answering questions on the noninvasive ventilator he is moving all extremities to command, we did not have extended conversations it is difficult for him to talk on the noninvasive ventilator.  Extremities/P Vascular: No clubbing no cyanosis no edema palpable radial and dorsalis pedis pulses bilaterally  MSE: He is appropriate       Labs:  Results from last 7 days   Lab Units 12/13/23  0339 12/12/23  1247   GLUCOSE mg/dL 101* 181*   SODIUM mmol/L 139 137   POTASSIUM mmol/L 3.8 3.8   CO2 mmol/L 28.0 26.0   CHLORIDE mmol/L 103 99   ANION GAP mmol/L 8.0 12.0   CREATININE mg/dL 1.71* 1.97*   BUN mg/dL 32* 28*   BUN / CREAT RATIO  18.7 14.2   CALCIUM mg/dL 9.2 9.6   ALK PHOS U/L  --  54   TOTAL PROTEIN g/dL  --  7.1   ALT (SGPT) U/L  --  9   AST (SGOT) U/L  --  13   BILIRUBIN mg/dL  --  0.5   ALBUMIN g/dL 3.3* 3.6   GLOBULIN gm/dL  --  3.5     Estimated Creatinine Clearance: 43.8 mL/min (A) (by C-G formula based on SCr of 1.71 mg/dL (H)).      Results from last 7 days   Lab Units 12/13/23  0339 12/12/23  1247   WBC 10*3/mm3 5.68 7.59   RBC 10*6/mm3 4.16 4.38   HEMOGLOBIN g/dL 12.8* 13.4   HEMATOCRIT % 39.1 41.1   MCV fL 94.0 93.8   MCH pg 30.8 30.6   MCHC g/dL 32.7 32.6   RDW % 13.9 14.1   RDW-SD fl 48.0 48.3   MPV fL 9.9 9.6   PLATELETS 10*3/mm3 231 256   NEUTROPHIL % % 57.6 64.0   LYMPHOCYTE % % 23.9 19.5*   MONOCYTES % % 15.1* 15.3*   EOSINOPHIL % % 2.5 0.4   BASOPHIL % % 0.7 0.5   IMM GRAN % % 0.2 0.3   NEUTROS ABS 10*3/mm3 3.27 4.86   LYMPHS ABS  10*3/mm3 1.36 1.48   MONOS ABS 10*3/mm3 0.86 1.16*   EOS ABS 10*3/mm3 0.14 0.03   BASOS ABS 10*3/mm3 0.04 0.04   IMMATURE GRANS (ABS) 10*3/mm3 0.01 0.02   NRBC /100 WBC 0.0 0.0     Results from last 7 days   Lab Units 12/13/23  0349   PH, ARTERIAL pH units 7.309*   PO2 ART mm Hg 61.4*   PCO2, ARTERIAL mm Hg 61.5*   HCO3 ART mmol/L 30.9*     Results from last 7 days   Lab Units 12/12/23  1450 12/12/23  1247   CK TOTAL U/L 65  --    HSTROP T ng/L 70* 59*     Results from last 7 days   Lab Units 12/12/23  1247   PROBNP pg/mL 810.0         Results from last 7 days   Lab Units 12/12/23  1450   LACTATE mmol/L 1.9         Microbiology Results (last 10 days)       Procedure Component Value - Date/Time    Respiratory Panel PCR w/COVID-19(SARS-CoV-2) PR[facility]NA/AARON/MADELINE/[facility]/JORGE In-House, NP Swab in UTM/VTM, 2 HR TAT - Swab, Nasopharynx [[id number]]  (Normal) Collected: 12/12/23 1248    Lab Status: Final result Specimen: Swab from Nasopharynx Updated: 12/12/23 1412     ADENOVIRUS, PCR Not Detected     Coronavirus 229E Not Detected     Coronavirus HKU1 Not Detected     Coronavirus NL63 Not Detected     Coronavirus OC43 Not Detected     COVID19 Not Detected     Human Metapneumovirus Not Detected     Human Rhinovirus/Enterovirus Not Detected     Influenza A PCR Not Detected     Influenza B PCR Not Detected     Parainfluenza Virus 1 Not Detected     Parainfluenza Virus 2 Not Detected     Parainfluenza Virus 3 Not Detected     Parainfluenza Virus 4 Not Detected     RSV, PCR Not Detected     Bordetella pertussis pcr Not Detected     Bordetella parapertussis PCR Not Detected     Chlamydophila pneumoniae PCR Not Detected     Mycoplasma pneumo by PCR Not Detected    Narrative:      In the setting of a positive respiratory panel with a viral infection PLUS a negative procalcitonin without other underlying concern for bacterial infection, consider observing off antibiotics or discontinuation of antibiotics and continue supportive care. If the  respiratory panel is positive for atypical bacterial infection (Bordetella pertussis, Chlamydophila pneumoniae, or Mycoplasma pneumoniae), consider antibiotic de-escalation to target atypical bacterial infection.                heparin (porcine), 5,000 Units, Subcutaneous, Q8H  insulin regular, 2-7 Units, Subcutaneous, Q6H  ipratropium-albuterol, 3 mL, Nebulization, 4x Daily - RT  senna-docusate sodium, 2 tablet, Oral, BID  sodium chloride, 10 mL, Intravenous, Q12H      norepinephrine, 0.02-0.3 mcg/kg/min, Last Rate: Stopped (12/12/23 1813)        Diagnostics:  XR Chest 1 View    Result Date: 12/12/2023  XR CHEST 1 VW-12/12/2023  HISTORY: Shortness of breath.  Heart size is at the upper limits of normal. There is moderately severe ill-defined increased density in the right upper to midlung. Left lung appears clear. Lungs are underinflated. There is some aortic calcification.      1. Borderline cardiomegaly. 2. Moderately severe right upper lobe infiltrate consistent with pneumonia. Follow-up films recommended to assess clearing.   This report was finalized on 12/12/2023 1:29 PM by Dr. Pravin Santana M.D on Workstation: [id number]          Reviewed the chest x-ray and I agree with radiology report I do not see any definite pleural effusions right upper lobe infiltrate and even notes a portable film probably cardiomegaly    Active Hospital Problems    Diagnosis  POA    **Respiratory failure [J96.90]  Yes      Resolved Hospital Problems   No resolved problems to display.         Assessment & Plan     Pneumonia right upper lobe respiratory pathogen panel negative blood cultures pending.  Continue Rocephin and azithromycin I will check a Legionella antigen if that is negative we can probably discontinue azithromycin.  Acute hypoxic and hypercapnic respiratory failure acute probably secondary to pneumonia he may have some underlying lung disease from his years of smoking.  He is on noninvasive ventilator he was a little  hypercapnic on entered high flow I think we can try heated high flow at 60 L with flow O2 to see if we can help give him some breaks from the noninvasive ventilator at times.  Also bronchodilator therapy.  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

## 2023-12-14 ENCOUNTER — APPOINTMENT (OUTPATIENT)
Dept: GENERAL RADIOLOGY | Facility: HOSPITAL | Age: 83
End: 2023-12-14
Payer: MEDICARE

## 2023-12-14 LAB
ALBUMIN SERPL-MCNC: 3.3 G/DL (ref 3.5–5.2)
ANION GAP SERPL CALCULATED.3IONS-SCNC: 11.4 MMOL/L (ref 5–15)
ARTERIAL PATENCY WRIST A: POSITIVE
ATMOSPHERIC PRESS: 768.3 MMHG
BASE EXCESS BLDA CALC-SCNC: 4.1 MMOL/L (ref 0–2)
BDY SITE: ABNORMAL
BUN SERPL-MCNC: 21 MG/DL (ref 8–23)
BUN/CREAT SERPL: 18.8 (ref 7–25)
CALCIUM SPEC-SCNC: 9.8 MG/DL (ref 8.6–10.5)
CHLORIDE SERPL-SCNC: 100 MMOL/L (ref 98–107)
CO2 BLDA-SCNC: 30.4 MMOL/L (ref 23–27)
CO2 SERPL-SCNC: 26.6 MMOL/L (ref 22–29)
CREAT SERPL-MCNC: 1.12 MG/DL (ref 0.76–1.27)
DEPRECATED RDW RBC AUTO: 47.4 FL (ref 37–54)
EGFRCR SERPLBLD CKD-EPI 2021: 65.6 ML/MIN/1.73
ERYTHROCYTE [DISTWIDTH] IN BLOOD BY AUTOMATED COUNT: 14 % (ref 12.3–15.4)
GAS FLOW AIRWAY: 2 LPM
GLUCOSE BLDC GLUCOMTR-MCNC: 112 MG/DL (ref 70–130)
GLUCOSE BLDC GLUCOMTR-MCNC: 115 MG/DL (ref 70–130)
GLUCOSE BLDC GLUCOMTR-MCNC: 119 MG/DL (ref 70–130)
GLUCOSE BLDC GLUCOMTR-MCNC: 134 MG/DL (ref 70–130)
GLUCOSE BLDC GLUCOMTR-MCNC: 96 MG/DL (ref 70–130)
GLUCOSE SERPL-MCNC: 116 MG/DL (ref 65–99)
HCO3 BLDA-SCNC: 29.1 MMOL/L (ref 22–28)
HCT VFR BLD AUTO: 40 % (ref 37.5–51)
HEMODILUTION: NO
HGB BLD-MCNC: 12.4 G/DL (ref 13–17.7)
INHALED O2 CONCENTRATION: 28 %
MCH RBC QN AUTO: 28.7 PG (ref 26.6–33)
MCHC RBC AUTO-ENTMCNC: 31 G/DL (ref 31.5–35.7)
MCV RBC AUTO: 92.6 FL (ref 79–97)
MODALITY: ABNORMAL
O2 A-A PPRESDIFF RESPIRATORY: 0.4 MMHG
PCO2 BLDA: 43.7 MM HG (ref 35–45)
PH BLDA: 7.43 PH UNITS (ref 7.35–7.45)
PHOSPHATE SERPL-MCNC: 2.4 MG/DL (ref 2.5–4.5)
PLATELET # BLD AUTO: 286 10*3/MM3 (ref 140–450)
PMV BLD AUTO: 10.3 FL (ref 6–12)
PO2 BLDA: 65.1 MM HG (ref 80–100)
POTASSIUM SERPL-SCNC: 3.6 MMOL/L (ref 3.5–5.2)
POTASSIUM SERPL-SCNC: 4.2 MMOL/L (ref 3.5–5.2)
RBC # BLD AUTO: 4.32 10*6/MM3 (ref 4.14–5.8)
SAO2 % BLDCOA: 92.9 % (ref 92–98.5)
SODIUM SERPL-SCNC: 138 MMOL/L (ref 136–145)
TOTAL RATE: 18 BREATHS/MINUTE
WBC NRBC COR # BLD AUTO: 6.13 10*3/MM3 (ref 3.4–10.8)

## 2023-12-14 PROCEDURE — 94799 UNLISTED PULMONARY SVC/PX: CPT

## 2023-12-14 PROCEDURE — 94660 CPAP INITIATION&MGMT: CPT

## 2023-12-14 PROCEDURE — 82803 BLOOD GASES ANY COMBINATION: CPT

## 2023-12-14 PROCEDURE — 71045 X-RAY EXAM CHEST 1 VIEW: CPT

## 2023-12-14 PROCEDURE — 84132 ASSAY OF SERUM POTASSIUM: CPT | Performed by: INTERNAL MEDICINE

## 2023-12-14 PROCEDURE — 94761 N-INVAS EAR/PLS OXIMETRY MLT: CPT

## 2023-12-14 PROCEDURE — 80069 RENAL FUNCTION PANEL: CPT | Performed by: INTERNAL MEDICINE

## 2023-12-14 PROCEDURE — 82948 REAGENT STRIP/BLOOD GLUCOSE: CPT

## 2023-12-14 PROCEDURE — 85027 COMPLETE CBC AUTOMATED: CPT | Performed by: INTERNAL MEDICINE

## 2023-12-14 PROCEDURE — 25010000002 HEPARIN (PORCINE) PER 1000 UNITS: Performed by: INTERNAL MEDICINE

## 2023-12-14 PROCEDURE — 25010000002 CEFTRIAXONE PER 250 MG: Performed by: INTERNAL MEDICINE

## 2023-12-14 PROCEDURE — 36600 WITHDRAWAL OF ARTERIAL BLOOD: CPT

## 2023-12-14 RX ORDER — LISINOPRIL 40 MG/1
40 TABLET ORAL
Status: DISCONTINUED | OUTPATIENT
Start: 2023-12-14 | End: 2023-12-19

## 2023-12-14 RX ORDER — ASPIRIN 325 MG
325 TABLET ORAL DAILY
Status: DISCONTINUED | OUTPATIENT
Start: 2023-12-14 | End: 2023-12-20 | Stop reason: HOSPADM

## 2023-12-14 RX ORDER — HYDROCHLOROTHIAZIDE 25 MG/1
25 TABLET ORAL DAILY
Status: DISCONTINUED | OUTPATIENT
Start: 2023-12-14 | End: 2023-12-20 | Stop reason: HOSPADM

## 2023-12-14 RX ORDER — GABAPENTIN 300 MG/1
300 CAPSULE ORAL 2 TIMES DAILY PRN
Status: DISCONTINUED | OUTPATIENT
Start: 2023-12-14 | End: 2023-12-20 | Stop reason: HOSPADM

## 2023-12-14 RX ORDER — TRAMADOL HYDROCHLORIDE 50 MG/1
50 TABLET ORAL NIGHTLY PRN
Status: DISCONTINUED | OUTPATIENT
Start: 2023-12-14 | End: 2023-12-20 | Stop reason: HOSPADM

## 2023-12-14 RX ORDER — AMLODIPINE BESYLATE 10 MG/1
10 TABLET ORAL DAILY
Status: DISCONTINUED | OUTPATIENT
Start: 2023-12-14 | End: 2023-12-18

## 2023-12-14 RX ORDER — DULOXETIN HYDROCHLORIDE 60 MG/1
60 CAPSULE, DELAYED RELEASE ORAL DAILY
Status: DISCONTINUED | OUTPATIENT
Start: 2023-12-14 | End: 2023-12-20 | Stop reason: HOSPADM

## 2023-12-14 RX ORDER — POTASSIUM CHLORIDE 750 MG/1
40 TABLET, FILM COATED, EXTENDED RELEASE ORAL EVERY 4 HOURS
Status: COMPLETED | OUTPATIENT
Start: 2023-12-14 | End: 2023-12-14

## 2023-12-14 RX ORDER — LEVOTHYROXINE SODIUM 0.03 MG/1
25 TABLET ORAL DAILY
Status: DISCONTINUED | OUTPATIENT
Start: 2023-12-14 | End: 2023-12-17

## 2023-12-14 RX ADMIN — METOPROLOL TARTRATE 25 MG: 25 TABLET, FILM COATED ORAL at 20:37

## 2023-12-14 RX ADMIN — Medication 10 ML: at 22:05

## 2023-12-14 RX ADMIN — LISINOPRIL 40 MG: 40 TABLET ORAL at 18:15

## 2023-12-14 RX ADMIN — IPRATROPIUM BROMIDE AND ALBUTEROL SULFATE 3 ML: 2.5; .5 SOLUTION RESPIRATORY (INHALATION) at 20:27

## 2023-12-14 RX ADMIN — HEPARIN SODIUM 5000 UNITS: 5000 INJECTION INTRAVENOUS; SUBCUTANEOUS at 13:37

## 2023-12-14 RX ADMIN — AMLODIPINE BESYLATE 10 MG: 10 TABLET ORAL at 18:15

## 2023-12-14 RX ADMIN — ASPIRIN 325 MG: 325 TABLET ORAL at 18:15

## 2023-12-14 RX ADMIN — HEPARIN SODIUM 5000 UNITS: 5000 INJECTION INTRAVENOUS; SUBCUTANEOUS at 20:38

## 2023-12-14 RX ADMIN — ACETAMINOPHEN 650 MG: 325 TABLET, FILM COATED ORAL at 01:13

## 2023-12-14 RX ADMIN — POTASSIUM CHLORIDE 40 MEQ: 750 TABLET, EXTENDED RELEASE ORAL at 12:24

## 2023-12-14 RX ADMIN — CEFTRIAXONE 2000 MG: 2 INJECTION, POWDER, FOR SOLUTION INTRAMUSCULAR; INTRAVENOUS at 09:23

## 2023-12-14 RX ADMIN — LEVOTHYROXINE SODIUM 25 MCG: 25 TABLET ORAL at 18:14

## 2023-12-14 RX ADMIN — HYDROCHLOROTHIAZIDE 25 MG: 25 TABLET ORAL at 18:14

## 2023-12-14 RX ADMIN — HEPARIN SODIUM 5000 UNITS: 5000 INJECTION INTRAVENOUS; SUBCUTANEOUS at 05:47

## 2023-12-14 RX ADMIN — GABAPENTIN 300 MG: 300 CAPSULE ORAL at 12:26

## 2023-12-14 RX ADMIN — Medication 10 ML: at 08:52

## 2023-12-14 RX ADMIN — ACETAMINOPHEN 650 MG: 325 TABLET, FILM COATED ORAL at 20:39

## 2023-12-14 RX ADMIN — METOPROLOL TARTRATE 25 MG: 25 TABLET, FILM COATED ORAL at 13:37

## 2023-12-14 RX ADMIN — DULOXETINE HYDROCHLORIDE 60 MG: 60 CAPSULE, DELAYED RELEASE ORAL at 18:15

## 2023-12-14 RX ADMIN — POTASSIUM CHLORIDE 40 MEQ: 750 TABLET, EXTENDED RELEASE ORAL at 15:36

## 2023-12-14 NOTE — PLAN OF CARE
Assumed care of pt around 1300. Pt was calm, cooperative et pleasant while wife at bedside. She left approx 1500.     At 1600 pt confused to time, situation; agitated, restless, removing medical equipment. Soft wrist restraints placed per protocol.     Approx 1700 pt had managed to remove SpO2 sensor from his finger. He become aggressive once again towards RN when attempting to replace sensor; even while in restraints.     A male RN assisted with admin of 1800 meds. Pt VSS.

## 2023-12-14 NOTE — PROGRESS NOTES
LOS: 2 days   Patient Care Team:  Elder Block MD as PCP - General (Family Medicine)    Subjective     Patient is new to me today I am picking up coverage from Dr. Carter I have reviewed his records and others this 83-year-old former smoker with no prior known history of lung disease transferred in from urgent care clinic with hypoxia and several days of confusion he apparently had sats of 79% in the emergency care clinic he has been on a noninvasive ventilator and found to have a right upper lobe pneumonia some questionable pulmonary edema and acute kidney injury on chronic kidney disease  Breathing has improved significantly patient is awake he is a little confused and agitated they have put him in restraints currently he is trying to climb out of bed he has been pulling off his oxygen his ,leads, his equipment.  He does not feel short of breath he denies taking pain medicines other than tramadol he says he only uses it at night because of pain in his right great toe secondary to a bunion and a botched surgery.  No cough  Review of Systems:   No nausea, no diarrhea       Objective     Vital Signs  Vital Sign Min/Max for last 24 hours  Temp  Min: 97.5 °F (36.4 °C)  Max: 99.4 °F (37.4 °C)   BP  Min: 98/65  Max: 152/84   Pulse  Min: 63  Max: 103   Resp  Min: 14  Max: 18   SpO2  Min: 87 %  Max: 98 %   Flow (L/min)  Min: 55  Max: 60   Weight  Min: 104 kg (229 lb 15 oz)  Max: 109 kg (240 lb)        Ventilator/Non-Invasive Ventilation Settings (From admission, onward)       Start     Ordered    12/12/23 1555  NIPPV (CPAP or BIPAP)  Until Discontinued        Question:  Type  Answer:  AutoBIPAP    12/12/23 1554                                 Body mass index is 29.52 kg/m².  I/O last 3 completed shifts:  In: -   Out: 1315 [Urine:1315]  I/O this shift:  In: -   Out: 900 [Urine:900]        Physical Exam:  General Appearance: Well-developed older white male, he is resting in bed 4 L nasal cannula O2 with  oxygen saturations between 91 and 95%  Eyes: Conjunctivae are clear and anicteric  ENT: Mucous membranes are moist no erythema or exudates  Neck: No adenopathy or thyromegaly no jugular venous tension and trachea midline  Lungs: Chest actually sounds fairly clear I do not hear wheezes  there may be a little bit of coarse rales rhonchi in the right base on a deep breath.  Nonlabored symmetric expansion  Cardiac: Regular rate rhythm no murmur  Abdomen: A little obese soft nontender no palpable hepatosplenomegaly or masses  : Not examined  Musculoskeletal: Grossly normal  Skin: Warm and dry no jaundice no petechiae no cyanosis  Neuro: He is awake he is aware he is in the hospital but he is confused time talking with him he is quite confused he is definitely moving all extremities vigorously  Extremities/P Vascular: No clubbing no cyanosis no edema palpable radial and dorsalis pedis pulses bilaterally  MSE: He is a little agitated       Labs:  Results from last 7 days   Lab Units 12/14/23  0535 12/13/23  0339 12/12/23  1247   GLUCOSE mg/dL 116* 101* 181*   SODIUM mmol/L 138 139 137   POTASSIUM mmol/L 3.6 3.8 3.8   CO2 mmol/L 26.6 28.0 26.0   CHLORIDE mmol/L 100 103 99   ANION GAP mmol/L 11.4 8.0 12.0   CREATININE mg/dL 1.12 1.71* 1.97*   BUN mg/dL 21 32* 28*   BUN / CREAT RATIO  18.8 18.7 14.2   CALCIUM mg/dL 9.8 9.2 9.6   ALK PHOS U/L  --   --  54   TOTAL PROTEIN g/dL  --   --  7.1   ALT (SGPT) U/L  --   --  9   AST (SGOT) U/L  --   --  13   BILIRUBIN mg/dL  --   --  0.5   ALBUMIN g/dL 3.3* 3.3* 3.6   GLOBULIN gm/dL  --   --  3.5     Estimated Creatinine Clearance: 65.4 mL/min (by C-G formula based on SCr of 1.12 mg/dL).      Results from last 7 days   Lab Units 12/14/23  0355 12/13/23  0339 12/12/23  1247   WBC 10*3/mm3 6.13 5.68 7.59   RBC 10*6/mm3 4.32 4.16 4.38   HEMOGLOBIN g/dL 12.4* 12.8* 13.4   HEMATOCRIT % 40.0 39.1 41.1   MCV fL 92.6 94.0 93.8   MCH pg 28.7 30.8 30.6   MCHC g/dL 31.0* 32.7 32.6   RDW % 14.0  13.9 14.1   RDW-SD fl 47.4 48.0 48.3   MPV fL 10.3 9.9 9.6   PLATELETS 10*3/mm3 286 231 256   NEUTROPHIL % %  --  57.6 64.0   LYMPHOCYTE % %  --  23.9 19.5*   MONOCYTES % %  --  15.1* 15.3*   EOSINOPHIL % %  --  2.5 0.4   BASOPHIL % %  --  0.7 0.5   IMM GRAN % %  --  0.2 0.3   NEUTROS ABS 10*3/mm3  --  3.27 4.86   LYMPHS ABS 10*3/mm3  --  1.36 1.48   MONOS ABS 10*3/mm3  --  0.86 1.16*   EOS ABS 10*3/mm3  --  0.14 0.03   BASOS ABS 10*3/mm3  --  0.04 0.04   IMMATURE GRANS (ABS) 10*3/mm3  --  0.01 0.02   NRBC /100 WBC  --  0.0 0.0     Results from last 7 days   Lab Units 12/13/23  1134   PH, ARTERIAL pH units 7.404   PO2 ART mm Hg 95.4   PCO2, ARTERIAL mm Hg 47.5*   HCO3 ART mmol/L 29.6*     Results from last 7 days   Lab Units 12/12/23  1450 12/12/23  1247   CK TOTAL U/L 65  --    HSTROP T ng/L 70* 59*     Results from last 7 days   Lab Units 12/12/23  1247   PROBNP pg/mL 810.0         Results from last 7 days   Lab Units 12/12/23  1450   LACTATE mmol/L 1.9         Microbiology Results (last 10 days)       Procedure Component Value - Date/Time    Legionella Antigen, Urine - Urine, Urine, Clean Catch [[id number]]  (Normal) Collected: 12/12/23 2155    Lab Status: Final result Specimen: Urine, Clean Catch Updated: 12/13/23 1141     LEGIONELLA ANTIGEN, URINE Negative    S. Pneumo Ag Urine or CSF - Urine, Urine, Clean Catch [[id number]]  (Normal) Collected: 12/12/23 2155    Lab Status: Final result Specimen: Urine, Clean Catch Updated: 12/13/23 1141     Strep Pneumo Ag Negative    Blood Culture - Blood, Arm, Left [[id number]]  (Normal) Collected: 12/12/23 1450    Lab Status: Preliminary result Specimen: Blood from Arm, Left Updated: 12/13/23 1515     Blood Culture No growth at 24 hours    Blood Culture - Blood, Arm, Left [[id number]]  (Normal) Collected: 12/12/23 1450    Lab Status: Preliminary result Specimen: Blood from Arm, Left Updated: 12/13/23 1515     Blood Culture No growth at 24 hours    Respiratory Panel PCR  w/COVID-19(SARS-CoV-2) PRASANNA/AARON/MADELINE/PAD/[facility]/JORGE In-House, NP Swab in UTM/VTM, 2 HR TAT - Swab, Nasopharynx [[id number]]  (Normal) Collected: 12/12/23 1248    Lab Status: Final result Specimen: Swab from Nasopharynx Updated: 12/12/23 1412     ADENOVIRUS, PCR Not Detected     Coronavirus 229E Not Detected     Coronavirus HKU1 Not Detected     Coronavirus NL63 Not Detected     Coronavirus OC43 Not Detected     COVID19 Not Detected     Human Metapneumovirus Not Detected     Human Rhinovirus/Enterovirus Not Detected     Influenza A PCR Not Detected     Influenza B PCR Not Detected     Parainfluenza Virus 1 Not Detected     Parainfluenza Virus 2 Not Detected     Parainfluenza Virus 3 Not Detected     Parainfluenza Virus 4 Not Detected     RSV, PCR Not Detected     Bordetella pertussis pcr Not Detected     Bordetella parapertussis PCR Not Detected     Chlamydophila pneumoniae PCR Not Detected     Mycoplasma pneumo by PCR Not Detected    Narrative:      In the setting of a positive respiratory panel with a viral infection PLUS a negative procalcitonin without other underlying concern for bacterial infection, consider observing off antibiotics or discontinuation of antibiotics and continue supportive care. If the respiratory panel is positive for atypical bacterial infection (Bordetella pertussis, Chlamydophila pneumoniae, or Mycoplasma pneumoniae), consider antibiotic de-escalation to target atypical bacterial infection.                cefTRIAXone, 2,000 mg, Intravenous, Q24H  heparin (porcine), 5,000 Units, Subcutaneous, Q8H  insulin regular, 2-7 Units, Subcutaneous, Q6H  ipratropium-albuterol, 3 mL, Nebulization, 4x Daily - RT  senna-docusate sodium, 2 tablet, Oral, BID  sodium chloride, 10 mL, Intravenous, Q12H      lactated ringers, 75 mL/hr, Last Rate: 75 mL/hr (12/13/23 0955)  norepinephrine, 0.02-0.3 mcg/kg/min, Last Rate: Stopped (12/12/23 1813)        Diagnostics:  XR Chest 1 View    Result Date: 12/12/2023  XR CHEST  1 VW-12/12/2023  HISTORY: Shortness of breath.  Heart size is at the upper limits of normal. There is moderately severe ill-defined increased density in the right upper to midlung. Left lung appears clear. Lungs are underinflated. There is some aortic calcification.      1. Borderline cardiomegaly. 2. Moderately severe right upper lobe infiltrate consistent with pneumonia. Follow-up films recommended to assess clearing.   This report was finalized on 12/12/2023 1:29 PM by Dr. Pravin Santana M.D on Workstation: [id number]      Results for orders placed during the hospital encounter of 12/12/23    Adult Transthoracic Echo Complete W/ Cont if Necessary Per Protocol    Interpretation Summary    Left ventricular systolic function is normal. Calculated left ventricular EF = 62.6%    Left ventricular wall thickness is consistent with hypertrophy. Sigmoid-shaped ventricular septum is present.    Left ventricular diastolic function was normal.    There is mild calcification of the aortic valve.    Estimated right ventricular systolic pressure from tricuspid regurgitation is normal (<35 mmHg).    There is moderate plaque in the ascending aorta present.      Reviewed the chest x-ray and I agree with radiology report     Active Hospital Problems    Diagnosis  POA    **Respiratory failure [J96.90]  Yes      Resolved Hospital Problems   No resolved problems to display.         Assessment & Plan     Pneumonia right upper lobe respiratory pathogen panel negative blood cultures no growth to date.  Continue Rocephin   Acute hypoxic and hypercapnic respiratory failure improved continue supplemental oxygen  Cardiac silhouette seems to be a little enlarged on chest x-ray normal systolic and diastolic dysfunction was little LVH with sigmoid septum  Acute kidney injury on chronic kidney disease stage III creatinine has improved overnight that is encouraging  Non-ST elevation MI type II demand ischemia  Diabetes mellitus type 2 with  diabetic peripheral neuropathy sliding scale insulin  Hypertension meds on hold he had some hypotension when he came in that is better and he is now hypertensive again resuming home medications  Hyperlipidemia hold medication for right now  History of memory impairment prior stroke think were having some of that may be a little bit of ICU psychosis even thrown in.  I went to try and get him out of this ICU getting him into a quiet room to see if he can get some sleep tonight and see if he is better tomorrow.    Plan for disposition: I am going to transfer him out of the ICU we will see if the hospitalist service would help with managing his hypertension encephalopathy/delirium take him in transfer and we would follow for pulmonary issues    Mahin Worthington Jr, MD  12/14/23  06:26 EST    Time: care time 40 minutes

## 2023-12-15 LAB
ALBUMIN SERPL-MCNC: 3.5 G/DL (ref 3.5–5.2)
ANION GAP SERPL CALCULATED.3IONS-SCNC: 13.5 MMOL/L (ref 5–15)
BUN SERPL-MCNC: 20 MG/DL (ref 8–23)
BUN/CREAT SERPL: 19 (ref 7–25)
CALCIUM SPEC-SCNC: 10.4 MG/DL (ref 8.6–10.5)
CHLORIDE SERPL-SCNC: 103 MMOL/L (ref 98–107)
CO2 SERPL-SCNC: 25.5 MMOL/L (ref 22–29)
CREAT SERPL-MCNC: 1.05 MG/DL (ref 0.76–1.27)
EGFRCR SERPLBLD CKD-EPI 2021: 70.9 ML/MIN/1.73
GEN 5 2HR TROPONIN T REFLEX: 65 NG/L
GLUCOSE BLDC GLUCOMTR-MCNC: 105 MG/DL (ref 70–130)
GLUCOSE BLDC GLUCOMTR-MCNC: 131 MG/DL (ref 70–130)
GLUCOSE BLDC GLUCOMTR-MCNC: 146 MG/DL (ref 70–130)
GLUCOSE BLDC GLUCOMTR-MCNC: 147 MG/DL (ref 70–130)
GLUCOSE SERPL-MCNC: 130 MG/DL (ref 65–99)
PHOSPHATE SERPL-MCNC: 2.5 MG/DL (ref 2.5–4.5)
POTASSIUM SERPL-SCNC: 3.9 MMOL/L (ref 3.5–5.2)
SODIUM SERPL-SCNC: 142 MMOL/L (ref 136–145)
TROPONIN T DELTA: -4 NG/L
TROPONIN T SERPL HS-MCNC: 69 NG/L

## 2023-12-15 PROCEDURE — 97110 THERAPEUTIC EXERCISES: CPT

## 2023-12-15 PROCEDURE — 94799 UNLISTED PULMONARY SVC/PX: CPT

## 2023-12-15 PROCEDURE — 25010000002 HEPARIN (PORCINE) PER 1000 UNITS: Performed by: INTERNAL MEDICINE

## 2023-12-15 PROCEDURE — 94761 N-INVAS EAR/PLS OXIMETRY MLT: CPT

## 2023-12-15 PROCEDURE — 25010000002 ENOXAPARIN PER 10 MG: Performed by: STUDENT IN AN ORGANIZED HEALTH CARE EDUCATION/TRAINING PROGRAM

## 2023-12-15 PROCEDURE — 82948 REAGENT STRIP/BLOOD GLUCOSE: CPT

## 2023-12-15 PROCEDURE — 97162 PT EVAL MOD COMPLEX 30 MIN: CPT

## 2023-12-15 PROCEDURE — 80069 RENAL FUNCTION PANEL: CPT | Performed by: INTERNAL MEDICINE

## 2023-12-15 PROCEDURE — 25010000002 CEFTRIAXONE PER 250 MG: Performed by: INTERNAL MEDICINE

## 2023-12-15 PROCEDURE — 94664 DEMO&/EVAL PT USE INHALER: CPT

## 2023-12-15 PROCEDURE — 84484 ASSAY OF TROPONIN QUANT: CPT | Performed by: STUDENT IN AN ORGANIZED HEALTH CARE EDUCATION/TRAINING PROGRAM

## 2023-12-15 RX ORDER — QUETIAPINE FUMARATE 25 MG/1
25 TABLET, FILM COATED ORAL ONCE
Status: COMPLETED | OUTPATIENT
Start: 2023-12-15 | End: 2023-12-15

## 2023-12-15 RX ORDER — ALPRAZOLAM 1 MG/1
2 TABLET ORAL NIGHTLY PRN
Status: DISCONTINUED | OUTPATIENT
Start: 2023-12-15 | End: 2023-12-20 | Stop reason: HOSPADM

## 2023-12-15 RX ORDER — ENOXAPARIN SODIUM 100 MG/ML
40 INJECTION SUBCUTANEOUS EVERY 24 HOURS
Status: DISCONTINUED | OUTPATIENT
Start: 2023-12-15 | End: 2023-12-15

## 2023-12-15 RX ORDER — QUETIAPINE FUMARATE 25 MG/1
25 TABLET, FILM COATED ORAL NIGHTLY
Status: DISCONTINUED | OUTPATIENT
Start: 2023-12-15 | End: 2023-12-20 | Stop reason: HOSPADM

## 2023-12-15 RX ORDER — ENOXAPARIN SODIUM 100 MG/ML
40 INJECTION SUBCUTANEOUS EVERY 24 HOURS
Status: DISCONTINUED | OUTPATIENT
Start: 2023-12-15 | End: 2023-12-20 | Stop reason: HOSPADM

## 2023-12-15 RX ORDER — GABAPENTIN 300 MG/1
300 CAPSULE ORAL NIGHTLY
Status: DISCONTINUED | OUTPATIENT
Start: 2023-12-15 | End: 2023-12-20 | Stop reason: HOSPADM

## 2023-12-15 RX ADMIN — QUETIAPINE FUMARATE 25 MG: 25 TABLET ORAL at 21:08

## 2023-12-15 RX ADMIN — GABAPENTIN 300 MG: 300 CAPSULE ORAL at 21:08

## 2023-12-15 RX ADMIN — LEVOTHYROXINE SODIUM 25 MCG: 25 TABLET ORAL at 09:41

## 2023-12-15 RX ADMIN — IPRATROPIUM BROMIDE AND ALBUTEROL SULFATE 3 ML: 2.5; .5 SOLUTION RESPIRATORY (INHALATION) at 15:50

## 2023-12-15 RX ADMIN — QUETIAPINE FUMARATE 25 MG: 25 TABLET ORAL at 09:42

## 2023-12-15 RX ADMIN — IPRATROPIUM BROMIDE AND ALBUTEROL SULFATE 3 ML: 2.5; .5 SOLUTION RESPIRATORY (INHALATION) at 21:20

## 2023-12-15 RX ADMIN — ALPRAZOLAM 2 MG: 1 TABLET ORAL at 14:14

## 2023-12-15 RX ADMIN — METOPROLOL TARTRATE 25 MG: 25 TABLET, FILM COATED ORAL at 21:09

## 2023-12-15 RX ADMIN — HEPARIN SODIUM 5000 UNITS: 5000 INJECTION INTRAVENOUS; SUBCUTANEOUS at 14:14

## 2023-12-15 RX ADMIN — CEFTRIAXONE 2000 MG: 2 INJECTION, POWDER, FOR SOLUTION INTRAMUSCULAR; INTRAVENOUS at 09:43

## 2023-12-15 RX ADMIN — ASPIRIN 325 MG: 325 TABLET ORAL at 09:41

## 2023-12-15 RX ADMIN — TRAMADOL HYDROCHLORIDE 50 MG: 50 TABLET ORAL at 21:09

## 2023-12-15 RX ADMIN — AMLODIPINE BESYLATE 10 MG: 10 TABLET ORAL at 09:41

## 2023-12-15 RX ADMIN — LISINOPRIL 40 MG: 40 TABLET ORAL at 09:41

## 2023-12-15 RX ADMIN — ENOXAPARIN SODIUM 40 MG: 100 INJECTION SUBCUTANEOUS at 21:13

## 2023-12-15 RX ADMIN — Medication 10 ML: at 09:42

## 2023-12-15 RX ADMIN — HYDROCHLOROTHIAZIDE 25 MG: 25 TABLET ORAL at 09:41

## 2023-12-15 RX ADMIN — DULOXETINE HYDROCHLORIDE 60 MG: 60 CAPSULE, DELAYED RELEASE ORAL at 09:41

## 2023-12-15 RX ADMIN — IPRATROPIUM BROMIDE AND ALBUTEROL SULFATE 3 ML: 2.5; .5 SOLUTION RESPIRATORY (INHALATION) at 11:28

## 2023-12-15 RX ADMIN — METOPROLOL TARTRATE 25 MG: 25 TABLET, FILM COATED ORAL at 09:42

## 2023-12-15 RX ADMIN — HEPARIN SODIUM 5000 UNITS: 5000 INJECTION INTRAVENOUS; SUBCUTANEOUS at 06:15

## 2023-12-15 RX ADMIN — Medication 10 ML: at 21:14

## 2023-12-15 NOTE — PROGRESS NOTES
Name: Eric Kramer ADMIT: 2023   : 1940  PCP: Elder Block MD    MRN: [id number] LOS: 3 days   AGE/SEX: 83 y.o. male  ROOM: [facility]     Subjective   Subjective   Patient's mental status is waxing and waning.  Family at bedside said that he was completely lucid 2 hours ago recurrently thought I was his brother Ernst.  Family states that he gets delirium when he is in the hospital    Review of Systems  Unable to obtain from delirium     Objective   Objective   Vital Signs  Temp:  [97.7 °F (36.5 °C)-98.6 °F (37 °C)] 98.1 °F (36.7 °C)  Heart Rate:  [] 90  Resp:  [18-22] 18  BP: (147-186)/() 149/89  SpO2:  [91 %-98 %] 91 %  on  Flow (L/min):  [3-4] 3;   Device (Oxygen Therapy): room air  Body mass index is 28.11 kg/m².  Physical Exam  Vitals and nursing note reviewed.   Constitutional:       General: He is not in acute distress.     Appearance: He is ill-appearing.   Cardiovascular:      Rate and Rhythm: Normal rate and regular rhythm.   Pulmonary:      Effort: Pulmonary effort is normal. No respiratory distress.   Abdominal:      General: Abdomen is flat. There is no distension.      Tenderness: There is no abdominal tenderness.   Musculoskeletal:         General: No swelling or deformity.   Skin:     General: Skin is warm and dry.   Neurological:      General: No focal deficit present.      Mental Status: He is alert.      Comments: Alert to self only         Results Review     I reviewed the patient's new clinical results.  Results from last 7 days   Lab Units 23  0355 23  0339 23  1247   WBC 10*3/mm3 6.13 5.68 7.59   HEMOGLOBIN g/dL 12.4* 12.8* 13.4   PLATELETS 10*3/mm3 286 231 256     Results from last 7 days   Lab Units 12/15/23  0504 23  1954 23  0535 23  0339 23  1247   SODIUM mmol/L 142  --  138 139 137   POTASSIUM mmol/L 3.9 4.2 3.6 3.8 3.8   CHLORIDE mmol/L 103  --  100 103 99   CO2 mmol/L 25.5  --  26.6 28.0 26.0   BUN mg/dL 20  --   21 32* 28*   CREATININE mg/dL 1.05  --  1.12 1.71* 1.97*   GLUCOSE mg/dL 130*  --  116* 101* 181*   Estimated Creatinine Clearance: 68.3 mL/min (by C-G formula based on SCr of 1.05 mg/dL).  Results from last 7 days   Lab Units 12/15/23  0504 12/14/23  0535 12/13/23  0339 12/12/23  1247   ALBUMIN g/dL 3.5 3.3* 3.3* 3.6   BILIRUBIN mg/dL  --   --   --  0.5   ALK PHOS U/L  --   --   --  54   AST (SGOT) U/L  --   --   --  13   ALT (SGPT) U/L  --   --   --  9     Results from last 7 days   Lab Units 12/15/23  0504 12/14/23  0535 12/13/23  0339 12/12/23  1247   CALCIUM mg/dL 10.4 9.8 9.2 9.6   ALBUMIN g/dL 3.5 3.3* 3.3* 3.6   PHOSPHORUS mg/dL 2.5 2.4* 3.5  --      Results from last 7 days   Lab Units 12/12/23  1450   LACTATE mmol/L 1.9     COVID19   Date Value Ref Range Status   12/12/2023 Not Detected Not Detected - Ref. Range Final     Glucose   Date/Time Value Ref Range Status   12/15/2023 1054 147 (H) 70 - 130 mg/dL Final   12/15/2023 0632 146 (H) 70 - 130 mg/dL Final   12/14/2023 2032 134 (H) 70 - 130 mg/dL Final   12/14/2023 1814 119 70 - 130 mg/dL Final   12/14/2023 1206 115 70 - 130 mg/dL Final   12/14/2023 0625 112 70 - 130 mg/dL Final   12/14/2023 0024 96 70 - 130 mg/dL Final       XR Chest 1 View  Narrative: SINGLE VIEW OF THE CHEST     HISTORY: Pneumonia     COMPARISON: December 12, 2023     FINDINGS:  There is cardiomegaly. There is tortuosity and ectasia of the thoracic  aorta. Extensive infiltrates are noted throughout the right lung. These  are probably worsened when compared to prior exam, as has right-sided  atelectasis. There may be a trace right pleural effusion. No  pneumothorax is seen. There is some left basilar atelectasis.  Interstitial prominence throughout the left lung is more apparent.     Impression: Worsening infiltrate and volume loss on the right. There is also some  increasing interstitial prominence within the left lung.     This report was finalized on 12/14/2023 4:44 AM by Dr. Rodriguez  WU Morales on Workstation: [id number]       I reviewed the patient's daily medications.  Scheduled Medications  amLODIPine, 10 mg, Oral, Daily  aspirin, 325 mg, Oral, Daily  cefTRIAXone, 2,000 mg, Intravenous, Q24H  DULoxetine, 60 mg, Oral, Daily  gabapentin, 300 mg, Oral, Nightly  heparin (porcine), 5,000 Units, Subcutaneous, Q8H  hydroCHLOROthiazide, 25 mg, Oral, Daily  insulin regular, 2-7 Units, Subcutaneous, Q6H  ipratropium-albuterol, 3 mL, Nebulization, 4x Daily - RT  levothyroxine, 25 mcg, Oral, Daily  lisinopril, 40 mg, Oral, Q24H  metoprolol tartrate, 25 mg, Oral, Q12H  QUEtiapine, 25 mg, Oral, Nightly  senna-docusate sodium, 2 tablet, Oral, BID  sodium chloride, 10 mL, Intravenous, Q12H    Infusions   Diet  Diet: Diabetic Diets; Consistent Carbohydrate; Texture: Regular Texture (IDDSI 7); Fluid Consistency: Thin (IDDSI 0)         I have personally reviewed:  [x]  Laboratory   [x]  Microbiology   [x]  Radiology   []  EKG/Telemetry   [x]  Cardiology/Vascular   []  Pathology   [x]  Records     Assessment/Plan     Active Hospital Problems    Diagnosis  POA    **Respiratory failure [J96.90]  Yes      Resolved Hospital Problems   No resolved problems to display.       83 y.o. male admitted with Respiratory failure.    Acute hypoxic and hypercapnic respiratory failure from pneumonia  -RVP negative, blood cultures no growth at 2 days  -Chest x-ray with right upper lobe pneumonia, will need follow-up films in 4 to 6 weeks to ensure resolution  -High flow weaned to room air    KAREN on CKD 2  -Suspect prerenal  -Back to baseline    Elevated troponin from pneumonia/KAREN  -Echo with EF of 62.6%, LV diastolic and systolic function was normal  -Troponin downtrending    Diabetes type 2 with neuropathy  -Hold home metformin and glipizide  -Low-dose sliding scale insulin    Hypertension  -Continue home hydrochlorothiazide, amlodipine, lisinopril, Lopressor    Memory impairment with delirium  -Out of  restraints  -Start Seroquel  -Discussed with family it sounds like it MoCA test about a year ago that was pretty normal, but recommended further memory testing after discharge    Lovenox 40 mg SC daily for DVT prophylaxis.  Full code.  Discussed with patient, family, nursing staff, CCP, and care team on multidisciplinary rounds.  Anticipate discharge home with HH vs SNU facility timing yet to be determined.    Expected Discharge Date: 12/18/2023; Expected Discharge Time:       Georges Lozoya MD  [facility]  12/15/23  14:43 EST

## 2023-12-15 NOTE — PLAN OF CARE
"Goal Outcome Evaluation:  Plan of Care Reviewed With: patient        Progress: no change  Outcome Evaluation:     New pt from ICU.       Confused. No significant sleep tonight.     Multiple attempts to get out of bed. (Very unsteady on his feet)  Constant removal of o2 and equipment.  NV restraints on    This morning pt yelling out and cursing RN's. \"I quit, take these things off of me\" \"You f**daniel liar\"  C/O headache at this time. Tylenol offered but pt declined stating that he did not trust this RN. Asked pt if he needed to void. He said yes. When attempting to assist with urinal he screamed \"get out of there, this is like rape!\" Then stated \"I need to shit\" Bedpan offered, but pt refused. Stated \"I'm gonna go right here\"        VSS. SR on telemetry.     3L via n/c.   Refused bipap.       Accuchecks.     Daily weights.         Will continue to monitor.         "

## 2023-12-15 NOTE — THERAPY EVALUATION
Patient Name: Eric Kramer  : 1940    MRN: [id number]                              Today's Date: 12/15/2023       Admit Date: 2023    Visit Dx:     ICD-10-CM ICD-9-CM   1. Pneumonia of right upper lobe due to infectious organism  J18.9 486   2. Acute respiratory failure with hypoxia and hypercapnia  J96.01 518.81    J96.02      Patient Active Problem List   Diagnosis    Type 2 diabetes mellitus, without long-term current use of insulin    Stroke    Prostatitis    Osteoarthritis    Nephrolithiasis    Memory impairment    Malignant neoplasm in situ of colon    Insomnia    Hypertension    Diabetic peripheral neuropathy    Benign enlargement of prostate    Arteriosclerosis of coronary artery    Diabetic ulcer of toe of right foot associated with diabetes mellitus due to underlying condition, limited to breakdown of skin    MRSA bacteremia    Hospital discharge follow-up    Medicare annual wellness visit, subsequent    Hypothyroidism due to acquired atrophy of thyroid    Other hyperlipidemia    High risk medication use    B12 deficiency    Seborrheic keratosis    Respiratory failure     Past Medical History:   Diagnosis Date    Acute foot pain, left     Acute UTI     Arteriosclerosis of coronary artery     Basal cell carcinoma (BCC) of face     Benign enlargement of prostate     Cataract     Chest pain     Diabetic peripheral neuropathy     Edema     Hyperlipidemia     Hypertension     Insomnia     Malignant neoplasm in situ of colon     Memory impairment     MMSE -2019    Nephrolithiasis     Osteoarthritis     Prostatitis     Renal azotemia     Stroke     stroke syndrome    Type 2 diabetes mellitus      Past Surgical History:   Procedure Laterality Date    ANGIOPLASTY Right     Cath Laser Angioplasty/right ventricle    APPENDECTOMY      CATARACT EXTRACTION Left     COLECTOMY PARTIAL / TOTAL      due to colon cancer, removed 13 inches of colon    CORONARY ANGIOPLASTY WITH STENT PLACEMENT      TOE  SURGERY Right 07/2021      General Information       Row Name 12/15/23 0925          Physical Therapy Time and Intention    Document Type evaluation  -AR     Mode of Treatment physical therapy  -AR       Row Name 12/15/23 0925          General Information    Patient Profile Reviewed yes  -AR     Prior Level of Function min assist:  denies use of walker, poor historian 2/2 cognition, but per chart from home w/ spouse  -AR     Existing Precautions/Restrictions fall  -AR     Barriers to Rehab none identified  -AR       Row Name 12/15/23 0925          Living Environment    People in Home spouse  -AR       Row Name 12/15/23 0925          Home Main Entrance    Number of Stairs, Main Entrance --  quad level home  -AR       Row Name 12/15/23 0925          Stairs Within Home, Primary    Stairs, Within Home, Primary quad level home   -AR       Row Name 12/15/23 0925          Cognition    Orientation Status (Cognition) oriented to;person  -AR       Row Name 12/15/23 0925          Safety Issues, Functional Mobility    Impairments Affecting Function (Mobility) balance;cognition;endurance/activity tolerance;strength  -AR               User Key  (r) = Recorded By, (t) = Taken By, (c) = Cosigned By      Initials Name Provider Type    AR Jeannette Quinn PT Physical Therapist                   Mobility       Row Name 12/15/23 0926          Bed Mobility    Bed Mobility supine-sit  -AR     Supine-Sit [place] (Bed Mobility) standby assist  -AR     Assistive Device (Bed Mobility) head of bed elevated  -AR       Row Name 12/15/23 0926          Sit-Stand Transfer    Sit-Stand [place] (Transfers) contact guard  -AR     Assistive Device (Sit-Stand Transfers) walker, front-wheeled  -AR     Comment, (Sit-Stand Transfer) cues for UE placement  -AR       Row Name 12/15/23 0926          Gait/Stairs (Locomotion)    [place] Level (Gait) minimum assist (75% patient effort)  -AR     Assistive Device (Gait) walker, front-wheeled  -AR      Distance in Feet (Gait) 12' slow shufling steps, cues for posture, some unsteadiness but no significant safety concerns w/ min A using RW.  Distance limited by weakness  -AR     Deviations/Abnormal Patterns (Gait) gait speed decreased;festinating/shuffling  -AR               User Key  (r) = Recorded By, (t) = Taken By, (c) = Cosigned By      Initials Name Provider Type    AR Jeannette Quinn, PT Physical Therapist                   Obj/Interventions       Row Name 12/15/23 0927          Range of Motion Comprehensive    Comment, General Range of Motion BLE WFL  -AR       Row Name 12/15/23 0927          Strength Comprehensive (MMT)    Comment, General Manual Muscle Testing (MMT) Assessment B LE -4/5  -AR       Row Name 12/15/23 0927          Balance    Balance Assessment standing dynamic balance  -AR     Dynamic Standing Balance minimal assist  -AR     Position/Device Used, Standing Balance walker, rolling  -AR               User Key  (r) = Recorded By, (t) = Taken By, (c) = Cosigned By      Initials Name Provider Type    AR Jeannette Quinn, PT Physical Therapist                   Goals/Plan       Row Name 12/15/23 0932          Bed Mobility Goal 1 (PT)    Activity/Assistive Device (Bed Mobility Goal 1, PT) bed mobility activities, all  -AR     [place] Level/Cues Needed (Bed Mobility Goal 1, PT) modified independence  -AR     Time Frame (Bed Mobility Goal 1, PT) 1 week  -AR       Row Name 12/15/23 0932          Transfer Goal 1 (PT)    Activity/Assistive Device (Transfer Goal 1, PT) sit-to-stand/stand-to-sit;bed-to-chair/chair-to-bed;walker, rolling  -AR     [place] Level/Cues Needed (Transfer Goal 1, PT) standby assist  -AR     Time Frame (Transfer Goal 1, PT) 1 week  -AR       Row Name 12/15/23 0932          Gait Training Goal 1 (PT)    Activity/Assistive Device (Gait Training Goal 1, PT) gait (walking locomotion);walker, rolling  -AR     Distance (Gait Training Goal 1, PT) 200  -AR     Time Frame (Gait  Training Goal 1, PT) 1 week  -AR       Row Name 12/15/23 0932          Stairs Goal 1 (PT)    Activity/Assistive Device (Stairs Goal 1, PT) stairs, all skills  -AR     [place] Level/Cues Needed (Stairs Goal 1, PT) contact guard required  -AR     Number of Stairs (Stairs Goal 1, PT) 5  -AR     Time Frame (Stairs Goal 1, PT) 1 week  -AR               User Key  (r) = Recorded By, (t) = Taken By, (c) = Cosigned By      Initials Name Provider Type    AR Jeannette Quinn, PT Physical Therapist                   Clinical Impression       Row Name 12/15/23 0928          Pain    Pretreatment Pain Rating 0/10 - no pain  -AR     Posttreatment Pain Rating 0/10 - no pain  -AR     Pain Intervention(s) Repositioned  -AR       Row Name 12/15/23 0928          Plan of Care Review    Outcome Evaluation Pt admitted from home w resp failure. Some confusion during conversation but pleasant and no agitation during PT.  Per chart pt lives w/ spouse in a quad level  home, unsure if pt normally uses any assistive device.  Today pt in soft restraints, demonstrates generalized weakness, impaired balance and independence w/ mobility but able to ambulate 12' w/ min A using RW, slow shufling steps, cues for posture, some unsteadiness but no significant safety concerns w/ min A using RW. Distance limited by weakness.  RN okay w/ leaving restraints off for pt to sit up in chair.  Possible need for DC to SNU depending on progress while inpatient.  -AR       Row Name 12/15/23 0928          Therapy Assessment/Plan (PT)    Rehab Potential (PT) good, to achieve stated therapy goals  -AR     Criteria for Skilled Interventions Met (PT) yes  -AR     Therapy Frequency (PT) 6 times/wk  -AR       Row Name 12/15/23 0928          Vital Signs    O2 Delivery Pre Treatment supplemental O2  -AR       Row Name 12/15/23 0928          Positioning and Restraints    Pre-Treatment Position in bed  -AR     Post Treatment Position chair  -AR     In Chair notified  nsg;reclined;sitting;call light within reach;encouraged to call for assist;exit alarm on  0 second delay, gray cord connected to alarm at nurses station, reclined, call light, breakfast, urinal all within pts reach.  Pts door left open.  -AR               User Key  (r) = Recorded By, (t) = Taken By, (c) = Cosigned By      Initials Name Provider Type    AR Jeannette Quinn, MAIDA Physical Therapist                   Outcome Measures       Row Name 12/15/23 0932          How much help from another person do you currently need...    Turning from your back to your side while in flat bed without using bedrails? 4  -AR     Moving from lying on back to sitting on the side of a flat bed without bedrails? 3  -AR     Moving to and from a bed to a chair (including a wheelchair)? 3  -AR     Standing up from a chair using your arms (e.g., wheelchair, bedside chair)? 3  -AR     Climbing 3-5 steps with a railing? 2  -AR     To walk in hospital room? 3  -AR     AM-PAC 6 Clicks Score (PT) 18  -AR     Highest Level of Mobility Goal 6 --> Walk 10 steps or more  -AR       Row Name 12/15/23 0932          Functional Assessment    Outcome Measure Options AM-PAC 6 Clicks Basic Mobility (PT)  -AR               User Key  (r) = Recorded By, (t) = Taken By, (c) = Cosigned By      Initials Name Provider Type    Jeannette Colmenares PT Physical Therapist                                 Physical Therapy Education       Title: PT OT SLP Therapies (In Progress)       Topic: Physical Therapy (In Progress)       Point: Mobility training (In Progress)       Learning Progress Summary             Patient Acceptance, E, NR by AR at 12/15/2023 0933                         Point: Home exercise program (In Progress)       Learning Progress Summary             Patient Acceptance, E, NR by AR at 12/15/2023 0933                         Point: Body mechanics (In Progress)       Learning Progress Summary             Patient Acceptance, E, NR by AR at 12/15/2023  0933                         Point: Precautions (In Progress)       Learning Progress Summary             Patient Acceptance, E, NR by AR at 12/15/2023 0933                                         User Key       Initials Effective Dates Name Provider Type Discipline    AR 06/16/21 -  Jeannette Quinn PT Physical Therapist PT                  PT Recommendation and Plan     Outcome Evaluation: Pt admitted from home w resp failure. Some confusion during conversation but pleasant and no agitation during PT.  Per chart pt lives w/ spouse in a quad level  home, unsure if pt normally uses any assistive device.  Today pt in soft restraints, demonstrates generalized weakness, impaired balance and independence w/ mobility but able to ambulate 12' w/ min A using RW, slow shufling steps, cues for posture, some unsteadiness but no significant safety concerns w/ min A using RW. Distance limited by weakness.  RN okay w/ leaving restraints off for pt to sit up in chair.  Possible need for DC to SNU depending on progress while inpatient.     Time Calculation:         PT Charges       Row Name 12/15/23 0925             Time Calculation    Start Time 0817  -AR      Stop Time 0846  -AR      Time Calculation (min) 29 min  -AR      PT Received On 12/15/23  -AR      PT - Next Appointment 12/17/23  -AR      PT Goal Re-Cert Due Date 12/22/23  -AR                User Key  (r) = Recorded By, (t) = Taken By, (c) = Cosigned By      Initials Name Provider Type    AR Jeannette Quinn PT Physical Therapist                  Therapy Charges for Today       Code Description Service Date Service Provider Modifiers Qty    56874393745 HC PT EVAL MOD COMPLEXITY 3 12/15/2023 Jeannette Quinn, PT GP 1    28920631833 HC PT THER PROC EA 15 MIN 12/15/2023 Jeannette Quinn PT GP 1            PT G-Codes  Outcome Measure Options: AM-PAC 6 Clicks Basic Mobility (PT)  AM-PAC 6 Clicks Score (PT): 18  PT Discharge Summary  Anticipated Discharge Disposition (PT):  home with assist, skilled nursing facility    Jeannette Quinn, PT  12/15/2023

## 2023-12-15 NOTE — NURSING NOTE
Call out to [facility] for NV wrist restraints orders      Constant removal of o2 and equipment.     Attempts to get up about every 30 minutes.

## 2023-12-15 NOTE — PLAN OF CARE
Goal Outcome Evaluation:              Outcome Evaluation: Pt admitted from home w resp failure. Some confusion during conversation but pleasant and no agitation during PT.  Per chart pt lives w/ spouse in a quad level  home, unsure if pt normally uses any assistive device.  Today pt in soft restraints, demonstrates generalized weakness, impaired balance and independence w/ mobility but able to ambulate 12' w/ min A using RW, slow shufling steps, cues for posture, some unsteadiness but no significant safety concerns w/ min A using RW. Distance limited by weakness.  RN okay w/ leaving restraints off for pt to sit up in chair.  Possible need for DC to SNU depending on progress while inpatient.      Anticipated Discharge Disposition (PT): home with assist, skilled nursing facility

## 2023-12-15 NOTE — PROGRESS NOTES
"DAILY PROGRESS NOTE  [facility]    Patient Identification:  Name: Eric Kramer  Age: 83 y.o.  Sex: male  :  1940  MRN: [id number]         Primary Care Physician: Elder Block MD    Subjective: patient is confused; was agitated earlier; no family is present  Interval History: follow up for pneumonia, respiratory failure, donta, diabetes, hypertension     Objective:    Scheduled Meds:amLODIPine, 10 mg, Oral, Daily  aspirin, 325 mg, Oral, Daily  cefTRIAXone, 2,000 mg, Intravenous, Q24H  DULoxetine, 60 mg, Oral, Daily  heparin (porcine), 5,000 Units, Subcutaneous, Q8H  hydroCHLOROthiazide, 25 mg, Oral, Daily  insulin regular, 2-7 Units, Subcutaneous, Q6H  ipratropium-albuterol, 3 mL, Nebulization, 4x Daily - RT  levothyroxine, 25 mcg, Oral, Daily  lisinopril, 40 mg, Oral, Q24H  metoprolol tartrate, 25 mg, Oral, Q12H  senna-docusate sodium, 2 tablet, Oral, BID  sodium chloride, 10 mL, Intravenous, Q12H      Continuous Infusions:lactated ringers, 75 mL/hr, Last Rate: 75 mL/hr (23 0955)  norepinephrine, 0.02-0.3 mcg/kg/min, Last Rate: Stopped (23)        Vital signs in last 24 hours:  Temp:  [97.5 °F (36.4 °C)-98.4 °F (36.9 °C)] 97.7 °F (36.5 °C)  Heart Rate:  [] 89  Resp:  [18-22] 20  BP: (114-198)/() 149/135    Intake/Output:    Intake/Output Summary (Last 24 hours) at 2023  Last data filed at 2023  Gross per 24 hour   Intake 1400 ml   Output 1700 ml   Net -300 ml       Exam:  BP (!) 149/135 (BP Location: Right arm, Patient Position: Lying)   Pulse 89   Temp 97.7 °F (36.5 °C) (Oral)   Resp 20   Ht 188 cm (74\")   Wt 104 kg (229 lb 15 oz)   SpO2 97%   BMI 29.52 kg/m²     General Appearance:    Alert, cooperative, no distress, AAOx3                          Head:    Normocephalic, without obvious abnormality, atraumatic                           Eyes:    PERRL, conjunctivae/corneas clear, EOM's intact, both eyes                         " Throat:   Lips, tongue, gums normal; oral mucosa pink and moist                           Neck:   Supple, symmetrical, trachea midline, no JVD                         Lungs:    decreased breath sounds bilaterally, respirations unlabored                 Chest Wall:    No tenderness or deformity                          Heart:    Regular rate and rhythm, S1 and S2 normal, no murmur,no  rub or gallop                  Abdomen:     Soft, nontender, bowel sounds active, no masses, no organomegaly                  Extremities:   Extremities normal, atraumatic, no cyanosis or edema                        Pulses:   Pulses palpable in all extremities                            Skin:   Skin is warm and dry,  no rashes or palpable lesions                        Data Review:  Labs in chart were reviewed.  WBC   Date Value Ref Range Status   12/14/2023 6.13 3.40 - 10.80 10*3/mm3 Final     Hemoglobin   Date Value Ref Range Status   12/14/2023 12.4 (L) 13.0 - 17.7 g/dL Final     Hematocrit   Date Value Ref Range Status   12/14/2023 40.0 37.5 - 51.0 % Final     Platelets   Date Value Ref Range Status   12/14/2023 286 140 - 450 10*3/mm3 Final     Sodium   Date Value Ref Range Status   12/14/2023 138 136 - 145 mmol/L Final     Potassium   Date Value Ref Range Status   12/14/2023 3.6 3.5 - 5.2 mmol/L Final     Chloride   Date Value Ref Range Status   12/14/2023 100 98 - 107 mmol/L Final     CO2   Date Value Ref Range Status   12/14/2023 26.6 22.0 - 29.0 mmol/L Final     BUN   Date Value Ref Range Status   12/14/2023 21 8 - 23 mg/dL Final     Creatinine   Date Value Ref Range Status   12/14/2023 1.12 0.76 - 1.27 mg/dL Final     Glucose   Date Value Ref Range Status   12/14/2023 116 (H) 65 - 99 mg/dL Final     Calcium   Date Value Ref Range Status   12/14/2023 9.8 8.6 - 10.5 mg/dL Final     Phosphorus   Date Value Ref Range Status   12/14/2023 2.4 (L) 2.5 - 4.5 mg/dL Final         Assessment:  Active Hospital Problems    Diagnosis  POA     **Respiratory failure [J96.90]acute hypoxic  Yes      Resolved Hospital Problems   No resolved problems to display.   Pneumonia  Obesity  Altered mental status  Acute kidney injury  Diabetes  hypertension    Plan:  Transferred out of icu  Monitor on telemetry  Continue antibiotics  Wean oxygen  Hopefully delirium will improve out of icu  Will follow with you  Thanks for involving us in his care    Angelic Quiros MD  12/14/2023  20:41 EST

## 2023-12-15 NOTE — PROGRESS NOTES
LOS: 3 days   Patient Care Team:  Elder Block MD as PCP - General (Family Medicine)    Subjective     this 83-year-old former smoker with no prior known history of lung disease transferred in from urgent care clinic with hypoxia and several days of confusion he apparently had sats of 79% in the emergency care clinic he has been on a noninvasive ventilator and found to have a right upper lobe pneumonia some questionable pulmonary edema and acute kidney injury on chronic kidney disease.  Patient has improved significantly respiratory wise he is now weaned off oxygen yesterday he was quite agitated today he is not agitated he is sitting on the side of the bed although he is even more confused although he is within enough to know that he was acting out yesterday he actually apologized for the way he behaved yesterday.    Review of Systems:   No nausea, no diarrhea       Objective     Vital Signs  Vital Sign Min/Max for last 24 hours  Temp  Min: 97.7 °F (36.5 °C)  Max: 98.8 °F (37.1 °C)   BP  Min: 147/81  Max: 186/99   Pulse  Min: 75  Max: 104   Resp  Min: 16  Max: 20   SpO2  Min: 91 %  Max: 98 %   Flow (L/min)  Min: 3  Max: 4   Weight  Min: 99.3 kg (218 lb 14.7 oz)  Max: 99.3 kg (218 lb 14.7 oz)        Ventilator/Non-Invasive Ventilation Settings (From admission, onward)       Start     Ordered    12/12/23 1555  NIPPV (CPAP or BIPAP)  Until Discontinued        Question:  Type  Answer:  AutoBIPAP    12/12/23 1554                                 Body mass index is 28.11 kg/m².  I/O last 3 completed shifts:  In: 1580 [P.O.:480; I.V.:1000; IV Piggyback:100]  Out: 2500 [Urine:2500]  I/O this shift:  In: 240 [P.O.:240]  Out: -         Physical Exam:  General Appearance: Well-developed older white male, he is r up in the side of the bed on room air oxygen saturations 94%  Eyes: Conjunctivae are clear and anicteric  ENT: Mucous membranes are moist no erythema or exudates  Neck: No adenopathy or thyromegaly no  jugular venous tension and trachea midline  Lungs: Has some scattered rhonchi right greater than left move with cough but he did not totally clear them no wheezing nonlabored symmetric expansion  Cardiac: Regular rate rhythm no murmur  Abdomen: A little obese soft nontender no palpable hepatosplenomegaly or masses  : Not examined  Musculoskeletal: Grossly normal  Skin: Warm and dry no jaundice no petechiae no cyanosis  Neuro: He is awake he is aware he is in the hospital and he seems to be oriented to date and time of day.  He is cooperative he is following commands and moving extremities but just conversations are all over the place almost flight of ideas  Extremities/P Vascular: No clubbing no cyanosis no edema palpable radial and dorsalis pedis pulses bilaterally  MSE: He is calm today       Labs:  Results from last 7 days   Lab Units 12/15/23  0504 12/14/23  1954 12/14/23  0535 12/13/23  0339 12/12/23  1247   GLUCOSE mg/dL 130*  --  116* 101* 181*   SODIUM mmol/L 142  --  138 139 137   POTASSIUM mmol/L 3.9 4.2 3.6 3.8 3.8   CO2 mmol/L 25.5  --  26.6 28.0 26.0   CHLORIDE mmol/L 103  --  100 103 99   ANION GAP mmol/L 13.5  --  11.4 8.0 12.0   CREATININE mg/dL 1.05  --  1.12 1.71* 1.97*   BUN mg/dL 20  --  21 32* 28*   BUN / CREAT RATIO  19.0  --  18.8 18.7 14.2   CALCIUM mg/dL 10.4  --  9.8 9.2 9.6   ALK PHOS U/L  --   --   --   --  54   TOTAL PROTEIN g/dL  --   --   --   --  7.1   ALT (SGPT) U/L  --   --   --   --  9   AST (SGOT) U/L  --   --   --   --  13   BILIRUBIN mg/dL  --   --   --   --  0.5   ALBUMIN g/dL 3.5  --  3.3* 3.3* 3.6   GLOBULIN gm/dL  --   --   --   --  3.5     Estimated Creatinine Clearance: 68.3 mL/min (by C-G formula based on SCr of 1.05 mg/dL).      Results from last 7 days   Lab Units 12/14/23  0355 12/13/23  0339 12/12/23  1247   WBC 10*3/mm3 6.13 5.68 7.59   RBC 10*6/mm3 4.32 4.16 4.38   HEMOGLOBIN g/dL 12.4* 12.8* 13.4   HEMATOCRIT % 40.0 39.1 41.1   MCV fL 92.6 94.0 93.8   MCH pg 28.7  30.8 30.6   MCHC g/dL 31.0* 32.7 32.6   RDW % 14.0 13.9 14.1   RDW-SD fl 47.4 48.0 48.3   MPV fL 10.3 9.9 9.6   PLATELETS 10*3/mm3 286 231 256   NEUTROPHIL % %  --  57.6 64.0   LYMPHOCYTE % %  --  23.9 19.5*   MONOCYTES % %  --  15.1* 15.3*   EOSINOPHIL % %  --  2.5 0.4   BASOPHIL % %  --  0.7 0.5   IMM GRAN % %  --  0.2 0.3   NEUTROS ABS 10*3/mm3  --  3.27 4.86   LYMPHS ABS 10*3/mm3  --  1.36 1.48   MONOS ABS 10*3/mm3  --  0.86 1.16*   EOS ABS 10*3/mm3  --  0.14 0.03   BASOS ABS 10*3/mm3  --  0.04 0.04   IMMATURE GRANS (ABS) 10*3/mm3  --  0.01 0.02   NRBC /100 WBC  --  0.0 0.0     Results from last 7 days   Lab Units 12/14/23  1152   PH, ARTERIAL pH units 7.431   PO2 ART mm Hg 65.1*   PCO2, ARTERIAL mm Hg 43.7   HCO3 ART mmol/L 29.1*     Results from last 7 days   Lab Units 12/15/23  0900 12/15/23  0504 12/12/23  1450   CK TOTAL U/L  --   --  65   HSTROP T ng/L 65* 69* 70*     Results from last 7 days   Lab Units 12/12/23  1247   PROBNP pg/mL 810.0         Results from last 7 days   Lab Units 12/12/23  1450   LACTATE mmol/L 1.9         Microbiology Results (last 10 days)       Procedure Component Value - Date/Time    Legionella Antigen, Urine - Urine, Urine, Clean Catch [[id number]]  (Normal) Collected: 12/12/23 2155    Lab Status: Final result Specimen: Urine, Clean Catch Updated: 12/13/23 1141     LEGIONELLA ANTIGEN, URINE Negative    S. Pneumo Ag Urine or CSF - Urine, Urine, Clean Catch [[id number]]  (Normal) Collected: 12/12/23 2155    Lab Status: Final result Specimen: Urine, Clean Catch Updated: 12/13/23 1141     Strep Pneumo Ag Negative    Blood Culture - Blood, Arm, Left [[id number]]  (Normal) Collected: 12/12/23 1450    Lab Status: Preliminary result Specimen: Blood from Arm, Left Updated: 12/15/23 9764     Blood Culture No growth at 3 days    Blood Culture - Blood, Arm, Left [[id number]]  (Normal) Collected: 12/12/23 1450    Lab Status: Preliminary result Specimen: Blood from Arm, Left Updated: 12/15/23 8825      Blood Culture No growth at 3 days    Respiratory Panel PCR w/COVID-19(SARS-CoV-2) PRASANNA/AARON/MADELINE/PAD/COR/JORGE In-House, NP Swab in UTM/VTM, 2 HR TAT - Swab, Nasopharynx [[id number]]  (Normal) Collected: 12/12/23 1248    Lab Status: Final result Specimen: Swab from Nasopharynx Updated: 12/12/23 1412     ADENOVIRUS, PCR Not Detected     Coronavirus 229E Not Detected     Coronavirus HKU1 Not Detected     Coronavirus NL63 Not Detected     Coronavirus OC43 Not Detected     COVID19 Not Detected     Human Metapneumovirus Not Detected     Human Rhinovirus/Enterovirus Not Detected     Influenza A PCR Not Detected     Influenza B PCR Not Detected     Parainfluenza Virus 1 Not Detected     Parainfluenza Virus 2 Not Detected     Parainfluenza Virus 3 Not Detected     Parainfluenza Virus 4 Not Detected     RSV, PCR Not Detected     Bordetella pertussis pcr Not Detected     Bordetella parapertussis PCR Not Detected     Chlamydophila pneumoniae PCR Not Detected     Mycoplasma pneumo by PCR Not Detected    Narrative:      In the setting of a positive respiratory panel with a viral infection PLUS a negative procalcitonin without other underlying concern for bacterial infection, consider observing off antibiotics or discontinuation of antibiotics and continue supportive care. If the respiratory panel is positive for atypical bacterial infection (Bordetella pertussis, Chlamydophila pneumoniae, or Mycoplasma pneumoniae), consider antibiotic de-escalation to target atypical bacterial infection.                amLODIPine, 10 mg, Oral, Daily  aspirin, 325 mg, Oral, Daily  cefTRIAXone, 2,000 mg, Intravenous, Q24H  DULoxetine, 60 mg, Oral, Daily  enoxaparin, 40 mg, Subcutaneous, Q24H  gabapentin, 300 mg, Oral, Nightly  hydroCHLOROthiazide, 25 mg, Oral, Daily  insulin regular, 2-7 Units, Subcutaneous, Q6H  ipratropium-albuterol, 3 mL, Nebulization, 4x Daily - RT  levothyroxine, 25 mcg, Oral, Daily  lisinopril, 40 mg, Oral, Q24H  metoprolol  tartrate, 25 mg, Oral, Q12H  QUEtiapine, 25 mg, Oral, Nightly  senna-docusate sodium, 2 tablet, Oral, BID  sodium chloride, 10 mL, Intravenous, Q12H             Diagnostics:  XR Chest 1 View    Result Date: 12/12/2023  XR CHEST 1 VW-12/12/2023  HISTORY: Shortness of breath.  Heart size is at the upper limits of normal. There is moderately severe ill-defined increased density in the right upper to midlung. Left lung appears clear. Lungs are underinflated. There is some aortic calcification.      1. Borderline cardiomegaly. 2. Moderately severe right upper lobe infiltrate consistent with pneumonia. Follow-up films recommended to assess clearing.   This report was finalized on 12/12/2023 1:29 PM by Dr. Pravin Santana M.D on Workstation: [id number]      Results for orders placed during the hospital encounter of 12/12/23    Adult Transthoracic Echo Complete W/ Cont if Necessary Per Protocol    Interpretation Summary    Left ventricular systolic function is normal. Calculated left ventricular EF = 62.6%    Left ventricular wall thickness is consistent with hypertrophy. Sigmoid-shaped ventricular septum is present.    Left ventricular diastolic function was normal.    There is mild calcification of the aortic valve.    Estimated right ventricular systolic pressure from tricuspid regurgitation is normal (<35 mmHg).    There is moderate plaque in the ascending aorta present.      Reviewed the chest x-ray and I agree with radiology report     Active Hospital Problems    Diagnosis  POA    **Respiratory failure [J96.90]  Yes      Resolved Hospital Problems   No resolved problems to display.         Assessment & Plan     Pneumonia right upper lobe respiratory pathogen panel negative blood cultures no growth to date.  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    Plan for disposition: Thanks to the hospitalist for taking over care.  Pulmonary wise complete antibiotics he should have a follow-up chest x-ray in 4 to 6 weeks to ensure complete resolution of infiltrate.  We will see as needed    Mahin Worthington Jr, MD  12/15/23  18:21 EST    Time: care time 40 minutes

## 2023-12-16 ENCOUNTER — APPOINTMENT (OUTPATIENT)
Dept: GENERAL RADIOLOGY | Facility: HOSPITAL | Age: 83
End: 2023-12-16
Payer: MEDICARE

## 2023-12-16 LAB
ALBUMIN SERPL-MCNC: 3.6 G/DL (ref 3.5–5.2)
ANION GAP SERPL CALCULATED.3IONS-SCNC: 12.3 MMOL/L (ref 5–15)
BUN SERPL-MCNC: 22 MG/DL (ref 8–23)
BUN/CREAT SERPL: 18.6 (ref 7–25)
CALCIUM SPEC-SCNC: 10.1 MG/DL (ref 8.6–10.5)
CHLORIDE SERPL-SCNC: 103 MMOL/L (ref 98–107)
CO2 SERPL-SCNC: 27.7 MMOL/L (ref 22–29)
CREAT SERPL-MCNC: 1.18 MG/DL (ref 0.76–1.27)
DEPRECATED RDW RBC AUTO: 45.7 FL (ref 37–54)
EGFRCR SERPLBLD CKD-EPI 2021: 61.6 ML/MIN/1.73
ERYTHROCYTE [DISTWIDTH] IN BLOOD BY AUTOMATED COUNT: 13.6 % (ref 12.3–15.4)
GLUCOSE BLDC GLUCOMTR-MCNC: 121 MG/DL (ref 70–130)
GLUCOSE BLDC GLUCOMTR-MCNC: 151 MG/DL (ref 70–130)
GLUCOSE BLDC GLUCOMTR-MCNC: 151 MG/DL (ref 70–130)
GLUCOSE BLDC GLUCOMTR-MCNC: 196 MG/DL (ref 70–130)
GLUCOSE SERPL-MCNC: 127 MG/DL (ref 65–99)
HCT VFR BLD AUTO: 41.6 % (ref 37.5–51)
HGB BLD-MCNC: 14 G/DL (ref 13–17.7)
MCH RBC QN AUTO: 30.8 PG (ref 26.6–33)
MCHC RBC AUTO-ENTMCNC: 33.7 G/DL (ref 31.5–35.7)
MCV RBC AUTO: 91.4 FL (ref 79–97)
PHOSPHATE SERPL-MCNC: 4.3 MG/DL (ref 2.5–4.5)
PLATELET # BLD AUTO: 325 10*3/MM3 (ref 140–450)
PMV BLD AUTO: 9.1 FL (ref 6–12)
POTASSIUM SERPL-SCNC: 3.9 MMOL/L (ref 3.5–5.2)
RBC # BLD AUTO: 4.55 10*6/MM3 (ref 4.14–5.8)
SODIUM SERPL-SCNC: 143 MMOL/L (ref 136–145)
TSH SERPL DL<=0.05 MIU/L-ACNC: 0.87 UIU/ML (ref 0.27–4.2)
VIT B12 BLD-MCNC: 1323 PG/ML (ref 211–946)
WBC NRBC COR # BLD AUTO: 7.75 10*3/MM3 (ref 3.4–10.8)

## 2023-12-16 PROCEDURE — 94799 UNLISTED PULMONARY SVC/PX: CPT

## 2023-12-16 PROCEDURE — 36415 COLL VENOUS BLD VENIPUNCTURE: CPT | Performed by: INTERNAL MEDICINE

## 2023-12-16 PROCEDURE — 25010000002 ENOXAPARIN PER 10 MG: Performed by: STUDENT IN AN ORGANIZED HEALTH CARE EDUCATION/TRAINING PROGRAM

## 2023-12-16 PROCEDURE — 82607 VITAMIN B-12: CPT | Performed by: STUDENT IN AN ORGANIZED HEALTH CARE EDUCATION/TRAINING PROGRAM

## 2023-12-16 PROCEDURE — 82948 REAGENT STRIP/BLOOD GLUCOSE: CPT

## 2023-12-16 PROCEDURE — 63710000001 INSULIN REGULAR HUMAN PER 5 UNITS: Performed by: INTERNAL MEDICINE

## 2023-12-16 PROCEDURE — 73130 X-RAY EXAM OF HAND: CPT

## 2023-12-16 PROCEDURE — 85027 COMPLETE CBC AUTOMATED: CPT | Performed by: STUDENT IN AN ORGANIZED HEALTH CARE EDUCATION/TRAINING PROGRAM

## 2023-12-16 PROCEDURE — 84443 ASSAY THYROID STIM HORMONE: CPT | Performed by: STUDENT IN AN ORGANIZED HEALTH CARE EDUCATION/TRAINING PROGRAM

## 2023-12-16 PROCEDURE — 94664 DEMO&/EVAL PT USE INHALER: CPT

## 2023-12-16 PROCEDURE — 25010000002 CEFTRIAXONE PER 250 MG: Performed by: INTERNAL MEDICINE

## 2023-12-16 PROCEDURE — 80069 RENAL FUNCTION PANEL: CPT | Performed by: INTERNAL MEDICINE

## 2023-12-16 RX ADMIN — INSULIN HUMAN 2 UNITS: 100 INJECTION, SOLUTION PARENTERAL at 21:00

## 2023-12-16 RX ADMIN — QUETIAPINE FUMARATE 25 MG: 25 TABLET ORAL at 21:35

## 2023-12-16 RX ADMIN — IPRATROPIUM BROMIDE AND ALBUTEROL SULFATE 3 ML: 2.5; .5 SOLUTION RESPIRATORY (INHALATION) at 09:16

## 2023-12-16 RX ADMIN — IPRATROPIUM BROMIDE AND ALBUTEROL SULFATE 3 ML: 2.5; .5 SOLUTION RESPIRATORY (INHALATION) at 19:51

## 2023-12-16 RX ADMIN — Medication 10 ML: at 21:35

## 2023-12-16 RX ADMIN — AMLODIPINE BESYLATE 10 MG: 10 TABLET ORAL at 09:08

## 2023-12-16 RX ADMIN — DULOXETINE HYDROCHLORIDE 60 MG: 60 CAPSULE, DELAYED RELEASE ORAL at 09:08

## 2023-12-16 RX ADMIN — LEVOTHYROXINE SODIUM 25 MCG: 25 TABLET ORAL at 09:08

## 2023-12-16 RX ADMIN — TRAMADOL HYDROCHLORIDE 50 MG: 50 TABLET ORAL at 21:34

## 2023-12-16 RX ADMIN — METOPROLOL TARTRATE 25 MG: 25 TABLET, FILM COATED ORAL at 09:08

## 2023-12-16 RX ADMIN — LISINOPRIL 40 MG: 40 TABLET ORAL at 09:08

## 2023-12-16 RX ADMIN — Medication 10 ML: at 09:09

## 2023-12-16 RX ADMIN — ACETAMINOPHEN 650 MG: 325 TABLET, FILM COATED ORAL at 18:18

## 2023-12-16 RX ADMIN — GABAPENTIN 300 MG: 300 CAPSULE ORAL at 21:33

## 2023-12-16 RX ADMIN — ENOXAPARIN SODIUM 40 MG: 100 INJECTION SUBCUTANEOUS at 21:33

## 2023-12-16 RX ADMIN — METOPROLOL TARTRATE 25 MG: 25 TABLET, FILM COATED ORAL at 21:33

## 2023-12-16 RX ADMIN — HYDROCHLOROTHIAZIDE 25 MG: 25 TABLET ORAL at 09:08

## 2023-12-16 RX ADMIN — ASPIRIN 325 MG: 325 TABLET ORAL at 09:08

## 2023-12-16 RX ADMIN — CEFTRIAXONE 2000 MG: 2 INJECTION, POWDER, FOR SOLUTION INTRAMUSCULAR; INTRAVENOUS at 09:08

## 2023-12-16 RX ADMIN — INSULIN HUMAN 2 UNITS: 100 INJECTION, SOLUTION PARENTERAL at 18:18

## 2023-12-16 NOTE — PROGRESS NOTES
Name: Eric Kramer ADMIT: 2023   : 1940  PCP: Elder Block MD    MRN: [id number] LOS: 4 days   AGE/SEX: 83 y.o. male  ROOM: [facility]     Subjective   Subjective   Delirium much improved today.  Alert and orient x 3.  Required restraints overnight but have now been removed.  Review of Systems  Nonproductive cough, no fevers or chills, no difficulty breathing, no chest pain     Objective   Objective   Vital Signs  Temp:  [97.5 °F (36.4 °C)-98.8 °F (37.1 °C)] 98.1 °F (36.7 °C)  Heart Rate:  [68-95] 83  Resp:  [16-18] 18  BP: (122-158)/(77-89) 131/77  SpO2:  [86 %-100 %] 94 %  on  Flow (L/min):  [3-6] 4;   Device (Oxygen Therapy): high-flow nasal cannula  Body mass index is 28.73 kg/m².  Physical Exam  Vitals and nursing note reviewed.   Constitutional:       General: He is not in acute distress.     Appearance: He is ill-appearing.   Cardiovascular:      Rate and Rhythm: Normal rate and regular rhythm.   Pulmonary:      Effort: Pulmonary effort is normal. No respiratory distress.   Abdominal:      General: Abdomen is flat. There is no distension.      Tenderness: There is no abdominal tenderness.   Musculoskeletal:         General: No swelling or deformity.   Skin:     General: Skin is warm and dry.   Neurological:      General: No focal deficit present.      Mental Status: He is alert and oriented to person, place, and time.         Results Review     I reviewed the patient's new clinical results.  Results from last 7 days   Lab Units 23  0856 23  0355 23  0339 23  1247   WBC 10*3/mm3 7.75 6.13 5.68 7.59   HEMOGLOBIN g/dL 14.0 12.4* 12.8* 13.4   PLATELETS 10*3/mm3 325 286 231 256     Results from last 7 days   Lab Units 23  0856 12/15/23  0504 23  1954 23  0535 23  0339   SODIUM mmol/L 143 142  --  138 139   POTASSIUM mmol/L 3.9 3.9 4.2 3.6 3.8   CHLORIDE mmol/L 103 103  --  100 103   CO2 mmol/L 27.7 25.5  --  26.6 28.0   BUN mg/dL 22 20  --  21  32*   CREATININE mg/dL 1.18 1.05  --  1.12 1.71*   GLUCOSE mg/dL 127* 130*  --  116* 101*   Estimated Creatinine Clearance: 61.5 mL/min (by C-G formula based on SCr of 1.18 mg/dL).  Results from last 7 days   Lab Units 12/16/23  0856 12/15/23  0504 12/14/23  0535 12/13/23  0339 12/12/23  1247   ALBUMIN g/dL 3.6 3.5 3.3* 3.3* 3.6   BILIRUBIN mg/dL  --   --   --   --  0.5   ALK PHOS U/L  --   --   --   --  54   AST (SGOT) U/L  --   --   --   --  13   ALT (SGPT) U/L  --   --   --   --  9     Results from last 7 days   Lab Units 12/16/23  0856 12/15/23  0504 12/14/23  0535 12/13/23  0339   CALCIUM mg/dL 10.1 10.4 9.8 9.2   ALBUMIN g/dL 3.6 3.5 3.3* 3.3*   PHOSPHORUS mg/dL 4.3 2.5 2.4* 3.5     Results from last 7 days   Lab Units 12/12/23  1450   LACTATE mmol/L 1.9     COVID19   Date Value Ref Range Status   12/12/2023 Not Detected Not Detected - Ref. Range Final     Glucose   Date/Time Value Ref Range Status   12/16/2023 1211 151 (H) 70 - 130 mg/dL Final   12/16/2023 0607 121 70 - 130 mg/dL Final   12/15/2023 2034 105 70 - 130 mg/dL Final   12/15/2023 1557 131 (H) 70 - 130 mg/dL Final   12/15/2023 1054 147 (H) 70 - 130 mg/dL Final   12/15/2023 0632 146 (H) 70 - 130 mg/dL Final   12/14/2023 2032 134 (H) 70 - 130 mg/dL Final       XR Chest 1 View  Narrative: SINGLE VIEW OF THE CHEST     HISTORY: Pneumonia     COMPARISON: December 12, 2023     FINDINGS:  There is cardiomegaly. There is tortuosity and ectasia of the thoracic  aorta. Extensive infiltrates are noted throughout the right lung. These  are probably worsened when compared to prior exam, as has right-sided  atelectasis. There may be a trace right pleural effusion. No  pneumothorax is seen. There is some left basilar atelectasis.  Interstitial prominence throughout the left lung is more apparent.     Impression: Worsening infiltrate and volume loss on the right. There is also some  increasing interstitial prominence within the left lung.     This report was  finalized on 12/14/2023 4:44 AM by Dr. Jennifer Morales M.D on Workstation: [id number]       I reviewed the patient's daily medications.  Scheduled Medications  amLODIPine, 10 mg, Oral, Daily  aspirin, 325 mg, Oral, Daily  cefTRIAXone, 2,000 mg, Intravenous, Q24H  DULoxetine, 60 mg, Oral, Daily  enoxaparin, 40 mg, Subcutaneous, Q24H  gabapentin, 300 mg, Oral, Nightly  hydroCHLOROthiazide, 25 mg, Oral, Daily  insulin regular, 2-7 Units, Subcutaneous, Q6H  ipratropium-albuterol, 3 mL, Nebulization, 4x Daily - RT  levothyroxine, 25 mcg, Oral, Daily  lisinopril, 40 mg, Oral, Q24H  metoprolol tartrate, 25 mg, Oral, Q12H  QUEtiapine, 25 mg, Oral, Nightly  senna-docusate sodium, 2 tablet, Oral, BID  sodium chloride, 10 mL, Intravenous, Q12H    Infusions   Diet  Diet: Diabetic Diets; Consistent Carbohydrate; Texture: Regular Texture (IDDSI 7); Fluid Consistency: Thin (IDDSI 0)         I have personally reviewed:  [x]  Laboratory   [x]  Microbiology   [x]  Radiology   []  EKG/Telemetry   [x]  Cardiology/Vascular   []  Pathology   [x]  Records     Assessment/Plan     Active Hospital Problems    Diagnosis  POA    **Respiratory failure [J96.90]  Yes      Resolved Hospital Problems   No resolved problems to display.       83 y.o. male admitted with Respiratory failure.    Acute hypoxic and hypercapnic respiratory failure from pneumonia  -RVP negative, blood cultures no growth at 2 days  -Chest x-ray with right upper lobe pneumonia, will need follow-up films in 4 to 6 weeks to ensure resolution  -High flow weaned, now on 4 L  -Rocephin, plan for 7-day course    KAREN on CKD 2  -Suspect prerenal  -Back to baseline    Elevated troponin from pneumonia/KAREN  -Echo with EF of 62.6%, LV diastolic and systolic function was normal  -Troponin downtrending    Diabetes type 2 with neuropathy  -Hold home metformin and glipizide  -Low-dose sliding scale insulin    Hypertension  -Continue home hydrochlorothiazide, amlodipine, lisinopril,  Lopressor    Memory impairment with delirium  -Out of restraints  -Start Seroquel  -Discussed with family on 12/15 it sounds like it MoCA test about a year ago that was pretty normal, but recommended further memory testing after discharge    Lovenox 40 mg SC daily for DVT prophylaxis.  Full code.  Discussed with patient, family, nursing staff, CCP, and care team on multidisciplinary rounds.  Anticipate discharge home with  vs SNU facility timing yet to be determined.  Pending weaning of oxygen     Expected Discharge Date: 12/18/2023; Expected Discharge Time:       Georges Lozoya MD  [facility]  12/16/23  12:27 EST

## 2023-12-16 NOTE — PLAN OF CARE
Problem: Adult Inpatient Plan of Care  Goal: Absence of Hospital-Acquired Illness or Injury  Intervention: Identify and Manage Fall Risk  Recent Flowsheet Documentation  Taken 12/16/2023 1400 by Rabia Leone RN  Safety Promotion/Fall Prevention: safety round/check completed  Taken 12/16/2023 1200 by Rabia Leone RN  Safety Promotion/Fall Prevention: safety round/check completed  Taken 12/16/2023 1000 by Rabia Leone RN  Safety Promotion/Fall Prevention: safety round/check completed  Taken 12/16/2023 0800 by Rabia Leone RN  Safety Promotion/Fall Prevention: safety round/check completed  Intervention: Prevent Skin Injury  Recent Flowsheet Documentation  Taken 12/16/2023 1400 by Rabia Leone RN  Body Position: position changed independently  Skin Protection:   adhesive use limited   tubing/devices free from skin contact  Taken 12/16/2023 1200 by Rabia Leone RN  Body Position: position changed independently  Taken 12/16/2023 1000 by Rabia Leone RN  Body Position: position changed independently  Taken 12/16/2023 0800 by Rabia Leone RN  Body Position: position changed independently  Skin Protection:   adhesive use limited   tubing/devices free from skin contact  Intervention: Prevent and Manage VTE (Venous Thromboembolism) Risk  Recent Flowsheet Documentation  Taken 12/16/2023 1400 by Rabia Leone RN  Activity Management: activity encouraged  VTE Prevention/Management: (Lovenox) other (see comments)  Range of Motion: active ROM (range of motion) encouraged  Taken 12/16/2023 1200 by Rabia Leone RN  Activity Management: activity encouraged  Taken 12/16/2023 1000 by Rabia Leone RN  Activity Management: activity encouraged  Taken 12/16/2023 0800 by Rabia Leone RN  Activity Management: activity encouraged  VTE Prevention/Management: (Lovenox) other (see comments)  Range of Motion: active ROM (range of motion) encouraged     Problem: Skin Injury Risk Increased  Goal: Skin Health and  Integrity  Intervention: Optimize Skin Protection  Recent Flowsheet Documentation  Taken 12/16/2023 1400 by Rabia Leone RN  Pressure Reduction Techniques:   frequent weight shift encouraged   weight shift assistance provided  Pressure Reduction Devices: positioning supports utilized  Skin Protection:   adhesive use limited   tubing/devices free from skin contact  Taken 12/16/2023 0800 by Rabia Leone RN  Pressure Reduction Techniques:   frequent weight shift encouraged   weight shift assistance provided  Pressure Reduction Devices: positioning supports utilized  Skin Protection:   adhesive use limited   tubing/devices free from skin contact     Problem: Fall Injury Risk  Goal: Absence of Fall and Fall-Related Injury  Intervention: Promote Injury-Free Environment  Recent Flowsheet Documentation  Taken 12/16/2023 1400 by Rabia Leone RN  Safety Promotion/Fall Prevention: safety round/check completed  Taken 12/16/2023 1200 by Rabia Leone RN  Safety Promotion/Fall Prevention: safety round/check completed  Taken 12/16/2023 1000 by Rabia Leone RN  Safety Promotion/Fall Prevention: safety round/check completed  Taken 12/16/2023 0800 by Rabai Leone RN  Safety Promotion/Fall Prevention: safety round/check completed     Problem: Restraint, Nonviolent  Goal: Absence of Harm or Injury  Intervention: Implement Least Restrictive Safety Strategies  Recent Flowsheet Documentation  Taken 12/16/2023 1400 by Rabia Leone RN  Diversional Activities: television  Taken 12/16/2023 0921 by Rabia Leone RN  Less Restrictive Alternative:   safety enhancements provided   positive reinforcement provided   environment adjusted   emotional support provided   bed alarm in use   calming techniques promoted   coping techniques promoted   family presence promoted   medication offered  Taken 12/16/2023 0800 by Rabia Leone RN  Medical Device Protection: tubing secured  Less Restrictive Alternative:   safety enhancements  provided   positive reinforcement provided   environment adjusted   emotional support provided   bed alarm in use   calming techniques promoted   coping techniques promoted   family presence promoted   medication offered  De-Escalation Techniques:   verbally redirected   appropriate behavior reinforced   stimulation decreased  Diversional Activities: television  Intervention: Protect Skin and Joint Integrity  Recent Flowsheet Documentation  Taken 12/16/2023 1400 by Rabia Leone RN  Body Position: position changed independently  Range of Motion: active ROM (range of motion) encouraged  Taken 12/16/2023 1200 by Rabia Leone RN  Body Position: position changed independently  Taken 12/16/2023 1000 by Rabia Leone RN  Body Position: position changed independently  Taken 12/16/2023 0800 by Rabia Leone RN  Body Position: position changed independently  Range of Motion: active ROM (range of motion) encouraged   Goal Outcome Evaluation:   VSS, pt started shift on 6L high flow NC, pt now weaned down to 3L; pt has tolerated being out of restraints since early this morning; pt A&Ox4 on initial assessment; R hand xrayed d/t pain in R wrist/thumb; SR on monitor; bed locked and in low position; bed alarm set; call light in reach; plan of care continues.

## 2023-12-16 NOTE — PLAN OF CARE
Problem: Adult Inpatient Plan of Care  Goal: Absence of Hospital-Acquired Illness or Injury  Intervention: Identify and Manage Fall Risk  Recent Flowsheet Documentation  Taken 12/15/2023 1800 by Rabia Leone RN  Safety Promotion/Fall Prevention: safety round/check completed  Taken 12/15/2023 1600 by Rabia Leone RN  Safety Promotion/Fall Prevention: safety round/check completed  Taken 12/15/2023 1400 by Rabia Leone RN  Safety Promotion/Fall Prevention: safety round/check completed  Taken 12/15/2023 1200 by Rabia Leone RN  Safety Promotion/Fall Prevention: safety round/check completed  Taken 12/15/2023 1000 by Rabia Leone RN  Safety Promotion/Fall Prevention: safety round/check completed  Taken 12/15/2023 0800 by Rabia Leone RN  Safety Promotion/Fall Prevention: safety round/check completed  Intervention: Prevent Skin Injury  Recent Flowsheet Documentation  Taken 12/15/2023 1800 by Rabia Leone RN  Body Position: position changed independently  Taken 12/15/2023 1600 by Rabia Leone RN  Body Position: position changed independently  Taken 12/15/2023 1400 by Rabia Leone RN  Body Position: supine  Skin Protection:   adhesive use limited   tubing/devices free from skin contact  Taken 12/15/2023 1200 by Rabia Leone RN  Body Position: supine  Taken 12/15/2023 0800 by Rabia Leone RN  Body Position: position changed independently  Skin Protection:   adhesive use limited   tubing/devices free from skin contact  Intervention: Prevent and Manage VTE (Venous Thromboembolism) Risk  Recent Flowsheet Documentation  Taken 12/15/2023 1800 by Rabia Leone RN  Activity Management: activity encouraged  Taken 12/15/2023 1600 by Rabia Leone RN  Activity Management: activity encouraged  Taken 12/15/2023 1400 by Rabia Leone RN  Activity Management: activity minimized  VTE Prevention/Management: (SQ heparin) other (see comments)  Range of Motion: active ROM (range of motion)  encouraged  Taken 12/15/2023 1200 by Rabia Leone RN  Activity Management: activity encouraged  Taken 12/15/2023 1000 by Rabia Leone RN  Activity Management: up in chair  Taken 12/15/2023 0800 by Rabia Leone RN  Activity Management: activity encouraged  VTE Prevention/Management: (SQ heparin) other (see comments)  Range of Motion: active ROM (range of motion) encouraged     Problem: Skin Injury Risk Increased  Goal: Skin Health and Integrity  Intervention: Optimize Skin Protection  Recent Flowsheet Documentation  Taken 12/15/2023 1400 by Rabia Leone RN  Pressure Reduction Techniques:   frequent weight shift encouraged   weight shift assistance provided  Head of Bed (HOB) Positioning: HOB elevated  Pressure Reduction Devices: positioning supports utilized  Skin Protection:   adhesive use limited   tubing/devices free from skin contact  Taken 12/15/2023 1200 by Rabia Leone RN  Head of Bed (HOB) Positioning: HOB elevated  Taken 12/15/2023 0800 by Rabia Leone RN  Pressure Reduction Techniques:   frequent weight shift encouraged   weight shift assistance provided  Pressure Reduction Devices: positioning supports utilized  Skin Protection:   adhesive use limited   tubing/devices free from skin contact     Problem: Fall Injury Risk  Goal: Absence of Fall and Fall-Related Injury  Intervention: Promote Injury-Free Environment  Recent Flowsheet Documentation  Taken 12/15/2023 1800 by Rabia Leone RN  Safety Promotion/Fall Prevention: safety round/check completed  Taken 12/15/2023 1600 by Rabia Leone RN  Safety Promotion/Fall Prevention: safety round/check completed  Taken 12/15/2023 1400 by Rabia Leone RN  Safety Promotion/Fall Prevention: safety round/check completed  Taken 12/15/2023 1200 by Rabia Leone RN  Safety Promotion/Fall Prevention: safety round/check completed  Taken 12/15/2023 1000 by Rabia Leone RN  Safety Promotion/Fall Prevention: safety round/check completed  Taken  12/15/2023 0800 by Rabia Leone RN  Safety Promotion/Fall Prevention: safety round/check completed     Problem: Restraint, Nonviolent  Goal: Absence of Harm or Injury  Intervention: Implement Least Restrictive Safety Strategies  Recent Flowsheet Documentation  Taken 12/15/2023 0800 by Rabia Leone RN  Less Restrictive Alternative:   bed alarm in use   calming techniques promoted   environment adjusted   safety enhancements provided   coping techniques promoted   family presence promoted  Intervention: Protect Skin and Joint Integrity  Recent Flowsheet Documentation  Taken 12/15/2023 1800 by Rabia Leone RN  Body Position: position changed independently  Taken 12/15/2023 1600 by Rabia Leone RN  Body Position: position changed independently  Taken 12/15/2023 1400 by Rabia Leone RN  Body Position: supine  Range of Motion: active ROM (range of motion) encouraged  Taken 12/15/2023 1200 by Rabia Leone RN  Body Position: supine  Taken 12/15/2023 0800 by Rabia Leone RN  Body Position: position changed independently  Range of Motion: active ROM (range of motion) encouraged

## 2023-12-17 LAB
ALBUMIN SERPL-MCNC: 3 G/DL (ref 3.5–5.2)
ANION GAP SERPL CALCULATED.3IONS-SCNC: 13.6 MMOL/L (ref 5–15)
BACTERIA SPEC AEROBE CULT: NORMAL
BACTERIA SPEC AEROBE CULT: NORMAL
BUN SERPL-MCNC: 25 MG/DL (ref 8–23)
BUN/CREAT SERPL: 17.9 (ref 7–25)
CALCIUM SPEC-SCNC: 9.5 MG/DL (ref 8.6–10.5)
CHLORIDE SERPL-SCNC: 100 MMOL/L (ref 98–107)
CO2 SERPL-SCNC: 24.4 MMOL/L (ref 22–29)
CREAT SERPL-MCNC: 1.4 MG/DL (ref 0.76–1.27)
DEPRECATED RDW RBC AUTO: 46.1 FL (ref 37–54)
EGFRCR SERPLBLD CKD-EPI 2021: 50.2 ML/MIN/1.73
ERYTHROCYTE [DISTWIDTH] IN BLOOD BY AUTOMATED COUNT: 13.4 % (ref 12.3–15.4)
GLUCOSE BLDC GLUCOMTR-MCNC: 126 MG/DL (ref 70–130)
GLUCOSE BLDC GLUCOMTR-MCNC: 170 MG/DL (ref 70–130)
GLUCOSE BLDC GLUCOMTR-MCNC: 196 MG/DL (ref 70–130)
GLUCOSE BLDC GLUCOMTR-MCNC: 201 MG/DL (ref 70–130)
GLUCOSE SERPL-MCNC: 129 MG/DL (ref 65–99)
HCT VFR BLD AUTO: 42.7 % (ref 37.5–51)
HGB BLD-MCNC: 14.3 G/DL (ref 13–17.7)
MCH RBC QN AUTO: 30.9 PG (ref 26.6–33)
MCHC RBC AUTO-ENTMCNC: 33.5 G/DL (ref 31.5–35.7)
MCV RBC AUTO: 92.2 FL (ref 79–97)
PHOSPHATE SERPL-MCNC: 3.4 MG/DL (ref 2.5–4.5)
PLATELET # BLD AUTO: 331 10*3/MM3 (ref 140–450)
PMV BLD AUTO: 9.5 FL (ref 6–12)
POTASSIUM SERPL-SCNC: 3.9 MMOL/L (ref 3.5–5.2)
RBC # BLD AUTO: 4.63 10*6/MM3 (ref 4.14–5.8)
SODIUM SERPL-SCNC: 138 MMOL/L (ref 136–145)
WBC NRBC COR # BLD AUTO: 8.82 10*3/MM3 (ref 3.4–10.8)

## 2023-12-17 PROCEDURE — 80069 RENAL FUNCTION PANEL: CPT | Performed by: INTERNAL MEDICINE

## 2023-12-17 PROCEDURE — 94799 UNLISTED PULMONARY SVC/PX: CPT

## 2023-12-17 PROCEDURE — 63710000001 INSULIN REGULAR HUMAN PER 5 UNITS: Performed by: INTERNAL MEDICINE

## 2023-12-17 PROCEDURE — 97530 THERAPEUTIC ACTIVITIES: CPT

## 2023-12-17 PROCEDURE — 85027 COMPLETE CBC AUTOMATED: CPT | Performed by: STUDENT IN AN ORGANIZED HEALTH CARE EDUCATION/TRAINING PROGRAM

## 2023-12-17 PROCEDURE — 94664 DEMO&/EVAL PT USE INHALER: CPT

## 2023-12-17 PROCEDURE — 94761 N-INVAS EAR/PLS OXIMETRY MLT: CPT

## 2023-12-17 PROCEDURE — 25010000002 CEFTRIAXONE PER 250 MG: Performed by: INTERNAL MEDICINE

## 2023-12-17 PROCEDURE — 25010000002 ENOXAPARIN PER 10 MG: Performed by: STUDENT IN AN ORGANIZED HEALTH CARE EDUCATION/TRAINING PROGRAM

## 2023-12-17 PROCEDURE — 82948 REAGENT STRIP/BLOOD GLUCOSE: CPT

## 2023-12-17 RX ORDER — LEVOTHYROXINE SODIUM 0.03 MG/1
25 TABLET ORAL
Status: DISCONTINUED | OUTPATIENT
Start: 2023-12-18 | End: 2023-12-20 | Stop reason: HOSPADM

## 2023-12-17 RX ADMIN — Medication 10 ML: at 20:16

## 2023-12-17 RX ADMIN — QUETIAPINE FUMARATE 25 MG: 25 TABLET ORAL at 20:04

## 2023-12-17 RX ADMIN — INSULIN HUMAN 2 UNITS: 100 INJECTION, SOLUTION PARENTERAL at 17:19

## 2023-12-17 RX ADMIN — CEFTRIAXONE 2000 MG: 2 INJECTION, POWDER, FOR SOLUTION INTRAMUSCULAR; INTRAVENOUS at 09:39

## 2023-12-17 RX ADMIN — ENOXAPARIN SODIUM 40 MG: 100 INJECTION SUBCUTANEOUS at 21:03

## 2023-12-17 RX ADMIN — ASPIRIN 325 MG: 325 TABLET ORAL at 09:39

## 2023-12-17 RX ADMIN — HYDROCHLOROTHIAZIDE 25 MG: 25 TABLET ORAL at 09:40

## 2023-12-17 RX ADMIN — METOPROLOL TARTRATE 25 MG: 25 TABLET, FILM COATED ORAL at 09:40

## 2023-12-17 RX ADMIN — LEVOTHYROXINE SODIUM 25 MCG: 25 TABLET ORAL at 09:40

## 2023-12-17 RX ADMIN — GABAPENTIN 300 MG: 300 CAPSULE ORAL at 20:04

## 2023-12-17 RX ADMIN — Medication 10 ML: at 09:40

## 2023-12-17 RX ADMIN — AMLODIPINE BESYLATE 10 MG: 10 TABLET ORAL at 09:39

## 2023-12-17 RX ADMIN — IPRATROPIUM BROMIDE AND ALBUTEROL SULFATE 3 ML: 2.5; .5 SOLUTION RESPIRATORY (INHALATION) at 15:58

## 2023-12-17 RX ADMIN — LISINOPRIL 40 MG: 40 TABLET ORAL at 09:40

## 2023-12-17 RX ADMIN — DULOXETINE HYDROCHLORIDE 60 MG: 60 CAPSULE, DELAYED RELEASE ORAL at 09:40

## 2023-12-17 RX ADMIN — SENNOSIDES AND DOCUSATE SODIUM 2 TABLET: 50; 8.6 TABLET ORAL at 20:04

## 2023-12-17 RX ADMIN — IPRATROPIUM BROMIDE AND ALBUTEROL SULFATE 3 ML: 2.5; .5 SOLUTION RESPIRATORY (INHALATION) at 11:54

## 2023-12-17 RX ADMIN — IPRATROPIUM BROMIDE AND ALBUTEROL SULFATE 3 ML: 2.5; .5 SOLUTION RESPIRATORY (INHALATION) at 08:37

## 2023-12-17 RX ADMIN — METOPROLOL TARTRATE 25 MG: 25 TABLET, FILM COATED ORAL at 20:04

## 2023-12-17 RX ADMIN — IPRATROPIUM BROMIDE AND ALBUTEROL SULFATE 3 ML: 2.5; .5 SOLUTION RESPIRATORY (INHALATION) at 19:42

## 2023-12-17 NOTE — PROGRESS NOTES
Name: Eric Kramer ADMIT: 2023   : 1940  PCP: Elder Block MD    MRN: [id number] LOS: 5 days   AGE/SEX: 83 y.o. male  ROOM: [facility]     Subjective   Subjective   Patient resting comfortably in bed.  Wife at bedside.  Denies any dyspnea but is currently on 6 L of oxygen.    Review of Systems  Nonproductive cough, no fevers or chills, no difficulty breathing, no chest pain     Objective   Objective   Vital Signs  Temp:  [97.5 °F (36.4 °C)-99.7 °F (37.6 °C)] 97.5 °F (36.4 °C)  Heart Rate:  [76-98] 84  Resp:  [18] 18  BP: ()/(75-88) 135/81  SpO2:  [88 %-96 %] 94 %  on  Flow (L/min):  [3-6] 4.5;   Device (Oxygen Therapy): humidified;high-flow nasal cannula  Body mass index is 27.57 kg/m².  Physical Exam  Vitals and nursing note reviewed.   Constitutional:       General: He is not in acute distress.     Appearance: He is ill-appearing.   Cardiovascular:      Rate and Rhythm: Normal rate and regular rhythm.   Pulmonary:      Effort: Pulmonary effort is normal. No respiratory distress.   Abdominal:      General: Abdomen is flat. There is no distension.      Tenderness: There is no abdominal tenderness.   Musculoskeletal:         General: No swelling or deformity.   Skin:     General: Skin is warm and dry.   Neurological:      General: No focal deficit present.      Mental Status: He is alert and oriented to person, place, and time.         Results Review     I reviewed the patient's new clinical results.  Results from last 7 days   Lab Units 23  0842 23  0856 23  0355 23  0339   WBC 10*3/mm3 8.82 7.75 6.13 5.68   HEMOGLOBIN g/dL 14.3 14.0 12.4* 12.8*   PLATELETS 10*3/mm3 331 325 286 231     Results from last 7 days   Lab Units 23  0842 23  0856 12/15/23  0504 23  1954 23  0535   SODIUM mmol/L 138 143 142  --  138   POTASSIUM mmol/L 3.9 3.9 3.9 4.2 3.6   CHLORIDE mmol/L 100 103 103  --  100   CO2 mmol/L 24.4 27.7 25.5  --  26.6   BUN mg/dL 25* 22  20  --  21   CREATININE mg/dL 1.40* 1.18 1.05  --  1.12   GLUCOSE mg/dL 129* 127* 130*  --  116*   Estimated Creatinine Clearance: 56 mL/min (A) (by C-G formula based on SCr of 1.4 mg/dL (H)).  Results from last 7 days   Lab Units 12/17/23  0842 12/16/23  0856 12/15/23  0504 12/14/23  0535 12/13/23  0339 12/12/23  1247   ALBUMIN g/dL 3.0* 3.6 3.5 3.3*   < > 3.6   BILIRUBIN mg/dL  --   --   --   --   --  0.5   ALK PHOS U/L  --   --   --   --   --  54   AST (SGOT) U/L  --   --   --   --   --  13   ALT (SGPT) U/L  --   --   --   --   --  9    < > = values in this interval not displayed.     Results from last 7 days   Lab Units 12/17/23  0842 12/16/23  0856 12/15/23  0504 12/14/23  0535   CALCIUM mg/dL 9.5 10.1 10.4 9.8   ALBUMIN g/dL 3.0* 3.6 3.5 3.3*   PHOSPHORUS mg/dL 3.4 4.3 2.5 2.4*     Results from last 7 days   Lab Units 12/12/23  1450   LACTATE mmol/L 1.9     COVID19   Date Value Ref Range Status   12/12/2023 Not Detected Not Detected - Ref. Range Final     Glucose   Date/Time Value Ref Range Status   12/17/2023 1145 201 (H) 70 - 130 mg/dL Final   12/17/2023 0618 126 70 - 130 mg/dL Final   12/16/2023 2105 151 (H) 70 - 130 mg/dL Final   12/16/2023 1652 196 (H) 70 - 130 mg/dL Final   12/16/2023 1211 151 (H) 70 - 130 mg/dL Final   12/16/2023 0607 121 70 - 130 mg/dL Final   12/15/2023 2034 105 70 - 130 mg/dL Final       XR Hand 3+ View Right  Narrative: RIGHT HAND: PA, LATERAL, OBLIQUE     HISTORY: Right hand pain.     COMPARISON: None     FINDINGS: There is nonuniform joint space narrowing at the  interphalangeal joints consistent with osteoarthritis. There is also  joint space narrowing at the 2nd MCP joint which is an atypical location  for osteoarthritis and may be associated with inflammatory arthritis or  CPPD arthropathy. There is advanced arthritis at the radiocarpal joints  with complete joint space loss. Widening is present of the scapholunate  interval with proximal migration of the capitate.  Chondrocalcinosis  overlies the triangular fibrocartilage. There is generalized soft tissue  swelling. There is also soft tissue calcification projecting medial to  the head of the 3rd middle phalanx.     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 Generalized soft tissue swelling.     This report was finalized on 12/16/2023 6:18 PM by Dr. Noé Simmons M.D on Workstation: [email or web address]       I reviewed the patient's daily medications.  Scheduled Medications  amLODIPine, 10 mg, Oral, Daily  aspirin, 325 mg, Oral, Daily  cefTRIAXone, 2,000 mg, Intravenous, Q24H  DULoxetine, 60 mg, Oral, Daily  enoxaparin, 40 mg, Subcutaneous, Q24H  gabapentin, 300 mg, Oral, Nightly  hydroCHLOROthiazide, 25 mg, Oral, Daily  insulin regular, 2-7 Units, Subcutaneous, Q6H  ipratropium-albuterol, 3 mL, Nebulization, 4x Daily - RT  [START ON 12/18/2023] levothyroxine, 25 mcg, Oral, Q AM  lisinopril, 40 mg, Oral, Q24H  metoprolol tartrate, 25 mg, Oral, Q12H  QUEtiapine, 25 mg, Oral, Nightly  senna-docusate sodium, 2 tablet, Oral, BID  sodium chloride, 10 mL, Intravenous, Q12H    Infusions   Diet  Diet: Diabetic Diets; Consistent Carbohydrate; Texture: Regular Texture (IDDSI 7); Fluid Consistency: Thin (IDDSI 0)         I have personally reviewed:  [x]  Laboratory   [x]  Microbiology   [x]  Radiology   []  EKG/Telemetry   [x]  Cardiology/Vascular   []  Pathology   [x]  Records     Assessment/Plan     Active Hospital Problems    Diagnosis  POA    **Respiratory failure [J96.90]  Yes      Resolved Hospital Problems   No resolved problems to display.       83 y.o. male admitted with Respiratory failure.    Acute hypoxic and hypercapnic respiratory failure from pneumonia  -RVP negative, blood cultures no growth at 2 days  -Chest x-ray with right upper lobe pneumonia, will need follow-up films in 4 to 6 weeks to ensure resolution  -Oxygen requirements up to 6 L today.  -CT chest pending  -Rocephin, plan for  7-day course    KAREN on CKD 2  -Suspect prerenal  -Back to baseline    Elevated troponin from pneumonia/KAREN  -Echo with EF of 62.6%, LV diastolic and systolic function was normal  -Troponin downtrending    Diabetes type 2 with neuropathy  -Hold home metformin and glipizide  -Low-dose sliding scale insulin    Hypertension  -Continue home hydrochlorothiazide, amlodipine, lisinopril, Lopressor    Memory impairment with delirium  -Out of restraints  -Start Seroquel  -Discussed with family on 12/15 it sounds like it MoCA test about a year ago that was pretty normal, but recommended further memory testing after discharge    Lovenox 40 mg SC daily for DVT prophylaxis.  Full code.  Discussed with patient, family, nursing staff, CCP, and care team on multidisciplinary rounds.  Anticipate discharge home with HH vs SNU facility timing yet to be determined.  Pending weaning of oxygen     Expected Discharge Date: 12/18/2023; Expected Discharge Time:       Georges Lozoya MD  [facility]  12/17/23  14:41 EST

## 2023-12-17 NOTE — PLAN OF CARE
Problem: Adult Inpatient Plan of Care  Goal: Plan of Care Review  12/17/2023 0605 by Geetha Villanueva RN  Outcome: Ongoing, Progressing  12/17/2023 0603 by Geetha Villanueva RN  Outcome: Ongoing, Progressing  Goal: Patient-Specific Goal (Individualized)  Outcome: Ongoing, Progressing  Goal: Absence of Hospital-Acquired Illness or Injury  Outcome: Ongoing, Progressing  Intervention: Identify and Manage Fall Risk  Recent Flowsheet Documentation  Taken 12/17/2023 0400 by Geetha Villanueva RN  Safety Promotion/Fall Prevention: safety round/check completed  Taken 12/17/2023 0200 by Geetha Villanueva RN  Safety Promotion/Fall Prevention: safety round/check completed  Taken 12/17/2023 0000 by Geetha Villanueva RN  Safety Promotion/Fall Prevention: safety round/check completed  Taken 12/16/2023 2200 by Geetha Villanueva RN  Safety Promotion/Fall Prevention:   activity supervised   safety round/check completed  Taken 12/16/2023 2000 by Geetha Villanueva RN  Safety Promotion/Fall Prevention:   activity supervised   safety round/check completed  Intervention: Prevent Skin Injury  Recent Flowsheet Documentation  Taken 12/17/2023 0400 by Geetha Villanueva RN  Body Position: position changed independently  Taken 12/17/2023 0200 by Geetha Villanueva RN  Body Position: position changed independently  Taken 12/17/2023 0000 by Geetha Villanueva RN  Body Position: position changed independently  Taken 12/16/2023 2200 by Geetha Villanueva RN  Body Position: position changed independently  Taken 12/16/2023 2000 by Geetha Villanueva RN  Body Position: position changed independently  Skin Protection:   adhesive use limited   tubing/devices free from skin contact   transparent dressing maintained   skin-to-skin areas padded   skin-to-device areas padded  Intervention: Prevent and Manage VTE (Venous Thromboembolism) Risk  Recent Flowsheet Documentation  Taken 12/16/2023 2000 by Geetha Villanueva RN  VTE  Prevention/Management: (see mar) --  Range of Motion: ROM (range of motion) performed  Intervention: Prevent Infection  Recent Flowsheet Documentation  Taken 12/17/2023 0400 by Geetha Villanueva RN  Infection Prevention: rest/sleep promoted  Taken 12/17/2023 0200 by Geetha Villanueva RN  Infection Prevention: rest/sleep promoted  Taken 12/17/2023 0000 by Geetha Villanueva RN  Infection Prevention: rest/sleep promoted  Taken 12/16/2023 2200 by Geetha Villanueva RN  Infection Prevention: rest/sleep promoted  Taken 12/16/2023 2000 by Geetha Villanueva RN  Infection Prevention: rest/sleep promoted  Goal: Optimal Comfort and Wellbeing  Outcome: Ongoing, Progressing  Intervention: Monitor Pain and Promote Comfort  Recent Flowsheet Documentation  Taken 12/16/2023 2000 by Geetha Villanueva RN  Pain Management Interventions: (see mar) --  Intervention: Provide Person-Centered Care  Recent Flowsheet Documentation  Taken 12/16/2023 2000 by Geetha Villanueva RN  Trust Relationship/Rapport:   care explained   choices provided   emotional support provided  Goal: Readiness for Transition of Care  Outcome: Ongoing, Progressing     Problem: Skin Injury Risk Increased  Goal: Skin Health and Integrity  Outcome: Ongoing, Progressing  Intervention: Optimize Skin Protection  Recent Flowsheet Documentation  Taken 12/17/2023 0400 by Geetha Villanueva RN  Head of Bed (HOB) Positioning: HOB elevated  Taken 12/17/2023 0200 by Geetha Villanueva RN  Head of Bed (HOB) Positioning: HOB elevated  Taken 12/17/2023 0000 by Geetha Villanueva RN  Head of Bed (HOB) Positioning: HOB at 20 degrees  Taken 12/16/2023 2200 by Geetha Villanueva RN  Head of Bed (HOB) Positioning: HOB at 30 degrees  Taken 12/16/2023 2000 by Geetha Villanueva RN  Pressure Reduction Techniques:   frequent weight shift encouraged   pressure points protected  Head of Bed (HOB) Positioning: HOB elevated  Pressure Reduction Devices:   specialty bed utilized    pressure-redistributing mattress utilized  Skin Protection:   adhesive use limited   tubing/devices free from skin contact   transparent dressing maintained   skin-to-skin areas padded   skin-to-device areas padded     Problem: Fall Injury Risk  Goal: Absence of Fall and Fall-Related Injury  Outcome: Ongoing, Progressing  Intervention: Identify and Manage Contributors  Recent Flowsheet Documentation  Taken 12/17/2023 0400 by Geetha Villanueva RN  Medication Review/Management: medications reviewed  Taken 12/17/2023 0200 by Geetha Villanueva RN  Medication Review/Management: medications reviewed  Taken 12/17/2023 0000 by Geetha Villanueva RN  Medication Review/Management: medications reviewed  Taken 12/16/2023 2200 by Geetha Villanueva RN  Medication Review/Management: medications reviewed  Taken 12/16/2023 2000 by Geetha Villanueva RN  Medication Review/Management: medications reviewed  Intervention: Promote Injury-Free Environment  Recent Flowsheet Documentation  Taken 12/17/2023 0400 by Geetha Villanueva RN  Safety Promotion/Fall Prevention: safety round/check completed  Taken 12/17/2023 0200 by Geetha Villanueva RN  Safety Promotion/Fall Prevention: safety round/check completed  Taken 12/17/2023 0000 by Geetha Villanueva RN  Safety Promotion/Fall Prevention: safety round/check completed  Taken 12/16/2023 2200 by Geetha Villanueva RN  Safety Promotion/Fall Prevention:   activity supervised   safety round/check completed  Taken 12/16/2023 2000 by Geetha Villanueva RN  Safety Promotion/Fall Prevention:   activity supervised   safety round/check completed     Problem: Restraint, Nonviolent  Goal: Absence of Harm or Injury  Outcome: Ongoing, Progressing  Intervention: Implement Least Restrictive Safety Strategies  Recent Flowsheet Documentation  Taken 12/17/2023 0400 by Geetha Villanueva RN  Medical Device Protection: tubing secured  Taken 12/17/2023 0200 by Geetha Villanueva RN  Medical Device Protection:  tubing secured  Taken 12/17/2023 0000 by Geetha Villanueva RN  Medical Device Protection: tubing secured  Taken 12/16/2023 2200 by Geetha Villanueva RN  Medical Device Protection: tubing secured  Taken 12/16/2023 2000 by Geetha Villanueva RN  Medical Device Protection: tubing secured  Diversional Activities: television  Intervention: Protect Dignity, Rights, and Personal Wellbeing  Recent Flowsheet Documentation  Taken 12/16/2023 2000 by Geetha Villanueva RN  Trust Relationship/Rapport:   care explained   choices provided   emotional support provided  Intervention: Protect Skin and Joint Integrity  Recent Flowsheet Documentation  Taken 12/17/2023 0400 by Geetha Villanueva RN  Body Position: position changed independently  Taken 12/17/2023 0200 by Geetha Villanueva RN  Body Position: position changed independently  Taken 12/17/2023 0000 by Geetha Villanueva RN  Body Position: position changed independently  Taken 12/16/2023 2200 by Geetha Villanueva RN  Body Position: position changed independently  Taken 12/16/2023 2000 by Geetha Villanueva RN  Body Position: position changed independently  Range of Motion: ROM (range of motion) performed     Problem: Adult Inpatient Plan of Care  Goal: Absence of Hospital-Acquired Illness or Injury  Intervention: Prevent and Manage VTE (Venous Thromboembolism) Risk  Recent Flowsheet Documentation  Taken 12/16/2023 2000 by Geetha Villanueva RN  VTE Prevention/Management: (see mar) --  Range of Motion: ROM (range of motion) performed     Problem: Adult Inpatient Plan of Care  Goal: Absence of Hospital-Acquired Illness or Injury  Intervention: Prevent Skin Injury  Recent Flowsheet Documentation  Taken 12/17/2023 0400 by Geetha Villanueva RN  Body Position: position changed independently  Taken 12/17/2023 0200 by Geetha Villanueva RN  Body Position: position changed independently  Taken 12/17/2023 0000 by Geetha Villanueva RN  Body Position: position changed  independently  Taken 12/16/2023 2200 by Geetha Villanueva RN  Body Position: position changed independently  Taken 12/16/2023 2000 by Geetha Villanueva RN  Body Position: position changed independently  Skin Protection:   adhesive use limited   tubing/devices free from skin contact   transparent dressing maintained   skin-to-skin areas padded   skin-to-device areas padded     Problem: Adult Inpatient Plan of Care  Goal: Optimal Comfort and Wellbeing  Outcome: Ongoing, Progressing  Intervention: Monitor Pain and Promote Comfort  Recent Flowsheet Documentation  Taken 12/16/2023 2000 by Geetha Villanueva RN  Pain Management Interventions: (see mar) --  Intervention: Provide Person-Centered Care  Recent Flowsheet Documentation  Taken 12/16/2023 2000 by Geetha Villanueva RN  Trust Relationship/Rapport:   care explained   choices provided   emotional support provided   Goal Outcome Evaluation:

## 2023-12-17 NOTE — PLAN OF CARE
Goal Outcome Evaluation:    Pt remains stable, no confusion noted this shift .  Pt did require some extra oxygen while sleeping.  No other acute events overnight.

## 2023-12-17 NOTE — THERAPY TREATMENT NOTE
Patient Name: Eric Kramer  : 1940    MRN: [id number]                              Today's Date: 2023       Admit Date: 2023    Visit Dx:     ICD-10-CM ICD-9-CM   1. Pneumonia of right upper lobe due to infectious organism  J18.9 486   2. Acute respiratory failure with hypoxia and hypercapnia  J96.01 518.81    J96.02      Patient Active Problem List   Diagnosis    Type 2 diabetes mellitus, without long-term current use of insulin    Stroke    Prostatitis    Osteoarthritis    Nephrolithiasis    Memory impairment    Malignant neoplasm in situ of colon    Insomnia    Hypertension    Diabetic peripheral neuropathy    Benign enlargement of prostate    Arteriosclerosis of coronary artery    Diabetic ulcer of toe of right foot associated with diabetes mellitus due to underlying condition, limited to breakdown of skin    MRSA bacteremia    Hospital discharge follow-up    Medicare annual wellness visit, subsequent    Hypothyroidism due to acquired atrophy of thyroid    Other hyperlipidemia    High risk medication use    B12 deficiency    Seborrheic keratosis    Respiratory failure     Past Medical History:   Diagnosis Date    Acute foot pain, left     Acute UTI     Arteriosclerosis of coronary artery     Basal cell carcinoma (BCC) of face     Benign enlargement of prostate     Cataract     Chest pain     Diabetic peripheral neuropathy     Edema     Hyperlipidemia     Hypertension     Insomnia     Malignant neoplasm in situ of colon     Memory impairment     MMSE -2019    Nephrolithiasis     Osteoarthritis     Prostatitis     Renal azotemia     Stroke     stroke syndrome    Type 2 diabetes mellitus      Past Surgical History:   Procedure Laterality Date    ANGIOPLASTY Right     Cath Laser Angioplasty/right ventricle    APPENDECTOMY      CATARACT EXTRACTION Left     COLECTOMY PARTIAL / TOTAL      due to colon cancer, removed 13 inches of colon    CORONARY ANGIOPLASTY WITH STENT PLACEMENT      TOE  SURGERY Right 07/2021      General Information       Row Name 12/17/23 1535          Physical Therapy Time and Intention    Document Type therapy note (daily note)  -EB     Mode of Treatment physical therapy;individual therapy  -EB       Row Name 12/17/23 1535          General Information    Patient Profile Reviewed yes  -EB     Existing Precautions/Restrictions fall  -EB       Row Name 12/17/23 1535          Cognition    Orientation Status (Cognition) oriented x 3  -EB       Row Name 12/17/23 1535          Safety Issues, Functional Mobility    Impairments Affecting Function (Mobility) balance;endurance/activity tolerance;strength  -EB               User Key  (r) = Recorded By, (t) = Taken By, (c) = Cosigned By      Initials Name Provider Type    EB Celeste Clarke PTA Physical Therapist Assistant                   Mobility       Row Name 12/17/23 1536          Bed Mobility    Bed Mobility supine-sit  -EB     Supine-Sit [place] (Bed Mobility) standby assist  -EB     Assistive Device (Bed Mobility) bed rails;head of bed elevated  -EB     Comment, (Bed Mobility) increased time needed  -EB       Row Name 12/17/23 1536          Sit-Stand Transfer    Sit-Stand [place] (Transfers) contact guard  -EB     Assistive Device (Sit-Stand Transfers) walker, front-wheeled  -EB       Row Name 12/17/23 1536          Gait/Stairs (Locomotion)    Yale Level (Gait) contact guard;minimum assist (75% patient effort)  -EB     Assistive Device (Gait) walker, front-wheeled  -EB     Distance in Feet (Gait) 10ft  -EB     Deviations/Abnormal Patterns (Gait) gait speed decreased;festinating/shuffling;stride length decreased  -EB     Bilateral Gait Deviations forward flexed posture  -EB     Comment, (Gait/Stairs) cues for posture and increasing step length. Pt is a little unsteady but no LOB.  -EB               User Key  (r) = Recorded By, (t) = Taken By, (c) = Cosigned By      Initials Name Provider Type    Celeste Bautista PTA  Physical Therapist Assistant                   Obj/Interventions    No documentation.                  Goals/Plan    No documentation.                  Clinical Impression       Row Name 12/17/23 1540          Plan of Care Review    Plan of Care Reviewed With patient  -EB     Progress improving  -EB     Outcome Evaluation Pt seen for PT treatment today. Pt able to complete supine to sit with bed mobility. Pt needed increased time. Pt required CGA with stands and ambulated 10ft with rwx, CGA/Mp. Pt is a little unsteady but no LOB. Pt required verbal cues for upright posture and increasing stride length. Pt fatigues quickly but sat UIC. Will continue to progress pt as able.  -EB       Row Name 12/17/23 1540          Therapy Assessment/Plan (PT)    Therapy Frequency (PT) 6 times/wk  -EB       Row Name 12/17/23 1540          Positioning and Restraints    Pre-Treatment Position in bed  -EB     Post Treatment Position chair  -EB     In Chair reclined;call light within reach;encouraged to call for assist;exit alarm on  -EB               User Key  (r) = Recorded By, (t) = Taken By, (c) = Cosigned By      Initials Name Provider Type    Celeste Bautista PTA Physical Therapist Assistant                   Outcome Measures       Row Name 12/17/23 1544 12/17/23 0600       How much help from another person do you currently need...    Turning from your back to your side while in flat bed without using bedrails? 3  -EB 4  -JL    Moving from lying on back to sitting on the side of a flat bed without bedrails? 3  -EB 4  -JL    Moving to and from a bed to a chair (including a wheelchair)? 3  -EB 3  -JL    Standing up from a chair using your arms (e.g., wheelchair, bedside chair)? 3  -EB 3  -JL    Climbing 3-5 steps with a railing? 2  -EB 2  -JL    To walk in hospital room? 3  -EB 3  -JL    AM-PAC 6 Clicks Score (PT) 17  -EB 19  -JL    Highest Level of Mobility Goal 5 --> Static standing  -EB 6 --> Walk 10 steps or more  -JL               User Key  (r) = Recorded By, (t) = Taken By, (c) = Cosigned By      Initials Name Provider Type    EB Celeste Clarke PTA Physical Therapist Assistant    Geetha Sullivan, RN Registered Nurse                                 Physical Therapy Education       Title: PT OT SLP Therapies (Done)       Topic: Physical Therapy (Done)       Point: Mobility training (Done)       Learning Progress Summary             Patient Acceptance, E, VU,NR by EB at 12/17/2023 1544    Acceptance, E, VU by JP at 12/17/2023 0601    Acceptance, E, NR by AR at 12/15/2023 0933                         Point: Home exercise program (Done)       Learning Progress Summary             Patient Acceptance, E, VU by JL at 12/17/2023 0601    Acceptance, E, NR by AR at 12/15/2023 0933                         Point: Body mechanics (Done)       Learning Progress Summary             Patient Acceptance, E, VU,NR by EB at 12/17/2023 1544    Acceptance, E, VU by JP at 12/17/2023 0601    Acceptance, E, NR by AR at 12/15/2023 0933                         Point: Precautions (Done)       Learning Progress Summary             Patient Acceptance, E, VU,NR by EB at 12/17/2023 1544    Acceptance, E, VU by JL at 12/17/2023 0601    Acceptance, E, NR by AR at 12/15/2023 0933                                         User Key       Initials Effective Dates Name Provider Type Discipline    AR 06/16/21 -  Jeannette Quinn, PT Physical Therapist PT    EB 02/14/23 -  Celeste Clarke PTA Physical Therapist Assistant PT    JP 09/07/23 -  Geetha Villanueva, RN Registered Nurse Nurse                  PT Recommendation and Plan     Plan of Care Reviewed With: patient  Progress: improving  Outcome Evaluation: Pt seen for PT treatment today. Pt able to complete supine to sit with bed mobility. Pt needed increased time. Pt required CGA with stands and ambulated 10ft with rwx, CGA/Mp. Pt is a little unsteady but no LOB. Pt required verbal cues for upright posture and increasing  stride length. Pt fatigues quickly but sat UIC. Will continue to progress pt as able.     Time Calculation:         PT Charges       Row Name 12/17/23 1534             Time Calculation    Start Time 1119  -EB      Stop Time 1133  -EB      Time Calculation (min) 14 min  -EB      PT Received On 12/17/23  -EB      PT - Next Appointment 12/18/23  -EB         Time Calculation- PT    Total Timed Code Minutes- PT 14 minute(s)  -EB                User Key  (r) = Recorded By, (t) = Taken By, (c) = Cosigned By      Initials Name Provider Type    Celeste Bautista PTA Physical Therapist Assistant                  Therapy Charges for Today       Code Description Service Date Service Provider Modifiers Qty    17784930499 HC PT THERAPEUTIC ACT EA 15 MIN 12/17/2023 Celeste Clarke PTA GP 1            PT G-Codes  Outcome Measure Options: AM-PAC 6 Clicks Basic Mobility (PT)  AM-PAC 6 Clicks Score (PT): 17       Celeste Clarke PTA  12/17/2023

## 2023-12-17 NOTE — PLAN OF CARE
Goal Outcome Evaluation:  Plan of Care Reviewed With: patient        Progress: improving  Outcome Evaluation: Pt seen for PT treatment today. Pt able to complete supine to sit with bed mobility. Pt needed increased time. Pt required CGA with stands and ambulated 10ft with rwx, CGA/Mp. Pt is a little unsteady but no LOB. Pt required verbal cues for upright posture and increasing stride length. Pt fatigues quickly but sat UIC. Will continue to progress pt as able.

## 2023-12-18 ENCOUNTER — APPOINTMENT (OUTPATIENT)
Dept: CT IMAGING | Facility: HOSPITAL | Age: 83
End: 2023-12-18
Payer: MEDICARE

## 2023-12-18 LAB
ALBUMIN SERPL-MCNC: 3 G/DL (ref 3.5–5.2)
ANION GAP SERPL CALCULATED.3IONS-SCNC: 12.6 MMOL/L (ref 5–15)
BUN SERPL-MCNC: 34 MG/DL (ref 8–23)
BUN/CREAT SERPL: 21.9 (ref 7–25)
CALCIUM SPEC-SCNC: 9.7 MG/DL (ref 8.6–10.5)
CHLORIDE SERPL-SCNC: 100 MMOL/L (ref 98–107)
CO2 SERPL-SCNC: 26.4 MMOL/L (ref 22–29)
CREAT SERPL-MCNC: 1.55 MG/DL (ref 0.76–1.27)
DEPRECATED RDW RBC AUTO: 46.7 FL (ref 37–54)
EGFRCR SERPLBLD CKD-EPI 2021: 44.4 ML/MIN/1.73
ERYTHROCYTE [DISTWIDTH] IN BLOOD BY AUTOMATED COUNT: 13.6 % (ref 12.3–15.4)
GLUCOSE BLDC GLUCOMTR-MCNC: 127 MG/DL (ref 70–130)
GLUCOSE BLDC GLUCOMTR-MCNC: 129 MG/DL (ref 70–130)
GLUCOSE BLDC GLUCOMTR-MCNC: 177 MG/DL (ref 70–130)
GLUCOSE BLDC GLUCOMTR-MCNC: 202 MG/DL (ref 70–130)
GLUCOSE SERPL-MCNC: 141 MG/DL (ref 65–99)
HCT VFR BLD AUTO: 40.3 % (ref 37.5–51)
HGB BLD-MCNC: 13.3 G/DL (ref 13–17.7)
MCH RBC QN AUTO: 30.5 PG (ref 26.6–33)
MCHC RBC AUTO-ENTMCNC: 33 G/DL (ref 31.5–35.7)
MCV RBC AUTO: 92.4 FL (ref 79–97)
PHOSPHATE SERPL-MCNC: 3.5 MG/DL (ref 2.5–4.5)
PLATELET # BLD AUTO: 336 10*3/MM3 (ref 140–450)
PMV BLD AUTO: 9.4 FL (ref 6–12)
POTASSIUM SERPL-SCNC: 3.5 MMOL/L (ref 3.5–5.2)
POTASSIUM SERPL-SCNC: 4.6 MMOL/L (ref 3.5–5.2)
RBC # BLD AUTO: 4.36 10*6/MM3 (ref 4.14–5.8)
SODIUM SERPL-SCNC: 139 MMOL/L (ref 136–145)
WBC NRBC COR # BLD AUTO: 9.2 10*3/MM3 (ref 3.4–10.8)

## 2023-12-18 PROCEDURE — 25010000002 CEFTRIAXONE PER 250 MG: Performed by: INTERNAL MEDICINE

## 2023-12-18 PROCEDURE — 63710000001 INSULIN REGULAR HUMAN PER 5 UNITS: Performed by: STUDENT IN AN ORGANIZED HEALTH CARE EDUCATION/TRAINING PROGRAM

## 2023-12-18 PROCEDURE — 82948 REAGENT STRIP/BLOOD GLUCOSE: CPT

## 2023-12-18 PROCEDURE — 25010000002 ENOXAPARIN PER 10 MG: Performed by: STUDENT IN AN ORGANIZED HEALTH CARE EDUCATION/TRAINING PROGRAM

## 2023-12-18 PROCEDURE — 80069 RENAL FUNCTION PANEL: CPT | Performed by: INTERNAL MEDICINE

## 2023-12-18 PROCEDURE — 36415 COLL VENOUS BLD VENIPUNCTURE: CPT | Performed by: INTERNAL MEDICINE

## 2023-12-18 PROCEDURE — 85027 COMPLETE CBC AUTOMATED: CPT | Performed by: STUDENT IN AN ORGANIZED HEALTH CARE EDUCATION/TRAINING PROGRAM

## 2023-12-18 PROCEDURE — 94664 DEMO&/EVAL PT USE INHALER: CPT

## 2023-12-18 PROCEDURE — 84132 ASSAY OF SERUM POTASSIUM: CPT | Performed by: STUDENT IN AN ORGANIZED HEALTH CARE EDUCATION/TRAINING PROGRAM

## 2023-12-18 PROCEDURE — 94799 UNLISTED PULMONARY SVC/PX: CPT

## 2023-12-18 PROCEDURE — 97110 THERAPEUTIC EXERCISES: CPT

## 2023-12-18 PROCEDURE — 71250 CT THORAX DX C-: CPT

## 2023-12-18 RX ORDER — POTASSIUM CHLORIDE 750 MG/1
40 TABLET, FILM COATED, EXTENDED RELEASE ORAL EVERY 4 HOURS
Status: COMPLETED | OUTPATIENT
Start: 2023-12-18 | End: 2023-12-18

## 2023-12-18 RX ADMIN — INSULIN HUMAN 3 UNITS: 100 INJECTION, SOLUTION PARENTERAL at 11:33

## 2023-12-18 RX ADMIN — HYDROCHLOROTHIAZIDE 25 MG: 25 TABLET ORAL at 08:26

## 2023-12-18 RX ADMIN — ENOXAPARIN SODIUM 40 MG: 100 INJECTION SUBCUTANEOUS at 21:28

## 2023-12-18 RX ADMIN — POTASSIUM CHLORIDE 40 MEQ: 750 TABLET, EXTENDED RELEASE ORAL at 11:33

## 2023-12-18 RX ADMIN — DULOXETINE HYDROCHLORIDE 60 MG: 60 CAPSULE, DELAYED RELEASE ORAL at 08:26

## 2023-12-18 RX ADMIN — Medication 10 ML: at 21:29

## 2023-12-18 RX ADMIN — INSULIN HUMAN 2 UNITS: 100 INJECTION, SOLUTION PARENTERAL at 21:28

## 2023-12-18 RX ADMIN — IPRATROPIUM BROMIDE AND ALBUTEROL SULFATE 3 ML: 2.5; .5 SOLUTION RESPIRATORY (INHALATION) at 21:05

## 2023-12-18 RX ADMIN — IPRATROPIUM BROMIDE AND ALBUTEROL SULFATE 3 ML: 2.5; .5 SOLUTION RESPIRATORY (INHALATION) at 10:54

## 2023-12-18 RX ADMIN — METOPROLOL TARTRATE 25 MG: 25 TABLET, FILM COATED ORAL at 21:28

## 2023-12-18 RX ADMIN — QUETIAPINE FUMARATE 25 MG: 25 TABLET ORAL at 21:28

## 2023-12-18 RX ADMIN — IPRATROPIUM BROMIDE AND ALBUTEROL SULFATE 3 ML: 2.5; .5 SOLUTION RESPIRATORY (INHALATION) at 08:04

## 2023-12-18 RX ADMIN — ASPIRIN 325 MG: 325 TABLET ORAL at 08:26

## 2023-12-18 RX ADMIN — Medication 10 ML: at 08:27

## 2023-12-18 RX ADMIN — SENNOSIDES AND DOCUSATE SODIUM 2 TABLET: 50; 8.6 TABLET ORAL at 08:26

## 2023-12-18 RX ADMIN — POTASSIUM CHLORIDE 40 MEQ: 750 TABLET, EXTENDED RELEASE ORAL at 08:26

## 2023-12-18 RX ADMIN — CEFTRIAXONE 2000 MG: 2 INJECTION, POWDER, FOR SOLUTION INTRAMUSCULAR; INTRAVENOUS at 08:26

## 2023-12-18 RX ADMIN — LEVOTHYROXINE SODIUM 25 MCG: 25 TABLET ORAL at 06:49

## 2023-12-18 RX ADMIN — GABAPENTIN 300 MG: 300 CAPSULE ORAL at 21:28

## 2023-12-18 RX ADMIN — IPRATROPIUM BROMIDE AND ALBUTEROL SULFATE 3 ML: 2.5; .5 SOLUTION RESPIRATORY (INHALATION) at 16:09

## 2023-12-18 RX ADMIN — AMLODIPINE BESYLATE 10 MG: 10 TABLET ORAL at 08:26

## 2023-12-18 RX ADMIN — LISINOPRIL 40 MG: 40 TABLET ORAL at 08:26

## 2023-12-18 NOTE — PLAN OF CARE
Goal Outcome Evaluation:  Plan of Care Reviewed With: patient        Progress: improving  Outcome Evaluation: Pt tolerated increased gait distance 80ft CGA rwx 4L 02, sa02 99% post ambulation. Pt somewhat impulsive w mobilization however agreeable and pleasant. Anticipate HHC vs SNU pending progress.      Anticipated Discharge Disposition (PT): home with assist, skilled nursing facility

## 2023-12-18 NOTE — PLAN OF CARE
Problem: Adult Inpatient Plan of Care  Goal: Plan of Care Review  Outcome: Ongoing, Progressing   Goal Outcome Evaluation:    A&Ox3, VSS, NSR on monitor, weaned down to 3L NC. Denies pain or SOB. Up to chair most of shift. Ambulates Ax1

## 2023-12-18 NOTE — THERAPY TREATMENT NOTE
Acute Care - Physical Therapy Treatment Note  [facility]     Patient Name: Eric Kramer  : 1940  MRN: [id number]  Today's Date: 2023      Visit Dx:     ICD-10-CM ICD-9-CM   1. Pneumonia of right upper lobe due to infectious organism  J18.9 486   2. Acute respiratory failure with hypoxia and hypercapnia  J96.01 518.81    J96.02      Patient Active Problem List   Diagnosis    Type 2 diabetes mellitus, without long-term current use of insulin    Stroke    Prostatitis    Osteoarthritis    Nephrolithiasis    Memory impairment    Malignant neoplasm in situ of colon    Insomnia    Hypertension    Diabetic peripheral neuropathy    Benign enlargement of prostate    Arteriosclerosis of coronary artery    Diabetic ulcer of toe of right foot associated with diabetes mellitus due to underlying condition, limited to breakdown of skin    MRSA bacteremia    Hospital discharge follow-up    Medicare annual wellness visit, subsequent    Hypothyroidism due to acquired atrophy of thyroid    Other hyperlipidemia    High risk medication use    B12 deficiency    Seborrheic keratosis    Respiratory failure     Past Medical History:   Diagnosis Date    Acute foot pain, left     Acute UTI     Arteriosclerosis of coronary artery     Basal cell carcinoma (BCC) of face     Benign enlargement of prostate     Cataract     Chest pain     Diabetic peripheral neuropathy     Edema     Hyperlipidemia     Hypertension     Insomnia     Malignant neoplasm in situ of colon     Memory impairment     MMSE -2019    Nephrolithiasis     Osteoarthritis     Prostatitis     Renal azotemia     Stroke     stroke syndrome    Type 2 diabetes mellitus      Past Surgical History:   Procedure Laterality Date    ANGIOPLASTY Right     Cath Laser Angioplasty/right ventricle    APPENDECTOMY      CATARACT EXTRACTION Left     COLECTOMY PARTIAL / TOTAL      due to colon cancer, removed 13 inches of colon    CORONARY ANGIOPLASTY WITH STENT PLACEMENT       TOE SURGERY Right 07/2021     PT Assessment (last 12 hours)       PT Evaluation and Treatment       [facility] Name 12/18/23 1536          Physical Therapy Time and Intention    Subjective Information no complaints  -     Document Type therapy note (daily note)  -     Mode of Treatment individual therapy;physical therapy  -     Patient Effort good  -Critical access hospital Name 12/18/23 1536          General Information    Patient Profile Reviewed yes  -     Existing Precautions/Restrictions fall  -     Barriers to Rehab none identified  -Critical access hospital Name 12/18/23 1536          Pain    Pretreatment Pain Rating 0/10 - no pain  -     Posttreatment Pain Rating 0/10 - no pain  -Critical access hospital Name 12/18/23 1536          Cognition    Affect/Mental Status (Cognition) confused  seemed somewhat confused in conversation  -     Orientation Status (Cognition) oriented to;person;situation  -Critical access hospital Name 12/18/23 1536          Bed Mobility    Bed Mobility sit-supine;supine-sit  -     Supine-Sit [place] (Bed Mobility) standby assist  -     Assistive Device (Bed Mobility) bed rails;head of bed elevated  -Critical access hospital Name 12/18/23 1536          Transfers    Transfers stand-sit transfer;sit-stand transfer  -Critical access hospital Name 12/18/23 1536          Sit-Stand Transfer    Sit-Stand [place] (Transfers) contact guard  -     Assistive Device (Sit-Stand Transfers) walker, front-wheeled  -Critical access hospital Name 12/18/23 1536          Stand-Sit Transfer    Stand-Sit [place] (Transfers) contact guard  -     Assistive Device (Stand-Sit Transfers) walker, front-wheeled  -Critical access hospital Name 12/18/23 1536          Gait/Stairs (Locomotion)    [place] Level (Gait) contact guard  -     Assistive Device (Gait) walker, front-wheeled  -     Distance in Feet (Gait) 80  -     Pattern (Gait) step-through  -     Deviations/Abnormal Patterns (Gait) conchis decreased  -     Bilateral Gait Deviations forward flexed  posture;heel strike decreased  -     Comment, (Gait/Stairs) somewhat impulsive- bending fwd to look at ground and observe something low on wall  -       Row Name 12/18/23 1536          Balance    Balance Assessment sitting static balance;standing static balance  -     Static Sitting Balance supervision  -     Position, Sitting Balance unsupported;sitting edge of bed  -     Static Standing Balance standby assist  -     Position/Device Used, Standing Balance supported;walker, front-wheeled  -       Row Name 12/18/23 1536          Plan of Care Review    Plan of Care Reviewed With patient  -     Progress improving  -     Outcome Evaluation Pt tolerated increased gait distance 80ft CGA rwx 4L 02, sa02 99% post ambulation. Pt somewhat impulsive w mobilization however agreeable and pleasant. Anticipate HHC vs SNU pending progress.  -       Row Name 12/18/23 1536          Positioning and Restraints    Pre-Treatment Position in bed  -     Post Treatment Position chair  -     In Chair reclined;call light within reach;exit alarm on;encouraged to call for assist  -       Row Name 12/18/23 1536          Therapy Assessment/Plan (PT)    Therapy Frequency (PT) 6 times/wk  [facility]               User Key  (r) = Recorded By, (t) = Taken By, (c) = Cosigned By      Initials Name Provider Type     Angela Nicole, PT Physical Therapist                    Physical Therapy Education       Title: PT OT SLP Therapies (In Progress)       Topic: Physical Therapy (In Progress)       Point: Mobility training (In Progress)       Learning Progress Summary             Patient Acceptance, E, NR by  at 12/18/2023 1542    Acceptance, E,TB, VU by KIA at 12/18/2023 0941    Acceptance, E, VU,NR by EB at 12/17/2023 1544    Acceptance, E, VU by JP at 12/17/2023 0601    Acceptance, E, NR by AR at 12/15/2023 0933                         Point: Home exercise program (In Progress)       Learning Progress Summary             Patient  Acceptance, E, NR by LH at 12/18/2023 1542    Acceptance, E,TB, VU by KW at 12/18/2023 0941    Acceptance, E, VU by JL at 12/17/2023 0601    Acceptance, E, NR by AR at 12/15/2023 0933                         Point: Body mechanics (In Progress)       Learning Progress Summary             Patient Acceptance, E, NR by LH at 12/18/2023 1542    Acceptance, E,TB, VU by KW at 12/18/2023 0941    Acceptance, E, VU,NR by EB at 12/17/2023 1544    Acceptance, E, VU by JL at 12/17/2023 0601    Acceptance, E, NR by AR at 12/15/2023 0933                         Point: Precautions (In Progress)       Learning Progress Summary             Patient Acceptance, E, NR by  at 12/18/2023 1542    Acceptance, E,TB, VU by KW at 12/18/2023 0941    Acceptance, E, VU,NR by EB at 12/17/2023 1544    Acceptance, E, VU by JL at 12/17/2023 0601    Acceptance, E, NR by AR at 12/15/2023 0933                                         User Key       Initials Effective Dates Name Provider Type Discipline     06/16/21 -  Angela Nicole, PT Physical Therapist PT    AR 06/16/21 -  Jeannette Quinn, PT Physical Therapist PT     10/17/23 -  Regi Silva, RN Registered Nurse Nurse    EB 02/14/23 -  Celeste Clarke PTA Physical Therapist Assistant PT     09/07/23 -  Geetha Villanueva RN Registered Nurse Nurse                  PT Recommendation and Plan  Anticipated Discharge Disposition (PT): home with assist, skilled nursing facility  Therapy Frequency (PT): 6 times/wk  Plan of Care Reviewed With: patient  Progress: improving  Outcome Evaluation: Pt tolerated increased gait distance 80ft CGA rwx 4L 02, sa02 99% post ambulation. Pt somewhat impulsive w mobilization however agreeable and pleasant. Anticipate HHC vs SNU pending progress.   Outcome Measures       Row Name 12/18/23 1500             How much help from another person do you currently need...    Turning from your back to your side while in flat bed without using bedrails? 4  -      Moving  from lying on back to sitting on the side of a flat bed without bedrails? 4  -LH      Moving to and from a bed to a chair (including a wheelchair)? 3  -LH      Standing up from a chair using your arms (e.g., wheelchair, bedside chair)? 3  -LH      Climbing 3-5 steps with a railing? 3  -LH      To walk in hospital room? 3  -      AM-PAC 6 Clicks Score (PT) 20  -      Highest Level of Mobility Goal 6 --> Walk 10 steps or more  -         Functional Assessment    Outcome Measure Options AM-PAC 6 Clicks Basic Mobility (PT)  -                User Key  (r) = Recorded By, (t) = Taken By, (c) = Cosigned By      Initials Name Provider Type     Angela Nicole, PT Physical Therapist                     Time Calculation:    PT Charges       Row Name 12/18/23 1543             Time Calculation    Start Time 1515  -      Stop Time 1533  -      Time Calculation (min) 18 min  -      PT Received On 12/18/23  -      PT - Next Appointment 12/19/23  -                User Key  (r) = Recorded By, (t) = Taken By, (c) = Cosigned By      Initials Name Provider Type     Angela Nicole, PT Physical Therapist                  Therapy Charges for Today       Code Description Service Date Service Provider Modifiers Qty    [id number] HC PT THER PROC EA 15 MIN 12/18/2023 Angela Nicole, PT GP 1            PT G-Codes  Outcome Measure Options: AM-PAC 6 Clicks Basic Mobility (PT)  AM-PAC 6 Clicks Score (PT): 20    Angela Nicole PT  12/18/2023

## 2023-12-18 NOTE — PLAN OF CARE
Goal Outcome Evaluation:  Plan of Care Reviewed With: patient           Outcome Evaluation: Patient requiring highflow oxygen 6L during sleep / 4L highflow during awake periods this shift. Fall risk precautions;telemetry monitoring;cooperative and conversational.

## 2023-12-18 NOTE — PROGRESS NOTES
Name: Eric Kramer ADMIT: 2023   : 1940  PCP: Elder Block MD    MRN: [id number] LOS: 6 days   AGE/SEX: 83 y.o. male  ROOM: [facility]     Subjective   Subjective   No acute events overnight.  Patient remains on 5 L nasal cannula.  Feels like his shortness of breath is about the same.  Denies any chest pain.  His wife is at bedside.    Objective   Objective   Vital Signs  Temp:  [97.3 °F (36.3 °C)-99.7 °F (37.6 °C)] 98.2 °F (36.8 °C)  Heart Rate:  [79-92] 84  Resp:  [18] 18  BP: (102-120)/(60-71) 113/61  SpO2:  [91 %-100 %] 97 %  on  Flow (L/min):  [3-6] 4;   Device (Oxygen Therapy): high-flow nasal cannula;humidified  Body mass index is 29.07 kg/m².  General: Alert, no acute distress.  Overall ill-appearing but remains comfortable.  ENT: No conjunctival injection or scleral icterus. Moist mucous membranes. No JVD.   Neuro: Eyes open and moving in all directions, strength normal in all extremities, no focal deficits.   Lungs: Coarse breath sounds throughout, no wheezing.  No focal crackles.  Mild tachypnea.  Nasal cannula in place.  Heart: RRR, no murmurs. No edema.  Abdomen: Soft, non-tender, non-distended. Normal bowel sounds.   Ext: Warm and well-perfused. No edema.   Skin: No rashes or lesions. IV site without swelling or erythema.     Results Review     I reviewed the patient's new clinical results.  Results from last 7 days   Lab Units 23  0547 23  0842 23  0856 23  0355   WBC 10*3/mm3 9.20 8.82 7.75 6.13   HEMOGLOBIN g/dL 13.3 14.3 14.0 12.4*   PLATELETS 10*3/mm3 336 331 325 286     Results from last 7 days   Lab Units 23  0547 23  0842 23  0856 12/15/23  0504   SODIUM mmol/L 139 138 143 142   POTASSIUM mmol/L 3.5 3.9 3.9 3.9   CHLORIDE mmol/L 100 100 103 103   CO2 mmol/L 26.4 24.4 27.7 25.5   BUN mg/dL 34* 25* 22 20   CREATININE mg/dL 1.55* 1.40* 1.18 1.05   GLUCOSE mg/dL 141* 129* 127* 130*   Estimated Creatinine Clearance: 47 mL/min (A) (by  C-G formula based on SCr of 1.55 mg/dL (H)).  Results from last 7 days   Lab Units 12/18/23  0547 12/17/23  0842 12/16/23  0856 12/15/23  0504 12/13/23  0339 12/12/23  1247   ALBUMIN g/dL 3.0* 3.0* 3.6 3.5   < > 3.6   BILIRUBIN mg/dL  --   --   --   --   --  0.5   ALK PHOS U/L  --   --   --   --   --  54   AST (SGOT) U/L  --   --   --   --   --  13   ALT (SGPT) U/L  --   --   --   --   --  9    < > = values in this interval not displayed.     Results from last 7 days   Lab Units 12/18/23  0547 12/17/23  0842 12/16/23  0856 12/15/23  0504   CALCIUM mg/dL 9.7 9.5 10.1 10.4   ALBUMIN g/dL 3.0* 3.0* 3.6 3.5   PHOSPHORUS mg/dL 3.5 3.4 4.3 2.5     Results from last 7 days   Lab Units 12/12/23  1450   LACTATE mmol/L 1.9     COVID19   Date Value Ref Range Status   12/12/2023 Not Detected Not Detected - Ref. Range Final     Glucose   Date/Time Value Ref Range Status   12/18/2023 1108 202 (H) 70 - 130 mg/dL Final   12/18/2023 0612 129 70 - 130 mg/dL Final   12/17/2023 2030 196 (H) 70 - 130 mg/dL Final   12/17/2023 1626 170 (H) 70 - 130 mg/dL Final   12/17/2023 1145 201 (H) 70 - 130 mg/dL Final   12/17/2023 0618 126 70 - 130 mg/dL Final   12/16/2023 2105 151 (H) 70 - 130 mg/dL Final       CT Chest Without Contrast Diagnostic  Narrative: CT CHEST WO CONTRAST DIAGNOSTIC-     HISTORY: Hypoxia, not improving with treatment of pneumonia.     TECHNIQUE: CT of the chest was performed without intravenous contrast.  Reformatted images were reviewed. Radiation dose reduction techniques  were utilized, including automated exposure control and exposure  modulation based on body size.     COMPARISON: Chest radiograph 12/14/2023        FINDINGS:       No pathologically enlarged thoracic lymph nodes are identified. Heart  size is normal. There is no pericardial effusion. There is advanced  calcific coronary artery atherosclerosis and/or coronary artery stents.  There is relatively advanced calcific aortic atherosclerosis. The  ascending  aorta is dilated to 4.2 cm at the level of the pulmonary  trunk. The descending artery is ectatic to 3.0 cm. The pulmonary trunk  is dilated to 4.0 cm. There is no significant pleural effusion.  Limited imaging through the upper abdomen demonstrates hepatic  steatosis. There is cholelithiasis. There is calcific atherosclerosis.  There is a 2.3 cm hypodense left adrenal nodule, which is incompletely  characterized.  There is left greater than right gynecomastia. There is multilevel  degenerative disc disease.  The trachea is clear. There is mild dependent atelectasis in the  posterior lungs. There is mild groundglass, curvilinear, and nodular  opacity in the right upper lobe. Right pulmonary opacities have improved  from 12/14/2023 when accounting for differences in modality. There is no  dense focal consolidation.        Impression:    1.  Mild groundglass, curvilinear, and nodular opacities in the right  upper lobe have improved from 12/14/2023 when accounting for differences  in modality and may be infectious/inflammatory. Recommend short-term  follow-up CT chest in 3 months to document complete resolution.  2.  Thoracic aortic enlargement, as above.  3.  Incompletely characterized left adrenal nodule. Recommend comparison  with prior imaging, if available. Otherwise, recommend further  evaluation with multiphase CT or contrast-enhanced MRI. At minimum,  recommend attention on follow-up imaging.  4.  Hepatic steatosis and cholelithiasis.              This report was finalized on 12/18/2023 9:47 AM by Dr. Nadja Reddy M.D  on Workstation: [id number]       I reviewed the patient's daily medications.  Scheduled Medications  aspirin, 325 mg, Oral, Daily  DULoxetine, 60 mg, Oral, Daily  enoxaparin, 40 mg, Subcutaneous, Q24H  gabapentin, 300 mg, Oral, Nightly  hydroCHLOROthiazide, 25 mg, Oral, Daily  insulin regular, 2-7 Units, Subcutaneous, 4x Daily AC & at Bedtime  ipratropium-albuterol, 3 mL, Nebulization, 4x Daily -  RT  levothyroxine, 25 mcg, Oral, Q AM  lisinopril, 40 mg, Oral, Q24H  metoprolol tartrate, 25 mg, Oral, Q12H  QUEtiapine, 25 mg, Oral, Nightly  senna-docusate sodium, 2 tablet, Oral, BID  sodium chloride, 10 mL, Intravenous, Q12H    Infusions   Diet  Diet: Diabetic Diets; Consistent Carbohydrate; Texture: Regular Texture (IDDSI 7); Fluid Consistency: Thin (IDDSI 0)    Assessment/Plan     Active Hospital Problems    Diagnosis  POA    **Respiratory failure [J96.90]  Yes      Resolved Hospital Problems   No resolved problems to display.       83 y.o. male admitted with Respiratory failure.    Acute hypoxic and hypercapnic respiratory failure from pneumonia  -RVP negative, blood cultures no growth to date  -Chest x-ray with right upper lobe pneumonia, will need follow-up films in 4 to 6 weeks to ensure resolution  -Oxygen requirements remain between 4-6 L, goal saturations above 90%  -Rocephin, plan for 7-day course which is completed today  -CT chest 12/18 with mild groundglass and nodular opacities in the right upper lobe improved from 12/14, recommend repeat CT scan in 3 months  -Given the improvement on CT chest, suspect this is all secondary to his pneumonia and he is going to be slower to recover.  Discussed with him and his wife at bedside today.  Going to add incentive spirometry and Acapella flutter valve.  Hopefully this will help with preventing atelectasis and airway clearance.  Also recommended that patient be up in the chair is much as possible.  Hoping that he has improvement in his oxygenation over the next couple of days and we can wean oxygen to a safe level for discharge.    Thoracic aortic aneurysm  -CT chest with dilation of the ascending aorta to 4.2 cm at the level of the pulmonary trunk, descending artery is ectatic to 3.0 cm, pulmonary trunk is dilated to 4 cm  -CT surgery follow-up as outpatient for repeat imaging    Adrenal nodule  -Abdominal finding of adrenal nodule on CT  chest  -Recommendation for follow-up imaging versus multiphase CT/contrast-enhanced MRI  -Given significant oxygen requirements, going to defer additional workup at this time.  If patient has significant improvement, can consider obtaining prior to discharge.    KAREN on CKD 2  -Baseline appears to be 1.4-1.6  -Creatinine is stable today at 1.4  -Avoid all nephrotoxic agents including contrast dyes and NSAIDs  -Monitor with daily BMP    Elevated troponin from pneumonia/KAREN  -Echo with EF of 62.6%, LV diastolic and systolic function was normal  -Troponin downtrending  -Continue to monitor clinically    Diabetes type 2 with neuropathy  -Hold home metformin and glipizide  -Low-dose sliding scale insulin  -Blood glucose fairly well-controlled but bordering on the higher side, closely monitor and add Lantus if needed    Hypertension  -Continue home hydrochlorothiazide, lisinopril, Lopressor  -Blood pressure has been on the softer side, going to discontinue amlodipine today  -Closely monitor BPs and titrate medications as needed    Memory impairment with delirium  -Out of restraints  -Continue Seroquel  -Discussed with family on 12/15 it sounds like it MoCA test about a year ago that was pretty normal, but recommended further memory testing after discharge  -Seems to be improving after addition of Seroquel, continue to monitor    Lovenox 40 mg SC daily for DVT prophylaxis.  Full code.  Discussed with patient, family, nursing staff, CCP, and care team on multidisciplinary rounds.  Anticipate discharge home with HH vs SNU facility timing yet to be determined.  Pending weaning of oxygen.    Mary Ann Saucedo MD  [facility]  12/18/23  14:11 EST

## 2023-12-19 LAB
ALBUMIN SERPL-MCNC: 3.1 G/DL (ref 3.5–5.2)
ANION GAP SERPL CALCULATED.3IONS-SCNC: 13 MMOL/L (ref 5–15)
BUN SERPL-MCNC: 37 MG/DL (ref 8–23)
BUN/CREAT SERPL: 21.9 (ref 7–25)
CALCIUM SPEC-SCNC: 9.6 MG/DL (ref 8.6–10.5)
CHLORIDE SERPL-SCNC: 102 MMOL/L (ref 98–107)
CO2 SERPL-SCNC: 25 MMOL/L (ref 22–29)
CREAT SERPL-MCNC: 1.69 MG/DL (ref 0.76–1.27)
DEPRECATED RDW RBC AUTO: 44.7 FL (ref 37–54)
EGFRCR SERPLBLD CKD-EPI 2021: 40 ML/MIN/1.73
ERYTHROCYTE [DISTWIDTH] IN BLOOD BY AUTOMATED COUNT: 13.3 % (ref 12.3–15.4)
GLUCOSE BLDC GLUCOMTR-MCNC: 140 MG/DL (ref 70–130)
GLUCOSE BLDC GLUCOMTR-MCNC: 145 MG/DL (ref 70–130)
GLUCOSE BLDC GLUCOMTR-MCNC: 188 MG/DL (ref 70–130)
GLUCOSE BLDC GLUCOMTR-MCNC: 192 MG/DL (ref 70–130)
GLUCOSE SERPL-MCNC: 145 MG/DL (ref 65–99)
HCT VFR BLD AUTO: 38 % (ref 37.5–51)
HGB BLD-MCNC: 12.7 G/DL (ref 13–17.7)
MCH RBC QN AUTO: 30.4 PG (ref 26.6–33)
MCHC RBC AUTO-ENTMCNC: 33.4 G/DL (ref 31.5–35.7)
MCV RBC AUTO: 90.9 FL (ref 79–97)
PHOSPHATE SERPL-MCNC: 3 MG/DL (ref 2.5–4.5)
PLATELET # BLD AUTO: 353 10*3/MM3 (ref 140–450)
PMV BLD AUTO: 9.4 FL (ref 6–12)
POTASSIUM SERPL-SCNC: 4.1 MMOL/L (ref 3.5–5.2)
RBC # BLD AUTO: 4.18 10*6/MM3 (ref 4.14–5.8)
SODIUM SERPL-SCNC: 140 MMOL/L (ref 136–145)
WBC NRBC COR # BLD AUTO: 7.71 10*3/MM3 (ref 3.4–10.8)

## 2023-12-19 PROCEDURE — 25010000002 ENOXAPARIN PER 10 MG: Performed by: STUDENT IN AN ORGANIZED HEALTH CARE EDUCATION/TRAINING PROGRAM

## 2023-12-19 PROCEDURE — 94761 N-INVAS EAR/PLS OXIMETRY MLT: CPT

## 2023-12-19 PROCEDURE — 85027 COMPLETE CBC AUTOMATED: CPT | Performed by: STUDENT IN AN ORGANIZED HEALTH CARE EDUCATION/TRAINING PROGRAM

## 2023-12-19 PROCEDURE — 63710000001 INSULIN REGULAR HUMAN PER 5 UNITS: Performed by: STUDENT IN AN ORGANIZED HEALTH CARE EDUCATION/TRAINING PROGRAM

## 2023-12-19 PROCEDURE — 94664 DEMO&/EVAL PT USE INHALER: CPT

## 2023-12-19 PROCEDURE — 94799 UNLISTED PULMONARY SVC/PX: CPT

## 2023-12-19 PROCEDURE — 82948 REAGENT STRIP/BLOOD GLUCOSE: CPT

## 2023-12-19 PROCEDURE — 80069 RENAL FUNCTION PANEL: CPT | Performed by: INTERNAL MEDICINE

## 2023-12-19 PROCEDURE — 97530 THERAPEUTIC ACTIVITIES: CPT

## 2023-12-19 RX ADMIN — ENOXAPARIN SODIUM 40 MG: 100 INJECTION SUBCUTANEOUS at 21:05

## 2023-12-19 RX ADMIN — ASPIRIN 325 MG: 325 TABLET ORAL at 09:51

## 2023-12-19 RX ADMIN — INSULIN HUMAN 2 UNITS: 100 INJECTION, SOLUTION PARENTERAL at 21:05

## 2023-12-19 RX ADMIN — Medication 10 ML: at 09:50

## 2023-12-19 RX ADMIN — DULOXETINE HYDROCHLORIDE 60 MG: 60 CAPSULE, DELAYED RELEASE ORAL at 09:51

## 2023-12-19 RX ADMIN — METOPROLOL TARTRATE 25 MG: 25 TABLET, FILM COATED ORAL at 09:50

## 2023-12-19 RX ADMIN — HYDROCHLOROTHIAZIDE 25 MG: 25 TABLET ORAL at 09:51

## 2023-12-19 RX ADMIN — INSULIN HUMAN 2 UNITS: 100 INJECTION, SOLUTION PARENTERAL at 13:31

## 2023-12-19 RX ADMIN — LEVOTHYROXINE SODIUM 25 MCG: 25 TABLET ORAL at 09:51

## 2023-12-19 RX ADMIN — QUETIAPINE FUMARATE 25 MG: 25 TABLET ORAL at 21:05

## 2023-12-19 RX ADMIN — METOPROLOL TARTRATE 25 MG: 25 TABLET, FILM COATED ORAL at 21:05

## 2023-12-19 RX ADMIN — GABAPENTIN 300 MG: 300 CAPSULE ORAL at 21:05

## 2023-12-19 RX ADMIN — IPRATROPIUM BROMIDE AND ALBUTEROL SULFATE 3 ML: 2.5; .5 SOLUTION RESPIRATORY (INHALATION) at 06:50

## 2023-12-19 RX ADMIN — IPRATROPIUM BROMIDE AND ALBUTEROL SULFATE 3 ML: 2.5; .5 SOLUTION RESPIRATORY (INHALATION) at 19:02

## 2023-12-19 RX ADMIN — Medication 10 ML: at 21:05

## 2023-12-19 RX ADMIN — LISINOPRIL 40 MG: 40 TABLET ORAL at 09:51

## 2023-12-19 RX ADMIN — IPRATROPIUM BROMIDE AND ALBUTEROL SULFATE 3 ML: 2.5; .5 SOLUTION RESPIRATORY (INHALATION) at 11:05

## 2023-12-19 NOTE — PLAN OF CARE
Goal Outcome Evaluation:  Plan of Care Reviewed With: patient, spouse        Progress: improving  Outcome Evaluation: Pt seen by PT this AM for tx. Pt was in bed and sat up to EOB quickly w/ mod I. Pt then stood impulsively w/ SV and no AD. Pt amb 100' req CGA and use of fww. Mild unsteadiness was noted w/ no overt LOB occurring. Pt sats at 95% on 3L after amb w/ SOA noted. Pt UIC w/ spouse in room at end of session. PT will prog as pt guillermo.      Anticipated Discharge Disposition (PT): home with home health, skilled nursing facility

## 2023-12-19 NOTE — PLAN OF CARE
Problem: Adult Inpatient Plan of Care  Goal: Plan of Care Review  Outcome: Ongoing, Progressing  Flowsheets (Taken 12/19/2023 0504)  Progress: improving  Plan of Care Reviewed With: patient  Outcome Evaluation: pt confused at times, weaned down to 2L. vss. denies pain, n/v, soa. plan of care continued.  Goal: Patient-Specific Goal (Individualized)  Outcome: Ongoing, Progressing  Goal: Absence of Hospital-Acquired Illness or Injury  Outcome: Ongoing, Progressing  Intervention: Identify and Manage Fall Risk  Description: Perform standard risk assessment on admission using a validated tool or comprehensive approach appropriate to the patient; reassess fall risk frequently, with change in status or transfer to another level of care.  Communicate fall injury risk to interprofessional healthcare team.  Determine need for increased observation, equipment and environmental modification, such as low bed, signage and supportive, nonskid footwear.  Adjust safety measures to individual developmental age, stage and identified risk factors.  Reinforce the importance of safety and physical activity with patient and family.  Perform regular intentional rounding to assess need for position change, pain assessment and personal needs, including assistance with toileting.  Recent Flowsheet Documentation  Taken 12/19/2023 0401 by Diana Daigle, RN  Safety Promotion/Fall Prevention:   safety round/check completed   nonskid shoes/slippers when out of bed   fall prevention program maintained   clutter free environment maintained   assistive device/personal items within reach  Taken 12/19/2023 0201 by Diana Daigle, RN  Safety Promotion/Fall Prevention:   safety round/check completed   nonskid shoes/slippers when out of bed   fall prevention program maintained   clutter free environment maintained   assistive device/personal items within reach  Taken 12/19/2023 0001 by Diana Daigle, RN  Safety Promotion/Fall Prevention:   safety  round/check completed   nonskid shoes/slippers when out of bed   fall prevention program maintained   clutter free environment maintained   assistive device/personal items within reach  Taken 12/18/2023 2250 by Diana Daigle RN  Safety Promotion/Fall Prevention:   safety round/check completed   nonskid shoes/slippers when out of bed   fall prevention program maintained   clutter free environment maintained   assistive device/personal items within reach  Taken 12/18/2023 2128 by Diana Daigle RN  Safety Promotion/Fall Prevention:   safety round/check completed   nonskid shoes/slippers when out of bed   fall prevention program maintained   clutter free environment maintained   assistive device/personal items within reach  Intervention: Prevent Skin Injury  Description: Perform a screening for skin injury risk, such as pressure or moisture associated skin damage on admission and at regular intervals throughout hospital stay.  Keep all areas of skin (especially folds) clean and dry.  Maintain adequate skin hydration.  Relieve and redistribute pressure and protect bony prominences; implement measures based on patient-specific risk factors.  Match turning and repositioning schedule to clinical condition.  Encourage weight shift frequently; assist with reposition if unable to complete independently.  Float heels off bed; avoid pressure on the Achilles tendon.  Keep skin free from extended contact with medical devices.  Encourage functional activity and mobility, as early as tolerated.  Use aids (e.g., slide boards, mechanical lift) during transfer.  Recent Flowsheet Documentation  Taken 12/19/2023 0401 by Diana Daigle RN  Body Position: position changed independently  Taken 12/19/2023 0201 by Diana Daigle RN  Body Position: position changed independently  Taken 12/19/2023 0001 by Diana Daigle RN  Body Position: position changed independently  Taken 12/18/2023 2250 by Diana Daigle RN  Body Position:  position changed independently  Taken 12/18/2023 2128 by Diana Daigle RN  Body Position: position changed independently  Intervention: Prevent and Manage VTE (Venous Thromboembolism) Risk  Description: Assess for VTE (venous thromboembolism) risk.  Encourage and assist with early ambulation.  Initiate and maintain compression or other therapy, as indicated, based on identified risk in accordance with organizational protocol and provider order.  Encourage both active and passive leg exercises while in bed, if unable to ambulate.  Recent Flowsheet Documentation  Taken 12/19/2023 0401 by Diana Daigle RN  Activity Management: activity encouraged  Taken 12/19/2023 0201 by Diana Daigle RN  Activity Management: activity encouraged  Taken 12/19/2023 0001 by Diana Daigle RN  Activity Management: activity encouraged  Taken 12/18/2023 2250 by Diana Daigle RN  Activity Management: activity encouraged  Taken 12/18/2023 2128 by Diana Daigle RN  Activity Management: activity encouraged  Range of Motion: active ROM (range of motion) encouraged  Goal: Optimal Comfort and Wellbeing  Outcome: Ongoing, Progressing  Intervention: Provide Person-Centered Care  Description: Use a family-focused approach to care.  Develop trust and rapport by proactively providing information, encouraging questions, addressing concerns and offering reassurance.  Acknowledge emotional response to hospitalization.  Recognize and utilize personal coping strategies.  Honor spiritual and cultural preferences.  Recent Flowsheet Documentation  Taken 12/18/2023 2128 by Diana Daigle RN  Trust Relationship/Rapport:   care explained   questions answered  Goal: Readiness for Transition of Care  Outcome: Ongoing, Progressing     Problem: Skin Injury Risk Increased  Goal: Skin Health and Integrity  Outcome: Ongoing, Progressing  Intervention: Optimize Skin Protection  Description: Perform a full pressure injury risk assessment, as indicated  by screening, upon admission to care unit.  Reassess skin (injury risk, full inspection) frequently (e.g., scheduled interval, with change in condition) to provide optimal early detection and prevention.  Maintain adequate tissue perfusion (e.g., encourage fluid balance; avoid crossing legs, constrictive clothing or devices) to promote tissue oxygenation.  Maintain head of bed at lowest degree of elevation tolerated, considering medical condition and other restrictions.  Avoid positioning onto an area that remains reddened.  Minimize incontinence and moisture (e.g., toileting schedule; moisture-wicking pad, diaper or incontinence collection device; skin moisture barrier).  Cleanse skin promptly and gently when soiled utilizing a pH-balanced cleanser.  Relieve and redistribute pressure (e.g., scheduled position changes, weight shifts, use of support surface, medical device repositioning, protective dressing application, use of positioning device, microclimate control, use of pressure-injury-monitor  Encourage increased activity, such as sitting in a chair at the bedside or early mobilization, when able to tolerate.  Recent Flowsheet Documentation  Taken 12/19/2023 0401 by Diana Daigle RN  Head of Bed (HOB) Positioning: HOB elevated  Taken 12/19/2023 0201 by Diana Daigle RN  Head of Bed (HOB) Positioning: HOB elevated  Taken 12/19/2023 0001 by Diana Daigle RN  Head of Bed (HOB) Positioning: HOB elevated  Taken 12/18/2023 2250 by Diana Daigle RN  Head of Bed (HOB) Positioning: HOB elevated  Taken 12/18/2023 2128 by Diana Daigle RN  Head of Bed (HOB) Positioning: HOB elevated     Problem: Fall Injury Risk  Goal: Absence of Fall and Fall-Related Injury  Outcome: Ongoing, Progressing  Intervention: Promote Injury-Free Environment  Description: Provide a safe, barrier-free environment that encourages independent activity.  Keep care area uncluttered and well-lighted.  Determine need for increased  observation or monitoring.  Avoid use of devices that minimize mobility, such as restraints or indwelling urinary catheter.  Recent Flowsheet Documentation  Taken 12/19/2023 0401 by Diana Daigle RN  Safety Promotion/Fall Prevention:   safety round/check completed   nonskid shoes/slippers when out of bed   fall prevention program maintained   clutter free environment maintained   assistive device/personal items within reach  Taken 12/19/2023 0201 by Diana Daigle RN  Safety Promotion/Fall Prevention:   safety round/check completed   nonskid shoes/slippers when out of bed   fall prevention program maintained   clutter free environment maintained   assistive device/personal items within reach  Taken 12/19/2023 0001 by Diana Daigle RN  Safety Promotion/Fall Prevention:   safety round/check completed   nonskid shoes/slippers when out of bed   fall prevention program maintained   clutter free environment maintained   assistive device/personal items within reach  Taken 12/18/2023 2250 by Diana Daigle RN  Safety Promotion/Fall Prevention:   safety round/check completed   nonskid shoes/slippers when out of bed   fall prevention program maintained   clutter free environment maintained   assistive device/personal items within reach  Taken 12/18/2023 2128 by Diana Daigle RN  Safety Promotion/Fall Prevention:   safety round/check completed   nonskid shoes/slippers when out of bed   fall prevention program maintained   clutter free environment maintained   assistive device/personal items within reach     Problem: Restraint, Nonviolent  Goal: Absence of Harm or Injury  Outcome: Ongoing, Progressing  Intervention: Implement Least Restrictive Safety Strategies  Description: Consider personal and environmental factors contributing to nonviolent or self-destructive behavior.  Utilize diversional activity/alternative device protection.  Document alternative strategies and less restrictive intervention attempts and  their effects.  Clearly describe/record behavior leading to need for restraint.  Use the least restrictive method of restraint.  Recognize restraint should only be used if needed to improve the patient's wellbeing and less restrictive interventions have been determined to be ineffective.  Reassess continued need frequently. Remove restraint as soon as unsafe situation is resolved.  Recent Flowsheet Documentation  Taken 12/18/2023 2128 by Diana Daigle RN  Diversional Activities: television  Intervention: Protect Dignity, Rights, and Personal Wellbeing  Description: Explain restraint rationale and procedure to patient and family/support person prior to application.  Regularly assess personal needs and for changes in behavior and clinical condition, as well as physical and emotional wellbeing.  Provide reassurance and emotional support.  Recent Flowsheet Documentation  Taken 12/18/2023 2128 by Diana Daigle RN  Trust Relationship/Rapport:   care explained   questions answered  Intervention: Protect Skin and Joint Integrity  Description: Frequently check restraint application site and document findings.  Release and replace at regular intervals per facility protocol.  Assist with frequent joint range of motion activity.  Recent Flowsheet Documentation  Taken 12/19/2023 0401 by Diana Daigle RN  Body Position: position changed independently  Taken 12/19/2023 0201 by Diana Daigle RN  Body Position: position changed independently  Taken 12/19/2023 0001 by Diana Daigle RN  Body Position: position changed independently  Taken 12/18/2023 2250 by Diana Daigle RN  Body Position: position changed independently  Taken 12/18/2023 2128 by Diana Daigle RN  Body Position: position changed independently  Range of Motion: active ROM (range of motion) encouraged     Problem: Gas Exchange Impaired  Goal: Optimal Gas Exchange  Outcome: Ongoing, Progressing  Intervention: Optimize Oxygenation and  Ventilation  Description: Recognize risk for obstructive sleep apnea; anticipate the need for continuous pulse oximetry and noninvasive positive pressure ventilation.  Maintain patent airway with positioning, airway adjuncts, secretion clearance.  Encourage pulmonary hygiene, such as cough-enhancement and airway-clearance techniques, that may include use of incentive spirometry, deep breathing and cough.  Anticipate the need for splinting with cough to minimize discomfort; assist if needed.  Provide oxygen therapy judiciously, if hypoxemia is present.  Consider pharmacologic therapy that may improve mucus clearance and reduce bronchospasm, laryngospasm, swelling or stridor.  Consider the need for intubation and invasive positive pressure ventilation for longer recovery needs; use lung protective measures.  Recent Flowsheet Documentation  Taken 12/19/2023 0401 by Diana Daigle RN  Head of Bed (HOB) Positioning: HOB elevated  Taken 12/19/2023 0201 by Diana Daigle RN  Head of Bed (HOB) Positioning: HOB elevated  Taken 12/19/2023 0001 by Diana Daigle RN  Head of Bed (HOB) Positioning: HOB elevated  Taken 12/18/2023 2250 by Diana Daigle RN  Head of Bed (HOB) Positioning: HOB elevated  Taken 12/18/2023 2128 by Diana Daigle RN  Head of Bed (HOB) Positioning: HOB elevated   Goal Outcome Evaluation:  Plan of Care Reviewed With: patient        Progress: improving  Outcome Evaluation: pt confused at times, weaned down to 2L. vss. denies pain, n/v, soa. plan of care continued.

## 2023-12-19 NOTE — THERAPY TREATMENT NOTE
Patient Name: Eric Kramer  : 1940    MRN: [id number]                              Today's Date: 2023       Admit Date: 2023    Visit Dx:     ICD-10-CM ICD-9-CM   1. Pneumonia of right upper lobe due to infectious organism  J18.9 486   2. Acute respiratory failure with hypoxia and hypercapnia  J96.01 518.81    J96.02      Patient Active Problem List   Diagnosis    Type 2 diabetes mellitus, without long-term current use of insulin    Stroke    Prostatitis    Osteoarthritis    Nephrolithiasis    Memory impairment    Malignant neoplasm in situ of colon    Insomnia    Hypertension    Diabetic peripheral neuropathy    Benign enlargement of prostate    Arteriosclerosis of coronary artery    Diabetic ulcer of toe of right foot associated with diabetes mellitus due to underlying condition, limited to breakdown of skin    MRSA bacteremia    Hospital discharge follow-up    Medicare annual wellness visit, subsequent    Hypothyroidism due to acquired atrophy of thyroid    Other hyperlipidemia    High risk medication use    B12 deficiency    Seborrheic keratosis    Respiratory failure     Past Medical History:   Diagnosis Date    Acute foot pain, left     Acute UTI     Arteriosclerosis of coronary artery     Basal cell carcinoma (BCC) of face     Benign enlargement of prostate     Cataract     Chest pain     Diabetic peripheral neuropathy     Edema     Hyperlipidemia     Hypertension     Insomnia     Malignant neoplasm in situ of colon     Memory impairment     MMSE -2019    Nephrolithiasis     Osteoarthritis     Prostatitis     Renal azotemia     Stroke     stroke syndrome    Type 2 diabetes mellitus      Past Surgical History:   Procedure Laterality Date    ANGIOPLASTY Right     Cath Laser Angioplasty/right ventricle    APPENDECTOMY      CATARACT EXTRACTION Left     COLECTOMY PARTIAL / TOTAL      due to colon cancer, removed 13 inches of colon    CORONARY ANGIOPLASTY WITH STENT PLACEMENT      TOE  SURGERY Right 07/2021      General Information       Row Name 12/19/23 1127          Physical Therapy Time and Intention    Document Type therapy note (daily note)  -PH     Mode of Treatment physical therapy  -PH       Row Name 12/19/23 1127          Cognition    Orientation Status (Cognition) situation  -PH       Row Name 12/19/23 1127          Safety Issues, Functional Mobility    Impairments Affecting Function (Mobility) endurance/activity tolerance;strength;balance  -PH     Comment, Safety Issues/Impairments (Mobility) gt belt and non skid socks donned; 3L of O2  -PH               User Key  (r) = Recorded By, (t) = Taken By, (c) = Cosigned By      Initials Name Provider Type    PH Lourdes Davidson PTA Physical Therapist Assistant                   Mobility       Row Name 12/19/23 1128          Bed Mobility    Bed Mobility supine-sit  -PH     Supine-Sit [place] (Bed Mobility) modified independence  -PH       Row Name 12/19/23 1128          Sit-Stand Transfer    Sit-Stand [place] (Transfers) supervision  -PH     Assistive Device (Sit-Stand Transfers) other (see comments)  No AD  -PH     Comment, (Sit-Stand Transfer) pt impulsively stood w/ a little unsteadiness  -PH       Row Name 12/19/23 1128          Gait/Stairs (Locomotion)    [place] Level (Gait) contact guard;verbal cues  -PH     Assistive Device (Gait) walker, front-wheeled  -PH     Distance in Feet (Gait) 100'  -PH     Deviations/Abnormal Patterns (Gait) conchis decreased;gait speed decreased  -PH     Bilateral Gait Deviations forward flexed posture;heel strike decreased  -PH     [place] Level (Stairs) not tested  -PH     Comment, (Gait/Stairs) pt was mildly unsteady w/ no overt LOB. Sats at 95% on 3L after returned to room.  -PH               User Key  (r) = Recorded By, (t) = Taken By, (c) = Cosigned By      Initials Name Provider Type    PH Lourdes Davidson PTA Physical Therapist Assistant                    Obj/Interventions       Row Name 12/19/23 1129          Balance    Balance Assessment sitting static balance;standing static balance  -PH     Static Sitting Balance standby assist  -PH     Static Standing Balance standby assist  -PH     Position/Device Used, Standing Balance other (see comments)  no AD  -PH               User Key  (r) = Recorded By, (t) = Taken By, (c) = Cosigned By      Initials Name Provider Type    PH Lourdes Davidson PTA Physical Therapist Assistant                   Goals/Plan    No documentation.                  Clinical Impression       Row Name 12/19/23 1130          Pain    Pretreatment Pain Rating 0/10 - no pain  -PH     Posttreatment Pain Rating 0/10 - no pain  -PH     Additional Documentation Pain Scale: Numbers Pre/Post-Treatment (Group)  -PH       Row Name 12/19/23 1130          Plan of Care Review    Plan of Care Reviewed With patient;spouse  -PH     Progress improving  -PH     Outcome Evaluation Pt seen by PT this AM for tx. Pt was in bed and sat up to EOB quickly w/ mod I. Pt then stood impulsively w/ SV and no AD. Pt amb 100' req CGA and use of fww. Mild unsteadiness was noted w/ no overt LOB occurring. Pt sats at 95% on 3L after amb w/ SOA noted. Pt UIC w/ spouse in room at end of session. PT will prog as pt guillermo.  -PH       Row Name 12/19/23 1130          Vital Signs    O2 Delivery Pre Treatment supplemental O2  -PH     Intra SpO2 (%) 95  -PH     O2 Delivery Intra Treatment supplemental O2  -PH     O2 Delivery Post Treatment supplemental O2  -PH       Row Name 12/19/23 1130          Positioning and Restraints    Pre-Treatment Position in bed  -PH     Post Treatment Position chair  -PH     In Chair sitting;call light within reach;encouraged to call for assist;exit alarm on;with nsg  -PH               User Key  (r) = Recorded By, (t) = Taken By, (c) = Cosigned By      Initials Name Provider Type    PH Lourdes Davidson PTA Physical Therapist Assistant                    Outcome Measures       Row Name 12/19/23 1132          How much help from another person do you currently need...    Turning from your back to your side while in flat bed without using bedrails? 4  -PH     Moving from lying on back to sitting on the side of a flat bed without bedrails? 4  -PH     Moving to and from a bed to a chair (including a wheelchair)? 3  -PH     Standing up from a chair using your arms (e.g., wheelchair, bedside chair)? 3  -PH     Climbing 3-5 steps with a railing? 3  -PH     To walk in hospital room? 3  -PH     AM-PAC 6 Clicks Score (PT) 20  -PH     Highest Level of Mobility Goal 6 --> Walk 10 steps or more  -PH       Row Name 12/19/23 1132          Functional Assessment    Outcome Measure Options AM-PAC 6 Clicks Basic Mobility (PT)  -PH               User Key  (r) = Recorded By, (t) = Taken By, (c) = Cosigned By      Initials Name Provider Type    Lourdes Marques PTA Physical Therapist Assistant                                 Physical Therapy Education       Title: PT OT SLP Therapies (In Progress)       Topic: Physical Therapy (In Progress)       Point: Mobility training (Done)       Learning Progress Summary             Patient Acceptance, E,TB, VU,NR by  at 12/19/2023 1133    Acceptance, E, NR by  at 12/18/2023 1542    Acceptance, E,TB, VU by KIA at 12/18/2023 0941    Acceptance, E, VU,NR by HANNA at 12/17/2023 1544    Acceptance, E, VU by JP at 12/17/2023 0601    Acceptance, E, NR by AR at 12/15/2023 0933                         Point: Home exercise program (In Progress)       Learning Progress Summary             Patient Acceptance, E, NR by  at 12/18/2023 1542    Acceptance, E,TB, VU by KIA at 12/18/2023 0941    Acceptance, E, VU by JP at 12/17/2023 0601    Acceptance, E, NR by AR at 12/15/2023 0933                         Point: Body mechanics (Done)       Learning Progress Summary             Patient Acceptance, E,TB, VU,NR by  at 12/19/2023 1133    Acceptance, E, NR  by LH at 12/18/2023 1542    Acceptance, E,TB, VU by KW at 12/18/2023 0941    Acceptance, E, VU,NR by EB at 12/17/2023 1544    Acceptance, E, VU by JL at 12/17/2023 0601    Acceptance, E, NR by AR at 12/15/2023 0933                         Point: Precautions (Done)       Learning Progress Summary             Patient Acceptance, E,TB, VU,NR by  at 12/19/2023 1133    Acceptance, E, NR by LH at 12/18/2023 1542    Acceptance, E,TB, VU by KW at 12/18/2023 0941    Acceptance, E, VU,NR by EB at 12/17/2023 1544    Acceptance, E, VU by JL at 12/17/2023 0601    Acceptance, E, NR by AR at 12/15/2023 0933                                         User Key       Initials Effective Dates Name Provider Type Discipline     06/16/21 -  Angela Nicole, PT Physical Therapist PT    AR 06/16/21 -  Jeannette Quinn PT Physical Therapist PT    KW 10/17/23 -  Regi Silva, RN Registered Nurse Nurse    EB 02/14/23 -  Celeste Clarke PTA Physical Therapist Assistant PT    PH 06/16/21 -  Lourdes Davidson PTA Physical Therapist Assistant PT    JL 09/07/23 -  Geetha Villanueva, RN Registered Nurse Nurse                  PT Recommendation and Plan     Plan of Care Reviewed With: patient, spouse  Progress: improving  Outcome Evaluation: Pt seen by PT this AM for tx. Pt was in bed and sat up to EOB quickly w/ mod I. Pt then stood impulsively w/ SV and no AD. Pt amb 100' req CGA and use of fww. Mild unsteadiness was noted w/ no overt LOB occurring. Pt sats at 95% on 3L after amb w/ SOA noted. Pt UIC w/ spouse in room at end of session. PT will prog as pt guillermo.     Time Calculation:         PT Charges       Row Name 12/19/23 1133             Time Calculation    Start Time 1112  -PH      Stop Time 1125  -PH      Time Calculation (min) 13 min  -PH      PT Received On 12/19/23  -PH      PT - Next Appointment 12/20/23  -PH         Timed Charges    61029 - PT Therapeutic Activity Minutes 13  -PH         Total Minutes    Timed Charges Total  Minutes 13  -PH       Total Minutes 13  -PH                User Key  (r) = Recorded By, (t) = Taken By, (c) = Cosigned By      Initials Name Provider Type    Lourdes Marques PTA Physical Therapist Assistant                  Therapy Charges for Today       Code Description Service Date Service Provider Modifiers Qty    [id number]  PT THERAPEUTIC ACT EA 15 MIN 12/19/2023 Lourdes Davidson PTA GP 1            PT G-Codes  Outcome Measure Options: AM-PAC 6 Clicks Basic Mobility (PT)  AM-PAC 6 Clicks Score (PT): 20  PT Discharge Summary  Anticipated Discharge Disposition (PT): home with home health, skilled nursing facility    Lourdes Davidson PTA  12/19/2023

## 2023-12-19 NOTE — PROGRESS NOTES
Name: Eric Kramer ADMIT: 2023   : 1940  PCP: Elder Block MD    MRN: [id number] LOS: 7 days   AGE/SEX: 83 y.o. male  ROOM: [facility]     Subjective   Subjective   No acute events overnight.  Patient has had improvement in his oxygen requirements.  He is currently requiring 3 L nasal cannula which is compared to the 6 L he required yesterday.  States that he is feeling much better.  Wife is not at bedside today.    Objective   Objective   Vital Signs  Temp:  [98.2 °F (36.8 °C)-99.3 °F (37.4 °C)] 98.2 °F (36.8 °C)  Heart Rate:  [77-89] 77  Resp:  [8-18] 16  BP: ()/(64-79) 124/79  SpO2:  [90 %-99 %] 91 %  on  Flow (L/min):  [2-4] 2;   Device (Oxygen Therapy): nasal cannula  Body mass index is 28.81 kg/m².  General: Alert, no acute distress.  More comfortable appearing today.  Fully oriented.  ENT: No conjunctival injection or scleral icterus. Moist mucous membranes.   Neuro: Eyes open and moving in all directions, strength normal in all extremities, no focal deficits.   Lungs: Coarse breath sounds throughout, no wheezing.  No focal crackles.  Mild tachypnea.  Nasal cannula in place.  Heart: RRR, no murmurs. No edema.  Abdomen: Soft, non-tender, non-distended. Normal bowel sounds.   Ext: Warm and well-perfused. No edema.   Skin: No rashes or lesions. IV site without swelling or erythema.     Results Review     I reviewed the patient's new clinical results.  Results from last 7 days   Lab Units 23  0543 23  0547 23  0842 23  0856   WBC 10*3/mm3 7.71 9.20 8.82 7.75   HEMOGLOBIN g/dL 12.7* 13.3 14.3 14.0   PLATELETS 10*3/mm3 353 336 331 325     Results from last 7 days   Lab Units 23  0543 23  1614 23  0547 23  0842 23  0856   SODIUM mmol/L 140  --  139 138 143   POTASSIUM mmol/L 4.1 4.6 3.5 3.9 3.9   CHLORIDE mmol/L 102  --  100 100 103   CO2 mmol/L 25.0  --  26.4 24.4 27.7   BUN mg/dL 37*  --  34* 25* 22   CREATININE mg/dL 1.69*  --   1.55* 1.40* 1.18   GLUCOSE mg/dL 145*  --  141* 129* 127*   Estimated Creatinine Clearance: 42.9 mL/min (A) (by C-G formula based on SCr of 1.69 mg/dL (H)).  Results from last 7 days   Lab Units 12/19/23  0543 12/18/23  0547 12/17/23  0842 12/16/23  0856   ALBUMIN g/dL 3.1* 3.0* 3.0* 3.6     Results from last 7 days   Lab Units 12/19/23  0543 12/18/23  0547 12/17/23  0842 12/16/23  0856   CALCIUM mg/dL 9.6 9.7 9.5 10.1   ALBUMIN g/dL 3.1* 3.0* 3.0* 3.6   PHOSPHORUS mg/dL 3.0 3.5 3.4 4.3     Results from last 7 days   Lab Units 12/12/23  1450   LACTATE mmol/L 1.9     COVID19   Date Value Ref Range Status   12/12/2023 Not Detected Not Detected - Ref. Range Final     Glucose   Date/Time Value Ref Range Status   12/19/2023 1122 192 (H) 70 - 130 mg/dL Final   12/19/2023 0649 145 (H) 70 - 130 mg/dL Final   12/18/2023 2101 177 (H) 70 - 130 mg/dL Final   12/18/2023 1628 127 70 - 130 mg/dL Final   12/18/2023 1108 202 (H) 70 - 130 mg/dL Final   12/18/2023 0612 129 70 - 130 mg/dL Final   12/17/2023 2030 196 (H) 70 - 130 mg/dL Final       CT Chest Without Contrast Diagnostic  Narrative: CT CHEST WO CONTRAST DIAGNOSTIC-     HISTORY: Hypoxia, not improving with treatment of pneumonia.     TECHNIQUE: CT of the chest was performed without intravenous contrast.  Reformatted images were reviewed. Radiation dose reduction techniques  were utilized, including automated exposure control and exposure  modulation based on body size.     COMPARISON: Chest radiograph 12/14/2023        FINDINGS:       No pathologically enlarged thoracic lymph nodes are identified. Heart  size is normal. There is no pericardial effusion. There is advanced  calcific coronary artery atherosclerosis and/or coronary artery stents.  There is relatively advanced calcific aortic atherosclerosis. The  ascending aorta is dilated to 4.2 cm at the level of the pulmonary  trunk. The descending artery is ectatic to 3.0 cm. The pulmonary trunk  is dilated to 4.0 cm. There  is no significant pleural effusion.  Limited imaging through the upper abdomen demonstrates hepatic  steatosis. There is cholelithiasis. There is calcific atherosclerosis.  There is a 2.3 cm hypodense left adrenal nodule, which is incompletely  characterized.  There is left greater than right gynecomastia. There is multilevel  degenerative disc disease.  The trachea is clear. There is mild dependent atelectasis in the  posterior lungs. There is mild groundglass, curvilinear, and nodular  opacity in the right upper lobe. Right pulmonary opacities have improved  from 12/14/2023 when accounting for differences in modality. There is no  dense focal consolidation.        Impression:    1.  Mild groundglass, curvilinear, and nodular opacities in the right  upper lobe have improved from 12/14/2023 when accounting for differences  in modality and may be infectious/inflammatory. Recommend short-term  follow-up CT chest in 3 months to document complete resolution.  2.  Thoracic aortic enlargement, as above.  3.  Incompletely characterized left adrenal nodule. Recommend comparison  with prior imaging, if available. Otherwise, recommend further  evaluation with multiphase CT or contrast-enhanced MRI. At minimum,  recommend attention on follow-up imaging.  4.  Hepatic steatosis and cholelithiasis.              This report was finalized on 12/18/2023 9:47 AM by Dr. Nadja Reddy M.D  on Workstation: [id number]       I reviewed the patient's daily medications.  Scheduled Medications  aspirin, 325 mg, Oral, Daily  DULoxetine, 60 mg, Oral, Daily  enoxaparin, 40 mg, Subcutaneous, Q24H  gabapentin, 300 mg, Oral, Nightly  hydroCHLOROthiazide, 25 mg, Oral, Daily  insulin regular, 2-7 Units, Subcutaneous, 4x Daily AC & at Bedtime  ipratropium-albuterol, 3 mL, Nebulization, 4x Daily - RT  levothyroxine, 25 mcg, Oral, Q AM  metoprolol tartrate, 25 mg, Oral, Q12H  QUEtiapine, 25 mg, Oral, Nightly  senna-docusate sodium, 2 tablet, Oral,  BID  sodium chloride, 10 mL, Intravenous, Q12H    Infusions   Diet  Diet: Diabetic Diets; Consistent Carbohydrate; Texture: Regular Texture (IDDSI 7); Fluid Consistency: Thin (IDDSI 0)    Assessment/Plan     Active Hospital Problems    Diagnosis  POA    **Respiratory failure [J96.90]  Yes      Resolved Hospital Problems   No resolved problems to display.       83 y.o. male admitted with Respiratory failure.    Acute hypoxic and hypercapnic respiratory failure from pneumonia  -RVP negative, blood cultures no growth to date  -Chest x-ray with right upper lobe pneumonia, will need follow-up films in 4 to 6 weeks to ensure resolution  -Oxygen requirements remain between 4-6 L, goal saturations above 90%  -Completed 7-day course of antibiotics for PNA  -CT chest 12/18 with mild groundglass and nodular opacities in the right upper lobe improved from 12/14, recommend repeat CT scan in 3 months  -Continue Acapella flutter valve and incentive spirometry  -Patient has had significant improvement in past 24 hours, so do feel this is all related to his pneumonia and he is just slower to improve.  Hopefully oxygenation will continue to improve over the next 24-48 hours and he will be able to be discharged.    Thoracic aortic aneurysm  -CT chest with dilation of the ascending aorta to 4.2 cm at the level of the pulmonary trunk, descending artery is ectatic to 3.0 cm, pulmonary trunk is dilated to 4 cm  -Will order CT surgery follow-up as outpatient for repeat imaging    Adrenal nodule  -Abdominal finding of adrenal nodule on CT chest  -Recommendation for follow-up imaging versus multiphase CT/contrast-enhanced MRI  -Will defer this to PCP    KAREN on CKD 2  -Baseline appears to be 1.4-1.6  -Creatinine increased today to 1.69  -Creatinine has been increasing over the past few days, but seems to be getting closer to the patient's actual baseline  -Holding lisinopril today, continue HCTZ given creatinine is right at baseline  -Avoid  all nephrotoxic agents including contrast dyes and NSAIDs  -Monitor with daily BMP  -If creatinine continues to worsen tomorrow, will likely need to asked nephrology to see him    Elevated troponin from pneumonia/KAREN  -Echo with EF of 62.6%, LV diastolic and systolic function was normal  -Troponin downtrending  -Continue to monitor clinically    Diabetes type 2 with neuropathy  -Hold home metformin and glipizide  -Low-dose sliding scale insulin  -Blood glucose fairly well-controlled, continue to closely monitor    Hypertension  -Continue home hydrochlorothiazide, Lopressor  -Amlodipine discontinued 12/18, discontinuing lisinopril today given increased creatinine and lower blood pressures  -Closely monitor BPs and titrate medications as needed    Memory impairment with delirium  -Out of restraints  -Continue Seroquel  -Discussed with family on 12/15 it sounds like it MoCA test about a year ago that was pretty normal, but recommended further memory testing after discharge  -Seems to be improving after addition of Seroquel, continue to monitor    Lovenox 40 mg SC daily for DVT prophylaxis.  Full code.  Discussed with patient, family, nursing staff, CCP, and care team on multidisciplinary rounds.  Anticipate discharge: In the next 24-48 hours.  Wife not at bedside today, but yesterday she felt she would like to take the patient home.  However, I do feel he could likely benefit from SNF.  Case management is going to reach out to her today to discuss.  If creatinine improves and oxygen remains less than 2 L, the patient will likely be medically ready for discharge tomorrow.    Mary Ann Saucedo MD  [facility]  12/19/23  13:31 EST

## 2023-12-20 ENCOUNTER — TELEPHONE (OUTPATIENT)
Dept: FAMILY MEDICINE CLINIC | Facility: CLINIC | Age: 83
End: 2023-12-20
Payer: MEDICARE

## 2023-12-20 ENCOUNTER — READMISSION MANAGEMENT (OUTPATIENT)
Dept: CALL CENTER | Facility: HOSPITAL | Age: 83
End: 2023-12-20
Payer: MEDICARE

## 2023-12-20 VITALS
DIASTOLIC BLOOD PRESSURE: 81 MMHG | SYSTOLIC BLOOD PRESSURE: 151 MMHG | RESPIRATION RATE: 16 BRPM | WEIGHT: 224.87 LBS | HEART RATE: 98 BPM | OXYGEN SATURATION: 92 % | HEIGHT: 74 IN | TEMPERATURE: 98.2 F | BODY MASS INDEX: 28.86 KG/M2

## 2023-12-20 DIAGNOSIS — E11.42 DIABETIC PERIPHERAL NEUROPATHY: ICD-10-CM

## 2023-12-20 LAB
ALBUMIN SERPL-MCNC: 3.2 G/DL (ref 3.5–5.2)
ANION GAP SERPL CALCULATED.3IONS-SCNC: 11.2 MMOL/L (ref 5–15)
BUN SERPL-MCNC: 38 MG/DL (ref 8–23)
BUN/CREAT SERPL: 24.5 (ref 7–25)
CALCIUM SPEC-SCNC: 9.7 MG/DL (ref 8.6–10.5)
CHLORIDE SERPL-SCNC: 103 MMOL/L (ref 98–107)
CO2 SERPL-SCNC: 26.8 MMOL/L (ref 22–29)
CREAT SERPL-MCNC: 1.55 MG/DL (ref 0.76–1.27)
DEPRECATED RDW RBC AUTO: 44 FL (ref 37–54)
EGFRCR SERPLBLD CKD-EPI 2021: 44.4 ML/MIN/1.73
ERYTHROCYTE [DISTWIDTH] IN BLOOD BY AUTOMATED COUNT: 13.3 % (ref 12.3–15.4)
GLUCOSE BLDC GLUCOMTR-MCNC: 165 MG/DL (ref 70–130)
GLUCOSE BLDC GLUCOMTR-MCNC: 207 MG/DL (ref 70–130)
GLUCOSE SERPL-MCNC: 156 MG/DL (ref 65–99)
HCT VFR BLD AUTO: 41.2 % (ref 37.5–51)
HGB BLD-MCNC: 12.8 G/DL (ref 13–17.7)
MCH RBC QN AUTO: 28.1 PG (ref 26.6–33)
MCHC RBC AUTO-ENTMCNC: 31.1 G/DL (ref 31.5–35.7)
MCV RBC AUTO: 90.5 FL (ref 79–97)
PHOSPHATE SERPL-MCNC: 3.4 MG/DL (ref 2.5–4.5)
PLATELET # BLD AUTO: 428 10*3/MM3 (ref 140–450)
PMV BLD AUTO: 9.3 FL (ref 6–12)
POTASSIUM SERPL-SCNC: 4.1 MMOL/L (ref 3.5–5.2)
RBC # BLD AUTO: 4.55 10*6/MM3 (ref 4.14–5.8)
SODIUM SERPL-SCNC: 141 MMOL/L (ref 136–145)
WBC NRBC COR # BLD AUTO: 6.85 10*3/MM3 (ref 3.4–10.8)

## 2023-12-20 PROCEDURE — 36415 COLL VENOUS BLD VENIPUNCTURE: CPT | Performed by: INTERNAL MEDICINE

## 2023-12-20 PROCEDURE — 94799 UNLISTED PULMONARY SVC/PX: CPT

## 2023-12-20 PROCEDURE — 94664 DEMO&/EVAL PT USE INHALER: CPT

## 2023-12-20 PROCEDURE — 80069 RENAL FUNCTION PANEL: CPT | Performed by: INTERNAL MEDICINE

## 2023-12-20 PROCEDURE — 63710000001 INSULIN REGULAR HUMAN PER 5 UNITS: Performed by: STUDENT IN AN ORGANIZED HEALTH CARE EDUCATION/TRAINING PROGRAM

## 2023-12-20 PROCEDURE — 82948 REAGENT STRIP/BLOOD GLUCOSE: CPT

## 2023-12-20 PROCEDURE — 85027 COMPLETE CBC AUTOMATED: CPT | Performed by: STUDENT IN AN ORGANIZED HEALTH CARE EDUCATION/TRAINING PROGRAM

## 2023-12-20 PROCEDURE — 94618 PULMONARY STRESS TESTING: CPT

## 2023-12-20 RX ORDER — ALPRAZOLAM 2 MG/1
2 TABLET ORAL NIGHTLY PRN
Qty: 90 TABLET | Refills: 1 | Status: SHIPPED | OUTPATIENT
Start: 2023-12-20

## 2023-12-20 RX ADMIN — INSULIN HUMAN 2 UNITS: 100 INJECTION, SOLUTION PARENTERAL at 08:50

## 2023-12-20 RX ADMIN — IPRATROPIUM BROMIDE AND ALBUTEROL SULFATE 3 ML: 2.5; .5 SOLUTION RESPIRATORY (INHALATION) at 10:11

## 2023-12-20 RX ADMIN — ASPIRIN 325 MG: 325 TABLET ORAL at 08:50

## 2023-12-20 RX ADMIN — LEVOTHYROXINE SODIUM 25 MCG: 25 TABLET ORAL at 08:50

## 2023-12-20 RX ADMIN — DULOXETINE HYDROCHLORIDE 60 MG: 60 CAPSULE, DELAYED RELEASE ORAL at 08:50

## 2023-12-20 RX ADMIN — IPRATROPIUM BROMIDE AND ALBUTEROL SULFATE 3 ML: 2.5; .5 SOLUTION RESPIRATORY (INHALATION) at 06:43

## 2023-12-20 RX ADMIN — Medication 10 ML: at 08:51

## 2023-12-20 RX ADMIN — HYDROCHLOROTHIAZIDE 25 MG: 25 TABLET ORAL at 08:51

## 2023-12-20 RX ADMIN — METOPROLOL TARTRATE 25 MG: 25 TABLET, FILM COATED ORAL at 08:50

## 2023-12-20 NOTE — DISCHARGE SUMMARY
Patient Name: Eric Kramer  : 1940  MRN: [id number]    Date of Admission: 2023  Date of Discharge:  2023  Primary Care Physician: Elder Block MD      Chief Complaint:   Low Sat      Discharge Diagnoses     Active Hospital Problems    Diagnosis  POA    **Respiratory failure [J96.90]  Yes      Resolved Hospital Problems   No resolved problems to display.        Hospital Course     Mr. Kramer is a 83 y.o. male with a history of CKD 2, diabetes type 2 with neuropathy, hypertension, presenting with dyspnea found to have acute hypoxic and hypercapnic respiratory failure from pneumonia.  Initially required ICU admission for BiPAP and high flow nasal cannula.  Completed a 7-day course of antibiotics for his pneumonia.  Has been difficult to wean him off oxygen, was initially on high flow.  CT chest was done as he required oxygen for longer than expected, this appeared improving but did find a thoracic aortic aneurysm and adrenal nodule that needed needed to be followed up as an outpatient.  Patient also had an KAREN that improved with fluids.    Walking oximetry on day of discharge patient to qualify for any type of oxygen.    Blood pressure has been on the low side this admission have discontinued amlodipine.  Continue lisinopril and hydrochlorothiazide    Patient did have significant delirium while admitted that improved with Seroquel.  Discussed with wife and it sounds like he had a MoCA test done about a year ago that was pretty normal but recommended further memory testing after discharge once he is recovered from this pneumonia.    At the time of discharge patient was told to take all medications as prescribed, keep all follow-up appointments, and call their doctor or return to the hospital with any worsening or concerning symptoms.    Day of Discharge     Subjective:  Patient lying comfortably in bed.  Discussed possible discharge later today.  Discussed with wife and they have a first  floor bedroom.    Review of Systems   Constitutional:  Negative for chills and fever.   Respiratory:  Negative for cough and shortness of breath.    Cardiovascular:  Negative for chest pain and palpitations.   Gastrointestinal:  Negative for abdominal pain, diarrhea, nausea and vomiting.       Physical Exam:  Temp:  [97.9 °F (36.6 °C)-98.6 °F (37 °C)] 98.2 °F (36.8 °C)  Heart Rate:  [71-98] 98  Resp:  [16] 16  BP: (116-151)/(64-81) 151/81  Body mass index is 28.87 kg/m².  Physical Exam  Constitutional:       General: He is not in acute distress.  Cardiovascular:      Rate and Rhythm: Normal rate and regular rhythm.   Pulmonary:      Effort: Pulmonary effort is normal. No respiratory distress.  Diminished breath sounds bilaterally.  Abdominal:      General: Abdomen is flat. There is no distension.      Tenderness: There is no abdominal tenderness.   Musculoskeletal:         General: No swelling or deformity.   Skin:     General: Skin is warm and dry.   Neurological:      General: No focal deficit present.  At baseline.    Consultants     Consult Orders (all) (From admission, onward)       Start     Ordered    12/14/23 1647  Inpatient Hospitalist Consult  Once        Specialty:  Hospitalist  Provider:  Danika Cavanaugh MD    12/14/23 1646    12/12/23 1600  Pulmonology (on-call MD unless specified)  Once        Specialty:  Pulmonary Disease  Provider:  (Not yet assigned)    12/12/23 1559                  Procedures     Imaging Results (All)       Procedure Component Value Units Date/Time    CT Chest Without Contrast Diagnostic [[id number]] Collected: 12/18/23 0940     Updated: 12/18/23 0950    Narrative:      CT CHEST WO CONTRAST DIAGNOSTIC-     HISTORY: Hypoxia, not improving with treatment of pneumonia.     TECHNIQUE: CT of the chest was performed without intravenous contrast.  Reformatted images were reviewed. Radiation dose reduction techniques  were utilized, including automated exposure control and  exposure  modulation based on body size.     COMPARISON: Chest radiograph 12/14/2023        FINDINGS:       No pathologically enlarged thoracic lymph nodes are identified. Heart  size is normal. There is no pericardial effusion. There is advanced  calcific coronary artery atherosclerosis and/or coronary artery stents.  There is relatively advanced calcific aortic atherosclerosis. The  ascending aorta is dilated to 4.2 cm at the level of the pulmonary  trunk. The descending artery is ectatic to 3.0 cm. The pulmonary trunk  is dilated to 4.0 cm. There is no significant pleural effusion.  Limited imaging through the upper abdomen demonstrates hepatic  steatosis. There is cholelithiasis. There is calcific atherosclerosis.  There is a 2.3 cm hypodense left adrenal nodule, which is incompletely  characterized.  There is left greater than right gynecomastia. There is multilevel  degenerative disc disease.  The trachea is clear. There is mild dependent atelectasis in the  posterior lungs. There is mild groundglass, curvilinear, and nodular  opacity in the right upper lobe. Right pulmonary opacities have improved  from 12/14/2023 when accounting for differences in modality. There is no  dense focal consolidation.          Impression:         1.  Mild groundglass, curvilinear, and nodular opacities in the right  upper lobe have improved from 12/14/2023 when accounting for differences  in modality and may be infectious/inflammatory. Recommend short-term  follow-up CT chest in 3 months to document complete resolution.  2.  Thoracic aortic enlargement, as above.  3.  Incompletely characterized left adrenal nodule. Recommend comparison  with prior imaging, if available. Otherwise, recommend further  evaluation with multiphase CT or contrast-enhanced MRI. At minimum,  recommend attention on follow-up imaging.  4.  Hepatic steatosis and cholelithiasis.              This report was finalized on 12/18/2023 9:47 AM by Dr. Saez  Barry WASHBURN  on Workstation: [id number]       XR Hand 3+ View Right [[id number]] Collected: 12/16/23 1757     Updated: 12/16/23 1822    Narrative:      RIGHT HAND: PA, LATERAL, OBLIQUE     HISTORY: Right hand pain.     COMPARISON: None     FINDINGS: There is nonuniform joint space narrowing at the  interphalangeal joints consistent with osteoarthritis. There is also  joint space narrowing at the 2nd MCP joint which is an atypical location  for osteoarthritis and may be associated with inflammatory arthritis or  CPPD arthropathy. There is advanced arthritis at the radiocarpal joints  with complete joint space loss. Widening is present of the scapholunate  interval with proximal migration of the capitate. Chondrocalcinosis  overlies the triangular fibrocartilage. There is generalized soft tissue  swelling. There is also soft tissue calcification projecting medial to  the head of the 3rd middle phalanx.       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 Generalized soft tissue swelling.     This report was finalized on 12/16/2023 6:18 PM by Dr. Noé Simmons M.D on Workstation: [id number]       XR Chest 1 View [[id number]] Collected: 12/14/23 0443     Updated: 12/14/23 0447    Narrative:      SINGLE VIEW OF THE CHEST     HISTORY: Pneumonia     COMPARISON: December 12, 2023     FINDINGS:  There is cardiomegaly. There is tortuosity and ectasia of the thoracic  aorta. Extensive infiltrates are noted throughout the right lung. These  are probably worsened when compared to prior exam, as has right-sided  atelectasis. There may be a trace right pleural effusion. No  pneumothorax is seen. There is some left basilar atelectasis.  Interstitial prominence throughout the left lung is more apparent.       Impression:      Worsening infiltrate and volume loss on the right. There is also some  increasing interstitial prominence within the left lung.     This report was finalized on 12/14/2023  "4:44 AM by Dr. Jennifer Morales M.D on Workstation: [id number]       XR Chest 1 View [[id number]] Collected: 12/12/23 1329     Updated: 12/12/23 1332    Narrative:      XR CHEST 1 VW-12/12/2023     HISTORY: Shortness of breath.     Heart size is at the upper limits of normal. There is moderately severe  ill-defined increased density in the right upper to midlung. Left lung  appears clear. Lungs are underinflated. There is some aortic  calcification.       Impression:      1. Borderline cardiomegaly.  2. Moderately severe right upper lobe infiltrate consistent with  pneumonia. Follow-up films recommended to assess clearing.        This report was finalized on 12/12/2023 1:29 PM by Dr. Pravin Santana M.D on Workstation: [id number]               Pertinent Labs     Results from last 7 days   Lab Units 12/20/23  0528 12/19/23  0543 12/18/23  0547 12/17/23  0842   WBC 10*3/mm3 6.85 7.71 9.20 8.82   HEMOGLOBIN g/dL 12.8* 12.7* 13.3 14.3   PLATELETS 10*3/mm3 428 353 336 331     Results from last 7 days   Lab Units 12/20/23  0528 12/19/23  0543 12/18/23  1614 12/18/23  0547 12/17/23  0842   SODIUM mmol/L 141 140  --  139 138   POTASSIUM mmol/L 4.1 4.1 4.6 3.5 3.9   CHLORIDE mmol/L 103 102  --  100 100   CO2 mmol/L 26.8 25.0  --  26.4 24.4   BUN mg/dL 38* 37*  --  34* 25*   CREATININE mg/dL 1.55* 1.69*  --  1.55* 1.40*   GLUCOSE mg/dL 156* 145*  --  141* 129*   Estimated Creatinine Clearance: 46.8 mL/min (A) (by C-G formula based on SCr of 1.55 mg/dL (H)).  Results from last 7 days   Lab Units 12/20/23  0528 12/19/23  0543 12/18/23  0547 12/17/23  0842   ALBUMIN g/dL 3.2* 3.1* 3.0* 3.0*     Results from last 7 days   Lab Units 12/20/23  0528 12/19/23  0543 12/18/23  0547 12/17/23  0842   CALCIUM mg/dL 9.7 9.6 9.7 9.5   ALBUMIN g/dL 3.2* 3.1* 3.0* 3.0*   PHOSPHORUS mg/dL 3.4 3.0 3.5 3.4       Results from last 7 days   Lab Units 12/15/23  0900 12/15/23  0504   HSTROP T ng/L 65* 69*           Invalid input(s): \"LDLCALC\"   "      Test Results Pending at Discharge       Discharge Details        Discharge Medications        Continue These Medications        Instructions Start Date   ALPRAZolam 2 MG tablet  Commonly known as: XANAX   2 mg, Oral, Nightly PRN      aspirin 325 MG tablet   325 mg, Oral, Daily      B-12 1000 MCG sublingual tablet   One sublingual tab dissolved on tongue daily      DULoxetine 60 MG capsule  Commonly known as: CYMBALTA   60 mg, Oral, Daily      gabapentin 300 MG capsule  Commonly known as: NEURONTIN   2 capsules BID      glipizide 10 MG tablet  Commonly known as: Glucotrol   10 mg, Oral, Daily With Breakfast, NOTE NEW DIRECTIONS!!!      hydroCHLOROthiazide 25 MG tablet  Commonly known as: HYDRODIURIL   25 mg, Oral, Daily      levothyroxine 25 MCG tablet  Commonly known as: SYNTHROID, LEVOTHROID   25 mcg, Oral, Daily      metFORMIN 1000 MG tablet  Commonly known as: Glucophage   1,000 mg, Oral, 2 Times Daily With Meals      metoprolol tartrate 25 MG tablet  Commonly known as: LOPRESSOR   25 mg, Oral, 2 Times Daily      nitroglycerin 0.4 MG SL tablet  Commonly known as: Nitrostat   0.4 mg, Sublingual, Every 5 Minutes PRN, Take no more than 3 doses in 15 minutes.      ramipril 10 MG capsule  Commonly known as: ALTACE   10 mg, Oral, Daily      simvastatin 40 MG tablet  Commonly known as: Zocor   One three times a week      traMADol 50 MG tablet  Commonly known as: ULTRAM   One to two tablets QID prn pain.             Stop These Medications      amLODIPine 10 MG tablet  Commonly known as: NORVASC              No Known Allergies      Discharge Disposition:  Home-Health Care [facility]    Discharge Diet:  Diet Order   Procedures    Diet: Diabetic Diets; Consistent Carbohydrate; Texture: Regular Texture (IDDSI 7); Fluid Consistency: Thin (IDDSI 0)       Discharge Activity:   As tolerated    CODE STATUS:    Code Status and Medical Interventions:   Ordered at: 12/12/23 8337     Code Status (Patient has no pulse and is not  breathing):    CPR (Attempt to Resuscitate)     Medical Interventions (Patient has pulse or is breathing):    Full Support       Future Appointments   Date Time Provider Department Center   2/5/2024 11:00 AM Elder Block MD [facility]  [id number]      Contact information for follow-up providers       Elder Block MD .    Specialty: Family Medicine  Contact information:  [address] [facility] [address]  [facility] [address]  [phone]                       Contact information for after-discharge care       Durable Medical Equipment       [facility] .    Service: Durable Medical Equipment  Contact information:  [address]  [facility] [address]  [phone]                                   Time Spent on Discharge:  Greater than 30 minutes      Georges Lozoya MD  [facility]  12/20/23  11:11 EST

## 2023-12-20 NOTE — OUTREACH NOTE
Prep Survey      Flowsheet Row Responses   [facility] patient discharged from? [facility]   Is LACE score < 7 ? No   Eligibility [facility]   Date of Admission 12/12/23   Date of Discharge 12/20/23   Discharge Disposition Home-Health Care Svc   Discharge diagnosis respiratory failure d/t PNA   Does the patient have one of the following disease processes/diagnoses(primary or secondary)? Pneumonia   Does the patient have Home health ordered? No   Is there a DME ordered? No   Prep survey completed? Yes            Shantell HYDE - Registered Nurse

## 2023-12-20 NOTE — TELEPHONE ENCOUNTER
OK for HUB to Relay    I called Patient to inform him that his Alprazolam has been sent in to his pharmacy

## 2023-12-20 NOTE — TELEPHONE ENCOUNTER
Patient's wife called to schedule his hospital follow up and to request a refill for his Alprazolam. Patient has enough to last until 1/2/2024, but he will need a refill to get to his appointment in February.

## 2023-12-20 NOTE — PROGRESS NOTES
Exercise Oximetry    Patient Name:Eric Kramer   MRN: [id number]   Date: 12/20/23             ROOM AIR BASELINE   SpO2% 92   Heart Rate    Blood Pressure      EXERCISE ON ROOM AIR SpO2% EXERCISE ON O2 @  LPM SpO2%   1 MINUTE  93 1 MINUTE    2 MINUTES  92 2 MINUTES    3 MINUTES  91 3 MINUTES    4 MINUTES  93 4 MINUTES    5 MINUTES  5 MINUTES    6 MINUTES  6 MINUTES               Distance Walked   Distance Walked   Dyspnea (Spring Scale)   Dyspnea (Spring Scale)   Fatigue (Spring Scale)   Fatigue (Spring Scale)   SpO2% Post Exercise  92 SpO2% Post Exercise   HR Post Exercise  93 HR Post Exercise   Time to Recovery  0 Time to Recovery     Comments: patient ambulated around Nurses desk and back to room without complaint of SOA. Patient sat maintained at 91% or higher on Room Air.

## 2023-12-20 NOTE — SIGNIFICANT NOTE
12/20/23 1129   OTHER   Discipline physical therapist   Rehab Time/Intention   Session Not Performed other (see comments)  (Plans to d/c home today. Reports mobility is at baseline. Just walked around unit for walking oximetry. PT will sign off)

## 2023-12-20 NOTE — CASE MANAGEMENT/SOCIAL WORK
Continued Stay Note  [facility]     Patient Name: Eric Kramer  MRN: [id number]  Today's Date: 12/20/2023    Admit Date: 12/12/2023    Plan: Home with wife   Discharge Plan       Row Name 12/20/23 1159       Plan    Plan Home with wife    Patient/Family in Agreement with Plan yes    Plan Comments Noted pt does not need oxygen at discharge, spoke with pt and wife at bedside, she declined need for hH services or any other dc needs. Based on interdisciplinary assessments, the recommended discharge plan is home with family. -Mary Ann OCONNELL                   Discharge Codes    No documentation.                 Expected Discharge Date and Time       Expected Discharge Date Expected Discharge Time    Dec 20, 2023               Mary Ann Ho RN

## 2023-12-20 NOTE — CASE MANAGEMENT/SOCIAL WORK
Case Management Discharge Note      Final Note: home no needs         Selected Continued Care - Discharged on 12/20/2023 Admission date: 12/12/2023 - Discharge disposition: Home-Health Care Svc      Destination    No services have been selected for the patient.                Durable Medical Equipment       Service Provider Selected Services Address Phone Fax Patient Preferred    [facility] [address] [address] [phone] --              Dialysis/Infusion    No services have been selected for the patient.                Home Medical Care    No services have been selected for the patient.                Therapy    No services have been selected for the patient.                Community Resources    No services have been selected for the patient.                Community & DME    No services have been selected for the patient.                    Transportation Services  Private: Car    Final Discharge Disposition Code: 01 - home or self-care

## 2023-12-21 ENCOUNTER — TRANSITIONAL CARE MANAGEMENT TELEPHONE ENCOUNTER (OUTPATIENT)
Dept: CALL CENTER | Facility: HOSPITAL | Age: 83
End: 2023-12-21
Payer: MEDICARE

## 2023-12-21 NOTE — PROGRESS NOTES
Enter Query Response Below      Query Response: Unable to clinically determine              If applicable, please update the problem list.     Patient: Eric Kramer        : 1940  Account: [id number]           Admit Date:         How to Respond to this query:       a. Click New Note     b. Answer query within the yellow box.                c. Update the Problem List, if applicable.      If you have any questions about this query contact me at: [email or web address]     Dr. Lozoya,     Patient with history of type 2 diabetes and CKD presented  for shortness of breath. Notes include right upper lobe pneumonia and acute respiratory failure. Patient treated with monitoring, supplemental oxygen, IV ceftriaxone  - , and IV azithromycin .    Please clarify the type of pneumonia the patient was treated/monitored for:     - Gram negative pneumonia (excluding Haemophilus influenzae)  - Bacterial pneumonia unspecified  - Other ___________________  - Unable to clinically determine    By submitting this query, we are merely seeking further clarification of documentation to accurately reflect all conditions that you are monitoring, evaluating, treating or that extend the hospitalization or utilize additional resources of care. Please utilize your independent clinical judgment when addressing the question(s) above.     This query and your response, once completed, will be entered into the legal medical record.    Sincerely,  Alfred Rodriguez RN, CDS  Clinical Documentation Integrity Program

## 2023-12-21 NOTE — OUTREACH NOTE
Call Center TCM Note      Flowsheet Row Responses   [facility] patient discharged from? [facility]   Does the patient have one of the following disease processes/diagnoses(primary or secondary)? Pneumonia   TCM attempt successful? Yes   Call start time 1358   Call end time 1401   Discharge diagnosis respiratory failure d/t PNA   Meds reviewed with patient/caregiver? Yes   Does the patient have all medications ordered at discharge? N/A   Is the patient taking all medications as directed (includes completed medication regime)? Yes   Does the patient have an appointment with their PCP within 7-14 days of discharge? Yes   Has home health visited the patient within 72 hours of discharge? N/A   Pulse Ox monitoring None   Psychosocial issues? No   Did the patient receive a copy of their discharge instructions? Yes   Nursing interventions Reviewed instructions with patient   What is the patient's perception of their health status since discharge? Improving   If the patient is a current smoker, are they able to teach back resources for cessation? Not a smoker   Is the patient/caregiver able to teach back the hierarchy of who to call/visit for symptoms/problems? PCP, Specialist, Home health nurse, Urgent Care, ED, 911 Yes   Is the patient/caregiver able to teach back signs and symptoms of worsening condition: Fever/chills, Chest pain   Is the patient/caregiver able to teach back importance of completing antibiotic course of treatment? Yes   TCM call completed? Yes   Wrap up additional comments D/C DX: Acute respiratory failure due to PNA - Pt feeling much better, breathing w/o issue.. No questions. Only medication change at d/c is discontinuation of Amlodipine for now. TCM APPT with PCP office (KAITLYN Bah for this visit) is 12/28/2023.   Call end time 1401            Yandy Langston MA    12/21/2023, 14:03 EST

## 2023-12-28 ENCOUNTER — OFFICE VISIT (OUTPATIENT)
Dept: FAMILY MEDICINE CLINIC | Facility: CLINIC | Age: 83
End: 2023-12-28
Payer: MEDICARE

## 2023-12-28 VITALS
BODY MASS INDEX: 26.82 KG/M2 | WEIGHT: 209 LBS | OXYGEN SATURATION: 94 % | HEIGHT: 74 IN | SYSTOLIC BLOOD PRESSURE: 135 MMHG | DIASTOLIC BLOOD PRESSURE: 81 MMHG | HEART RATE: 50 BPM | RESPIRATION RATE: 16 BRPM

## 2023-12-28 DIAGNOSIS — Z09 HOSPITAL DISCHARGE FOLLOW-UP: Primary | ICD-10-CM

## 2023-12-28 DIAGNOSIS — J18.9 PNEUMONIA OF BOTH LUNGS DUE TO INFECTIOUS ORGANISM, UNSPECIFIED PART OF LUNG: ICD-10-CM

## 2023-12-28 DIAGNOSIS — J96.02 ACUTE RESPIRATORY FAILURE WITH HYPOXIA AND HYPERCAPNIA: ICD-10-CM

## 2023-12-28 DIAGNOSIS — I71.20 THORACIC AORTIC ANEURYSM WITHOUT RUPTURE, UNSPECIFIED PART: ICD-10-CM

## 2023-12-28 DIAGNOSIS — N17.9 AKI (ACUTE KIDNEY INJURY): ICD-10-CM

## 2023-12-28 DIAGNOSIS — J96.01 ACUTE RESPIRATORY FAILURE WITH HYPOXIA AND HYPERCAPNIA: ICD-10-CM

## 2023-12-28 DIAGNOSIS — E27.8 ADRENAL NODULE: ICD-10-CM

## 2023-12-28 PROBLEM — I25.10 CAD (CORONARY ARTERY DISEASE): Status: ACTIVE | Noted: 2023-12-28

## 2023-12-28 PROBLEM — E27.9 ADRENAL NODULE: Status: ACTIVE | Noted: 2023-12-28

## 2023-12-28 NOTE — PROGRESS NOTES
Transitional Care Follow Up Visit  Subjective     Eric Kramer is a 83 y.o. male who presents for a transitional care management visit.    Within 48 business hours after discharge our office contacted him via telephone to coordinate his care and needs.      I reviewed and discussed the details of that call along with the discharge summary, hospital problems, inpatient lab results, inpatient diagnostic studies, and consultation reports with Eric.     Current outpatient and discharge medications have been reconciled for the patient.  Reviewed by: KAITLYN Cobb          12/20/2023     4:59 PM   Date of TCM Phone Call   [facility]   Date of Admission 12/12/2023   Date of Discharge 12/20/2023   Discharge Disposition Home-[facility]     Risk for Readmission (LACE) Score: 12 (12/20/2023  6:00 AM)      History of Present Illness   Course During Hospital Stay:    12/12/23-12/20/23; respiratory failure  C/O of dyspnea; found to have acute hypoxic and hypercapnic respiratory failure from pneumonia.  Initially required ICU admission for BiPAP and high flow nasal cannula.  Completed a 7-day course of antibiotics for his pneumonia.  Has been difficult to wean him off oxygen, was initially on high flow.  CT chest was done as he required oxygen for longer than expected, this appeared improving but did find a thoracic aortic aneurysm and adrenal nodule that needed needed to be followed up as an outpatient.  Patient also had an KAREN that improved with fluids.  Blood pressure has been on the low side this admission have discontinued amlodipine.  Continue lisinopril and hydrochlorothiazide     CT chest w/o contrast; 12/18/23  1.  Mild groundglass, curvilinear, and nodular opacities in the right  upper lobe have improved from 12/14/2023 when accounting for differences  in modality and may be infectious/inflammatory. Recommend short-term  follow-up CT chest in 3 months to document complete resolution.  2.   Thoracic aortic enlargement, as above.  3.  Incompletely characterized left adrenal nodule. Recommend comparison  with prior imaging, if available. Otherwise, recommend further  evaluation with multiphase CT or contrast-enhanced MRI. At minimum,  recommend attention on follow-up imaging.  4.  Hepatic steatosis and cholelithiasis.      Medications reviewed in depth with patient and wife; d/c norvasc.  F/U; none, referral for pulmonology, cardiology, endocrinology due to CT findings.  Oxygen at home? No   PT: no   Home health; no    -Patient reports overall he is feeling much better. His SOB has improved.   BP Readings from Last 3 Encounters:   12/28/23 135/81   12/20/23 151/81   12/12/23 106/62   -Discussed staying off Norvasc, and f/u on HTN with Dr harris at next visit. Continue HCTZ and metoprolol.      The following portions of the patient's history were reviewed and updated as appropriate: He  has a past medical history of Acute foot pain, left, Acute UTI, Arteriosclerosis of coronary artery, Basal cell carcinoma (BCC) of face, Benign enlargement of prostate, Cataract, Chest pain, Diabetic peripheral neuropathy, Edema, Hyperlipidemia, Hypertension, Insomnia, Malignant neoplasm in situ of colon, Memory impairment, Nephrolithiasis, Osteoarthritis, Prostatitis, Renal azotemia, Stroke, and Type 2 diabetes mellitus.  He does not have any pertinent problems on file.  He  has a past surgical history that includes Appendectomy; Cataract Extraction (Left); Angioplasty (Right); Coronary angioplasty with stent; Colectomy partial / total; and Toe Surgery (Right, 07/2021).  His family history includes Cataracts in his maternal grandmother; Colon cancer in his maternal grandmother, mother, and another family member; Emphysema in his father; Hypertension in his father and mother; Osteoarthritis in his father and mother; Rheum arthritis in his sister.  He  reports that he has quit smoking. He has never used smokeless tobacco.  He reports that he does not currently use alcohol. He reports that he does not use drugs.  Current Outpatient Medications   Medication Sig Dispense Refill    ALPRAZolam (XANAX) 2 MG tablet Take 1 tablet by mouth At Night As Needed for Sleep. 90 tablet 1    aspirin 325 MG tablet Take 1 tablet by mouth Daily.      Cyanocobalamin (B-12) 1000 MCG sublingual tablet One sublingual tab dissolved on tongue daily 90 each 3    DULoxetine (CYMBALTA) 60 MG capsule Take 1 capsule by mouth Daily. 90 capsule 3    gabapentin (NEURONTIN) 300 MG capsule 2 capsules  capsule 3    glipizide (Glucotrol) 10 MG tablet Take 1 tablet by mouth Daily With Breakfast. NOTE NEW DIRECTIONS!!! 90 tablet 3    hydroCHLOROthiazide (HYDRODIURIL) 25 MG tablet Take 1 tablet by mouth Daily. 90 tablet 3    levothyroxine (SYNTHROID, LEVOTHROID) 25 MCG tablet Take 1 tablet by mouth Daily. 90 tablet 3    metFORMIN (Glucophage) 1000 MG tablet Take 1 tablet by mouth 2 (Two) Times a Day With Meals. 180 tablet 3    metoprolol tartrate (LOPRESSOR) 25 MG tablet Take 1 tablet by mouth 2 (Two) Times a Day. 180 tablet 3    nitroglycerin (Nitrostat) 0.4 MG SL tablet Place 1 tablet under the tongue Every 5 (Five) Minutes As Needed for Chest Pain. Take no more than 3 doses in 15 minutes. 25 tablet 12    ramipril (ALTACE) 10 MG capsule Take 1 capsule by mouth once daily 90 capsule 3    simvastatin (Zocor) 40 MG tablet One three times a week 90 tablet 3    traMADol (ULTRAM) 50 MG tablet One to two tablets QID prn pain. 240 tablet 3     No current facility-administered medications for this visit.     He has No Known Allergies..    Review of Systems    Objective   Physical Exam  Vitals reviewed.   Constitutional:       General: He is not in acute distress.     Appearance: Normal appearance.   HENT:      Head: Normocephalic and atraumatic.      Right Ear: Tympanic membrane normal.      Left Ear: Tympanic membrane normal.      Nose: Nose normal. No congestion.       Mouth/Throat:      Mouth: Mucous membranes are moist.      Pharynx: No oropharyngeal exudate or posterior oropharyngeal erythema.   Eyes:      Conjunctiva/sclera: Conjunctivae normal.      Pupils: Pupils are equal, round, and reactive to light.   Cardiovascular:      Rate and Rhythm: Normal rate and regular rhythm.      Pulses: Normal pulses.      Heart sounds: No murmur heard.     No gallop.   Pulmonary:      Effort: Pulmonary effort is normal. No respiratory distress.      Breath sounds: Normal breath sounds. No wheezing.   Abdominal:      General: Bowel sounds are normal. There is no distension.      Palpations: Abdomen is soft.      Tenderness: There is no abdominal tenderness.   Musculoskeletal:         General: Normal range of motion.      Cervical back: Normal range of motion and neck supple. No tenderness.      Right lower leg: No edema.      Left lower leg: No edema.   Skin:     General: Skin is warm and dry.   Neurological:      Mental Status: He is alert and oriented to person, place, and time. Mental status is at baseline.   Psychiatric:         Mood and Affect: Mood normal.         Assessment & Plan   Problems Addressed this Visit       Hospital discharge follow-up - Primary    Respiratory failure    Relevant Orders    CT Chest With Contrast    Ambulatory Referral to Pulmonology    Pneumonia of both lungs due to infectious organism    Relevant Orders    CT Chest With Contrast    Ambulatory Referral to Pulmonology    Thoracic aortic aneurysm without rupture    Relevant Orders    Ambulatory Referral to Cardiology (Completed)    Adrenal nodule    Relevant Orders    CT Abdomen Pelvis With & Without Contrast    Ambulatory Referral to Endocrinology     Diagnoses         Codes Comments    Hospital discharge follow-up    -  Primary ICD-10-CM: Z09  ICD-9-CM: V67.59     Acute respiratory failure with hypoxia and hypercapnia     ICD-10-CM: J96.01, J96.02  ICD-9-CM: 518.81     Pneumonia of both lungs due to  infectious organism, unspecified part of lung     ICD-10-CM: J18.9  ICD-9-CM: 483.8     Thoracic aortic aneurysm without rupture, unspecified part     ICD-10-CM: I71.20  ICD-9-CM: 441.2     Adrenal nodule     ICD-10-CM: E27.8  ICD-9-CM: 255.8                  -Repeat cmp in February with he sees PCP.  -Discussed staying off Norvasc, and f/u on HTN with Dr harris at next visit. -Continue HCTZ and metoprolol.   -Repeat Ct of chest in 3 months per radiology recommendations.  -CT of abdomen with adrenal protocol ordered to follow-up on left adrenal nodule found on CT of chest scan in hospital.  -Referral to cardiology placed for incidental finding of thoracic aortic aneurysm.

## 2024-01-10 ENCOUNTER — TELEPHONE (OUTPATIENT)
Dept: FAMILY MEDICINE CLINIC | Facility: CLINIC | Age: 84
End: 2024-01-10

## 2024-01-10 NOTE — TELEPHONE ENCOUNTER
Caller: DANIEL    Relationship:     Best call back number: [phone]     What is the best time to reach you:  ANY    Who are you requesting to speak with (clinical staff, provider,  specific staff member): CLINICAL STAFF    Do you know the name of the person who called:  DANIEL IN SCHEDULING     What was the call regarding: DANIEL IN SCHEDULING CALLED THE PATIENT IS SCHEDULED ON 1/18/24 FOR CT ABD/PELVIS WITH/WITHOUT CONTRAST.  THERE IS REQUEST ON 3/28/24 FOR CT CHEST WITH CONTRAST AND THE PATIENT WIFE IS WANTING TO KNOW IF THE PATIENT CAN HAVE THE CT ABD/PEVLIS AND CT CHEST DONE ON THE SAME DAY.  JUST CALL DANIEL BACK AND LET HER KNOW SO THAT SHE CAN SCHEDULE THEM TOGETHER IF HE CAN HAVE THEM BOTH.      Is it okay if the provider responds through MyChart:

## 2024-01-11 ENCOUNTER — TELEPHONE (OUTPATIENT)
Dept: FAMILY MEDICINE CLINIC | Facility: CLINIC | Age: 84
End: 2024-01-11

## 2024-01-11 NOTE — TELEPHONE ENCOUNTER
Caller: GEOFF KARIMI    Relationship: Emergency Contact    Best call back number: [phone]     Which medication are you concerned about: glipizide (Glucotrol) 10 MG tablet     Who prescribed you this medication: DR MOLLY BURGOS    What are your concerns: PATIENT'S WIFE REQUESTS A CALL BACK TO DISCUSS CHANGES TO HIS PRESCRIPTION

## 2024-01-18 ENCOUNTER — HOSPITAL ENCOUNTER (OUTPATIENT)
Dept: CT IMAGING | Facility: HOSPITAL | Age: 84
Discharge: HOME | End: 2024-01-18
Payer: MEDICARE

## 2024-01-18 DIAGNOSIS — J18.9 PNEUMONIA OF BOTH LUNGS DUE TO INFECTIOUS ORGANISM, UNSPECIFIED PART OF LUNG: ICD-10-CM

## 2024-01-18 DIAGNOSIS — J96.01 ACUTE RESPIRATORY FAILURE WITH HYPOXIA AND HYPERCAPNIA: ICD-10-CM

## 2024-01-18 DIAGNOSIS — E27.8 ADRENAL NODULE: ICD-10-CM

## 2024-01-18 DIAGNOSIS — J96.02 ACUTE RESPIRATORY FAILURE WITH HYPOXIA AND HYPERCAPNIA: ICD-10-CM

## 2024-01-18 PROCEDURE — 71260 CT THORAX DX C+: CPT

## 2024-01-18 PROCEDURE — 25510000001 IOPAMIDOL 61 % SOLUTION

## 2024-01-18 PROCEDURE — 0 DIATRIZOATE MEGLUMINE & SODIUM PER 1 ML

## 2024-01-18 PROCEDURE — 74178 CT ABD&PLV WO CNTR FLWD CNTR: CPT

## 2024-01-18 RX ADMIN — DIATRIZOATE MEGLUMINE AND DIATRIZOATE SODIUM 30 ML: 660; 100 LIQUID ORAL; RECTAL at 10:47

## 2024-01-18 RX ADMIN — IOPAMIDOL 85 ML: 612 INJECTION, SOLUTION INTRAVENOUS at 10:46

## 2024-01-19 DIAGNOSIS — E27.8 ADRENAL NODULE: Primary | ICD-10-CM

## 2024-01-23 ENCOUNTER — TELEPHONE (OUTPATIENT)
Dept: FAMILY MEDICINE CLINIC | Facility: CLINIC | Age: 84
End: 2024-01-23
Payer: MEDICARE

## 2024-01-23 NOTE — TELEPHONE ENCOUNTER
PT would like to have his MRI done at a stand alone facility such as [facility]. The walk at [facility] is just to much for the 2 of them

## 2024-02-02 RX ORDER — SIMVASTATIN 40 MG
TABLET ORAL
Qty: 38 TABLET | Refills: 0 | Status: SHIPPED | OUTPATIENT
Start: 2024-02-02

## 2024-02-05 ENCOUNTER — OFFICE VISIT (OUTPATIENT)
Dept: FAMILY MEDICINE CLINIC | Facility: CLINIC | Age: 84
End: 2024-02-05
Payer: MEDICARE

## 2024-02-05 VITALS
SYSTOLIC BLOOD PRESSURE: 130 MMHG | BODY MASS INDEX: 28.62 KG/M2 | HEIGHT: 74 IN | RESPIRATION RATE: 18 BRPM | DIASTOLIC BLOOD PRESSURE: 72 MMHG | OXYGEN SATURATION: 96 % | WEIGHT: 223 LBS

## 2024-02-05 DIAGNOSIS — E78.49 OTHER HYPERLIPIDEMIA: ICD-10-CM

## 2024-02-05 DIAGNOSIS — I10 PRIMARY HYPERTENSION: Primary | ICD-10-CM

## 2024-02-05 DIAGNOSIS — E11.42 DIABETIC PERIPHERAL NEUROPATHY: ICD-10-CM

## 2024-02-05 DIAGNOSIS — E11.649 TYPE 2 DIABETES MELLITUS WITH HYPOGLYCEMIA WITHOUT COMA, WITHOUT LONG-TERM CURRENT USE OF INSULIN: ICD-10-CM

## 2024-02-05 PROCEDURE — 3075F SYST BP GE 130 - 139MM HG: CPT | Performed by: FAMILY MEDICINE

## 2024-02-05 PROCEDURE — 99214 OFFICE O/P EST MOD 30 MIN: CPT | Performed by: FAMILY MEDICINE

## 2024-02-05 PROCEDURE — G2211 COMPLEX E/M VISIT ADD ON: HCPCS | Performed by: FAMILY MEDICINE

## 2024-02-05 PROCEDURE — 3078F DIAST BP <80 MM HG: CPT | Performed by: FAMILY MEDICINE

## 2024-02-05 RX ORDER — ASPIRIN 81 MG/1
81 TABLET ORAL DAILY
COMMUNITY

## 2024-02-05 RX ORDER — TRAMADOL HYDROCHLORIDE 50 MG/1
TABLET ORAL
Qty: 240 TABLET | Refills: 3 | Status: SHIPPED | OUTPATIENT
Start: 2024-02-05

## 2024-02-05 RX ORDER — GLIPIZIDE 5 MG/1
5 TABLET ORAL
Qty: 90 TABLET | Refills: 3 | Status: SHIPPED | OUTPATIENT
Start: 2024-02-05

## 2024-02-05 NOTE — PROGRESS NOTES
Chief Complaint   Patient presents with    Diabetes    Follow-up     Medication and scans review from Hospital        Subjective   Eric Kramer is a 83 y.o. male.     Diabetes  Pertinent negatives for hypoglycemia include no confusion. Pertinent negatives for diabetes include no blurred vision, no chest pain, no fatigue, no polyuria and no weakness.      F/U DM2.  Doing well with meds.    F/U HTN.  Doing well off amlo.    The following portions of the patient's history were reviewed and updated as appropriate: allergies, current medications, past family history, past medical history, past social history, past surgical history and problem list.    Review of Systems   Constitutional:  Negative for appetite change and fatigue.   HENT:  Negative for nosebleeds and sore throat.    Eyes:  Negative for blurred vision and visual disturbance.   Respiratory:  Negative for shortness of breath and wheezing.    Cardiovascular:  Negative for chest pain and leg swelling.   Gastrointestinal:  Negative for abdominal distention and abdominal pain.   Endocrine: Negative for cold intolerance and polyuria.   Genitourinary:  Negative for dysuria and hematuria.   Musculoskeletal:  Negative for arthralgias and myalgias.   Skin:  Negative for color change and rash.   Neurological:  Negative for weakness and confusion.   Psychiatric/Behavioral:  Negative for agitation and depressed mood.        Patient Active Problem List   Diagnosis    Type 2 diabetes mellitus, without long-term current use of insulin    Stroke    Prostatitis    Osteoarthritis    Nephrolithiasis    Memory impairment    Malignant neoplasm in situ of colon    Insomnia    Hypertension    Diabetic peripheral neuropathy    Benign enlargement of prostate    Arteriosclerosis of coronary artery    Diabetic ulcer of toe of right foot associated with diabetes mellitus due to underlying condition, limited to breakdown of skin    MRSA bacteremia    Hospital discharge follow-up     Medicare annual wellness visit, subsequent    Hypothyroidism due to acquired atrophy of thyroid    Other hyperlipidemia    High risk medication use    B12 deficiency    Seborrheic keratosis    Respiratory failure    CAD (coronary artery disease)    Hospital discharge follow-up    Pneumonia of both lungs due to infectious organism    Thoracic aortic aneurysm without rupture    Adrenal nodule       No Known Allergies      Current Outpatient Medications:     ALPRAZolam (XANAX) 2 MG tablet, Take 1 tablet by mouth At Night As Needed for Sleep., Disp: 90 tablet, Rfl: 1    aspirin 81 MG EC tablet, Take 1 tablet by mouth Daily., Disp: , Rfl:     Cyanocobalamin (B-12) 1000 MCG sublingual tablet, One sublingual tab dissolved on tongue daily, Disp: 90 each, Rfl: 3    DULoxetine (CYMBALTA) 60 MG capsule, Take 1 capsule by mouth Daily., Disp: 90 capsule, Rfl: 3    gabapentin (NEURONTIN) 300 MG capsule, 2 capsules BID, Disp: 180 capsule, Rfl: 3    glipizide (Glucotrol) 5 MG tablet, Take 1 tablet by mouth Daily With Breakfast. NOTE NEW DIRECTIONS!!!, Disp: 90 tablet, Rfl: 3    hydroCHLOROthiazide (HYDRODIURIL) 25 MG tablet, Take 1 tablet by mouth Daily., Disp: 90 tablet, Rfl: 3    levothyroxine (SYNTHROID, LEVOTHROID) 25 MCG tablet, Take 1 tablet by mouth Daily., Disp: 90 tablet, Rfl: 3    metFORMIN (GLUCOPHAGE) 1000 MG tablet, TAKE 1 TABLET BY MOUTH TWICE DAILY WITH MEALS, Disp: 180 tablet, Rfl: 3    metoprolol tartrate (LOPRESSOR) 25 MG tablet, Take 1 tablet by mouth 2 (Two) Times a Day., Disp: 180 tablet, Rfl: 3    nitroglycerin (Nitrostat) 0.4 MG SL tablet, Place 1 tablet under the tongue Every 5 (Five) Minutes As Needed for Chest Pain. Take no more than 3 doses in 15 minutes., Disp: 25 tablet, Rfl: 12    ramipril (ALTACE) 10 MG capsule, Take 1 capsule by mouth once daily, Disp: 90 capsule, Rfl: 3    simvastatin (ZOCOR) 40 MG tablet, TAKE 1 TABLET BY MOUTH THREE TIMES A WEEK, Disp: 38 tablet, Rfl: 0    traMADol (ULTRAM) 50 MG  tablet, One to two tablets QID prn pain., Disp: 240 tablet, Rfl: 3    Past Medical History:   Diagnosis Date    Acute foot pain, left     Acute UTI     Arteriosclerosis of coronary artery     Basal cell carcinoma (BCC) of face     Benign enlargement of prostate     Cataract     Chest pain     Diabetic peripheral neuropathy     Edema     Hyperlipidemia     Hypertension     Insomnia     Malignant neoplasm in situ of colon     Memory impairment     MMSE 27/30-5/20/2019    Nephrolithiasis     Osteoarthritis     Prostatitis     Renal azotemia     Stroke     stroke syndrome    Type 2 diabetes mellitus        Past Surgical History:   Procedure Laterality Date    ANGIOPLASTY Right     Cath Laser Angioplasty/right ventricle    APPENDECTOMY      CATARACT EXTRACTION Left     COLECTOMY PARTIAL / TOTAL      due to colon cancer, removed 13 inches of colon    CORONARY ANGIOPLASTY WITH STENT PLACEMENT      TOE SURGERY Right 07/2021       Family History   Problem Relation Age of Onset    Colon cancer Mother     Hypertension Mother     Osteoarthritis Mother     Emphysema Father         smoker/tobacco use    Hypertension Father     Osteoarthritis Father     Rheum arthritis Sister     Colon cancer Maternal Grandmother     Cataracts Maternal Grandmother     Colon cancer Other        Social History     Tobacco Use    Smoking status: Former    Smokeless tobacco: Never   Substance Use Topics    Alcohol use: Not Currently     Comment: 1-2 a month--Caffeine use-2 reg coffee daily            Objective     Vitals:    02/05/24 1049   BP: 130/72   Resp: 18   SpO2: 96%     Body mass index is 28.62 kg/m².    Physical Exam  Vitals reviewed.   Constitutional:       Appearance: He is well-developed. He is not diaphoretic.   HENT:      Head: Normocephalic and atraumatic.   Eyes:      General: No scleral icterus.     Pupils: Pupils are equal, round, and reactive to light.   Neck:      Thyroid: No thyromegaly.   Cardiovascular:      Rate and Rhythm:  Normal rate and regular rhythm.      Heart sounds: No murmur heard.     No friction rub. No gallop.   Pulmonary:      Effort: Pulmonary effort is normal. No respiratory distress.      Breath sounds: No wheezing or rales.   Chest:      Chest wall: No tenderness.   Abdominal:      General: Bowel sounds are normal. There is no distension.      Palpations: Abdomen is soft.      Tenderness: There is no abdominal tenderness.   Musculoskeletal:         General: No deformity. Normal range of motion.   Lymphadenopathy:      Cervical: No cervical adenopathy.   Skin:     General: Skin is warm and dry.      Findings: No rash.   Neurological:      Cranial Nerves: No cranial nerve deficit.      Motor: No abnormal muscle tone.         Lab Results   Component Value Date    GLUCOSE 156 (H) 12/20/2023    BUN 38 (H) 12/20/2023    CREATININE 1.55 (H) 12/20/2023    EGFRIFNONA 39 (L) 12/07/2021    EGFRIFAFRI 46 (L) 12/07/2021    BCR 24.5 12/20/2023    K 4.1 12/20/2023    CO2 26.8 12/20/2023    CALCIUM 9.7 12/20/2023    PROTENTOTREF 7.2 10/11/2023    ALBUMIN 3.2 (L) 12/20/2023    LABIL2 1.8 10/11/2023    AST 13 12/12/2023    ALT 9 12/12/2023       WBC   Date Value Ref Range Status   12/20/2023 6.85 3.40 - 10.80 10*3/mm3 Final   06/05/2023 7.18 3.40 - 10.80 10*3/mm3 Final   09/10/2019 8.27 4.5 - 11.0 10*3/uL Final     RBC   Date Value Ref Range Status   12/20/2023 4.55 4.14 - 5.80 10*6/mm3 Final   06/05/2023 5.35 4.14 - 5.80 10*6/mm3 Final   09/10/2019 4.26 (L) 4.5 - 5.9 10*6/uL Final     Hemoglobin   Date Value Ref Range Status   12/20/2023 12.8 (L) 13.0 - 17.7 g/dL Final   09/10/2019 12.5 (L) 13.5 - 17.5 g/dL Final     Hematocrit   Date Value Ref Range Status   12/20/2023 41.2 37.5 - 51.0 % Final   09/10/2019 39.2 (L) 41.0 - 53.0 % Final     MCV   Date Value Ref Range Status   12/20/2023 90.5 79.0 - 97.0 fL Final   09/10/2019 92.0 80.0 - 100.0 fL Final     MCH   Date Value Ref Range Status   12/20/2023 28.1 26.6 - 33.0 pg Final    09/10/2019 29.3 26.0 - 34.0 pg Final     MCHC   Date Value Ref Range Status   12/20/2023 31.1 (L) 31.5 - 35.7 g/dL Final   09/10/2019 31.9 31.0 - 37.0 g/dL Final     RDW   Date Value Ref Range Status   12/20/2023 13.3 12.3 - 15.4 % Final   09/10/2019 14.2 12.0 - 16.8 % Final     RDW-SD   Date Value Ref Range Status   12/20/2023 44.0 37.0 - 54.0 fl Final     MPV   Date Value Ref Range Status   12/20/2023 9.3 6.0 - 12.0 fL Final   09/10/2019 9.0 6.7 - 10.8 fL Final     Platelets   Date Value Ref Range Status   12/20/2023 428 140 - 450 10*3/mm3 Final   09/10/2019 363 140 - 440 10*3/uL Final     Neutrophil Rel %   Date Value Ref Range Status   09/10/2019 74.3 45 - 80 % Final     Neutrophil %   Date Value Ref Range Status   12/13/2023 57.6 42.7 - 76.0 % Final     Lymphocyte Rel %   Date Value Ref Range Status   09/10/2019 17.2 15 - 50 % Final     Lymphocyte %   Date Value Ref Range Status   12/13/2023 23.9 19.6 - 45.3 % Final     Monocyte Rel %   Date Value Ref Range Status   09/10/2019 7.7 0 - 15 % Final     Monocyte %   Date Value Ref Range Status   12/13/2023 15.1 (H) 5.0 - 12.0 % Final     Eosinophil %   Date Value Ref Range Status   12/13/2023 2.5 0.3 - 6.2 % Final   09/10/2019 0.4 0 - 7 % Final     Basophil Rel %   Date Value Ref Range Status   09/10/2019 0.2 0 - 2 % Final     Basophil %   Date Value Ref Range Status   12/13/2023 0.7 0.0 - 1.5 % Final     Immature Grans %   Date Value Ref Range Status   12/13/2023 0.2 0.0 - 0.5 % Final   09/10/2019 0.2 (H) 0 % Final     Neutrophils Absolute   Date Value Ref Range Status   09/10/2019 6.14 2.0 - 8.8 10*3/uL Final     Neutrophils, Absolute   Date Value Ref Range Status   12/13/2023 3.27 1.70 - 7.00 10*3/mm3 Final     Lymphocytes Absolute   Date Value Ref Range Status   09/10/2019 1.42 0.7 - 5.5 10*3/uL Final     Lymphocytes, Absolute   Date Value Ref Range Status   12/13/2023 1.36 0.70 - 3.10 10*3/mm3 Final     Monocytes Absolute   Date Value Ref Range Status  "  09/10/2019 0.64 0.0 - 1.7 10*3/uL Final     Monocytes, Absolute   Date Value Ref Range Status   12/13/2023 0.86 0.10 - 0.90 10*3/mm3 Final     Eosinophils Absolute   Date Value Ref Range Status   09/10/2019 0.03 0.0 - 0.8 10*3/uL Final     Eosinophils, Absolute   Date Value Ref Range Status   12/13/2023 0.14 0.00 - 0.40 10*3/mm3 Final     Basophils Absolute   Date Value Ref Range Status   09/10/2019 0.02 0.0 - 0.2 10*3/uL Final     Basophils, Absolute   Date Value Ref Range Status   12/13/2023 0.04 0.00 - 0.20 10*3/mm3 Final     Immature Grans, Absolute   Date Value Ref Range Status   12/13/2023 0.01 0.00 - 0.05 10*3/mm3 Final   09/10/2019 0.02 <1 10*3/uL Final     nRBC   Date Value Ref Range Status   12/13/2023 0.0 0.0 - 0.2 /100 WBC Final       Lab Results   Component Value Date    HGBA1C 6.60 (H) 10/11/2023       Lab Results   Component Value Date    NFFMPQBC41 1,323 (H) 12/16/2023       TSH   Date Value Ref Range Status   12/16/2023 0.870 0.270 - 4.200 uIU/mL Final       No results found for: \"CHOL\"  Lab Results   Component Value Date    TRIG 228 (H) 06/05/2023     Lab Results   Component Value Date    HDL 34 (L) 06/05/2023     Lab Results   Component Value Date     (H) 06/05/2023     Lab Results   Component Value Date    VLDL 40 06/05/2023     No results found for: \"LDLHDL\"      Procedures    Assessment & Plan   Problems Addressed this Visit       Type 2 diabetes mellitus, without long-term current use of insulin    Relevant Medications    glipizide (Glucotrol) 5 MG tablet    Other Relevant Orders    Comprehensive Metabolic Panel    Lipid Panel With / Chol / HDL Ratio    Hemoglobin A1c    Hypertension - Primary    Diabetic peripheral neuropathy    Relevant Medications    glipizide (Glucotrol) 5 MG tablet    traMADol (ULTRAM) 50 MG tablet    Other hyperlipidemia     Diagnoses         Codes Comments    Primary hypertension    -  Primary ICD-10-CM: I10  ICD-9-CM: 401.9     Type 2 diabetes mellitus with " hypoglycemia without coma, without long-term current use of insulin     ICD-10-CM: E11.649  ICD-9-CM: 250.80, 251.2     Other hyperlipidemia     ICD-10-CM: E78.49  ICD-9-CM: 272.4     Diabetic peripheral neuropathy     ICD-10-CM: E11.42  ICD-9-CM: 250.60, 357.2         DM2.  Uncontrolled.  Decrease glipizide to 5mg with largest meal.  Continue metformin.  Check A1c, CMP, FLP.    Htn.  Controlled.  Continue meds.    Peripheral neuropathy  Doing well with gabapenitn and tramadol.  RF tramadol.  Continue duloxetine 60  a day.      Orders Placed This Encounter   Procedures    Comprehensive Metabolic Panel     Order Specific Question:   Release to patient     Answer:   Routine Release [[id number]]    Lipid Panel With / Chol / HDL Ratio     Order Specific Question:   Release to patient     Answer:   Routine Release [[id number]]    Hemoglobin A1c     Order Specific Question:   Release to patient     Answer:   Routine Release [[id number]]       Current Outpatient Medications   Medication Sig Dispense Refill    ALPRAZolam (XANAX) 2 MG tablet Take 1 tablet by mouth At Night As Needed for Sleep. 90 tablet 1    aspirin 81 MG EC tablet Take 1 tablet by mouth Daily.      Cyanocobalamin (B-12) 1000 MCG sublingual tablet One sublingual tab dissolved on tongue daily 90 each 3    DULoxetine (CYMBALTA) 60 MG capsule Take 1 capsule by mouth Daily. 90 capsule 3    gabapentin (NEURONTIN) 300 MG capsule 2 capsules  capsule 3    glipizide (Glucotrol) 5 MG tablet Take 1 tablet by mouth Daily With Breakfast. NOTE NEW DIRECTIONS!!! 90 tablet 3    hydroCHLOROthiazide (HYDRODIURIL) 25 MG tablet Take 1 tablet by mouth Daily. 90 tablet 3    levothyroxine (SYNTHROID, LEVOTHROID) 25 MCG tablet Take 1 tablet by mouth Daily. 90 tablet 3    metFORMIN (GLUCOPHAGE) 1000 MG tablet TAKE 1 TABLET BY MOUTH TWICE DAILY WITH MEALS 180 tablet 3    metoprolol tartrate (LOPRESSOR) 25 MG tablet Take 1 tablet by mouth 2 (Two) Times a Day. 180 tablet 3     nitroglycerin (Nitrostat) 0.4 MG SL tablet Place 1 tablet under the tongue Every 5 (Five) Minutes As Needed for Chest Pain. Take no more than 3 doses in 15 minutes. 25 tablet 12    ramipril (ALTACE) 10 MG capsule Take 1 capsule by mouth once daily 90 capsule 3    simvastatin (ZOCOR) 40 MG tablet TAKE 1 TABLET BY MOUTH THREE TIMES A WEEK 38 tablet 0    traMADol (ULTRAM) 50 MG tablet One to two tablets QID prn pain. 240 tablet 3     No current facility-administered medications for this visit.       Eric Kramer had no medications administered during this visit.    Return in about 4 months (around 6/5/2024).    There are no Patient Instructions on file for this visit.

## 2024-02-06 LAB
ALBUMIN SERPL-MCNC: 4 G/DL (ref 3.7–4.7)
ALBUMIN/GLOB SERPL: 1.4 {RATIO} (ref 1.2–2.2)
ALP SERPL-CCNC: 55 IU/L (ref 44–121)
ALT SERPL-CCNC: 7 IU/L (ref 0–44)
AST SERPL-CCNC: 11 IU/L (ref 0–40)
BILIRUB SERPL-MCNC: 0.6 MG/DL (ref 0–1.2)
BUN SERPL-MCNC: 25 MG/DL (ref 8–27)
BUN/CREAT SERPL: 16 (ref 10–24)
CALCIUM SERPL-MCNC: 10.1 MG/DL (ref 8.6–10.2)
CHLORIDE SERPL-SCNC: 101 MMOL/L (ref 96–106)
CHOLEST SERPL-MCNC: 173 MG/DL (ref 100–199)
CHOLEST/HDLC SERPL: 5.4 RATIO (ref 0–5)
CO2 SERPL-SCNC: 26 MMOL/L (ref 20–29)
CREAT SERPL-MCNC: 1.53 MG/DL (ref 0.76–1.27)
EGFRCR SERPLBLD CKD-EPI 2021: 45 ML/MIN/1.73
GLOBULIN SER CALC-MCNC: 2.8 G/DL (ref 1.5–4.5)
GLUCOSE SERPL-MCNC: 126 MG/DL (ref 70–99)
HBA1C MFR BLD: 6.1 % (ref 4.8–5.6)
HDLC SERPL-MCNC: 32 MG/DL
LDLC SERPL CALC-MCNC: 106 MG/DL (ref 0–99)
POTASSIUM SERPL-SCNC: 4.4 MMOL/L (ref 3.5–5.2)
PROT SERPL-MCNC: 6.8 G/DL (ref 6–8.5)
SODIUM SERPL-SCNC: 144 MMOL/L (ref 134–144)
TRIGL SERPL-MCNC: 198 MG/DL (ref 0–149)
VLDLC SERPL CALC-MCNC: 35 MG/DL (ref 5–40)

## 2024-02-14 ENCOUNTER — HOSPITAL ENCOUNTER (OUTPATIENT)
Dept: MRI IMAGING | Facility: HOSPITAL | Age: 84
Discharge: HOME | End: 2024-02-14
Payer: MEDICARE

## 2024-02-14 DIAGNOSIS — E27.8 ADRENAL NODULE: ICD-10-CM

## 2024-02-14 LAB — CREAT BLDA-MCNC: 2 MG/DL (ref 0.6–1.3)

## 2024-02-14 PROCEDURE — 74183 MRI ABD W/O CNTR FLWD CNTR: CPT

## 2024-02-14 PROCEDURE — A9577 INJ MULTIHANCE: HCPCS

## 2024-02-14 PROCEDURE — 82565 ASSAY OF CREATININE: CPT

## 2024-02-14 PROCEDURE — 0 GADOBENATE DIMEGLUMINE 529 MG/ML SOLUTION

## 2024-02-14 RX ADMIN — GADOBENATE DIMEGLUMINE 19 ML: 529 INJECTION, SOLUTION INTRAVENOUS at 09:52

## 2024-02-20 DIAGNOSIS — E27.8 ADRENAL NODULE: Primary | ICD-10-CM

## 2024-02-21 DIAGNOSIS — E27.8 ADRENAL NODULE: ICD-10-CM

## 2024-02-27 ENCOUNTER — TELEPHONE (OUTPATIENT)
Dept: FAMILY MEDICINE CLINIC | Facility: CLINIC | Age: 84
End: 2024-02-27
Payer: MEDICARE

## 2024-02-27 LAB
ACTH PLAS-MCNC: 51.9 PG/ML (ref 7.2–63.3)
ALDOST SERPL-MCNC: 3.2 NG/DL (ref 0–30)

## 2024-02-27 NOTE — TELEPHONE ENCOUNTER
----- Message from KAITLYN Deshpande sent at 2/27/2024 10:08 AM EST -----  Let patient know his ACTH and aldosterone are within normal limits, will forward results to Dara Bah.

## 2024-03-05 ENCOUNTER — OFFICE VISIT (OUTPATIENT)
Dept: ENDOCRINOLOGY | Age: 84
End: 2024-03-05
Payer: MEDICARE

## 2024-03-05 VITALS
OXYGEN SATURATION: 96 % | WEIGHT: 215 LBS | SYSTOLIC BLOOD PRESSURE: 116 MMHG | DIASTOLIC BLOOD PRESSURE: 62 MMHG | BODY MASS INDEX: 27.59 KG/M2 | HEART RATE: 64 BPM | HEIGHT: 74 IN

## 2024-03-05 DIAGNOSIS — E03.9 ACQUIRED HYPOTHYROIDISM: ICD-10-CM

## 2024-03-05 DIAGNOSIS — E27.8 LEFT ADRENAL MASS: Primary | ICD-10-CM

## 2024-03-05 RX ORDER — DEXAMETHASONE 1 MG
1 TABLET ORAL ONCE
Qty: 1 TABLET | Refills: 0 | Status: SHIPPED | OUTPATIENT
Start: 2024-03-05 | End: 2024-03-05

## 2024-03-05 NOTE — PROGRESS NOTES
"Chief Complaint  Adrenal nodule    Subjective        Eric Kramer presents to [facility]  to establish care.       History of Present Illness       Patient was referred to endocrinology for the evaluation of adrenal mass.   Presenting symptoms: HTN   Was noted to have a left adrenal mass on chest CT   Imaging done:  CT abdomen in 2023 and MRI abdomen in 2024   Labs done: Pending   h/o Hypertension: Yes   Current BP meds: HCTZ, Metoprolol and Ramipril    H/o potassium/sodium issues: No   H/o weight gain/loss: Lost 30 lb since 11/2023    H/o Diabetes: Yes, stable    Reports lightheadedness, dizziness and sense of impending room when moving fast or turning his head: Happens once every 2 weeks, lasts about 30 seconds     Complains of loss of taste   Decreased appetite      2/16/2024    [facility]    K 3.5      Colon cancer : surgery and chemotherapy     Has hypothyroidism and takes Synthroid 25 mcg daily  Has type 2 diabetes-would like PCP to manage his diabetes            Objective   Vital Signs:  /62 (BP Location: Left arm, Patient Position: Sitting)   Pulse 64   Ht 188 cm (74.02\")   Wt 97.5 kg (215 lb)   SpO2 96%   BMI 27.59 kg/m²   Estimated body mass index is 27.59 kg/m² as calculated from the following:    Height as of this encounter: 188 cm (74.02\").    Weight as of this encounter: 97.5 kg (215 lb).               Review of Systems   Constitutional:  Positive for unexpected weight change (lost 30 lb since 11/2023).   Eyes:  Negative for visual disturbance.   Respiratory:  Negative for wheezing.    Cardiovascular:  Negative for palpitations.   Gastrointestinal:  Negative for abdominal pain, constipation and vomiting.   Neurological:  Negative for dizziness and light-headedness.        Physical Exam   Result Review :  The following data was reviewed by: Mahrokh Nokhbehzaeim, MD on 03/05/2024:  Common labs          12/20/2023    05:28 2/5/2024    11:44 2/14/2024    09:38 "   Common Labs   Glucose 156  126     BUN 38  25     Creatinine 1.55  1.53  2.00    Sodium 141  144     Potassium 4.1  4.4     Chloride 103  101     Calcium 9.7  10.1     Total Protein  6.8     Albumin 3.2  4.0     Total Bilirubin  0.6     Alkaline Phosphatase  55     AST (SGOT)  11     ALT (SGPT)  7     WBC 6.85      Hemoglobin 12.8      Hematocrit 41.2      Platelets 428      Total Cholesterol  173     Triglycerides  198     HDL Cholesterol  32     LDL Cholesterol   106     Hemoglobin A1C  6.1       CMP          12/20/2023    05:28 2/5/2024    11:44 2/14/2024    09:38   CMP   Glucose 156  126     BUN 38  25     Creatinine 1.55  1.53  2.00    EGFR 44.4      Sodium 141  144     Potassium 4.1  4.4     Chloride 103  101     Calcium 9.7  10.1     Total Protein  6.8     Albumin 3.2  4.0     Globulin  2.8     Total Bilirubin  0.6     Alkaline Phosphatase  55     AST (SGOT)  11     ALT (SGPT)  7     BUN/Creatinine Ratio 24.5  16     Anion Gap 11.2        CBC          12/18/2023    05:47 12/19/2023    05:43 12/20/2023    05:28   CBC   WBC 9.20  7.71  6.85    RBC 4.36  4.18  4.55    Hemoglobin 13.3  12.7  12.8    Hematocrit 40.3  38.0  41.2    MCV 92.4  90.9  90.5    MCH 30.5  30.4  28.1    MCHC 33.0  33.4  31.1    RDW 13.6  13.3  13.3    Platelets 336  353  428      CBC w/diff          12/18/2023    05:47 12/19/2023    05:43 12/20/2023    05:28   CBC w/Diff   WBC 9.20  7.71  6.85    RBC 4.36  4.18  4.55    Hemoglobin 13.3  12.7  12.8    Hematocrit 40.3  38.0  41.2    MCV 92.4  90.9  90.5    MCH 30.5  30.4  28.1    MCHC 33.0  33.4  31.1    RDW 13.6  13.3  13.3    Platelets 336  353  428      Lipid Panel          6/5/2023    09:17 2/5/2024    11:44   Lipid Panel   Total Cholesterol 187  173    Triglycerides 228  198    HDL Cholesterol 34  32    VLDL Cholesterol 40  35    LDL Cholesterol  113  106      TSH          6/5/2023    09:17 12/16/2023    08:56   TSH   TSH 2.230  0.870      Electrolytes          12/19/2023    05:43  12/20/2023    05:28 2/5/2024    11:44   Electrolytes   Sodium 140  141  144    Potassium 4.1  4.1  4.4    Chloride 102  103  101    Calcium 9.6  9.7  10.1      Renal Profile          12/20/2023    05:28 2/5/2024    11:44 2/14/2024    09:38   Renal Profile   BUN 38  25     Creatinine 1.55  1.53  2.00      BMP          12/20/2023    05:28 2/5/2024    11:44 2/14/2024    09:38   BMP   BUN 38  25     Creatinine 1.55  1.53  2.00    Sodium 141  144     Potassium 4.1  4.4     Chloride 103  101     CO2 26.8  26     Calcium 9.7  10.1       Most Recent A1C          2/5/2024    11:44   HGBA1C Most Recent   Hemoglobin A1C 6.1        Data reviewed : Radiologic studies CT abdomen and pelvis, MRI abdomen             Assessment and Plan   Diagnoses and all orders for this visit:    1. Left adrenal mass (Primary)  Assessment & Plan:  CT abdomen and MRI abdomen consistent with a lipid rich adrenal mass   - The incidence of adrenal incidentaloma, an otherwise unsuspected adrenal mass on radiologic imaging, is around 8%.   - Patients with an adrenal incidentaloma should undergo evaluation clinically, biochemically, and radiographically.   - Any adrenal mass with suspicious radiographic characteristics, and nodules 4 cm or larger should be resected because of her increased risk of adrenal cancer. Benign characteristics on CT include homogenous nodule with regular borders, HU <10 and size <4 cm.   - Evaluation includes hormonal evaluation for hypercortisolism, hyperaldosteronism (if hypertensive), or the presence of a pheochromocytoma.   - Patients with adrenal incidentalomas who do not fulfill the criteria for surgical resection need to have radiographic reevaluation at 3 to 6 months and then annually for 1 to 2 years. For all adrenal tumors, hormonal evaluation should be performed at the time of diagnosis and then annually for 5 years.   - Approximately 10 - 20% of patients with adrenal nodules, particularly those with low attenuation  values, have evidence of mild ACTH-independent hypercortisolism. Complications can include low bone density, with fractures, hypertension, and other features of the metabolic syndrome.   - Screening for hypercortisolism can be performed with a 1 mg dexamethasone suppression test. There is no consensus on what constitutes a normal post dexamethasone cortisol value. Many guidelines have recommended that cortisol should suppress to less than 1.8 µg/dL. Cortisol of greater than 5 mcg/dL supports the diagnosis. Other features may include low basal ACTH and DHEA-S  Will check ACTH and DHEA-S today  Will check aldosterone renin ratio and fractionated plasma metanephrine  Will request the 24-hour urine test  Will do dexamethasone suppression test  Instruction for dexamethasone suppression test provided patient verbalized understanding    Orders:  -     ACTH  -     DHEA-Sulfate  -     Aldosterone / Renin Ratio  -     Metanephrines, Frac. Free, Plasma  -     Cortisol; Future  -     Dexamethasone Level, Serum; Future  -     dexAMETHasone (DECADRON) 1 MG tablet; Take 1 tablet by mouth 1 (One) Time for 1 dose. Take at bedtime the day before getting lab work  Dispense: 1 tablet; Refill: 0    2. Acquired hypothyroidism  Assessment & Plan:  Clinically euthyroid no recent TFT  Will check TFT and adjust the dose of Synthroid accordingly    Orders:  -     T4, Free  -     TSH             Follow Up   Return in about 3 months (around 6/5/2024).  Patient was given instructions and counseling regarding his condition or for health maintenance advice. Please see specific information pulled into the AVS if appropriate.

## 2024-03-05 NOTE — ASSESSMENT & PLAN NOTE
CT abdomen and MRI abdomen consistent with a lipid rich adrenal mass   - The incidence of adrenal incidentaloma, an otherwise unsuspected adrenal mass on radiologic imaging, is around 8%.   - Patients with an adrenal incidentaloma should undergo evaluation clinically, biochemically, and radiographically.   - Any adrenal mass with suspicious radiographic characteristics, and nodules 4 cm or larger should be resected because of her increased risk of adrenal cancer. Benign characteristics on CT include homogenous nodule with regular borders, HU <10 and size <4 cm.   - Evaluation includes hormonal evaluation for hypercortisolism, hyperaldosteronism (if hypertensive), or the presence of a pheochromocytoma.   - Patients with adrenal incidentalomas who do not fulfill the criteria for surgical resection need to have radiographic reevaluation at 3 to 6 months and then annually for 1 to 2 years. For all adrenal tumors, hormonal evaluation should be performed at the time of diagnosis and then annually for 5 years.   - Approximately 10 - 20% of patients with adrenal nodules, particularly those with low attenuation values, have evidence of mild ACTH-independent hypercortisolism. Complications can include low bone density, with fractures, hypertension, and other features of the metabolic syndrome.   - Screening for hypercortisolism can be performed with a 1 mg dexamethasone suppression test. There is no consensus on what constitutes a normal post dexamethasone cortisol value. Many guidelines have recommended that cortisol should suppress to less than 1.8 µg/dL. Cortisol of greater than 5 mcg/dL supports the diagnosis. Other features may include low basal ACTH and DHEA-S  Will check ACTH and DHEA-S today  Will check aldosterone renin ratio and fractionated plasma metanephrine  Will request the 24-hour urine test  Will do dexamethasone suppression test  Instruction for dexamethasone suppression test provided patient verbalized  understanding

## 2024-03-06 LAB
CORTIS F 24H UR-MCNC: 13 UG/L
CORTIS F 24H UR-MRATE: 14 UG/24 HR (ref 3–49)
DOPAMINE 24H UR-MRATE: 80 UG/24 HR (ref 0–510)
DOPAMINE UR-MCNC: 73 UG/L
EPINEPH 24H UR-MRATE: <3 UG/24 HR (ref 0–20)
EPINEPH UR-MCNC: <3 UG/L
NOREPINEPH 24H UR-MRATE: 32 UG/24 HR (ref 0–135)
NOREPINEPH UR-MCNC: 29 UG/L

## 2024-03-08 ENCOUNTER — TELEPHONE (OUTPATIENT)
Dept: ENDOCRINOLOGY | Age: 84
End: 2024-03-08
Payer: MEDICARE

## 2024-03-08 ENCOUNTER — PATIENT ROUNDING (BHMG ONLY) (OUTPATIENT)
Dept: ENDOCRINOLOGY | Age: 84
End: 2024-03-08
Payer: MEDICARE

## 2024-03-08 NOTE — TELEPHONE ENCOUNTER
3/8 called and paulette Marie to fax labs   ----- Message from Mahrokh Nokhbehzaeim, MD sent at 3/6/2024  1:28 PM EST -----  Thanks  ----- Message -----  From: Jewels Lopez MA  Sent: 3/6/2024  11:20 AM EST  To: Mahrokh Nokhbehzaeim, MD    Called athe[facility] are sending a partial  ----- Message -----  From: Nokhbehzaeim, Mahrokh, MD  Sent: 3/5/2024   1:43 PM EST  To: [facility]    [facility]       Thanks

## 2024-03-12 ENCOUNTER — OFFICE VISIT (OUTPATIENT)
Dept: CARDIOLOGY | Facility: CLINIC | Age: 84
End: 2024-03-12
Payer: MEDICARE

## 2024-03-12 ENCOUNTER — TELEPHONE (OUTPATIENT)
Dept: ENDOCRINOLOGY | Age: 84
End: 2024-03-12
Payer: MEDICARE

## 2024-03-12 VITALS
BODY MASS INDEX: 31.52 KG/M2 | DIASTOLIC BLOOD PRESSURE: 72 MMHG | HEIGHT: 70 IN | WEIGHT: 220.2 LBS | SYSTOLIC BLOOD PRESSURE: 136 MMHG | OXYGEN SATURATION: 95 % | HEART RATE: 61 BPM

## 2024-03-12 DIAGNOSIS — E78.49 OTHER HYPERLIPIDEMIA: ICD-10-CM

## 2024-03-12 DIAGNOSIS — I10 PRIMARY HYPERTENSION: ICD-10-CM

## 2024-03-12 DIAGNOSIS — I25.10 CORONARY ARTERY DISEASE INVOLVING NATIVE CORONARY ARTERY OF NATIVE HEART WITHOUT ANGINA PECTORIS: Primary | ICD-10-CM

## 2024-03-12 LAB
ACTH PLAS-MCNC: 38.2 PG/ML (ref 7.2–63.3)
ALDOST SERPL-MCNC: 5.2 NG/DL (ref 0–30)
ALDOST/RENIN PLAS-RTO: 4.9 {RATIO} (ref 0–30)
DHEA-S SERPL-MCNC: 85.4 UG/DL (ref 20.8–226.4)
METANEPH FREE SERPL-MCNC: <25 PG/ML (ref 0–88)
NORMETANEPHRINE SERPL-MCNC: 72.7 PG/ML (ref 0–297.2)
RENIN PLAS-CCNC: 1.07 NG/ML/HR (ref 0.17–5.38)
T4 FREE SERPL-MCNC: 1.31 NG/DL (ref 0.82–1.77)
TSH SERPL DL<=0.005 MIU/L-ACNC: 0.95 UIU/ML (ref 0.45–4.5)

## 2024-03-12 PROCEDURE — 3078F DIAST BP <80 MM HG: CPT | Performed by: INTERNAL MEDICINE

## 2024-03-12 PROCEDURE — 99204 OFFICE O/P NEW MOD 45 MIN: CPT | Performed by: INTERNAL MEDICINE

## 2024-03-12 PROCEDURE — 1159F MED LIST DOCD IN RCRD: CPT | Performed by: INTERNAL MEDICINE

## 2024-03-12 PROCEDURE — 1160F RVW MEDS BY RX/DR IN RCRD: CPT | Performed by: INTERNAL MEDICINE

## 2024-03-12 PROCEDURE — 3075F SYST BP GE 130 - 139MM HG: CPT | Performed by: INTERNAL MEDICINE

## 2024-03-12 RX ORDER — ATORVASTATIN CALCIUM 40 MG/1
40 TABLET, FILM COATED ORAL DAILY
Qty: 30 TABLET | Refills: 11 | Status: SHIPPED | OUTPATIENT
Start: 2024-03-12

## 2024-03-12 NOTE — TELEPHONE ENCOUNTER
Received outside lab result ordered by Greer rubalcava  Urine epinephrine, norepinephrine and dopamine within normal range  24-hour urine cortisol 14

## 2024-03-12 NOTE — PROGRESS NOTES
[facility]      Patient Name: Eric Kramer  :1940  Age: 83 y.o.  Encounter Provider:  Lul Romo Jr, MD      Chief Complaint:   Chief Complaint   Patient presents with    Thoracic aortic aneurysm without rupture, unspecified part         HPI  Eric Kramer is a 83 y.o. male past medical history of coronary artery disease status post stent , diabetes, hypertension, dyslipidemia who presents for initial evaluation.  Patient was hospitalized in 2023 for pneumonia and altered mental status.  Echocardiogram at that time showed normal EF with no significant valvular heart disease and normal pulmonary pressures.  CT chest performed at that time showed dilated pulmonary artery.  He was reevaluated in January at which time a CT chest and abdomen was performed showing mildly dilated thoracic aorta at 4.2 cm.  He denies chest pain with activity.  No orthopnea, PND or edema.  No palpitations, dizziness or syncope.  He lives a fairly sedentary lifestyle and his wife stated on multiple occasions she felt he was depressed.  He complains of diffuse musculoskeletal pains.  No family history of coronary artery disease.  He quit smoking 40 years ago, drinks rarely and denies illicit drug use.  No cardiac complaints at time of interview.      The following portions of the patient's history were reviewed and updated as appropriate: allergies, current medications, past family history, past medical history, past social history, past surgical history and problem list.      Review of Systems   Constitutional: Negative for chills and fever.   HENT:  Negative for hoarse voice and sore throat.    Eyes:  Negative for double vision and photophobia.   Cardiovascular:  Negative for chest pain, leg swelling, near-syncope, orthopnea, palpitations, paroxysmal nocturnal dyspnea and syncope.   Respiratory:  Negative for cough and wheezing.    Skin:  Negative for poor wound healing and rash.  "  Musculoskeletal:  Positive for back pain and joint pain. Negative for arthritis and joint swelling.   Gastrointestinal:  Negative for bloating, abdominal pain, hematemesis and hematochezia.   Neurological:  Negative for dizziness and focal weakness.   Psychiatric/Behavioral:  Negative for depression and suicidal ideas.        OBJECTIVE:   Vital Signs  Vitals:    03/12/24 1316   BP: 136/72   Pulse: 61   SpO2: 95%     Estimated body mass index is 31.6 kg/m² as calculated from the following:    Height as of this encounter: 177.8 cm (70\").    Weight as of this encounter: 99.9 kg (220 lb 3.2 oz).    Vitals reviewed.   Constitutional:       Appearance: Healthy appearance. Not in distress.   Neck:      Vascular: No JVR. JVD normal.   Pulmonary:      Effort: Pulmonary effort is normal.      Breath sounds: Normal breath sounds. No wheezing. No rhonchi. No rales.   Chest:      Chest wall: Not tender to palpatation.   Cardiovascular:      PMI at left midclavicular line. Normal rate. Regular rhythm. Normal S1. Normal S2.       Murmurs: There is no murmur.      No gallop.  No click. No rub.   Pulses:     Intact distal pulses.   Edema:     Peripheral edema absent.   Abdominal:      General: Bowel sounds are normal.      Palpations: Abdomen is soft.      Tenderness: There is no abdominal tenderness.   Musculoskeletal: Normal range of motion.         General: No tenderness. Skin:     General: Skin is warm and dry.   Neurological:      General: No focal deficit present.      Mental Status: Alert and oriented to person, place and time.         Procedures    Lipid Panel          6/5/2023    09:17 2/5/2024    11:44   Lipid Panel   Total Cholesterol 187  173    Triglycerides 228  198    HDL Cholesterol 34  32    VLDL Cholesterol 40  35    LDL Cholesterol  113  106         BUN   Date Value Ref Range Status   03/07/2024 21 8 - 23 mg/dL Final   12/20/2023 38 (H) 8 - 23 mg/dL Final   09/10/2019 13 7 - 20 mg/dL Final     Creatinine   Date " Value Ref Range Status   03/07/2024 1.19 0.76 - 1.27 mg/dL Final   02/14/2024 2.00 (H) 0.60 - 1.30 mg/dL Final     Comment:     Serial Number: [id number]:  [id number]   09/10/2019 1.0 0.7 - 1.5 mg/dL Final     Potassium   Date Value Ref Range Status   03/07/2024 4.9 3.5 - 5.2 mmol/L Final   12/20/2023 4.1 3.5 - 5.2 mmol/L Final   09/10/2019 3.6 3.5 - 5.1 mmol/L Final     ALT (SGPT)   Date Value Ref Range Status   03/07/2024 7 1 - 41 U/L Final   12/12/2023 9 1 - 41 U/L Final   09/05/2019 18 13 - 69 U/L Final     AST (SGOT)   Date Value Ref Range Status   03/07/2024 9 1 - 40 U/L Final   12/12/2023 13 1 - 40 U/L Final   09/05/2019 17 15 - 46 U/L Final           ASSESSMENT:     83-year-old male with history of coronary artery disease presents for initial evaluation      PLAN OF CARE:     Coronary artery disease without angina -last .  We will switch simvastatin to atorvastatin follow lipid surveillance.  Continue aspirin.    Thoracic aortic aneurysm -mildly dilated with controlled blood pressure.  We will monitor outpatient blood pressure log and repeat scan in 1 year.  Mixed hyperlipidemia -as above  Depression -defer to Dr. Block for ongoing workup and management    Return to clinic 6 months             Discharge Medications            Accurate as of March 12, 2024  1:20 PM. If you have any questions, ask your nurse or doctor.                Continue These Medications        Instructions Start Date   ALPRAZolam 2 MG tablet  Commonly known as: XANAX   2 mg, Oral, Nightly PRN      aspirin 81 MG EC tablet   81 mg, Oral, Daily      B-12 1000 MCG sublingual tablet   One sublingual tab dissolved on tongue daily      DULoxetine 60 MG capsule  Commonly known as: CYMBALTA   60 mg, Oral, Daily      gabapentin 300 MG capsule  Commonly known as: NEURONTIN   2 capsules BID      glipizide 5 MG tablet  Commonly known as: Glucotrol   5 mg, Oral, Daily With Breakfast, NOTE NEW DIRECTIONS!!!      hydroCHLOROthiazide 25 MG  tablet   25 mg, Oral, Daily      levothyroxine 25 MCG tablet  Commonly known as: SYNTHROID, LEVOTHROID   25 mcg, Oral, Daily      metFORMIN 1000 MG tablet  Commonly known as: GLUCOPHAGE   1,000 mg, Oral, 2 Times Daily With Meals      metoprolol tartrate 25 MG tablet  Commonly known as: LOPRESSOR   25 mg, Oral, 2 Times Daily      nitroglycerin 0.4 MG SL tablet  Commonly known as: Nitrostat   0.4 mg, Sublingual, Every 5 Minutes PRN, Take no more than 3 doses in 15 minutes.      ramipril 10 MG capsule  Commonly known as: ALTACE   10 mg, Oral, Daily      simvastatin 40 MG tablet  Commonly known as: ZOCOR   TAKE 1 TABLET BY MOUTH THREE TIMES A WEEK      traMADol 50 MG tablet  Commonly known as: ULTRAM   One to two tablets QID prn pain.               Thank you for allowing me to participate in the care of your patient,      Sincerely,   Lul Romo MD  [facility]  03/12/24  13:20 EDT

## 2024-03-13 ENCOUNTER — TELEPHONE (OUTPATIENT)
Dept: ENDOCRINOLOGY | Age: 84
End: 2024-03-13
Payer: MEDICARE

## 2024-03-13 NOTE — TELEPHONE ENCOUNTER
Called and discussed the blood test result    Aldosterone renin ratio is normal  Metanephrine normetanephrine level normal  TFT normal  DST is pending

## 2024-03-20 ENCOUNTER — TELEPHONE (OUTPATIENT)
Dept: ENDOCRINOLOGY | Age: 84
End: 2024-03-20

## 2024-03-20 NOTE — TELEPHONE ENCOUNTER
Caller: GEOFF KARIMI    Relationship: Emergency Contact    Best call back number: [phone]    Caller requesting test results: WIFE, NIKKI     What test was performed: CORTISOL     When was the test performed: 03/07/24    Where was the test performed:  [facility]     Additional notes: PT WIFE STATES THAT CORTISOL LEVELS WERE FLAGGED AS LOW ON THE PORTAL BUT WHEN DR NOHKBEHZAEIM MESSAGED, SHE STATED THAT RESULTS WERE NORMAL. PT DISAGREES AND WOULD LIKE IT TO BE CHECKED AGAIN.        EXAMINATION note    Chief Complaint   Patient presents with   â¢ Diabetes     3 month follow up, states taking medications as prescribed, states went to lab prior to appointment   â¢ Hyperlipidemia     3 month follow up, states taking medications as prescribed, states went to lab prior to appointment   â¢ Hypertension     3 month follow up, states taking medications as prescribed, states went to lab prior to appointment   â¢ Ear Problem     states prescribed medications for right otitis media, would like rechecked, states still having draining         History: Fara López is a 47year old male who presents for a 3 month follow up on diabetes including hyperlipidemia, hypertension and  Right otitis. His weight has decreased 9 lbs in the past 3 months, weight today 320 lbs. Her blood pressure in office today is 128/80 ,Denies any dizziness or lightheadedness when going from a sitting to standing position. States taking medications as directed with no side effects. Last A1c 7.1 in April, he is fasting for lab work today. He was started on Ciprodex ear drops and Augmentin on 10/4/2017 for the Otitis and Cold symptoms with the symptoms improving. He continues to have yellow drainage and pain from the right ear. The patient denies chest pain, shortness of breath, palpitations, headaches or lower extremity edema. I have reviewed the past medical, family and social history sections including the medications and allergies listed in the above medical record as well as the nursing notes.      Patient Active Problem List    Diagnosis Date Noted   â¢ SOB (shortness of breath) 02/10/2015     Priority: Low   â¢ Left foot pain 07/31/2014     Priority: Low   â¢ Pure hypercholesterolemia 11/07/2013     Priority: Low   â¢ Obesity 02/18/2013     Priority: Low   â¢ Pure hypercholesterolemia 02/18/2013     Priority: Low   â¢ Tobacco dependence 02/18/2013     Priority: Low   â¢ DM2 (diabetes mellitus, type 2) (CMS/[facility]) 02/18/2013 "Priority: Low   â¢ HTN (hypertension) 02/18/2013     Priority: Low       Current Outpatient Prescriptions   Medication Sig   â¢ metFORMIN (GLUCOPHAGE) 500 MG tablet TAKE 1 TABLET BY MOUTH 2 TIMES DAILY. â¢ losartan (COZAAR) 50 MG tablet TAKE 1 TABLET BY MOUTH DAILY. â¢ ciprofloxacin-dexamethasone (CIPRODEX) otic suspension Place 4 drops into both ears 2 times daily. â¢ amoxicillin-clavulanate (AUGMENTIN) 875-125 MG per tablet Take 1 tablet by mouth 2 times daily for 10 days. â¢ atorvastatin (LIPITOR) 80 MG tablet Take 1 tablet by mouth daily. â¢ albuterol (PROAIR HFA) 108 (90 BASE) MCG/ACT inhaler Inhale 2 puffs into the lungs every 4 hours as needed for Shortness of Breath or Wheezing. â¢ Daily Multiple Vitamins TABS Take 1 capsule by mouth daily. â¢ bisacodyl (DULCOLAX) 5 MG EC tablet Use as directed in colonoscopy prep instructions     No current facility-administered medications for this visit. Review of systems; Constitutional:  Patient denies fever, chills, tiredness or malaise. Eyes:  Denies change in visual acuity, pain, burning or itching. HENT:  Denies sinus problems, , nasal congestion or sore throat. Respiratory:  Denies cough, shortness of breath. Cardiovascular:  Denies chest pain, edema. Gastrointestinal:  Denies abdominal pain, nausea, vomiting, bloody stools or diarrhea. Musculoskeletal:  Denies back pain, neck pain, joint pain or leg swelling. Integument:  Denies rash, itching. Neurologic:  Denies headache, focal weakness or sensory changes. All other systems reviewed and negative      Physical ExamINATION:    Vital Signs:    Vitals:    10/10/17 0856   BP: 128/80   Pulse: 76   Temp: 96.7 Â°F (35.9 Â°C)   TempSrc: Tympanic   SpO2: 97%   Weight: (!) 145.3 kg   Height: 6' 2"" (1.88 m)     Constitutional:  In no acute distress. Appears stated age. Cooperative throughout exam.   Integument:  Warm. Dry. No erythema. No rash.     Eyes:  PERRL (Pupils equal, " round, reactive to light), EOMI (extraocular movements intact). Conjunctivae normal.  No discharge. HENT:  Normocephalic. Atraumatic. Bilateral external ears normal.  Oropharynx moist. No oral exudates. Nose normal.   Neck:  Normal range of motion. No masses. No tenderness. Supple. No stridor. No JVD (jugular venous distention). No bruits. No thyromegaly. No cervical adenopathy. No supraclavicular adenopathy. Respiratory:  Normal breath sounds. No respiratory distress. No wheezing. No crackles. No rhonchi. No chest tenderness. Cardiovascular:  Normal heart rate. Normal rhythm. No murmurs. No rubs. No gallops. Gastrointestinal:  Bowel sounds normal.  Soft. No tenderness. No masses. No pulsatile masses. No hepatomegaly. No splenomegaly. Musculoskeletal:  Normal strength. Normal reflexes. Assessment AND Plan:      1. Hypertension. Blood pressure stable. Continue with losartan 50 MG daily. 2. Diabetes. We'll check his hemoglobin A1c. Continue with metformin 500 MG daily, script is for twice daily. 3. Bilateral Otitis Media. Continue the Ciprodex 4 drops twice a day. Started on a second round of Augmentin twice a day. Nurse visit one week  4. Hyperlipidemia. The Vytorin was changed to Lipitor 80 mg. We'll check LFTs including lipid profile  5. Follow up in 6 months    On 10/10/2017, I, [address] scribed the services personally performed by Shi Bruce MD.         The documentation recorded by the scribe accurately and completely reflects the service(s) I personally performed and the decisions made by me.

## 2024-04-26 RX ORDER — SIMVASTATIN 40 MG
TABLET ORAL
Qty: 38 TABLET | Refills: 0 | OUTPATIENT
Start: 2024-04-26

## 2024-06-04 ENCOUNTER — OFFICE VISIT (OUTPATIENT)
Dept: FAMILY MEDICINE CLINIC | Facility: CLINIC | Age: 84
End: 2024-06-04
Payer: MEDICARE

## 2024-06-04 VITALS
BODY MASS INDEX: 30.64 KG/M2 | DIASTOLIC BLOOD PRESSURE: 90 MMHG | SYSTOLIC BLOOD PRESSURE: 138 MMHG | TEMPERATURE: 97 F | RESPIRATION RATE: 17 BRPM | OXYGEN SATURATION: 95 % | WEIGHT: 214 LBS | HEIGHT: 70 IN | HEART RATE: 68 BPM

## 2024-06-04 DIAGNOSIS — E03.9 HYPOTHYROIDISM, UNSPECIFIED TYPE: ICD-10-CM

## 2024-06-04 DIAGNOSIS — I10 PRIMARY HYPERTENSION: ICD-10-CM

## 2024-06-04 DIAGNOSIS — E53.8 B12 DEFICIENCY: ICD-10-CM

## 2024-06-04 DIAGNOSIS — E11.42 DIABETIC PERIPHERAL NEUROPATHY: ICD-10-CM

## 2024-06-04 DIAGNOSIS — E11.649 TYPE 2 DIABETES MELLITUS WITH HYPOGLYCEMIA WITHOUT COMA, WITHOUT LONG-TERM CURRENT USE OF INSULIN: Primary | ICD-10-CM

## 2024-06-04 DIAGNOSIS — E78.49 OTHER HYPERLIPIDEMIA: ICD-10-CM

## 2024-06-04 PROCEDURE — 99214 OFFICE O/P EST MOD 30 MIN: CPT | Performed by: FAMILY MEDICINE

## 2024-06-04 PROCEDURE — 3075F SYST BP GE 130 - 139MM HG: CPT | Performed by: FAMILY MEDICINE

## 2024-06-04 PROCEDURE — 3080F DIAST BP >= 90 MM HG: CPT | Performed by: FAMILY MEDICINE

## 2024-06-04 PROCEDURE — G2211 COMPLEX E/M VISIT ADD ON: HCPCS | Performed by: FAMILY MEDICINE

## 2024-06-04 RX ORDER — TRAMADOL HYDROCHLORIDE 50 MG/1
TABLET ORAL
Qty: 240 TABLET | Refills: 3 | Status: SHIPPED | OUTPATIENT
Start: 2024-06-04

## 2024-06-04 RX ORDER — HYDROCHLOROTHIAZIDE 25 MG/1
25 TABLET ORAL DAILY
Qty: 90 TABLET | Refills: 3 | Status: SHIPPED | OUTPATIENT
Start: 2024-06-04

## 2024-06-04 RX ORDER — RAMIPRIL 10 MG/1
10 CAPSULE ORAL DAILY
Qty: 90 CAPSULE | Refills: 3 | Status: SHIPPED | OUTPATIENT
Start: 2024-06-04

## 2024-06-04 RX ORDER — DULOXETIN HYDROCHLORIDE 60 MG/1
60 CAPSULE, DELAYED RELEASE ORAL DAILY
Qty: 90 CAPSULE | Refills: 3 | Status: SHIPPED | OUTPATIENT
Start: 2024-06-04

## 2024-06-04 RX ORDER — LEVOTHYROXINE SODIUM 0.03 MG/1
25 TABLET ORAL DAILY
Qty: 90 TABLET | Refills: 3 | Status: SHIPPED | OUTPATIENT
Start: 2024-06-04

## 2024-06-04 RX ORDER — NITROGLYCERIN 0.4 MG/1
0.4 TABLET SUBLINGUAL
Qty: 25 TABLET | Refills: 12 | Status: SHIPPED | OUTPATIENT
Start: 2024-06-04

## 2024-06-04 RX ORDER — ALPRAZOLAM 2 MG/1
2 TABLET ORAL NIGHTLY PRN
Qty: 90 TABLET | Refills: 1 | Status: SHIPPED | OUTPATIENT
Start: 2024-06-04

## 2024-06-04 RX ORDER — GLIPIZIDE 5 MG/1
5 TABLET ORAL
Qty: 90 TABLET | Refills: 3 | Status: SHIPPED | OUTPATIENT
Start: 2024-06-04

## 2024-06-04 RX ORDER — ATORVASTATIN CALCIUM 40 MG/1
40 TABLET, FILM COATED ORAL DAILY
Qty: 30 TABLET | Refills: 11 | Status: SHIPPED | OUTPATIENT
Start: 2024-06-04

## 2024-06-04 NOTE — PROGRESS NOTES
Chief Complaint   Patient presents with    Hypertension     Med refills     Diabetes    Foot Pain       Subjective   Eric Kramer is a 84 y.o. male.     Hypertension  Pertinent negatives include no blurred vision, chest pain or shortness of breath.   Diabetes  Pertinent negatives for hypoglycemia include no confusion. Pertinent negatives for diabetes include no blurred vision, no chest pain, no fatigue, no polyuria and no weakness.   Foot Pain  Pertinent negatives include no abdominal pain, arthralgias, chest pain, fatigue, myalgias, rash, sore throat or weakness.      F/U HTN.  Doing well with meds.    F/U hyperlipidmeia.  Doing well with meds.    FU insomnia.  Doing well with alprazolam 2mg at night.    F/U DM2.  ON meds regularly.    The following portions of the patient's history were reviewed and updated as appropriate: allergies, current medications, past family history, past medical history, past social history, past surgical history and problem list.    Review of Systems   Constitutional:  Negative for appetite change and fatigue.   HENT:  Negative for nosebleeds and sore throat.    Eyes:  Negative for blurred vision and visual disturbance.   Respiratory:  Negative for shortness of breath and wheezing.    Cardiovascular:  Negative for chest pain and leg swelling.   Gastrointestinal:  Negative for abdominal distention and abdominal pain.   Endocrine: Negative for cold intolerance and polyuria.   Genitourinary:  Negative for dysuria and hematuria.   Musculoskeletal:  Negative for arthralgias and myalgias.   Skin:  Negative for color change and rash.   Neurological:  Negative for weakness and confusion.   Psychiatric/Behavioral:  Negative for agitation and depressed mood.        Patient Active Problem List   Diagnosis    Type 2 diabetes mellitus, without long-term current use of insulin    Stroke    Prostatitis    Osteoarthritis    Nephrolithiasis    Memory impairment    Malignant neoplasm in situ of colon     Insomnia    Hypertension    Diabetic peripheral neuropathy    Benign enlargement of prostate    Arteriosclerosis of coronary artery    Diabetic ulcer of toe of right foot associated with diabetes mellitus due to underlying condition, limited to breakdown of skin    MRSA bacteremia    Hospital discharge follow-up    Medicare annual wellness visit, subsequent    Hypothyroidism due to acquired atrophy of thyroid    Other hyperlipidemia    High risk medication use    B12 deficiency    Seborrheic keratosis    Respiratory failure    CAD (coronary artery disease)    Hospital discharge follow-up    Pneumonia of both lungs due to infectious organism    Thoracic aortic aneurysm without rupture    Adrenal nodule    Acquired hypothyroidism    Left adrenal mass       No Known Allergies      Current Outpatient Medications:     ALPRAZolam (XANAX) 2 MG tablet, Take 1 tablet by mouth At Night As Needed for Sleep., Disp: 90 tablet, Rfl: 1    aspirin 81 MG EC tablet, Take 1 tablet by mouth Daily., Disp: , Rfl:     atorvastatin (LIPITOR) 40 MG tablet, Take 1 tablet by mouth Daily., Disp: 30 tablet, Rfl: 11    Cyanocobalamin (B-12) 1000 MCG sublingual tablet, One sublingual tab dissolved on tongue daily, Disp: 90 each, Rfl: 3    DULoxetine (CYMBALTA) 60 MG capsule, Take 1 capsule by mouth Daily., Disp: 90 capsule, Rfl: 3    glipizide (Glucotrol) 5 MG tablet, Take 1 tablet by mouth Daily With Breakfast. NOTE NEW DIRECTIONS!!!, Disp: 90 tablet, Rfl: 3    hydroCHLOROthiazide 25 MG tablet, Take 1 tablet by mouth Daily., Disp: 90 tablet, Rfl: 3    levothyroxine (SYNTHROID, LEVOTHROID) 25 MCG tablet, Take 1 tablet by mouth Daily., Disp: 90 tablet, Rfl: 3    metFORMIN (GLUCOPHAGE) 1000 MG tablet, Take 1 tablet by mouth 2 (Two) Times a Day With Meals., Disp: 180 tablet, Rfl: 3    metoprolol tartrate (LOPRESSOR) 25 MG tablet, Take 1 tablet by mouth 2 (Two) Times a Day., Disp: 180 tablet, Rfl: 3    nitroglycerin (Nitrostat) 0.4 MG SL tablet,  Place 1 tablet under the tongue Every 5 (Five) Minutes As Needed for Chest Pain. Take no more than 3 doses in 15 minutes., Disp: 25 tablet, Rfl: 12    ramipril (ALTACE) 10 MG capsule, Take 1 capsule by mouth Daily., Disp: 90 capsule, Rfl: 3    traMADol (ULTRAM) 50 MG tablet, One to two tablets QID prn pain., Disp: 240 tablet, Rfl: 3    Past Medical History:   Diagnosis Date    Acute foot pain, left     Acute UTI     Arteriosclerosis of coronary artery     Basal cell carcinoma (BCC) of face     Benign enlargement of prostate     Cataract     Chest pain     Diabetic peripheral neuropathy     Edema     Hyperlipidemia     Hypertension     Hypothyroidism     Insomnia     Malignant neoplasm in situ of colon     Memory impairment     MMSE 27/30-5/20/2019    Nephrolithiasis     Osteoarthritis     Prostatitis     Renal azotemia     Stroke     stroke syndrome    Testosterone deficiency     Type 2 diabetes mellitus     Vitamin D deficiency        Past Surgical History:   Procedure Laterality Date    ANGIOPLASTY Right     Cath Laser Angioplasty/right ventricle    APPENDECTOMY      CATARACT EXTRACTION Left     COLECTOMY PARTIAL / TOTAL      due to colon cancer, removed 13 inches of colon    CORONARY ANGIOPLASTY WITH STENT PLACEMENT      TOE SURGERY Right 07/2021       Family History   Problem Relation Age of Onset    Colon cancer Mother     Hypertension Mother         colon cancer    Osteoarthritis Mother     Emphysema Father         smoker/tobacco use    Hypertension Father     Osteoarthritis Father     Rheum arthritis Sister     Colon cancer Maternal Grandmother     Cataracts Maternal Grandmother     Hypertension Maternal Grandmother         colon cance    Colon cancer Other     Hypertension Sister         She had 2 kinds ???    Hypertension Maternal Uncle         colon cancer    Hypertension Maternal Uncle         colon cancer       Social History     Tobacco Use    Smoking status: Former     Current packs/day: 1.00      Average packs/day: 1 pack/day for 25.0 years (25.0 ttl pk-yrs)     Types: Cigarettes    Smokeless tobacco: Never    Tobacco comments:     Greg quit 42 plus years ago   Substance Use Topics    Alcohol use: Not Currently     Comment: 1-2 a month--Caffeine use-2 reg coffee daily            Objective     Vitals:    06/04/24 0743   BP: 138/90   Pulse: 68   Resp: 17   Temp: 97 °F (36.1 °C)   SpO2: 95%     Body mass index is 30.71 kg/m².    Physical Exam  Vitals reviewed.   Constitutional:       Appearance: He is well-developed. He is not diaphoretic.   HENT:      Head: Normocephalic and atraumatic.   Eyes:      General: No scleral icterus.     Pupils: Pupils are equal, round, and reactive to light.   Neck:      Thyroid: No thyromegaly.   Cardiovascular:      Rate and Rhythm: Normal rate and regular rhythm.      Heart sounds: No murmur heard.     No friction rub. No gallop.   Pulmonary:      Effort: Pulmonary effort is normal. No respiratory distress.      Breath sounds: No wheezing or rales.   Chest:      Chest wall: No tenderness.   Abdominal:      General: Bowel sounds are normal. There is no distension.      Palpations: Abdomen is soft.      Tenderness: There is no abdominal tenderness.   Musculoskeletal:         General: No deformity. Normal range of motion.   Lymphadenopathy:      Cervical: No cervical adenopathy.   Skin:     General: Skin is warm and dry.      Findings: No rash.   Neurological:      Cranial Nerves: No cranial nerve deficit.      Motor: No abnormal muscle tone.         Lab Results   Component Value Date    GLUCOSE 126 (H) 03/07/2024    BUN 21 03/07/2024    CREATININE 1.19 03/07/2024    EGFRIFNONA 39 (L) 12/07/2021    EGFRIFAFRI 46 (L) 12/07/2021    BCR 17.6 03/07/2024    K 4.9 03/07/2024    CO2 27.9 03/07/2024    CALCIUM 10.0 03/07/2024    PROTENTOTREF 6.6 03/07/2024    ALBUMIN 4.1 03/07/2024    LABIL2 1.6 03/07/2024    AST 9 03/07/2024    ALT 7 03/07/2024       WBC   Date Value Ref Range Status    12/20/2023 6.85 3.40 - 10.80 10*3/mm3 Final   06/05/2023 7.18 3.40 - 10.80 10*3/mm3 Final   09/10/2019 8.27 4.5 - 11.0 10*3/uL Final     RBC   Date Value Ref Range Status   12/20/2023 4.55 4.14 - 5.80 10*6/mm3 Final   06/05/2023 5.35 4.14 - 5.80 10*6/mm3 Final   09/10/2019 4.26 (L) 4.5 - 5.9 10*6/uL Final     Hemoglobin   Date Value Ref Range Status   12/20/2023 12.8 (L) 13.0 - 17.7 g/dL Final   09/10/2019 12.5 (L) 13.5 - 17.5 g/dL Final     Hematocrit   Date Value Ref Range Status   12/20/2023 41.2 37.5 - 51.0 % Final   09/10/2019 39.2 (L) 41.0 - 53.0 % Final     MCV   Date Value Ref Range Status   12/20/2023 90.5 79.0 - 97.0 fL Final   09/10/2019 92.0 80.0 - 100.0 fL Final     MCH   Date Value Ref Range Status   12/20/2023 28.1 26.6 - 33.0 pg Final   09/10/2019 29.3 26.0 - 34.0 pg Final     MCHC   Date Value Ref Range Status   12/20/2023 31.1 (L) 31.5 - 35.7 g/dL Final   09/10/2019 31.9 31.0 - 37.0 g/dL Final     RDW   Date Value Ref Range Status   12/20/2023 13.3 12.3 - 15.4 % Final   09/10/2019 14.2 12.0 - 16.8 % Final     RDW-SD   Date Value Ref Range Status   12/20/2023 44.0 37.0 - 54.0 fl Final     MPV   Date Value Ref Range Status   12/20/2023 9.3 6.0 - 12.0 fL Final   09/10/2019 9.0 6.7 - 10.8 fL Final     Platelets   Date Value Ref Range Status   12/20/2023 428 140 - 450 10*3/mm3 Final   09/10/2019 363 140 - 440 10*3/uL Final     Neutrophil Rel %   Date Value Ref Range Status   09/10/2019 74.3 45 - 80 % Final     Neutrophil %   Date Value Ref Range Status   12/13/2023 57.6 42.7 - 76.0 % Final     Lymphocyte Rel %   Date Value Ref Range Status   09/10/2019 17.2 15 - 50 % Final     Lymphocyte %   Date Value Ref Range Status   12/13/2023 23.9 19.6 - 45.3 % Final     Monocyte Rel %   Date Value Ref Range Status   09/10/2019 7.7 0 - 15 % Final     Monocyte %   Date Value Ref Range Status   12/13/2023 15.1 (H) 5.0 - 12.0 % Final     Eosinophil %   Date Value Ref Range Status   12/13/2023 2.5 0.3 - 6.2 % Final  "  09/10/2019 0.4 0 - 7 % Final     Basophil Rel %   Date Value Ref Range Status   09/10/2019 0.2 0 - 2 % Final     Basophil %   Date Value Ref Range Status   12/13/2023 0.7 0.0 - 1.5 % Final     Immature Grans %   Date Value Ref Range Status   12/13/2023 0.2 0.0 - 0.5 % Final   09/10/2019 0.2 (H) 0 % Final     Neutrophils Absolute   Date Value Ref Range Status   02/16/2024 6.20 (H) 1.70 - 6.00 x10(3)/ul Final   09/10/2019 6.14 2.0 - 8.8 10*3/uL Final     Lymphocytes Absolute   Date Value Ref Range Status   09/10/2019 1.42 0.7 - 5.5 10*3/uL Final     Lymphocytes, Absolute   Date Value Ref Range Status   12/13/2023 1.36 0.70 - 3.10 10*3/mm3 Final     Monocytes Absolute   Date Value Ref Range Status   09/10/2019 0.64 0.0 - 1.7 10*3/uL Final     Monocytes, Absolute   Date Value Ref Range Status   12/13/2023 0.86 0.10 - 0.90 10*3/mm3 Final     Eosinophils Absolute   Date Value Ref Range Status   02/16/2024 0.0 0.0 - 0.6 x10(3)/ul Final   09/10/2019 0.03 0.0 - 0.8 10*3/uL Final     Basophils Absolute   Date Value Ref Range Status   02/16/2024 0.1 0.0 - 0.3 x10(3)/ul Final   09/10/2019 0.02 0.0 - 0.2 10*3/uL Final     Immature Grans, Absolute   Date Value Ref Range Status   12/13/2023 0.01 0.00 - 0.05 10*3/mm3 Final   09/10/2019 0.02 <1 10*3/uL Final     nRBC   Date Value Ref Range Status   12/13/2023 0.0 0.0 - 0.2 /100 WBC Final       Lab Results   Component Value Date    HGBA1C 6.1 (H) 02/05/2024       Lab Results   Component Value Date    XGPDAHMG44 1,323 (H) 12/16/2023       TSH   Date Value Ref Range Status   03/05/2024 0.950 0.450 - 4.500 uIU/mL Final   12/16/2023 0.870 0.270 - 4.200 uIU/mL Final       No results found for: \"CHOL\"  Lab Results   Component Value Date    TRIG 198 (H) 02/05/2024     Lab Results   Component Value Date    HDL 32 (L) 02/05/2024     Lab Results   Component Value Date     (H) 02/05/2024     Lab Results   Component Value Date    VLDL 35 02/05/2024     No results found for: " "\"LDLHDL\"      Procedures    Assessment & Plan   Problems Addressed this Visit       Type 2 diabetes mellitus, without long-term current use of insulin - Primary    Relevant Medications    glipizide (Glucotrol) 5 MG tablet    metFORMIN (GLUCOPHAGE) 1000 MG tablet    Other Relevant Orders    Comprehensive Metabolic Panel    Lipid Panel With / Chol / HDL Ratio    Hemoglobin A1c    Hypertension    Relevant Medications    ramipril (ALTACE) 10 MG capsule    metoprolol tartrate (LOPRESSOR) 25 MG tablet    hydroCHLOROthiazide 25 MG tablet    Diabetic peripheral neuropathy    Relevant Medications    traMADol (ULTRAM) 50 MG tablet    DULoxetine (CYMBALTA) 60 MG capsule    ALPRAZolam (XANAX) 2 MG tablet    glipizide (Glucotrol) 5 MG tablet    metFORMIN (GLUCOPHAGE) 1000 MG tablet    Other hyperlipidemia    Relevant Medications    atorvastatin (LIPITOR) 40 MG tablet    Other Relevant Orders    Lipid Panel With / Chol / HDL Ratio    B12 deficiency     Other Visit Diagnoses       Hypothyroidism, unspecified type        Relevant Medications    metoprolol tartrate (LOPRESSOR) 25 MG tablet    levothyroxine (SYNTHROID, LEVOTHROID) 25 MCG tablet          Diagnoses         Codes Comments    Type 2 diabetes mellitus with hypoglycemia without coma, without long-term current use of insulin    -  Primary ICD-10-CM: E11.649  ICD-9-CM: 250.80, 251.2     B12 deficiency     ICD-10-CM: E53.8  ICD-9-CM: 266.2     Primary hypertension     ICD-10-CM: I10  ICD-9-CM: 401.9     Other hyperlipidemia     ICD-10-CM: E78.49  ICD-9-CM: 272.4     Diabetic peripheral neuropathy     ICD-10-CM: E11.42  ICD-9-CM: 250.60, 357.2     Hypothyroidism, unspecified type     ICD-10-CM: E03.9  ICD-9-CM: 244.9           DM2.  Check A1c, CMP, FLP.  Contnue meds.  RF as above.   Peripheral neuropathy.  Severe.  Contnue duloxetine/tramadol.  Insomnia.  Chronic.  Contnue alprazolam.  RF today.    Orders Placed This Encounter   Procedures    Comprehensive Metabolic Panel     " Order Specific Question:   Release to patient     Answer:   Routine Release [[id number]]    Lipid Panel With / Chol / HDL Ratio     Order Specific Question:   Release to patient     Answer:   Routine Release [[id number]]    Hemoglobin A1c     Order Specific Question:   Release to patient     Answer:   Routine Release [[id number]]       Current Outpatient Medications   Medication Sig Dispense Refill    ALPRAZolam (XANAX) 2 MG tablet Take 1 tablet by mouth At Night As Needed for Sleep. 90 tablet 1    aspirin 81 MG EC tablet Take 1 tablet by mouth Daily.      atorvastatin (LIPITOR) 40 MG tablet Take 1 tablet by mouth Daily. 30 tablet 11    Cyanocobalamin (B-12) 1000 MCG sublingual tablet One sublingual tab dissolved on tongue daily 90 each 3    DULoxetine (CYMBALTA) 60 MG capsule Take 1 capsule by mouth Daily. 90 capsule 3    glipizide (Glucotrol) 5 MG tablet Take 1 tablet by mouth Daily With Breakfast. NOTE NEW DIRECTIONS!!! 90 tablet 3    hydroCHLOROthiazide 25 MG tablet Take 1 tablet by mouth Daily. 90 tablet 3    levothyroxine (SYNTHROID, LEVOTHROID) 25 MCG tablet Take 1 tablet by mouth Daily. 90 tablet 3    metFORMIN (GLUCOPHAGE) 1000 MG tablet Take 1 tablet by mouth 2 (Two) Times a Day With Meals. 180 tablet 3    metoprolol tartrate (LOPRESSOR) 25 MG tablet Take 1 tablet by mouth 2 (Two) Times a Day. 180 tablet 3    nitroglycerin (Nitrostat) 0.4 MG SL tablet Place 1 tablet under the tongue Every 5 (Five) Minutes As Needed for Chest Pain. Take no more than 3 doses in 15 minutes. 25 tablet 12    ramipril (ALTACE) 10 MG capsule Take 1 capsule by mouth Daily. 90 capsule 3    traMADol (ULTRAM) 50 MG tablet One to two tablets QID prn pain. 240 tablet 3     No current facility-administered medications for this visit.       Eric Nia had no medications administered during this visit.    Return in about 19 weeks (around 10/15/2024) for Medicare wellness and OV, 30 minutes.    There are no Patient Instructions on file  for this visit.

## 2024-06-05 ENCOUNTER — OFFICE VISIT (OUTPATIENT)
Dept: ENDOCRINOLOGY | Age: 84
End: 2024-06-05
Payer: MEDICARE

## 2024-06-05 VITALS
SYSTOLIC BLOOD PRESSURE: 108 MMHG | OXYGEN SATURATION: 96 % | HEART RATE: 70 BPM | WEIGHT: 214 LBS | HEIGHT: 70 IN | DIASTOLIC BLOOD PRESSURE: 70 MMHG | BODY MASS INDEX: 30.64 KG/M2

## 2024-06-05 DIAGNOSIS — E27.8 LEFT ADRENAL MASS: Primary | ICD-10-CM

## 2024-06-05 LAB
ALBUMIN SERPL-MCNC: 4 G/DL (ref 3.5–5.2)
ALBUMIN/GLOB SERPL: 1.6 G/DL
ALP SERPL-CCNC: 69 U/L (ref 39–117)
ALT SERPL-CCNC: 8 U/L (ref 1–41)
AST SERPL-CCNC: 11 U/L (ref 1–40)
BILIRUB SERPL-MCNC: 0.5 MG/DL (ref 0–1.2)
BUN SERPL-MCNC: 27 MG/DL (ref 8–23)
BUN/CREAT SERPL: 19.6 (ref 7–25)
CALCIUM SERPL-MCNC: 9.8 MG/DL (ref 8.6–10.5)
CHLORIDE SERPL-SCNC: 100 MMOL/L (ref 98–107)
CHOLEST SERPL-MCNC: 113 MG/DL (ref 0–200)
CHOLEST/HDLC SERPL: 2.83 {RATIO}
CO2 SERPL-SCNC: 31.9 MMOL/L (ref 22–29)
CREAT SERPL-MCNC: 1.38 MG/DL (ref 0.76–1.27)
EGFRCR SERPLBLD CKD-EPI 2021: 50.7 ML/MIN/1.73
GLOBULIN SER CALC-MCNC: 2.5 GM/DL
GLUCOSE SERPL-MCNC: 102 MG/DL (ref 65–99)
HBA1C MFR BLD: 6.6 % (ref 4.8–5.6)
HDLC SERPL-MCNC: 40 MG/DL (ref 40–60)
LDLC SERPL CALC-MCNC: 56 MG/DL (ref 0–100)
POTASSIUM SERPL-SCNC: 4.5 MMOL/L (ref 3.5–5.2)
PROT SERPL-MCNC: 6.5 G/DL (ref 6–8.5)
SODIUM SERPL-SCNC: 143 MMOL/L (ref 136–145)
TRIGL SERPL-MCNC: 83 MG/DL (ref 0–150)
VLDLC SERPL CALC-MCNC: 17 MG/DL (ref 5–40)

## 2024-06-05 PROCEDURE — 3074F SYST BP LT 130 MM HG: CPT | Performed by: STUDENT IN AN ORGANIZED HEALTH CARE EDUCATION/TRAINING PROGRAM

## 2024-06-05 PROCEDURE — 99214 OFFICE O/P EST MOD 30 MIN: CPT | Performed by: STUDENT IN AN ORGANIZED HEALTH CARE EDUCATION/TRAINING PROGRAM

## 2024-06-05 PROCEDURE — G2211 COMPLEX E/M VISIT ADD ON: HCPCS | Performed by: STUDENT IN AN ORGANIZED HEALTH CARE EDUCATION/TRAINING PROGRAM

## 2024-06-05 PROCEDURE — 3078F DIAST BP <80 MM HG: CPT | Performed by: STUDENT IN AN ORGANIZED HEALTH CARE EDUCATION/TRAINING PROGRAM

## 2024-06-05 NOTE — ASSESSMENT & PLAN NOTE
Imaging finding is consistent with fat rich adenoma, benign adrenal adenoma  Had a comprehensive hormonal workup including DST, metanephrine and normetanephrine, and aldosterone renin ratio which was unremarkable  Repeat hormonal workup in 1 year/March 2025

## 2024-06-05 NOTE — PROGRESS NOTES
"Chief Complaint  Left adrenal mass    Subjective        Eric Kramer presents to [facility] for follow up.       History of Present Illness      Presented to office with his wife to follow-up on adrenal mass  Presenting symptoms: HTN   Was noted to have a left adrenal mass on chest CT   Imaging done:  CT abdomen in 2023 and MRI abdomen in 2024   h/o Hypertension: Yes   Current BP meds: HCTZ, Metoprolol and Ramipril    H/o potassium/sodium issues: No  No recent weight loss  His diabetes is stable        Urine epinephrine, norepinephrine, and dopamine within normal range  24-hour urine cortisol 14  Aldosterone renin ratio normal  Serum metanephrine and normetanephrine normal  DST normal:  Cortisol  1.5     Dexamethasone  401    Reports chronic intermittent cough and left toe pain.    Would like his PCP to manage diabetes and hypothyroidism.        Colon cancer : surgery and chemotherapy          MRI: 2/2024  Left adrenal nodule demonstrates signal dropout on out of phase imaging  in keeping with a lipid rich adenoma, measuring 2.1 cm.      CT adrenals  1/2024:  Left adrenal nodule, which remains indeterminate, still very likely  an adenoma.       Objective   Vital Signs:  /70 (BP Location: Left arm, Patient Position: Sitting, Cuff Size: Adult)   Pulse 70   Ht 177.8 cm (70\")   Wt 97.1 kg (214 lb)   SpO2 96%   BMI 30.71 kg/m²   Estimated body mass index is 30.71 kg/m² as calculated from the following:    Height as of this encounter: 177.8 cm (70\").    Weight as of this encounter: 97.1 kg (214 lb).                    Physical Exam  Constitutional:       Appearance: Normal appearance.   HENT:      Head: Normocephalic and atraumatic.   Eyes:      Extraocular Movements: Extraocular movements intact.   Cardiovascular:      Rate and Rhythm: Normal rate and regular rhythm.   Pulmonary:      Effort: Pulmonary effort is normal.      Breath sounds: Normal breath sounds. No wheezing. "   Abdominal:      Palpations: Abdomen is soft.      Tenderness: There is no abdominal tenderness.   Musculoskeletal:         General: No swelling. Normal range of motion.      Cervical back: Neck supple. No tenderness.   Neurological:      Mental Status: He is alert and oriented to person, place, and time.      Comments: Tremor : for about 10 years    Psychiatric:         Mood and Affect: Mood normal.        Result Review :  The following data was reviewed by: Mahrokh Nokhbehzaeim, MD on 06/05/2024:  CMP          2/14/2024    09:38 3/7/2024    08:09 6/4/2024    08:33   CMP   Glucose  126  102    BUN  21  27    Creatinine 2.00  1.19  1.38    Sodium  140  143    Potassium  4.9  4.5    Chloride  102  100    Calcium  10.0  9.8    Total Protein  6.6  6.5    Albumin  4.1  4.0    Globulin  2.5  2.5    Total Bilirubin  0.4  0.5    Alkaline Phosphatase  65  69    AST (SGOT)  9  11    ALT (SGPT)  7  8    BUN/Creatinine Ratio  17.6  19.6      TSH          12/16/2023    08:56 3/5/2024    13:52   TSH   TSH 0.870  0.950      CMP          2/14/2024    09:38 3/7/2024    08:09 6/4/2024    08:33   CMP   Glucose  126  102    BUN  21  27    Creatinine 2.00  1.19  1.38    Sodium  140  143    Potassium  4.9  4.5    Chloride  102  100    Calcium  10.0  9.8    Total Protein  6.6  6.5    Albumin  4.1  4.0    Globulin  2.5  2.5    Total Bilirubin  0.4  0.5    Alkaline Phosphatase  65  69    AST (SGOT)  9  11    ALT (SGPT)  7  8    BUN/Creatinine Ratio  17.6  19.6       Lipid Panel          2/5/2024    11:44 6/4/2024    08:33   Lipid Panel   Total Cholesterol 173  113    Triglycerides 198  83    HDL Cholesterol 32  40    VLDL Cholesterol 35  17    LDL Cholesterol  106  56       Most Recent A1C          6/4/2024    08:33   HGBA1C Most Recent   Hemoglobin A1C 6.60          A1C Last 3 Results          10/11/2023    09:01 2/5/2024    11:44 6/4/2024    08:33   HGBA1C Last 3 Results   Hemoglobin A1C 6.60  6.1  6.60       TSH          12/16/2023     08:56 3/5/2024    13:52   TSH   TSH 0.870  0.950       Free T4   Date Value Ref Range Status   2024 1.31 0.82 - 1.77 ng/dL Final      ACTH   Date Value Ref Range Status   2024 38.2 7.2 - 63.3 pg/mL Final     Comment:     ACTH reference interval for samples collected between 7 and 10 AM.      Cortisol   Date Value Ref Range Status   2024 1.5 (L) 6.2 - 19.4 ug/dL Final     Comment:     Please Note: The reference interval and flagging for   this test is for an AM collection. If this is a PM   collection please use:         Cortisol PM: 2.3-11.9        Aldosterone   Date Value Ref Range Status   2024 5.2 0.0 - 30.0 ng/dL Final     Renin Activity   Date Value Ref Range Status   2024 1.066 0.167 - 5.380 ng/mL/hr Final     Aldosterone/Renin Ratio   Date Value Ref Range Status   2024 4.9 0.0 - 30.0 Final     Comment:                              Units:      ng/dL per ng/mL/hr      Normetanephrine   Date Value Ref Range Status   2024 72.7 0.0 - 297.2 pg/mL Final     Metanephrine   Date Value Ref Range Status   2024 <25.0 0.0 - 88.0 pg/mL Final      Dexamethasone, Serum   Date Value Ref Range Status   2024 401 ng/dL Final     Comment:     This test was developed and its performance characteristics  determined by [facility]. It has not been cleared or approved  by the Food and Drug Administration.  Reference Range:  Adults baseline: <30  8:00 AM following 1 mg dexamethasone   previous evenin - 295  8:00 AM following 8 mg dexamethasone  (4 x 2 mg doses) previous day:  1600 - 2850        Data reviewed : Radiologic studies CT abdomen and MRI abdomen             Assessment and Plan   Diagnoses and all orders for this visit:    1. Left adrenal mass (Primary)  Assessment & Plan:  Imaging finding is consistent with fat rich adenoma, benign adrenal adenoma  Had a comprehensive hormonal workup including DST, metanephrine and normetanephrine, and aldosterone renin ratio which was  unremarkable  Repeat hormonal workup in 1 year/March 2025      His cough might be related to allergy and PND as he feels  better after few minutes of coughing         Follow Up   Return in about 9 months (around 3/5/2025).  Patient was given instructions and counseling regarding his condition or for health maintenance advice. Please see specific information pulled into the AVS if appropriate.

## 2024-10-16 ENCOUNTER — OFFICE VISIT (OUTPATIENT)
Dept: FAMILY MEDICINE CLINIC | Facility: CLINIC | Age: 84
End: 2024-10-16
Payer: MEDICARE

## 2024-10-16 VITALS
RESPIRATION RATE: 18 BRPM | HEART RATE: 65 BPM | TEMPERATURE: 97.3 F | WEIGHT: 216.2 LBS | OXYGEN SATURATION: 95 % | HEIGHT: 74 IN | BODY MASS INDEX: 27.75 KG/M2 | SYSTOLIC BLOOD PRESSURE: 130 MMHG | DIASTOLIC BLOOD PRESSURE: 90 MMHG

## 2024-10-16 DIAGNOSIS — F01.B0 MODERATE VASCULAR DEMENTIA WITHOUT BEHAVIORAL DISTURBANCE, PSYCHOTIC DISTURBANCE, MOOD DISTURBANCE, OR ANXIETY: ICD-10-CM

## 2024-10-16 DIAGNOSIS — E11.42 TYPE 2 DIABETES MELLITUS WITH DIABETIC POLYNEUROPATHY, WITHOUT LONG-TERM CURRENT USE OF INSULIN: ICD-10-CM

## 2024-10-16 DIAGNOSIS — F02.B0 MODERATE LATE ONSET ALZHEIMER'S DEMENTIA WITHOUT BEHAVIORAL DISTURBANCE, PSYCHOTIC DISTURBANCE, MOOD DISTURBANCE, OR ANXIETY: ICD-10-CM

## 2024-10-16 DIAGNOSIS — Z00.00 MEDICARE ANNUAL WELLNESS VISIT, SUBSEQUENT: Primary | ICD-10-CM

## 2024-10-16 DIAGNOSIS — G30.1 MODERATE LATE ONSET ALZHEIMER'S DEMENTIA WITHOUT BEHAVIORAL DISTURBANCE, PSYCHOTIC DISTURBANCE, MOOD DISTURBANCE, OR ANXIETY: ICD-10-CM

## 2024-10-16 DIAGNOSIS — E11.42 DIABETIC PERIPHERAL NEUROPATHY: ICD-10-CM

## 2024-10-16 PROBLEM — F01.50 VASCULAR DEMENTIA: Status: ACTIVE | Noted: 2024-10-16

## 2024-10-16 PROCEDURE — 3075F SYST BP GE 130 - 139MM HG: CPT | Performed by: FAMILY MEDICINE

## 2024-10-16 PROCEDURE — 91320 SARSCV2 VAC 30MCG TRS-SUC IM: CPT | Performed by: FAMILY MEDICINE

## 2024-10-16 PROCEDURE — 99214 OFFICE O/P EST MOD 30 MIN: CPT | Performed by: FAMILY MEDICINE

## 2024-10-16 PROCEDURE — G0439 PPPS, SUBSEQ VISIT: HCPCS | Performed by: FAMILY MEDICINE

## 2024-10-16 PROCEDURE — 3080F DIAST BP >= 90 MM HG: CPT | Performed by: FAMILY MEDICINE

## 2024-10-16 PROCEDURE — 1170F FXNL STATUS ASSESSED: CPT | Performed by: FAMILY MEDICINE

## 2024-10-16 PROCEDURE — 90480 ADMN SARSCOV2 VAC 1/ONLY CMP: CPT | Performed by: FAMILY MEDICINE

## 2024-10-16 RX ORDER — MEMANTINE HYDROCHLORIDE 10 MG/1
TABLET ORAL
Qty: 180 TABLET | Refills: 1 | Status: SHIPPED | OUTPATIENT
Start: 2024-10-16

## 2024-10-16 NOTE — PROGRESS NOTES
Subjective   The ABCs of the Annual Wellness Visit  Medicare Wellness Visit      Eric Kramer is a 84 y.o. patient who presents for a Medicare Wellness Visit.    The following portions of the patient's history were reviewed and   updated as appropriate: allergies, current medications, past family history, past medical history, past social history, past surgical history, and problem list.    Compared to one year ago, the patient's physical   health is the same.  Compared to one year ago, the patient's mental   health is the same.    Recent Hospitalizations:  This patient has had a [facility] admission record on file within the last 365 days.  Current Medical Providers:  Patient Care Team:  Elder Block MD as PCP - General (Family Medicine)    Outpatient Medications Prior to Visit   Medication Sig Dispense Refill    ALPRAZolam (XANAX) 2 MG tablet Take 1 tablet by mouth At Night As Needed for Sleep. 90 tablet 1    aspirin 81 MG EC tablet Take 1 tablet by mouth Daily.      atorvastatin (LIPITOR) 40 MG tablet Take 1 tablet by mouth Daily. 30 tablet 11    Cyanocobalamin (B-12) 1000 MCG sublingual tablet One sublingual tab dissolved on tongue daily 90 each 3    DULoxetine (CYMBALTA) 60 MG capsule Take 1 capsule by mouth Daily. 90 capsule 3    glipizide (Glucotrol) 5 MG tablet Take 1 tablet by mouth Daily With Breakfast. NOTE NEW DIRECTIONS!!! 90 tablet 3    hydroCHLOROthiazide 25 MG tablet Take 1 tablet by mouth Daily. 90 tablet 3    levothyroxine (SYNTHROID, LEVOTHROID) 25 MCG tablet Take 1 tablet by mouth Daily. 90 tablet 3    metFORMIN (GLUCOPHAGE) 1000 MG tablet Take 1 tablet by mouth 2 (Two) Times a Day With Meals. 180 tablet 3    metoprolol tartrate (LOPRESSOR) 25 MG tablet Take 1 tablet by mouth 2 (Two) Times a Day. 180 tablet 3    nitroglycerin (Nitrostat) 0.4 MG SL tablet Place 1 tablet under the tongue Every 5 (Five) Minutes As Needed for Chest Pain. Take no more than 3 doses in 15 minutes. 25  tablet 12    ramipril (ALTACE) 10 MG capsule Take 1 capsule by mouth Daily. 90 capsule 3    traMADol (ULTRAM) 50 MG tablet One to two tablets QID prn pain. 240 tablet 3     No facility-administered medications prior to visit.     Opioid medication/s are on active medication list.  and I have evaluated his active treatment plan and pain score trends (see table).  There were no vitals filed for this visit.  I have reviewed the chart for potential of high risk medication and harmful drug interactions in the elderly.        Aspirin is on active medication list. Aspirin use is indicated based on review of current medical condition/s. Pros and cons of this therapy have been discussed today. Benefits of this medication outweigh potential harm.  Patient has been encouraged to continue taking this medication.  .      Patient Active Problem List   Diagnosis    Type 2 diabetes mellitus with diabetic polyneuropathy, without long-term current use of insulin    Stroke    Prostatitis    Osteoarthritis    Nephrolithiasis    Memory impairment    Malignant neoplasm in situ of colon    Insomnia    Hypertension    Diabetic peripheral neuropathy    Benign enlargement of prostate    Arteriosclerosis of coronary artery    Diabetic ulcer of toe of right foot associated with diabetes mellitus due to underlying condition, limited to breakdown of skin    MRSA bacteremia    Hospital discharge follow-up    Medicare annual wellness visit, subsequent    Hypothyroidism due to acquired atrophy of thyroid    Other hyperlipidemia    High risk medication use    B12 deficiency    Seborrheic keratosis    Respiratory failure    CAD (coronary artery disease)    Hospital discharge follow-up    Pneumonia of both lungs due to infectious organism    Thoracic aortic aneurysm without rupture    Adrenal nodule    Acquired hypothyroidism    Left adrenal mass    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 behavioral disturbance, psychotic disturbance, mood  "disturbance, or anxiety    Vascular dementia     Advance Care Planning Advance Directive is not on file.  ACP discussion was held with the patient during this visit. Patient has an advance directive (not in EMR), copy requested.            Objective   Vitals:    10/16/24 0812   BP: 130/90   BP Location: Left arm   Patient Position: Sitting   Cuff Size: Adult   Pulse: 65   Resp: 18   Temp: 97.3 °F (36.3 °C)   TempSrc: Temporal   SpO2: 95%   Weight: 98.1 kg (216 lb 3.2 oz)   Height: 189.2 cm (74.49\")       Estimated body mass index is 27.4 kg/m² as calculated from the following:    Height as of this encounter: 189.2 cm (74.49\").    Weight as of this encounter: 98.1 kg (216 lb 3.2 oz).            Does the patient have evidence of cognitive impairment? Yes                                                                                                Health  Risk Assessment    Smoking Status:  Social History     Tobacco Use   Smoking Status Former    Current packs/day: 0.00    Average packs/day: 1 pack/day for 49.0 years (49.0 ttl pk-yrs)    Types: Cigarettes    Start date: 1956    Quit date: 4/3/1980    Years since quittin.5   Smokeless Tobacco Never   Tobacco Comments    Greg quit 42 plus years ago     Alcohol Consumption:  Social History     Substance and Sexual Activity   Alcohol Use Not Currently    Comment: Next to no liquour       Fall Risk Screen  STEADI Fall Risk Assessment was completed, and patient is at HIGH risk for falls. Assessment completed on:10/16/2024    Depression Screening:      10/16/2024     8:20 AM   PHQ-2/PHQ-9 Depression Screening   Little interest or pleasure in doing things Not at all   Feeling down, depressed, or hopeless Not at all     Health Habits and Functional and Cognitive Screening:      10/16/2024     8:20 AM   Functional & Cognitive Status   Do you have difficulty preparing food and eating? No   Do you have difficulty bathing yourself, getting dressed or grooming yourself? " No   Do you have difficulty using the toilet? No   Do you have difficulty moving around from place to place? No   Do you have trouble with steps or getting out of a bed or a chair? No   Current Diet Well Balanced Diet   Dental Exam Up to date   Eye Exam Up to date   Exercise (times per week) 0 times per week   Current Exercises Include No Regular Exercise   Do you need help using the phone?  No   Are you deaf or do you have serious difficulty hearing?  No   Do you need help to go to places out of walking distance? No   Do you need help shopping? No   Do you need help preparing meals?  No   Do you need help with housework?  No   Do you need help with laundry? No   Do you need help taking your medications? No   Do you need help managing money? No   Do you ever drive or ride in a car without wearing a seat belt? No   Have you felt unusual stress, anger or loneliness in the last month? No   Who do you live with? Spouse   If you need help, do you have trouble finding someone available to you? No   Have you been bothered in the last four weeks by sexual problems? No   Do you have difficulty concentrating, remembering or making decisions? Yes           Age-appropriate Screening Schedule:  Refer to the list below for future screening recommendations based on patient's age, sex and/or medical conditions. Orders for these recommended tests are listed in the plan section. The patient has been provided with a written plan.    Health Maintenance List  Health Maintenance   Topic Date Due    DIABETIC EYE EXAM  Never done    TDAP/TD VACCINES (1 - Tdap) Never done    ZOSTER VACCINE (1 of 2) Never done    RSV Vaccine - Adults (1 - 1-dose 75+ series) Never done    URINE MICROALBUMIN  06/05/2024    INFLUENZA VACCINE  Never done    COVID-19 Vaccine (8 - 2023-24 season) 09/01/2024    HEMOGLOBIN A1C  12/04/2024    BMI FOLLOWUP  02/05/2025    LIPID PANEL  06/04/2025    ANNUAL WELLNESS VISIT  10/16/2025    Pneumococcal Vaccine 65+   "Completed                                                                                                                                                CMS Preventative Services Quick Reference  Risk Factors Identified During Encounter  Inactivity/Sedentary: Patient was advised to exercise at least 150 minutes a week per CDC recommendations.    The above risks/problems have been discussed with the patient.  Pertinent information has been shared with the patient in the After Visit Summary.  An After Visit Summary and PPPS were made available to the patient.    Follow Up:   Next Medicare Wellness visit to be scheduled in 1 year.         Additional E&M Note during same encounter follows:  Patient has additional, significant, and separately identifiable condition(s)/problem(s) that require work above and beyond the Medicare Wellness Visit     Chief Complaint  Medicare Wellness-subsequent    Subjective   HPI  Eric is also being seen today for additional medical problem/s.    Review of Systems   Constitutional:  Negative for chills, diaphoresis and fatigue.   HENT:  Negative for rhinorrhea.    Respiratory:  Negative for cough and shortness of breath.    Cardiovascular:  Negative for chest pain and leg swelling.   Gastrointestinal:  Negative for abdominal distention and abdominal pain.   Musculoskeletal:  Negative for arthralgias and back pain.   Skin:  Negative for rash.      F/U diabetic peripheral neuropathy.  Increasing pain in lower legs.    C/o trouble with memory.  Going on a while.  Hx of having word finding difficulty.          Objective   Vital Signs:  /90 (BP Location: Left arm, Patient Position: Sitting, Cuff Size: Adult)   Pulse 65   Temp 97.3 °F (36.3 °C) (Temporal)   Resp 18   Ht 189.2 cm (74.49\")   Wt 98.1 kg (216 lb 3.2 oz)   SpO2 95%   BMI 27.40 kg/m²   Physical Exam  Vitals reviewed.   Constitutional:       Appearance: He is well-developed. He is not diaphoretic.   HENT:      Head: " Normocephalic and atraumatic.   Eyes:      General: No scleral icterus.     Pupils: Pupils are equal, round, and reactive to light.   Neck:      Thyroid: No thyromegaly.   Cardiovascular:      Rate and Rhythm: Normal rate and regular rhythm.      Heart sounds: No murmur heard.     No friction rub. No gallop.   Pulmonary:      Effort: Pulmonary effort is normal. No respiratory distress.      Breath sounds: No wheezing or rales.   Chest:      Chest wall: No tenderness.   Abdominal:      General: Bowel sounds are normal. There is no distension.      Palpations: Abdomen is soft.      Tenderness: There is no abdominal tenderness.   Musculoskeletal:         General: No deformity. Normal range of motion.   Lymphadenopathy:      Cervical: No cervical adenopathy.   Skin:     General: Skin is warm and dry.      Findings: No rash.   Neurological:      Cranial Nerves: No cranial nerve deficit.      Motor: No abnormal muscle tone.                 Assessment and Plan               Medicare annual wellness visit, subsequent    Moderate vascular dementia without behavioral disturbance, psychotic disturbance, mood disturbance, or anxiety    Type 2 diabetes mellitus with diabetic polyneuropathy, without long-term current use of insulin    Diabetic peripheral neuropathy    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 behavioral disturbance, psychotic disturbance, mood disturbance, or anxiety      Orders Placed This Encounter   Procedures    COVID-19 (Pfizer) 12yrs+ (COMIRNATY)    Comprehensive Metabolic Panel     Order Specific Question:   Release to patient     Answer:   Routine Release [[id number]]    Hemoglobin A1c     Order Specific Question:   Release to patient     Answer:   Routine Release [[id number]]    CBC & Differential     Order Specific Question:   Manual Differential     Answer:   No     Order Specific Question:   Release to patient     Answer:   Routine Release [[id number]]     NURY with combined vascular dementia.   New dx.  Start namenda.  MMSE  today.  Encouraged to not drive any longer.    Preventive Counseling:  Encouraged to stay active.  Covid vaccine today.  Fu declined.    HEP A UTD.  Pneumovax UTD.     Dentist UTD.  Optho UTD.      New Medications Ordered This Visit   Medications    memantine (Namenda) 10 MG tablet     Si.5 tab at night for one week then 0.5 tab BID for one week then 0.5 tab am and 1 tab at night one week then 1 BID thereafter.     Dispense:  180 tablet     Refill:  1          Follow Up   Return in about 4 months (around 2025).  Patient was given instructions and counseling regarding his condition or for health maintenance advice. Please see specific information pulled into the AVS if appropriate.

## 2024-10-17 LAB
ALBUMIN SERPL-MCNC: 3.9 G/DL (ref 3.5–5.2)
ALBUMIN/GLOB SERPL: 1.3 G/DL
ALP SERPL-CCNC: 72 U/L (ref 39–117)
ALT SERPL-CCNC: 10 U/L (ref 1–41)
AST SERPL-CCNC: 14 U/L (ref 1–40)
BASOPHILS # BLD AUTO: 0.05 10*3/MM3 (ref 0–0.2)
BASOPHILS NFR BLD AUTO: 0.7 % (ref 0–1.5)
BILIRUB SERPL-MCNC: 0.7 MG/DL (ref 0–1.2)
BUN SERPL-MCNC: 27 MG/DL (ref 8–23)
BUN/CREAT SERPL: 17.8 (ref 7–25)
CALCIUM SERPL-MCNC: 10.2 MG/DL (ref 8.6–10.5)
CHLORIDE SERPL-SCNC: 100 MMOL/L (ref 98–107)
CO2 SERPL-SCNC: 30.2 MMOL/L (ref 22–29)
CREAT SERPL-MCNC: 1.52 MG/DL (ref 0.76–1.27)
EGFRCR SERPLBLD CKD-EPI 2021: 45.2 ML/MIN/1.73
EOSINOPHIL # BLD AUTO: 0.15 10*3/MM3 (ref 0–0.4)
EOSINOPHIL NFR BLD AUTO: 2 % (ref 0.3–6.2)
ERYTHROCYTE [DISTWIDTH] IN BLOOD BY AUTOMATED COUNT: 13.9 % (ref 12.3–15.4)
GLOBULIN SER CALC-MCNC: 3.1 GM/DL
GLUCOSE SERPL-MCNC: 127 MG/DL (ref 65–99)
HBA1C MFR BLD: 6.4 % (ref 4.8–5.6)
HCT VFR BLD AUTO: 44.4 % (ref 37.5–51)
HGB BLD-MCNC: 14.3 G/DL (ref 13–17.7)
IMM GRANULOCYTES # BLD AUTO: 0.01 10*3/MM3 (ref 0–0.05)
IMM GRANULOCYTES NFR BLD AUTO: 0.1 % (ref 0–0.5)
LYMPHOCYTES # BLD AUTO: 1.79 10*3/MM3 (ref 0.7–3.1)
LYMPHOCYTES NFR BLD AUTO: 24 % (ref 19.6–45.3)
MCH RBC QN AUTO: 30.8 PG (ref 26.6–33)
MCHC RBC AUTO-ENTMCNC: 32.2 G/DL (ref 31.5–35.7)
MCV RBC AUTO: 95.5 FL (ref 79–97)
MONOCYTES # BLD AUTO: 0.61 10*3/MM3 (ref 0.1–0.9)
MONOCYTES NFR BLD AUTO: 8.2 % (ref 5–12)
NEUTROPHILS # BLD AUTO: 4.86 10*3/MM3 (ref 1.7–7)
NEUTROPHILS NFR BLD AUTO: 65 % (ref 42.7–76)
NRBC BLD AUTO-RTO: 0 /100 WBC (ref 0–0.2)
PLATELET # BLD AUTO: 292 10*3/MM3 (ref 140–450)
POTASSIUM SERPL-SCNC: 4.2 MMOL/L (ref 3.5–5.2)
PROT SERPL-MCNC: 7 G/DL (ref 6–8.5)
RBC # BLD AUTO: 4.65 10*6/MM3 (ref 4.14–5.8)
SODIUM SERPL-SCNC: 142 MMOL/L (ref 136–145)
WBC # BLD AUTO: 7.47 10*3/MM3 (ref 3.4–10.8)

## 2024-12-12 ENCOUNTER — TELEPHONE (OUTPATIENT)
Dept: FAMILY MEDICINE CLINIC | Facility: CLINIC | Age: 84
End: 2024-12-12

## 2024-12-12 ENCOUNTER — OFFICE VISIT (OUTPATIENT)
Dept: FAMILY MEDICINE CLINIC | Facility: CLINIC | Age: 84
End: 2024-12-12
Payer: MEDICARE

## 2024-12-12 VITALS
HEIGHT: 74 IN | BODY MASS INDEX: 27.85 KG/M2 | TEMPERATURE: 98 F | RESPIRATION RATE: 14 BRPM | HEART RATE: 57 BPM | SYSTOLIC BLOOD PRESSURE: 134 MMHG | OXYGEN SATURATION: 94 % | DIASTOLIC BLOOD PRESSURE: 86 MMHG | WEIGHT: 217 LBS

## 2024-12-12 DIAGNOSIS — F01.B0 MODERATE VASCULAR DEMENTIA WITHOUT BEHAVIORAL DISTURBANCE, PSYCHOTIC DISTURBANCE, MOOD DISTURBANCE, OR ANXIETY: ICD-10-CM

## 2024-12-12 DIAGNOSIS — R26.2 DIFFICULTY WALKING: ICD-10-CM

## 2024-12-12 DIAGNOSIS — M17.0 PRIMARY OSTEOARTHRITIS OF BOTH KNEES: ICD-10-CM

## 2024-12-12 DIAGNOSIS — F02.B0 MODERATE LATE ONSET ALZHEIMER'S DEMENTIA WITHOUT BEHAVIORAL DISTURBANCE, PSYCHOTIC DISTURBANCE, MOOD DISTURBANCE, OR ANXIETY: Primary | ICD-10-CM

## 2024-12-12 DIAGNOSIS — E11.42 DIABETIC PERIPHERAL NEUROPATHY: ICD-10-CM

## 2024-12-12 DIAGNOSIS — G30.1 MODERATE LATE ONSET ALZHEIMER'S DEMENTIA WITHOUT BEHAVIORAL DISTURBANCE, PSYCHOTIC DISTURBANCE, MOOD DISTURBANCE, OR ANXIETY: Primary | ICD-10-CM

## 2024-12-12 PROCEDURE — 3079F DIAST BP 80-89 MM HG: CPT | Performed by: FAMILY MEDICINE

## 2024-12-12 PROCEDURE — 3075F SYST BP GE 130 - 139MM HG: CPT | Performed by: FAMILY MEDICINE

## 2024-12-12 PROCEDURE — G2211 COMPLEX E/M VISIT ADD ON: HCPCS | Performed by: FAMILY MEDICINE

## 2024-12-12 PROCEDURE — 1125F AMNT PAIN NOTED PAIN PRSNT: CPT | Performed by: FAMILY MEDICINE

## 2024-12-12 PROCEDURE — 99214 OFFICE O/P EST MOD 30 MIN: CPT | Performed by: FAMILY MEDICINE

## 2024-12-12 RX ORDER — ATORVASTATIN CALCIUM 40 MG/1
40 TABLET, FILM COATED ORAL DAILY
Qty: 90 TABLET | Refills: 11 | Status: SHIPPED | OUTPATIENT
Start: 2024-12-12

## 2024-12-12 RX ORDER — ALPRAZOLAM 2 MG/1
2 TABLET ORAL NIGHTLY PRN
Qty: 90 TABLET | Refills: 1 | Status: SHIPPED | OUTPATIENT
Start: 2024-12-12

## 2024-12-12 RX ORDER — MEMANTINE HYDROCHLORIDE 10 MG/1
TABLET ORAL
Qty: 180 TABLET | Refills: 1 | Status: SHIPPED | OUTPATIENT
Start: 2024-12-12

## 2024-12-12 RX ORDER — TRAMADOL HYDROCHLORIDE 50 MG/1
TABLET ORAL
Qty: 240 TABLET | Refills: 3 | Status: SHIPPED | OUTPATIENT
Start: 2024-12-12

## 2024-12-12 NOTE — TELEPHONE ENCOUNTER
"  Caller: SACHIARCENIOPRINCE    Relationship: Emergency Contact    Best call back number: [phone]    Equipment requested: A 20\" WHEELCHAIR WITH SAME SPECIFICS AS THE FIRST REQUEST    Reason for the request: PATIENT IS NOT ABLE TO WALK     Prescribing Provider: DR BURGOS  Additional information or concerns:   [phone] [facility] FAX NUMBER    NIKKI IS REQUESTING A NEW SCRIPT BE SENT TO [facility] FOR A 20\" WHEELCHAIR AS AT TIMES THE PATIENT WOULD LIKE TO PUSH HIMSELF INSTEAD OF HAVING [employer] PUSH HIM.    NIKKI STATED THAT THE TRANSPORT WHEELCHAIR WILL NOT BE BENEFICIAL. ALSO THE CANE AND CRUTCHES WOULD NOT BE ADEQUATE AS WELL.  PATIENT HAS FALLEN WHEN HE HAS USED [employer] CANE.        "

## 2024-12-12 NOTE — PROGRESS NOTES
Chief Complaint   Patient presents with    Hypertension    Med Refill    Diabetes       Subjective   Eric Kramer is a 84 y.o. male.     History of Present Illness   F/U HTN.  Doing well with meds.    F/U NURY.  Still with toruble with memory.  MMSE 21/30 from 10/24. Appetite has improved.  Pt/wife declines addial meds.    F/U peripheral neuropathy.  Uncontrolled. Cannot feel feet now.  Shuffles with gait.  Unsteady on feet.  Unable to use a cane or walker in home as risk of fall.  Difficult moving around home. Has to take one tramadol 4-5 times a day.    The following portions of the patient's history were reviewed and updated as appropriate: allergies, current medications, past family history, past medical history, past social history, past surgical history and problem list.    Review of Systems   Constitutional:  Negative for appetite change and fatigue.   HENT:  Negative for nosebleeds and sore throat.    Eyes:  Negative for blurred vision and visual disturbance.   Respiratory:  Negative for shortness of breath and wheezing.    Cardiovascular:  Negative for chest pain and leg swelling.   Gastrointestinal:  Negative for abdominal distention and abdominal pain.   Endocrine: Negative for cold intolerance and polyuria.   Genitourinary:  Negative for dysuria and hematuria.   Musculoskeletal:  Negative for arthralgias and myalgias.   Skin:  Negative for color change and rash.   Neurological:  Negative for weakness and confusion.   Psychiatric/Behavioral:  Negative for agitation and depressed mood.        Patient Active Problem List   Diagnosis    Type 2 diabetes mellitus with diabetic polyneuropathy, without long-term current use of insulin    Stroke    Prostatitis    Osteoarthritis    Nephrolithiasis    Memory impairment    Malignant neoplasm in situ of colon    Insomnia    Hypertension    Diabetic peripheral neuropathy    Benign enlargement of prostate    Arteriosclerosis of coronary artery    Diabetic ulcer of toe  of right foot associated with diabetes mellitus due to underlying condition, limited to breakdown of skin    MRSA bacteremia    Hospital discharge follow-up    Medicare annual wellness visit, subsequent    Hypothyroidism due to acquired atrophy of thyroid    Other hyperlipidemia    High risk medication use    B12 deficiency    Seborrheic keratosis    Respiratory failure    CAD (coronary artery disease)    Hospital discharge follow-up    Pneumonia of both lungs due to infectious organism    Thoracic aortic aneurysm without rupture    Adrenal nodule    Acquired hypothyroidism    Left adrenal mass    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 behavioral disturbance, psychotic disturbance, mood disturbance, or anxiety    Vascular dementia    Difficulty walking    Primary osteoarthritis of both knees       No Known Allergies      Current Outpatient Medications:     ALPRAZolam (XANAX) 2 MG tablet, Take 1 tablet by mouth At Night As Needed for Sleep., Disp: 90 tablet, Rfl: 1    aspirin 81 MG EC tablet, Take 1 tablet by mouth Daily., Disp: , Rfl:     atorvastatin (LIPITOR) 40 MG tablet, Take 1 tablet by mouth Daily., Disp: 90 tablet, Rfl: 11    Cyanocobalamin (B-12) 1000 MCG sublingual tablet, One sublingual tab dissolved on tongue daily, Disp: 90 each, Rfl: 3    DULoxetine (CYMBALTA) 60 MG capsule, Take 1 capsule by mouth Daily., Disp: 90 capsule, Rfl: 3    glipizide (Glucotrol) 5 MG tablet, Take 1 tablet by mouth Daily With Breakfast. NOTE NEW DIRECTIONS!!!, Disp: 90 tablet, Rfl: 3    hydroCHLOROthiazide 25 MG tablet, Take 1 tablet by mouth Daily., Disp: 90 tablet, Rfl: 3    levothyroxine (SYNTHROID, LEVOTHROID) 25 MCG tablet, Take 1 tablet by mouth Daily., Disp: 90 tablet, Rfl: 3    memantine (Namenda) 10 MG tablet, 1 BID, Disp: 180 tablet, Rfl: 1    metFORMIN (GLUCOPHAGE) 1000 MG tablet, Take 1 tablet by mouth 2 (Two) Times a Day With Meals., Disp: 180 tablet, Rfl: 3    metoprolol tartrate (LOPRESSOR) 25 MG  tablet, Take 1 tablet by mouth 2 (Two) Times a Day., Disp: 180 tablet, Rfl: 3    nitroglycerin (Nitrostat) 0.4 MG SL tablet, Place 1 tablet under the tongue Every 5 (Five) Minutes As Needed for Chest Pain. Take no more than 3 doses in 15 minutes., Disp: 25 tablet, Rfl: 12    ramipril (ALTACE) 10 MG capsule, Take 1 capsule by mouth Daily., Disp: 90 capsule, Rfl: 3    traMADol (ULTRAM) 50 MG tablet, One to two tablets QID prn pain., Disp: 240 tablet, Rfl: 3    Past Medical History:   Diagnosis Date    Acute foot pain, left     Acute UTI     Arteriosclerosis of coronary artery     Basal cell carcinoma (BCC) of face     Benign enlargement of prostate     Cataract     Chest pain     Diabetic peripheral neuropathy     Edema     Hyperlipidemia     Hypertension     Hypothyroidism     Insomnia     Malignant neoplasm in situ of colon     Memory impairment     MMSE 27/30-5/20/2019    Nephrolithiasis     Osteoarthritis     Prostatitis     Renal azotemia     Stroke     stroke syndrome    Testosterone deficiency     Type 2 diabetes mellitus     Vitamin D deficiency        Past Surgical History:   Procedure Laterality Date    ANGIOPLASTY Right     Cath Laser Angioplasty/right ventricle    APPENDECTOMY      CATARACT EXTRACTION Left     COLECTOMY PARTIAL / TOTAL      due to colon cancer, removed 13 inches of colon    CORONARY ANGIOPLASTY WITH STENT PLACEMENT      TOE SURGERY Right 07/2021       Family History   Problem Relation Age of Onset    Colon cancer Mother     Hypertension Mother         colon cancer    Osteoarthritis Mother     Emphysema Father         smoker/tobacco use    Hypertension Father         CPOD    Osteoarthritis Father     Rheum arthritis Sister     Colon cancer Maternal Grandmother     Cataracts Maternal Grandmother     Hypertension Maternal Grandmother         colon cance    Colon cancer Other     Hypertension Sister         She had 2 kinds ???    Hypertension Maternal Uncle         colon cancer    Hypertension  Maternal Uncle         colon cancer       Social History     Tobacco Use    Smoking status: Former     Current packs/day: 0.00     Average packs/day: 1 pack/day for 49.0 years (49.0 ttl pk-yrs)     Types: Cigarettes     Start date: 1956     Quit date: 4/3/1980     Years since quittin.7    Smokeless tobacco: Never    Tobacco comments:     Greg quit 42 plus years ago   Substance Use Topics    Alcohol use: Not Currently     Comment: Next to no liquour            Objective     Vitals:    24 1001   BP: 134/86   Pulse: 57   Resp: 14   Temp: 98 °F (36.7 °C)   SpO2: 94%     Body mass index is 27.5 kg/m².    Physical Exam  Vitals reviewed.   Constitutional:       Appearance: He is well-developed. He is not diaphoretic.   HENT:      Head: Normocephalic and atraumatic.   Eyes:      General: No scleral icterus.     Pupils: Pupils are equal, round, and reactive to light.   Neck:      Thyroid: No thyromegaly.   Cardiovascular:      Rate and Rhythm: Normal rate and regular rhythm.      Heart sounds: No murmur heard.     No friction rub. No gallop.   Pulmonary:      Effort: Pulmonary effort is normal. No respiratory distress.      Breath sounds: No wheezing or rales.   Chest:      Chest wall: No tenderness.   Abdominal:      General: Bowel sounds are normal. There is no distension.      Palpations: Abdomen is soft.      Tenderness: There is no abdominal tenderness.   Musculoskeletal:         General: Deformity present.      Comments: Shuffling, kyphotic gait. 4/5 upper arm strength bilaterally.     Lymphadenopathy:      Cervical: No cervical adenopathy.   Skin:     General: Skin is warm and dry.      Findings: No rash.   Neurological:      Cranial Nerves: No cranial nerve deficit.      Motor: No abnormal muscle tone.         Lab Results   Component Value Date    GLUCOSE 127 (H) 10/16/2024    BUN 27 (H) 10/16/2024    CREATININE 1.52 (H) 10/16/2024    EGFRIFNONA 39 (L) 2021    EGFRIFAFRI 46 (L) 2021    BCR  17.8 10/16/2024    K 4.2 10/16/2024    CO2 30.2 (H) 10/16/2024    CALCIUM 10.2 10/16/2024    PROTENTOTREF 7.0 10/16/2024    ALBUMIN 3.9 10/16/2024    LABIL2 1.3 10/16/2024    AST 14 10/16/2024    ALT 10 10/16/2024       WBC   Date Value Ref Range Status   10/16/2024 7.47 3.40 - 10.80 10*3/mm3 Final   09/10/2019 8.27 4.5 - 11.0 10*3/uL Final     RBC   Date Value Ref Range Status   10/16/2024 4.65 4.14 - 5.80 10*6/mm3 Final   09/10/2019 4.26 (L) 4.5 - 5.9 10*6/uL Final     Hemoglobin   Date Value Ref Range Status   10/16/2024 14.3 13.0 - 17.7 g/dL Final   12/20/2023 12.8 (L) 13.0 - 17.7 g/dL Final   09/10/2019 12.5 (L) 13.5 - 17.5 g/dL Final     Hematocrit   Date Value Ref Range Status   10/16/2024 44.4 37.5 - 51.0 % Final   12/20/2023 41.2 37.5 - 51.0 % Final   09/10/2019 39.2 (L) 41.0 - 53.0 % Final     MCV   Date Value Ref Range Status   10/16/2024 95.5 79.0 - 97.0 fL Final   12/20/2023 90.5 79.0 - 97.0 fL Final   09/10/2019 92.0 80.0 - 100.0 fL Final     MCH   Date Value Ref Range Status   10/16/2024 30.8 26.6 - 33.0 pg Final   12/20/2023 28.1 26.6 - 33.0 pg Final   09/10/2019 29.3 26.0 - 34.0 pg Final     MCHC   Date Value Ref Range Status   10/16/2024 32.2 31.5 - 35.7 g/dL Final   12/20/2023 31.1 (L) 31.5 - 35.7 g/dL Final   09/10/2019 31.9 31.0 - 37.0 g/dL Final     RDW   Date Value Ref Range Status   10/16/2024 13.9 12.3 - 15.4 % Final   12/20/2023 13.3 12.3 - 15.4 % Final   09/10/2019 14.2 12.0 - 16.8 % Final     RDW-SD   Date Value Ref Range Status   12/20/2023 44.0 37.0 - 54.0 fl Final     MPV   Date Value Ref Range Status   12/20/2023 9.3 6.0 - 12.0 fL Final   09/10/2019 9.0 6.7 - 10.8 fL Final     Platelets   Date Value Ref Range Status   10/16/2024 292 140 - 450 10*3/mm3 Final   12/20/2023 428 140 - 450 10*3/mm3 Final   09/10/2019 363 140 - 440 10*3/uL Final     Neutrophil Rel %   Date Value Ref Range Status   10/16/2024 65.0 42.7 - 76.0 % Final   09/10/2019 74.3 45 - 80 % Final     Neutrophil %   Date  Value Ref Range Status   12/13/2023 57.6 42.7 - 76.0 % Final     Lymphocyte Rel %   Date Value Ref Range Status   10/16/2024 24.0 19.6 - 45.3 % Final   09/10/2019 17.2 15 - 50 % Final     Lymphocyte %   Date Value Ref Range Status   12/13/2023 23.9 19.6 - 45.3 % Final     Monocyte Rel %   Date Value Ref Range Status   10/16/2024 8.2 5.0 - 12.0 % Final   09/10/2019 7.7 0 - 15 % Final     Monocyte %   Date Value Ref Range Status   12/13/2023 15.1 (H) 5.0 - 12.0 % Final     Eosinophil Rel %   Date Value Ref Range Status   10/16/2024 2.0 0.3 - 6.2 % Final     Eosinophil %   Date Value Ref Range Status   12/13/2023 2.5 0.3 - 6.2 % Final   09/10/2019 0.4 0 - 7 % Final     Basophil Rel %   Date Value Ref Range Status   10/16/2024 0.7 0.0 - 1.5 % Final   09/10/2019 0.2 0 - 2 % Final     Basophil %   Date Value Ref Range Status   12/13/2023 0.7 0.0 - 1.5 % Final     Immature Grans %   Date Value Ref Range Status   12/13/2023 0.2 0.0 - 0.5 % Final   09/10/2019 0.2 (H) 0 % Final     Neutrophils Absolute   Date Value Ref Range Status   10/16/2024 4.86 1.70 - 7.00 10*3/mm3 Final   02/16/2024 6.20 (H) 1.70 - 6.00 x10(3)/ul Final   09/10/2019 6.14 2.0 - 8.8 10*3/uL Final     Lymphocytes Absolute   Date Value Ref Range Status   10/16/2024 1.79 0.70 - 3.10 10*3/mm3 Final   09/10/2019 1.42 0.7 - 5.5 10*3/uL Final     Lymphocytes, Absolute   Date Value Ref Range Status   12/13/2023 1.36 0.70 - 3.10 10*3/mm3 Final     Monocytes Absolute   Date Value Ref Range Status   10/16/2024 0.61 0.10 - 0.90 10*3/mm3 Final   09/10/2019 0.64 0.0 - 1.7 10*3/uL Final     Monocytes, Absolute   Date Value Ref Range Status   12/13/2023 0.86 0.10 - 0.90 10*3/mm3 Final     Eosinophils Absolute   Date Value Ref Range Status   10/16/2024 0.15 0.00 - 0.40 10*3/mm3 Final   02/16/2024 0.0 0.0 - 0.6 x10(3)/ul Final   09/10/2019 0.03 0.0 - 0.8 10*3/uL Final     Basophils Absolute   Date Value Ref Range Status   10/16/2024 0.05 0.00 - 0.20 10*3/mm3 Final  "  02/16/2024 0.1 0.0 - 0.3 x10(3)/ul Final   09/10/2019 0.02 0.0 - 0.2 10*3/uL Final     Immature Grans, Absolute   Date Value Ref Range Status   12/13/2023 0.01 0.00 - 0.05 10*3/mm3 Final   09/10/2019 0.02 <1 10*3/uL Final     nRBC   Date Value Ref Range Status   10/16/2024 0.0 0.0 - 0.2 /100 WBC Final   12/13/2023 0.0 0.0 - 0.2 /100 WBC Final       Lab Results   Component Value Date    HGBA1C 6.40 (H) 10/16/2024       Lab Results   Component Value Date    HOSYVUJH23 1,323 (H) 12/16/2023       TSH   Date Value Ref Range Status   03/05/2024 0.950 0.450 - 4.500 uIU/mL Final   12/16/2023 0.870 0.270 - 4.200 uIU/mL Final       No results found for: \"CHOL\"  Lab Results   Component Value Date    TRIG 83 06/04/2024     Lab Results   Component Value Date    HDL 40 06/04/2024     Lab Results   Component Value Date    LDL 56 06/04/2024     Lab Results   Component Value Date    VLDL 17 06/04/2024     No results found for: \"LDLHDL\"      Procedures    Assessment & Plan   Problems Addressed this Visit       Diabetic peripheral neuropathy    Relevant Medications    ALPRAZolam (XANAX) 2 MG tablet    traMADol (ULTRAM) 50 MG tablet    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 behavioral disturbance, psychotic disturbance, mood disturbance, or anxiety - Primary    Relevant Medications    memantine (Namenda) 10 MG tablet    ALPRAZolam (XANAX) 2 MG tablet    Vascular dementia    Relevant Medications    memantine (Namenda) 10 MG tablet    ALPRAZolam (XANAX) 2 MG tablet    Difficulty walking    Primary osteoarthritis of both knees     Diagnoses         Codes Comments    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  Stable.  Contnue namenda.   Rx sent.    OA b knees with diabetic peripheral neuropathy with difficulty walking and fall risk.  Uncontrolled, chronic.  Trouble in home.  Fall risk.  Unable to use cane or walker safely in the home.  The patient has a mobility limitation that significantly impairs his/her ability in walking from room to room and going to the bathroom and toileting within the home due to bilateral peripheral neuropathy and osteoarthritis of knees.     A cane or walker is not a safe option due to fall risk. · The use of a manual wheelchair will significantly improve the pa   tient's ability to participate in ADL's and the patient will use it on a regular basis within the home.   Patient has the upper body strength and mental capability to safely propel the wheelchair in their home and they do have a caregive   r that is available, willing, and able to provide assistance with the wheelchair. RF tramadol for pain control.     No orders of the defined types were placed in this encounter.      Current Outpatient Medications   Medication Sig Dispense Refill    ALPRAZolam (XANAX) 2 MG tablet Take 1 tablet by mouth At Night As Needed for Sleep. 90 tablet 1    aspirin 81 MG EC tablet Take 1 tablet by mouth Daily.      atorvastatin (LIPITOR) 40 MG tablet Take 1 tablet by mouth Daily. 90 tablet 11    Cyanocobalamin (B-12) 1000 MCG sublingual tablet One sublingual tab dissolved on tongue daily 90 each 3    DULoxetine (CYMBALTA) 60 MG capsule Take 1 capsule by mouth Daily. 90 capsule 3    glipizide (Glucotrol) 5 MG tablet Take 1 tablet by mouth Daily With Breakfast. NOTE NEW DIRECTIONS!!! 90 tablet 3    hydroCHLOROthiazide 25 MG tablet Take 1 tablet by mouth Daily. 90 tablet 3    levothyroxine  (SYNTHROID, LEVOTHROID) 25 MCG tablet Take 1 tablet by mouth Daily. 90 tablet 3    memantine (Namenda) 10 MG tablet 1  tablet 1    metFORMIN (GLUCOPHAGE) 1000 MG tablet Take 1 tablet by mouth 2 (Two) Times a Day With Meals. 180 tablet 3    metoprolol tartrate (LOPRESSOR) 25 MG tablet Take 1 tablet by mouth 2 (Two) Times a Day. 180 tablet 3    nitroglycerin (Nitrostat) 0.4 MG SL tablet Place 1 tablet under the tongue Every 5 (Five) Minutes As Needed for Chest Pain. Take no more than 3 doses in 15 minutes. 25 tablet 12    ramipril (ALTACE) 10 MG capsule Take 1 capsule by mouth Daily. 90 capsule 3    traMADol (ULTRAM) 50 MG tablet One to two tablets QID prn pain. 240 tablet 3     No current facility-administered medications for this visit.       Eric Kramer had no medications administered during this visit.    Return in about 4 months (around 4/12/2025).    There are no Patient Instructions on file for this visit.

## 2025-01-01 ENCOUNTER — HOSPITAL ENCOUNTER (INPATIENT)
Facility: HOSPITAL | Age: 85
LOS: 5 days | End: 2025-04-03
Attending: INTERNAL MEDICINE | Admitting: INTERNAL MEDICINE
Payer: COMMERCIAL

## 2025-01-01 VITALS
DIASTOLIC BLOOD PRESSURE: 73 MMHG | RESPIRATION RATE: 18 BRPM | HEART RATE: 113 BPM | OXYGEN SATURATION: 73 % | TEMPERATURE: 100.1 F | SYSTOLIC BLOOD PRESSURE: 113 MMHG

## 2025-01-01 PROCEDURE — 25010000002 LORAZEPAM PER 2 MG: Performed by: INTERNAL MEDICINE

## 2025-01-01 PROCEDURE — 25010000002 HYDROMORPHONE 1 MG/ML SOLUTION: Performed by: INTERNAL MEDICINE

## 2025-01-01 PROCEDURE — 25010000002 MORPHINE PER 10 MG: Performed by: INTERNAL MEDICINE

## 2025-01-01 PROCEDURE — 25010000002 GLYCOPYRROLATE 0.2 MG/ML SOLUTION: Performed by: INTERNAL MEDICINE

## 2025-01-01 PROCEDURE — 99221 1ST HOSP IP/OBS SF/LOW 40: CPT | Performed by: INTERNAL MEDICINE

## 2025-01-01 PROCEDURE — 25010000002 HYDROMORPHONE PER 4 MG: Performed by: INTERNAL MEDICINE

## 2025-01-01 PROCEDURE — 99231 SBSQ HOSP IP/OBS SF/LOW 25: CPT | Performed by: INTERNAL MEDICINE

## 2025-01-01 RX ORDER — LORAZEPAM 2 MG/ML
2 INJECTION INTRAMUSCULAR
Status: DISCONTINUED | OUTPATIENT
Start: 2025-01-01 | End: 2025-01-01 | Stop reason: HOSPADM

## 2025-01-01 RX ORDER — ACETAMINOPHEN 650 MG/1
650 SUPPOSITORY RECTAL EVERY 4 HOURS PRN
Status: DISCONTINUED | OUTPATIENT
Start: 2025-01-01 | End: 2025-01-01 | Stop reason: HOSPADM

## 2025-01-01 RX ORDER — SODIUM CHLORIDE 0.9 % (FLUSH) 0.9 %
10 SYRINGE (ML) INJECTION AS NEEDED
Status: DISCONTINUED | OUTPATIENT
Start: 2025-01-01 | End: 2025-01-01 | Stop reason: HOSPADM

## 2025-01-01 RX ORDER — LORAZEPAM 2 MG/ML
1 CONCENTRATE ORAL
Status: DISCONTINUED | OUTPATIENT
Start: 2025-01-01 | End: 2025-01-01 | Stop reason: HOSPADM

## 2025-01-01 RX ORDER — LORAZEPAM 2 MG/ML
2 INJECTION INTRAMUSCULAR
Status: DISCONTINUED | OUTPATIENT
Start: 2025-01-01 | End: 2025-01-01

## 2025-01-01 RX ORDER — LORAZEPAM 2 MG/ML
0.5 INJECTION INTRAMUSCULAR
Status: DISCONTINUED | OUTPATIENT
Start: 2025-01-01 | End: 2025-01-01

## 2025-01-01 RX ORDER — LORAZEPAM 2 MG/ML
0.5 INJECTION INTRAMUSCULAR
Status: DISCONTINUED | OUTPATIENT
Start: 2025-01-01 | End: 2025-01-01 | Stop reason: HOSPADM

## 2025-01-01 RX ORDER — HYDROMORPHONE HYDROCHLORIDE 1 MG/ML
0.5 INJECTION, SOLUTION INTRAMUSCULAR; INTRAVENOUS; SUBCUTANEOUS
Refills: 0 | Status: DISCONTINUED | OUTPATIENT
Start: 2025-01-01 | End: 2025-01-01 | Stop reason: HOSPADM

## 2025-01-01 RX ORDER — MORPHINE SULFATE 2 MG/ML
2 INJECTION, SOLUTION INTRAMUSCULAR; INTRAVENOUS
Status: DISCONTINUED | OUTPATIENT
Start: 2025-01-01 | End: 2025-01-01 | Stop reason: HOSPADM

## 2025-01-01 RX ORDER — MORPHINE SULFATE 4 MG/ML
4 INJECTION, SOLUTION INTRAMUSCULAR; INTRAVENOUS
Status: DISCONTINUED | OUTPATIENT
Start: 2025-01-01 | End: 2025-01-01 | Stop reason: HOSPADM

## 2025-01-01 RX ORDER — LORAZEPAM 2 MG/ML
2 CONCENTRATE ORAL
Status: DISCONTINUED | OUTPATIENT
Start: 2025-01-01 | End: 2025-01-01

## 2025-01-01 RX ORDER — LORAZEPAM 2 MG/ML
2 CONCENTRATE ORAL
Status: DISCONTINUED | OUTPATIENT
Start: 2025-01-01 | End: 2025-01-01 | Stop reason: HOSPADM

## 2025-01-01 RX ORDER — HYDROMORPHONE HYDROCHLORIDE 2 MG/ML
1.5 INJECTION, SOLUTION INTRAMUSCULAR; INTRAVENOUS; SUBCUTANEOUS
Refills: 0 | Status: DISCONTINUED | OUTPATIENT
Start: 2025-01-01 | End: 2025-01-01 | Stop reason: HOSPADM

## 2025-01-01 RX ORDER — GLYCOPYRROLATE 0.2 MG/ML
0.4 INJECTION INTRAMUSCULAR; INTRAVENOUS
Status: DISCONTINUED | OUTPATIENT
Start: 2025-01-01 | End: 2025-01-01 | Stop reason: HOSPADM

## 2025-01-01 RX ORDER — BISACODYL 10 MG
10 SUPPOSITORY, RECTAL RECTAL DAILY PRN
Status: DISCONTINUED | OUTPATIENT
Start: 2025-01-01 | End: 2025-01-01 | Stop reason: HOSPADM

## 2025-01-01 RX ORDER — MORPHINE SULFATE 20 MG/ML
10 SOLUTION ORAL
Refills: 0 | Status: DISCONTINUED | OUTPATIENT
Start: 2025-01-01 | End: 2025-01-01 | Stop reason: HOSPADM

## 2025-01-01 RX ORDER — MORPHINE SULFATE 10 MG/ML
6 INJECTION, SOLUTION INTRAMUSCULAR; INTRAVENOUS
Status: DISCONTINUED | OUTPATIENT
Start: 2025-01-01 | End: 2025-01-01 | Stop reason: HOSPADM

## 2025-01-01 RX ORDER — LORAZEPAM 2 MG/ML
0.5 CONCENTRATE ORAL
Status: DISCONTINUED | OUTPATIENT
Start: 2025-01-01 | End: 2025-01-01 | Stop reason: HOSPADM

## 2025-01-01 RX ORDER — ONDANSETRON 2 MG/ML
4 INJECTION INTRAMUSCULAR; INTRAVENOUS EVERY 6 HOURS PRN
Status: DISCONTINUED | OUTPATIENT
Start: 2025-01-01 | End: 2025-01-01 | Stop reason: HOSPADM

## 2025-01-01 RX ORDER — LORAZEPAM 2 MG/ML
1 CONCENTRATE ORAL
Status: DISCONTINUED | OUTPATIENT
Start: 2025-01-01 | End: 2025-01-01

## 2025-01-01 RX ORDER — GLYCOPYRROLATE 0.2 MG/ML
0.2 INJECTION INTRAMUSCULAR; INTRAVENOUS
Status: DISCONTINUED | OUTPATIENT
Start: 2025-01-01 | End: 2025-01-01 | Stop reason: HOSPADM

## 2025-01-01 RX ORDER — LORAZEPAM 2 MG/ML
0.5 CONCENTRATE ORAL
Status: DISCONTINUED | OUTPATIENT
Start: 2025-01-01 | End: 2025-01-01

## 2025-01-01 RX ORDER — MORPHINE SULFATE 20 MG/ML
20 SOLUTION ORAL
Refills: 0 | Status: DISCONTINUED | OUTPATIENT
Start: 2025-01-01 | End: 2025-01-01 | Stop reason: HOSPADM

## 2025-01-01 RX ORDER — ECHINACEA PURPUREA EXTRACT 125 MG
1 TABLET ORAL AS NEEDED
Status: DISCONTINUED | OUTPATIENT
Start: 2025-01-01 | End: 2025-01-01 | Stop reason: HOSPADM

## 2025-01-01 RX ORDER — SCOPOLAMINE 1 MG/3D
1 PATCH, EXTENDED RELEASE TRANSDERMAL
Status: DISCONTINUED | OUTPATIENT
Start: 2025-01-01 | End: 2025-01-01 | Stop reason: HOSPADM

## 2025-01-01 RX ORDER — LORAZEPAM 2 MG/ML
1 INJECTION INTRAMUSCULAR
Status: DISCONTINUED | OUTPATIENT
Start: 2025-01-01 | End: 2025-01-01 | Stop reason: HOSPADM

## 2025-01-01 RX ORDER — LORAZEPAM 2 MG/ML
1 INJECTION INTRAMUSCULAR
Status: DISCONTINUED | OUTPATIENT
Start: 2025-01-01 | End: 2025-01-01

## 2025-01-01 RX ORDER — MORPHINE SULFATE 20 MG/ML
5 SOLUTION ORAL
Refills: 0 | Status: DISCONTINUED | OUTPATIENT
Start: 2025-01-01 | End: 2025-01-01 | Stop reason: HOSPADM

## 2025-01-01 RX ADMIN — MORPHINE SULFATE 4 MG: 4 INJECTION, SOLUTION INTRAMUSCULAR; INTRAVENOUS at 00:24

## 2025-01-01 RX ADMIN — LORAZEPAM 2 MG: 2 INJECTION INTRAMUSCULAR; INTRAVENOUS at 04:23

## 2025-01-01 RX ADMIN — LORAZEPAM 2 MG: 2 INJECTION INTRAMUSCULAR; INTRAVENOUS at 00:48

## 2025-01-01 RX ADMIN — MORPHINE SULFATE 4 MG: 4 INJECTION, SOLUTION INTRAMUSCULAR; INTRAVENOUS at 17:48

## 2025-01-01 RX ADMIN — MORPHINE SULFATE 4 MG: 4 INJECTION, SOLUTION INTRAMUSCULAR; INTRAVENOUS at 13:04

## 2025-01-01 RX ADMIN — GLYCOPYRROLATE 0.4 MG: 0.2 INJECTION INTRAMUSCULAR; INTRAVENOUS at 22:13

## 2025-01-01 RX ADMIN — MORPHINE SULFATE 4 MG: 4 INJECTION, SOLUTION INTRAMUSCULAR; INTRAVENOUS at 08:28

## 2025-01-01 RX ADMIN — MORPHINE SULFATE 4 MG: 4 INJECTION, SOLUTION INTRAMUSCULAR; INTRAVENOUS at 20:21

## 2025-01-01 RX ADMIN — LORAZEPAM 2 MG: 2 INJECTION INTRAMUSCULAR; INTRAVENOUS at 17:29

## 2025-01-01 RX ADMIN — LORAZEPAM 2 MG: 2 INJECTION INTRAMUSCULAR; INTRAVENOUS at 20:29

## 2025-01-01 RX ADMIN — LORAZEPAM 2 MG: 2 INJECTION INTRAMUSCULAR; INTRAVENOUS at 08:30

## 2025-01-01 RX ADMIN — MORPHINE SULFATE 4 MG: 4 INJECTION, SOLUTION INTRAMUSCULAR; INTRAVENOUS at 08:27

## 2025-01-01 RX ADMIN — LORAZEPAM 2 MG: 2 INJECTION INTRAMUSCULAR; INTRAVENOUS at 20:21

## 2025-01-01 RX ADMIN — MORPHINE SULFATE 4 MG: 4 INJECTION, SOLUTION INTRAMUSCULAR; INTRAVENOUS at 17:16

## 2025-01-01 RX ADMIN — MORPHINE SULFATE 4 MG: 4 INJECTION, SOLUTION INTRAMUSCULAR; INTRAVENOUS at 04:23

## 2025-01-01 RX ADMIN — LORAZEPAM 2 MG: 2 INJECTION INTRAMUSCULAR; INTRAVENOUS at 00:54

## 2025-01-01 RX ADMIN — SCOPOLAMINE 1 PATCH: 1.5 PATCH, EXTENDED RELEASE TRANSDERMAL at 09:19

## 2025-01-01 RX ADMIN — LORAZEPAM 2 MG: 2 INJECTION INTRAMUSCULAR; INTRAVENOUS at 20:42

## 2025-01-01 RX ADMIN — LORAZEPAM 2 MG: 2 INJECTION INTRAMUSCULAR; INTRAVENOUS at 04:40

## 2025-01-01 RX ADMIN — MORPHINE SULFATE 4 MG: 4 INJECTION, SOLUTION INTRAMUSCULAR; INTRAVENOUS at 00:48

## 2025-01-01 RX ADMIN — LORAZEPAM 2 MG: 2 INJECTION INTRAMUSCULAR; INTRAVENOUS at 00:24

## 2025-01-01 RX ADMIN — GLYCOPYRROLATE 0.2 MG: 0.2 INJECTION INTRAMUSCULAR; INTRAVENOUS at 04:23

## 2025-01-01 RX ADMIN — HYDROMORPHONE HYDROCHLORIDE 1 MG: 1 INJECTION, SOLUTION INTRAMUSCULAR; INTRAVENOUS; SUBCUTANEOUS at 22:13

## 2025-01-01 RX ADMIN — GLYCOPYRROLATE 0.4 MG: 0.2 INJECTION INTRAMUSCULAR; INTRAVENOUS at 00:51

## 2025-01-01 RX ADMIN — LORAZEPAM 2 MG: 2 INJECTION INTRAMUSCULAR; INTRAVENOUS at 17:09

## 2025-01-01 RX ADMIN — MORPHINE SULFATE 4 MG: 4 INJECTION, SOLUTION INTRAMUSCULAR; INTRAVENOUS at 20:42

## 2025-01-01 RX ADMIN — LORAZEPAM 2 MG: 2 INJECTION INTRAMUSCULAR; INTRAVENOUS at 12:51

## 2025-01-01 RX ADMIN — GLYCOPYRROLATE 0.2 MG: 0.2 INJECTION INTRAMUSCULAR; INTRAVENOUS at 20:23

## 2025-01-01 RX ADMIN — MORPHINE SULFATE 4 MG: 4 INJECTION, SOLUTION INTRAMUSCULAR; INTRAVENOUS at 17:09

## 2025-01-01 RX ADMIN — MORPHINE SULFATE 4 MG: 4 INJECTION, SOLUTION INTRAMUSCULAR; INTRAVENOUS at 20:23

## 2025-01-01 RX ADMIN — HYDROMORPHONE HYDROCHLORIDE 1 MG: 1 INJECTION, SOLUTION INTRAMUSCULAR; INTRAVENOUS; SUBCUTANEOUS at 00:51

## 2025-01-01 RX ADMIN — GLYCOPYRROLATE 0.4 MG: 0.2 INJECTION INTRAMUSCULAR; INTRAVENOUS at 04:21

## 2025-01-01 RX ADMIN — HYDROMORPHONE HYDROCHLORIDE 1.5 MG: 2 INJECTION, SOLUTION INTRAMUSCULAR; INTRAVENOUS; SUBCUTANEOUS at 04:21

## 2025-01-01 RX ADMIN — LORAZEPAM 2 MG: 2 INJECTION INTRAMUSCULAR; INTRAVENOUS at 08:28

## 2025-01-01 RX ADMIN — LORAZEPAM 2 MG: 2 INJECTION INTRAMUSCULAR; INTRAVENOUS at 16:22

## 2025-01-01 RX ADMIN — GLYCOPYRROLATE 0.2 MG: 0.2 INJECTION INTRAMUSCULAR; INTRAVENOUS at 17:16

## 2025-01-01 RX ADMIN — LORAZEPAM 2 MG: 2 INJECTION INTRAMUSCULAR; INTRAVENOUS at 08:27

## 2025-01-01 RX ADMIN — GLYCOPYRROLATE 0.2 MG: 0.2 INJECTION INTRAMUSCULAR; INTRAVENOUS at 16:22

## 2025-01-01 RX ADMIN — LORAZEPAM 2 MG: 2 INJECTION INTRAMUSCULAR; INTRAVENOUS at 17:16

## 2025-01-01 RX ADMIN — GLYCOPYRROLATE 0.2 MG: 0.2 INJECTION INTRAMUSCULAR; INTRAVENOUS at 13:26

## 2025-01-01 RX ADMIN — LORAZEPAM 2 MG: 2 INJECTION INTRAMUSCULAR; INTRAVENOUS at 00:51

## 2025-01-01 RX ADMIN — LORAZEPAM 2 MG: 2 INJECTION INTRAMUSCULAR; INTRAVENOUS at 09:18

## 2025-01-01 RX ADMIN — MORPHINE SULFATE 4 MG: 4 INJECTION, SOLUTION INTRAMUSCULAR; INTRAVENOUS at 04:37

## 2025-01-01 RX ADMIN — LORAZEPAM 2 MG: 2 INJECTION INTRAMUSCULAR; INTRAVENOUS at 04:21

## 2025-01-01 RX ADMIN — GLYCOPYRROLATE 0.2 MG: 0.2 INJECTION INTRAMUSCULAR; INTRAVENOUS at 05:35

## 2025-01-01 RX ADMIN — GLYCOPYRROLATE 0.2 MG: 0.2 INJECTION INTRAMUSCULAR; INTRAVENOUS at 13:03

## 2025-01-01 RX ADMIN — MORPHINE SULFATE 4 MG: 4 INJECTION, SOLUTION INTRAMUSCULAR; INTRAVENOUS at 12:56

## 2025-01-01 RX ADMIN — LORAZEPAM 2 MG: 2 INJECTION INTRAMUSCULAR; INTRAVENOUS at 05:34

## 2025-01-01 RX ADMIN — LORAZEPAM 2 MG: 2 INJECTION INTRAMUSCULAR; INTRAVENOUS at 04:37

## 2025-01-01 RX ADMIN — GLYCOPYRROLATE 0.2 MG: 0.2 INJECTION INTRAMUSCULAR; INTRAVENOUS at 08:30

## 2025-01-01 RX ADMIN — MORPHINE SULFATE 4 MG: 4 INJECTION, SOLUTION INTRAMUSCULAR; INTRAVENOUS at 20:30

## 2025-01-01 RX ADMIN — MORPHINE SULFATE 4 MG: 4 INJECTION, SOLUTION INTRAMUSCULAR; INTRAVENOUS at 00:32

## 2025-01-01 RX ADMIN — LORAZEPAM 2 MG: 2 INJECTION INTRAMUSCULAR; INTRAVENOUS at 17:48

## 2025-01-01 RX ADMIN — LORAZEPAM 2 MG: 2 INJECTION INTRAMUSCULAR; INTRAVENOUS at 13:27

## 2025-01-01 RX ADMIN — GLYCOPYRROLATE 0.4 MG: 0.2 INJECTION INTRAMUSCULAR; INTRAVENOUS at 20:43

## 2025-01-01 RX ADMIN — MORPHINE SULFATE 4 MG: 4 INJECTION, SOLUTION INTRAMUSCULAR; INTRAVENOUS at 04:40

## 2025-01-01 RX ADMIN — MORPHINE SULFATE 4 MG: 4 INJECTION, SOLUTION INTRAMUSCULAR; INTRAVENOUS at 00:54

## 2025-01-01 RX ADMIN — MORPHINE SULFATE 4 MG: 4 INJECTION, SOLUTION INTRAMUSCULAR; INTRAVENOUS at 17:30

## 2025-01-01 RX ADMIN — LORAZEPAM 2 MG: 2 INJECTION INTRAMUSCULAR; INTRAVENOUS at 00:32

## 2025-01-01 RX ADMIN — MORPHINE SULFATE 4 MG: 4 INJECTION, SOLUTION INTRAMUSCULAR; INTRAVENOUS at 09:18

## 2025-01-01 RX ADMIN — LORAZEPAM 2 MG: 2 INJECTION INTRAMUSCULAR; INTRAVENOUS at 13:32

## 2025-01-01 RX ADMIN — GLYCOPYRROLATE 0.2 MG: 0.2 INJECTION INTRAMUSCULAR; INTRAVENOUS at 00:32

## 2025-01-01 RX ADMIN — MORPHINE SULFATE 4 MG: 4 INJECTION, SOLUTION INTRAMUSCULAR; INTRAVENOUS at 13:32

## 2025-01-01 RX ADMIN — LORAZEPAM 2 MG: 2 INJECTION INTRAMUSCULAR; INTRAVENOUS at 20:23

## 2025-01-01 RX ADMIN — MORPHINE SULFATE 4 MG: 4 INJECTION, SOLUTION INTRAMUSCULAR; INTRAVENOUS at 13:52

## 2025-01-01 RX ADMIN — MORPHINE SULFATE 4 MG: 4 INJECTION, SOLUTION INTRAMUSCULAR; INTRAVENOUS at 16:22

## 2025-01-01 RX ADMIN — MORPHINE SULFATE 4 MG: 4 INJECTION, SOLUTION INTRAMUSCULAR; INTRAVENOUS at 05:34

## 2025-01-01 RX ADMIN — MORPHINE SULFATE 4 MG: 4 INJECTION, SOLUTION INTRAMUSCULAR; INTRAVENOUS at 08:32

## 2025-01-01 RX ADMIN — MORPHINE SULFATE 4 MG: 4 INJECTION, SOLUTION INTRAMUSCULAR; INTRAVENOUS at 13:27

## 2025-01-01 RX ADMIN — GLYCOPYRROLATE 0.2 MG: 0.2 INJECTION INTRAMUSCULAR; INTRAVENOUS at 20:41

## 2025-01-01 RX ADMIN — LORAZEPAM 2 MG: 2 INJECTION INTRAMUSCULAR; INTRAVENOUS at 13:03

## 2025-03-16 ENCOUNTER — APPOINTMENT (OUTPATIENT)
Dept: CT IMAGING | Facility: HOSPITAL | Age: 85
End: 2025-03-16
Payer: MEDICARE

## 2025-03-16 ENCOUNTER — APPOINTMENT (OUTPATIENT)
Dept: GENERAL RADIOLOGY | Facility: HOSPITAL | Age: 85
End: 2025-03-16
Payer: MEDICARE

## 2025-03-16 ENCOUNTER — HOSPITAL ENCOUNTER (INPATIENT)
Facility: HOSPITAL | Age: 85
LOS: 2 days | Discharge: HOME | End: 2025-03-18
Attending: EMERGENCY MEDICINE | Admitting: STUDENT IN AN ORGANIZED HEALTH CARE EDUCATION/TRAINING PROGRAM
Payer: MEDICARE

## 2025-03-16 ENCOUNTER — APPOINTMENT (OUTPATIENT)
Dept: CARDIOLOGY | Facility: HOSPITAL | Age: 85
End: 2025-03-16
Payer: MEDICARE

## 2025-03-16 DIAGNOSIS — R79.89 ELEVATED TROPONIN: ICD-10-CM

## 2025-03-16 DIAGNOSIS — I26.99 ACUTE PULMONARY EMBOLISM WITHOUT ACUTE COR PULMONALE, UNSPECIFIED PULMONARY EMBOLISM TYPE: Primary | ICD-10-CM

## 2025-03-16 DIAGNOSIS — R29.6 MULTIPLE FALLS: ICD-10-CM

## 2025-03-16 DIAGNOSIS — R09.02 HYPOXIA: ICD-10-CM

## 2025-03-16 DIAGNOSIS — L03.031 CELLULITIS OF GREAT TOE OF RIGHT FOOT: ICD-10-CM

## 2025-03-16 DIAGNOSIS — R41.82 ALTERED MENTAL STATUS, UNSPECIFIED ALTERED MENTAL STATUS TYPE: ICD-10-CM

## 2025-03-16 LAB
ALBUMIN SERPL-MCNC: 3.3 G/DL (ref 3.5–5.2)
ALBUMIN/GLOB SERPL: 1.1 G/DL
ALP SERPL-CCNC: 65 U/L (ref 39–117)
ALT SERPL W P-5'-P-CCNC: 10 U/L (ref 1–41)
ANION GAP SERPL CALCULATED.3IONS-SCNC: 13.1 MMOL/L (ref 5–15)
AORTIC ARCH: 2.8 CM
AORTIC DIMENSIONLESS INDEX: 0.73 (DI)
ASCENDING AORTA: 3.9 CM
AST SERPL-CCNC: 18 U/L (ref 1–40)
AV MEAN PRESS GRAD SYS DOP V1V2: 4 MMHG
AV VMAX SYS DOP: 136.7 CM/SEC
B PARAPERT DNA SPEC QL NAA+PROBE: NOT DETECTED
B PERT DNA SPEC QL NAA+PROBE: NOT DETECTED
BACTERIA UR QL AUTO: NORMAL /HPF
BASOPHILS # BLD AUTO: 0.03 10*3/MM3 (ref 0–0.2)
BASOPHILS NFR BLD AUTO: 0.4 % (ref 0–1.5)
BH CV ECHO MEAS - ACS: 2.07 CM
BH CV ECHO MEAS - AO MAX PG: 7.5 MMHG
BH CV ECHO MEAS - AO ROOT AREA (BSA CORRECTED): 1.6 CM2
BH CV ECHO MEAS - AO ROOT DIAM: 3.6 CM
BH CV ECHO MEAS - AO V2 VTI: 29.2 CM
BH CV ECHO MEAS - AVA(I,D): 3 CM2
BH CV ECHO MEAS - EDV(CUBED): 63.7 ML
BH CV ECHO MEAS - EDV(MOD-SP2): 53 ML
BH CV ECHO MEAS - EDV(MOD-SP4): 59 ML
BH CV ECHO MEAS - EF(MOD-SP2): 56.6 %
BH CV ECHO MEAS - EF(MOD-SP4): 61 %
BH CV ECHO MEAS - ESV(CUBED): 20.8 ML
BH CV ECHO MEAS - ESV(MOD-SP2): 23 ML
BH CV ECHO MEAS - ESV(MOD-SP4): 23 ML
BH CV ECHO MEAS - FS: 31.2 %
BH CV ECHO MEAS - IVS/LVPW: 1.03 CM
BH CV ECHO MEAS - IVSD: 1.23 CM
BH CV ECHO MEAS - LAT PEAK E' VEL: 8.4 CM/SEC
BH CV ECHO MEAS - LV DIASTOLIC VOL/BSA (35-75): 26.5 CM2
BH CV ECHO MEAS - LV MASS(C)D: 168.6 GRAMS
BH CV ECHO MEAS - LV MAX PG: 3.4 MMHG
BH CV ECHO MEAS - LV MEAN PG: 1.98 MMHG
BH CV ECHO MEAS - LV SYSTOLIC VOL/BSA (12-30): 10.3 CM2
BH CV ECHO MEAS - LV V1 MAX: 92.8 CM/SEC
BH CV ECHO MEAS - LV V1 VTI: 21.2 CM
BH CV ECHO MEAS - LVIDD: 4 CM
BH CV ECHO MEAS - LVIDS: 2.7 CM
BH CV ECHO MEAS - LVOT AREA: 4.1 CM2
BH CV ECHO MEAS - LVOT DIAM: 2.28 CM
BH CV ECHO MEAS - LVPWD: 1.2 CM
BH CV ECHO MEAS - MED PEAK E' VEL: 5.5 CM/SEC
BH CV ECHO MEAS - MV A DUR: 0.13 SEC
BH CV ECHO MEAS - MV A MAX VEL: 89.6 CM/SEC
BH CV ECHO MEAS - MV DEC SLOPE: 169.9 CM/SEC2
BH CV ECHO MEAS - MV DEC TIME: 0.33 SEC
BH CV ECHO MEAS - MV E MAX VEL: 49.3 CM/SEC
BH CV ECHO MEAS - MV E/A: 0.55
BH CV ECHO MEAS - MV MAX PG: 3.5 MMHG
BH CV ECHO MEAS - MV MEAN PG: 1.1 MMHG
BH CV ECHO MEAS - MV P1/2T: 94 MSEC
BH CV ECHO MEAS - MV V2 VTI: 27.8 CM
BH CV ECHO MEAS - MVA(P1/2T): 2.34 CM2
BH CV ECHO MEAS - MVA(VTI): 3.1 CM2
BH CV ECHO MEAS - PA ACC TIME: 0.07 SEC
BH CV ECHO MEAS - PA V2 MAX: 91.8 CM/SEC
BH CV ECHO MEAS - PULM SYS VEL: 65.4 CM/SEC
BH CV ECHO MEAS - RAP SYSTOLE: 3 MMHG
BH CV ECHO MEAS - RV MAX PG: 2.5 MMHG
BH CV ECHO MEAS - RV V1 MAX: 79.1 CM/SEC
BH CV ECHO MEAS - RV V1 VTI: 15.7 CM
BH CV ECHO MEAS - SV(LVOT): 86.3 ML
BH CV ECHO MEAS - SV(MOD-SP2): 30 ML
BH CV ECHO MEAS - SV(MOD-SP4): 36 ML
BH CV ECHO MEAS - SVI(LVOT): 38.7 ML/M2
BH CV ECHO MEAS - SVI(MOD-SP2): 13.5 ML/M2
BH CV ECHO MEAS - SVI(MOD-SP4): 16.2 ML/M2
BH CV ECHO MEAS - TAPSE (>1.6): 1.78 CM
BH CV ECHO MEAS RV FREE WALL STRAIN: -24.7 %
BH CV ECHO MEASUREMENTS AVERAGE E/E' RATIO: 7.09
BH CV XLRA - RV BASE: 2.9 CM
BH CV XLRA - RV LENGTH: 7.7 CM
BH CV XLRA - RV MID: 1.98 CM
BH CV XLRA - TDI S': 10.9 CM/SEC
BILIRUB SERPL-MCNC: 0.6 MG/DL (ref 0–1.2)
BILIRUB UR QL STRIP: NEGATIVE
BUN SERPL-MCNC: 26 MG/DL (ref 8–23)
BUN/CREAT SERPL: 16.4 (ref 7–25)
C PNEUM DNA NPH QL NAA+NON-PROBE: NOT DETECTED
CALCIUM SPEC-SCNC: 9.1 MG/DL (ref 8.6–10.5)
CHLORIDE SERPL-SCNC: 101 MMOL/L (ref 98–107)
CLARITY UR: CLEAR
CO2 SERPL-SCNC: 25.9 MMOL/L (ref 22–29)
COLOR UR: YELLOW
CREAT SERPL-MCNC: 1.59 MG/DL (ref 0.76–1.27)
D-LACTATE SERPL-SCNC: 1.9 MMOL/L (ref 0.5–2)
DEPRECATED RDW RBC AUTO: 48.6 FL (ref 37–54)
EGFRCR SERPLBLD CKD-EPI 2021: 42.8 ML/MIN/1.73
EOSINOPHIL # BLD AUTO: 0.13 10*3/MM3 (ref 0–0.4)
EOSINOPHIL NFR BLD AUTO: 1.9 % (ref 0.3–6.2)
ERYTHROCYTE [DISTWIDTH] IN BLOOD BY AUTOMATED COUNT: 13.7 % (ref 12.3–15.4)
FLUAV SUBTYP SPEC NAA+PROBE: NOT DETECTED
FLUBV RNA ISLT QL NAA+PROBE: NOT DETECTED
GEN 5 1HR TROPONIN T REFLEX: 37 NG/L
GLOBULIN UR ELPH-MCNC: 3.1 GM/DL
GLUCOSE BLDC GLUCOMTR-MCNC: 135 MG/DL (ref 70–130)
GLUCOSE BLDC GLUCOMTR-MCNC: 145 MG/DL (ref 70–130)
GLUCOSE SERPL-MCNC: 220 MG/DL (ref 65–99)
GLUCOSE UR STRIP-MCNC: NEGATIVE MG/DL
HADV DNA SPEC NAA+PROBE: NOT DETECTED
HCOV 229E RNA SPEC QL NAA+PROBE: NOT DETECTED
HCOV HKU1 RNA SPEC QL NAA+PROBE: NOT DETECTED
HCOV NL63 RNA SPEC QL NAA+PROBE: NOT DETECTED
HCOV OC43 RNA SPEC QL NAA+PROBE: NOT DETECTED
HCT VFR BLD AUTO: 41 % (ref 37.5–51)
HGB BLD-MCNC: 13.1 G/DL (ref 13–17.7)
HGB UR QL STRIP.AUTO: NEGATIVE
HMPV RNA NPH QL NAA+NON-PROBE: NOT DETECTED
HPIV1 RNA ISLT QL NAA+PROBE: NOT DETECTED
HPIV2 RNA SPEC QL NAA+PROBE: NOT DETECTED
HPIV3 RNA NPH QL NAA+PROBE: NOT DETECTED
HPIV4 P GENE NPH QL NAA+PROBE: NOT DETECTED
HYALINE CASTS UR QL AUTO: NORMAL /LPF
IMM GRANULOCYTES # BLD AUTO: 0.01 10*3/MM3 (ref 0–0.05)
IMM GRANULOCYTES NFR BLD AUTO: 0.1 % (ref 0–0.5)
KETONES UR QL STRIP: NEGATIVE
LEFT ATRIUM VOLUME INDEX: 17.4 ML/M2
LEUKOCYTE ESTERASE UR QL STRIP.AUTO: NEGATIVE
LIPASE SERPL-CCNC: 123 U/L (ref 13–60)
LV EF BIPLANE MOD: 58.1 %
LYMPHOCYTES # BLD AUTO: 1.49 10*3/MM3 (ref 0.7–3.1)
LYMPHOCYTES NFR BLD AUTO: 22 % (ref 19.6–45.3)
M PNEUMO IGG SER IA-ACNC: NOT DETECTED
MCH RBC QN AUTO: 30.3 PG (ref 26.6–33)
MCHC RBC AUTO-ENTMCNC: 32 G/DL (ref 31.5–35.7)
MCV RBC AUTO: 94.7 FL (ref 79–97)
MONOCYTES # BLD AUTO: 0.62 10*3/MM3 (ref 0.1–0.9)
MONOCYTES NFR BLD AUTO: 9.2 % (ref 5–12)
NEUTROPHILS NFR BLD AUTO: 4.49 10*3/MM3 (ref 1.7–7)
NEUTROPHILS NFR BLD AUTO: 66.4 % (ref 42.7–76)
NITRITE UR QL STRIP: NEGATIVE
NRBC BLD AUTO-RTO: 0 /100 WBC (ref 0–0.2)
NT-PROBNP SERPL-MCNC: 411 PG/ML (ref 0–1800)
PH UR STRIP.AUTO: 7 [PH] (ref 5–8)
PLATELET # BLD AUTO: 288 10*3/MM3 (ref 140–450)
PMV BLD AUTO: 9.5 FL (ref 6–12)
POTASSIUM SERPL-SCNC: 3.9 MMOL/L (ref 3.5–5.2)
PROCALCITONIN SERPL-MCNC: 0.07 NG/ML (ref 0–0.25)
PROT SERPL-MCNC: 6.4 G/DL (ref 6–8.5)
PROT UR QL STRIP: ABNORMAL
QT INTERVAL: 392 MS
QTC INTERVAL: 404 MS
RBC # BLD AUTO: 4.33 10*6/MM3 (ref 4.14–5.8)
RBC # UR STRIP: NORMAL /HPF
REF LAB TEST METHOD: NORMAL
RHINOVIRUS RNA SPEC NAA+PROBE: NOT DETECTED
RSV RNA NPH QL NAA+NON-PROBE: NOT DETECTED
SARS-COV-2 RNA NPH QL NAA+NON-PROBE: NOT DETECTED
SINUS: 3 CM
SODIUM SERPL-SCNC: 140 MMOL/L (ref 136–145)
SP GR UR STRIP: 1.01 (ref 1–1.03)
SQUAMOUS #/AREA URNS HPF: NORMAL /HPF
TROPONIN T % DELTA: -5
TROPONIN T NUMERIC DELTA: -2 NG/L
TROPONIN T SERPL HS-MCNC: 39 NG/L
TSH SERPL DL<=0.05 MIU/L-ACNC: 2.32 UIU/ML (ref 0.27–4.2)
UROBILINOGEN UR QL STRIP: ABNORMAL
WBC # UR STRIP: NORMAL /HPF
WBC NRBC COR # BLD AUTO: 6.77 10*3/MM3 (ref 3.4–10.8)

## 2025-03-16 PROCEDURE — 73620 X-RAY EXAM OF FOOT: CPT

## 2025-03-16 PROCEDURE — 25510000001 IOPAMIDOL PER 1 ML: Performed by: EMERGENCY MEDICINE

## 2025-03-16 PROCEDURE — 82948 REAGENT STRIP/BLOOD GLUCOSE: CPT

## 2025-03-16 PROCEDURE — 25010000002 PIPERACILLIN SOD-TAZOBACTAM PER 1 G: Performed by: INTERNAL MEDICINE

## 2025-03-16 PROCEDURE — 25010000002 FUROSEMIDE PER 20 MG: Performed by: EMERGENCY MEDICINE

## 2025-03-16 PROCEDURE — P9612 CATHETERIZE FOR URINE SPEC: HCPCS

## 2025-03-16 PROCEDURE — 83605 ASSAY OF LACTIC ACID: CPT | Performed by: EMERGENCY MEDICINE

## 2025-03-16 PROCEDURE — 83880 ASSAY OF NATRIURETIC PEPTIDE: CPT | Performed by: EMERGENCY MEDICINE

## 2025-03-16 PROCEDURE — 80053 COMPREHEN METABOLIC PANEL: CPT | Performed by: EMERGENCY MEDICINE

## 2025-03-16 PROCEDURE — 25010000002 VANCOMYCIN HCL 1.25 G RECONSTITUTED SOLUTION 1 EACH VIAL: Performed by: INTERNAL MEDICINE

## 2025-03-16 PROCEDURE — 93306 TTE W/DOPPLER COMPLETE: CPT

## 2025-03-16 PROCEDURE — 93010 ELECTROCARDIOGRAM REPORT: CPT | Performed by: STUDENT IN AN ORGANIZED HEALTH CARE EDUCATION/TRAINING PROGRAM

## 2025-03-16 PROCEDURE — 93005 ELECTROCARDIOGRAM TRACING: CPT | Performed by: EMERGENCY MEDICINE

## 2025-03-16 PROCEDURE — 93356 MYOCRD STRAIN IMG SPCKL TRCK: CPT

## 2025-03-16 PROCEDURE — 84484 ASSAY OF TROPONIN QUANT: CPT | Performed by: EMERGENCY MEDICINE

## 2025-03-16 PROCEDURE — 83690 ASSAY OF LIPASE: CPT | Performed by: EMERGENCY MEDICINE

## 2025-03-16 PROCEDURE — 99291 CRITICAL CARE FIRST HOUR: CPT

## 2025-03-16 PROCEDURE — 25010000002 ENOXAPARIN PER 10 MG: Performed by: EMERGENCY MEDICINE

## 2025-03-16 PROCEDURE — 84443 ASSAY THYROID STIM HORMONE: CPT | Performed by: EMERGENCY MEDICINE

## 2025-03-16 PROCEDURE — 71275 CT ANGIOGRAPHY CHEST: CPT

## 2025-03-16 PROCEDURE — 71045 X-RAY EXAM CHEST 1 VIEW: CPT

## 2025-03-16 PROCEDURE — 84145 PROCALCITONIN (PCT): CPT | Performed by: EMERGENCY MEDICINE

## 2025-03-16 PROCEDURE — 93356 MYOCRD STRAIN IMG SPCKL TRCK: CPT | Performed by: INTERNAL MEDICINE

## 2025-03-16 PROCEDURE — 93306 TTE W/DOPPLER COMPLETE: CPT | Performed by: INTERNAL MEDICINE

## 2025-03-16 PROCEDURE — 70450 CT HEAD/BRAIN W/O DYE: CPT

## 2025-03-16 PROCEDURE — 36415 COLL VENOUS BLD VENIPUNCTURE: CPT

## 2025-03-16 PROCEDURE — 0202U NFCT DS 22 TRGT SARS-COV-2: CPT | Performed by: EMERGENCY MEDICINE

## 2025-03-16 PROCEDURE — 25810000003 SODIUM CHLORIDE 0.9 % SOLUTION 250 ML FLEX CONT: Performed by: INTERNAL MEDICINE

## 2025-03-16 PROCEDURE — 87040 BLOOD CULTURE FOR BACTERIA: CPT | Performed by: EMERGENCY MEDICINE

## 2025-03-16 PROCEDURE — 81001 URINALYSIS AUTO W/SCOPE: CPT | Performed by: EMERGENCY MEDICINE

## 2025-03-16 PROCEDURE — 85025 COMPLETE CBC W/AUTO DIFF WBC: CPT | Performed by: EMERGENCY MEDICINE

## 2025-03-16 RX ORDER — ACETAMINOPHEN 650 MG/1
650 SUPPOSITORY RECTAL EVERY 4 HOURS PRN
Status: DISCONTINUED | OUTPATIENT
Start: 2025-03-16 | End: 2025-03-18 | Stop reason: HOSPADM

## 2025-03-16 RX ORDER — DULOXETIN HYDROCHLORIDE 60 MG/1
60 CAPSULE, DELAYED RELEASE ORAL DAILY
Status: DISCONTINUED | OUTPATIENT
Start: 2025-03-17 | End: 2025-03-18 | Stop reason: HOSPADM

## 2025-03-16 RX ORDER — ASPIRIN 81 MG/1
81 TABLET ORAL DAILY
Status: DISCONTINUED | OUTPATIENT
Start: 2025-03-17 | End: 2025-03-18 | Stop reason: HOSPADM

## 2025-03-16 RX ORDER — SODIUM CHLORIDE 9 MG/ML
40 INJECTION, SOLUTION INTRAVENOUS AS NEEDED
Status: DISCONTINUED | OUTPATIENT
Start: 2025-03-16 | End: 2025-03-18 | Stop reason: HOSPADM

## 2025-03-16 RX ORDER — ALPRAZOLAM 1 MG/1
1 TABLET ORAL NIGHTLY PRN
Status: COMPLETED | OUTPATIENT
Start: 2025-03-16 | End: 2025-03-16

## 2025-03-16 RX ORDER — ONDANSETRON 2 MG/ML
4 INJECTION INTRAMUSCULAR; INTRAVENOUS EVERY 6 HOURS PRN
Status: DISCONTINUED | OUTPATIENT
Start: 2025-03-16 | End: 2025-03-18 | Stop reason: HOSPADM

## 2025-03-16 RX ORDER — ACETAMINOPHEN 325 MG/1
650 TABLET ORAL EVERY 4 HOURS PRN
Status: DISCONTINUED | OUTPATIENT
Start: 2025-03-16 | End: 2025-03-18 | Stop reason: HOSPADM

## 2025-03-16 RX ORDER — ACETAMINOPHEN 160 MG/5ML
650 SOLUTION ORAL EVERY 4 HOURS PRN
Status: DISCONTINUED | OUTPATIENT
Start: 2025-03-16 | End: 2025-03-18 | Stop reason: HOSPADM

## 2025-03-16 RX ORDER — AMOXICILLIN 250 MG
2 CAPSULE ORAL 2 TIMES DAILY
Status: DISCONTINUED | OUTPATIENT
Start: 2025-03-16 | End: 2025-03-18 | Stop reason: HOSPADM

## 2025-03-16 RX ORDER — ENOXAPARIN SODIUM 100 MG/ML
1 INJECTION SUBCUTANEOUS ONCE
Status: COMPLETED | OUTPATIENT
Start: 2025-03-16 | End: 2025-03-16

## 2025-03-16 RX ORDER — MEMANTINE HYDROCHLORIDE 10 MG/1
10 TABLET ORAL DAILY
Status: DISCONTINUED | OUTPATIENT
Start: 2025-03-17 | End: 2025-03-18 | Stop reason: HOSPADM

## 2025-03-16 RX ORDER — BISACODYL 10 MG
10 SUPPOSITORY, RECTAL RECTAL DAILY PRN
Status: DISCONTINUED | OUTPATIENT
Start: 2025-03-16 | End: 2025-03-18 | Stop reason: HOSPADM

## 2025-03-16 RX ORDER — ATORVASTATIN CALCIUM 20 MG/1
40 TABLET, FILM COATED ORAL DAILY
Status: DISCONTINUED | OUTPATIENT
Start: 2025-03-17 | End: 2025-03-18 | Stop reason: HOSPADM

## 2025-03-16 RX ORDER — LEVOTHYROXINE SODIUM 25 UG/1
25 TABLET ORAL
Status: DISCONTINUED | OUTPATIENT
Start: 2025-03-17 | End: 2025-03-18 | Stop reason: HOSPADM

## 2025-03-16 RX ORDER — BISACODYL 5 MG/1
5 TABLET, DELAYED RELEASE ORAL DAILY PRN
Status: DISCONTINUED | OUTPATIENT
Start: 2025-03-16 | End: 2025-03-18 | Stop reason: HOSPADM

## 2025-03-16 RX ORDER — LISINOPRIL 10 MG/1
10 TABLET ORAL
Status: DISCONTINUED | OUTPATIENT
Start: 2025-03-17 | End: 2025-03-18 | Stop reason: HOSPADM

## 2025-03-16 RX ORDER — HYDROCHLOROTHIAZIDE 12.5 MG/1
25 TABLET ORAL DAILY
Status: DISCONTINUED | OUTPATIENT
Start: 2025-03-17 | End: 2025-03-18 | Stop reason: HOSPADM

## 2025-03-16 RX ORDER — ENOXAPARIN SODIUM 100 MG/ML
1 INJECTION SUBCUTANEOUS EVERY 12 HOURS
Status: DISCONTINUED | OUTPATIENT
Start: 2025-03-16 | End: 2025-03-18

## 2025-03-16 RX ORDER — IBUPROFEN 600 MG/1
1 TABLET ORAL
Status: DISCONTINUED | OUTPATIENT
Start: 2025-03-16 | End: 2025-03-18 | Stop reason: HOSPADM

## 2025-03-16 RX ORDER — NICOTINE POLACRILEX 4 MG
15 LOZENGE BUCCAL
Status: DISCONTINUED | OUTPATIENT
Start: 2025-03-16 | End: 2025-03-18 | Stop reason: HOSPADM

## 2025-03-16 RX ORDER — INSULIN LISPRO 100 [IU]/ML
2-7 INJECTION, SOLUTION INTRAVENOUS; SUBCUTANEOUS
Status: DISCONTINUED | OUTPATIENT
Start: 2025-03-16 | End: 2025-03-18 | Stop reason: HOSPADM

## 2025-03-16 RX ORDER — SODIUM CHLORIDE 0.9 % (FLUSH) 0.9 %
10 SYRINGE (ML) INJECTION AS NEEDED
Status: DISCONTINUED | OUTPATIENT
Start: 2025-03-16 | End: 2025-03-18 | Stop reason: HOSPADM

## 2025-03-16 RX ORDER — SODIUM CHLORIDE 0.9 % (FLUSH) 0.9 %
10 SYRINGE (ML) INJECTION EVERY 12 HOURS SCHEDULED
Status: DISCONTINUED | OUTPATIENT
Start: 2025-03-16 | End: 2025-03-18 | Stop reason: HOSPADM

## 2025-03-16 RX ORDER — NITROGLYCERIN 0.4 MG/1
0.4 TABLET SUBLINGUAL
Status: DISCONTINUED | OUTPATIENT
Start: 2025-03-16 | End: 2025-03-18 | Stop reason: HOSPADM

## 2025-03-16 RX ORDER — DEXTROSE MONOHYDRATE 25 G/50ML
25 INJECTION, SOLUTION INTRAVENOUS
Status: DISCONTINUED | OUTPATIENT
Start: 2025-03-16 | End: 2025-03-18 | Stop reason: HOSPADM

## 2025-03-16 RX ORDER — CEPHALEXIN 500 MG/1
500 CAPSULE ORAL ONCE
Status: COMPLETED | OUTPATIENT
Start: 2025-03-16 | End: 2025-03-16

## 2025-03-16 RX ORDER — POLYETHYLENE GLYCOL 3350 17 G/17G
17 POWDER, FOR SOLUTION ORAL DAILY PRN
Status: DISCONTINUED | OUTPATIENT
Start: 2025-03-16 | End: 2025-03-18 | Stop reason: HOSPADM

## 2025-03-16 RX ORDER — FUROSEMIDE 10 MG/ML
40 INJECTION INTRAMUSCULAR; INTRAVENOUS ONCE
Status: COMPLETED | OUTPATIENT
Start: 2025-03-16 | End: 2025-03-16

## 2025-03-16 RX ORDER — METOPROLOL TARTRATE 25 MG/1
25 TABLET, FILM COATED ORAL 2 TIMES DAILY
Status: DISCONTINUED | OUTPATIENT
Start: 2025-03-16 | End: 2025-03-18 | Stop reason: HOSPADM

## 2025-03-16 RX ORDER — IOPAMIDOL 755 MG/ML
100 INJECTION, SOLUTION INTRAVASCULAR
Status: COMPLETED | OUTPATIENT
Start: 2025-03-16 | End: 2025-03-16

## 2025-03-16 RX ADMIN — CEPHALEXIN 500 MG: 500 CAPSULE ORAL at 12:17

## 2025-03-16 RX ADMIN — PIPERACILLIN AND TAZOBACTAM 3.38 G: 3; .375 INJECTION, POWDER, FOR SOLUTION INTRAVENOUS at 14:29

## 2025-03-16 RX ADMIN — IOPAMIDOL 95 ML: 755 INJECTION, SOLUTION INTRAVENOUS at 11:28

## 2025-03-16 RX ADMIN — METOPROLOL TARTRATE 25 MG: 25 TABLET, FILM COATED ORAL at 22:36

## 2025-03-16 RX ADMIN — Medication 10 ML: at 14:31

## 2025-03-16 RX ADMIN — SODIUM CHLORIDE 1250 MG: 9 INJECTION, SOLUTION INTRAVENOUS at 15:54

## 2025-03-16 RX ADMIN — ENOXAPARIN SODIUM 100 MG: 100 INJECTION SUBCUTANEOUS at 12:18

## 2025-03-16 RX ADMIN — Medication 10 ML: at 22:37

## 2025-03-16 RX ADMIN — FUROSEMIDE 40 MG: 10 INJECTION, SOLUTION INTRAMUSCULAR; INTRAVENOUS at 11:50

## 2025-03-16 RX ADMIN — ALPRAZOLAM 1 MG: 1 TABLET ORAL at 22:36

## 2025-03-16 RX ADMIN — SODIUM CHLORIDE 3.38 G: 9 INJECTION, SOLUTION INTRAVENOUS at 21:49

## 2025-03-16 NOTE — ED NOTES
Nursing report ED to floor  Eric Kramer  84 y.o.  male    HPI :  HPI  Stated Reason for Visit: wound check  History Obtained From: patient, family  Duration (Days): 2    Chief Complaint  Chief Complaint   Patient presents with    Wound Check    Fall       Admitting doctor:   Adi Mullen MD    Admitting diagnosis:   The primary encounter diagnosis was Acute pulmonary embolism without acute cor pulmonale, unspecified pulmonary embolism type. Diagnoses of Hypoxia, Elevated troponin, Altered mental status, unspecified altered mental status type, Multiple falls, and Cellulitis of great toe of right foot were also pertinent to this visit.    Code status:   Current Code Status       Date Active Code Status Order ID Comments User Context       Prior            Allergies:   Patient has no known allergies.    Isolation:   No active isolations    Intake and Output  No intake or output data in the 24 hours ending 03/16/25 1250    Weight:       03/16/25  0901   Weight: 96.2 kg (212 lb)       Most recent vitals:   Vitals:    03/16/25 1154 03/16/25 1155 03/16/25 1156 03/16/25 1204   BP: 158/96 161/96 161/96    Patient Position: Standing Standing     Pulse: 64 65  58   Resp:       Temp:       SpO2:    97%   Weight:       Height:           Active LDAs/IV Access:   Lines, Drains & Airways       Active LDAs       Name Placement date Placement time Site Days    Peripheral IV 02/14/24 Right Antecubital 02/14/24  --  Antecubital  396    Peripheral IV 03/16/25 0927 Right Antecubital 03/16/25 0927  Antecubital  less than 1                    Labs (abnormal labs have a star):   Labs Reviewed   COMPREHENSIVE METABOLIC PANEL - Abnormal; Notable for the following components:       Result Value    Glucose 220 (*)     BUN 26 (*)     Creatinine 1.59 (*)     Albumin 3.3 (*)     eGFR 42.8 (*)     All other components within normal limits    Narrative:     GFR Categories in Chronic Kidney Disease (CKD)      GFR Category          GFR  (mL/min/1.73)    Interpretation  G1                     90 or greater         Normal or high (1)  G2                      60-89                Mild decrease (1)  G3a                   45-59                Mild to moderate decrease  G3b                   30-44                Moderate to severe decrease  G4                    15-29                Severe decrease  G5                    14 or less           Kidney failure          (1)In the absence of evidence of kidney disease, neither GFR category G1 or G2 fulfill the criteria for CKD.    eGFR calculation 2021 CKD-EPI creatinine equation, which does not include race as a factor   LIPASE - Abnormal; Notable for the following components:    Lipase 123 (*)     All other components within normal limits   URINALYSIS W/ MICROSCOPIC IF INDICATED (NO CULTURE) - Abnormal; Notable for the following components:    Protein, UA 30 mg/dL (1+) (*)     All other components within normal limits   TROPONIN - Abnormal; Notable for the following components:    HS Troponin T 39 (*)     All other components within normal limits    Narrative:     High Sensitive Troponin T Reference Range:  <14.0 ng/L- Negative Female for AMI  <22.0 ng/L- Negative Male for AMI  >=14 - Abnormal Female indicating possible myocardial injury.  >=22 - Abnormal Male indicating possible myocardial injury.   Clinicians would have to utilize clinical acumen, EKG, Troponin, and serial changes to determine if it is an Acute Myocardial Infarction or myocardial injury due to an underlying chronic condition.        HIGH SENSITIVITIY TROPONIN T 1HR - Abnormal; Notable for the following components:    HS Troponin T 37 (*)     All other components within normal limits    Narrative:     High Sensitive Troponin T Reference Range:  <14.0 ng/L- Negative Female for AMI  <22.0 ng/L- Negative Male for AMI  >=14 - Abnormal Female indicating possible myocardial injury.  >=22 - Abnormal Male indicating possible myocardial injury.  "  Clinicians would have to utilize clinical acumen, EKG, Troponin, and serial changes to determine if it is an Acute Myocardial Infarction or myocardial injury due to an underlying chronic condition.        RESPIRATORY PANEL PCR W/ COVID-19 (SARS-COV-2), NP SWAB IN UTM/VTP, 2 HR TAT - Normal    Narrative:     In the setting of a positive respiratory panel with a viral infection PLUS a negative procalcitonin without other underlying concern for bacterial infection, consider observing off antibiotics or discontinuation of antibiotics and continue supportive care. If the respiratory panel is positive for atypical bacterial infection (Bordetella pertussis, Chlamydophila pneumoniae, or Mycoplasma pneumoniae), consider antibiotic de-escalation to target atypical bacterial infection.   PROCALCITONIN - Normal    Narrative:     As a Marker for Sepsis (Non-Neonates):    1. <0.5 ng/mL represents a low risk of severe sepsis and/or septic shock.  2. >2 ng/mL represents a high risk of severe sepsis and/or septic shock.    As a Marker for Lower Respiratory Tract Infections that require antibiotic therapy:    PCT on Admission    Antibiotic Therapy       6-12 Hrs later    >0.5                Strongly Recommended  >0.25 - <0.5        Recommended   0.1 - 0.25          Discouraged              Remeasure/reassess PCT  <0.1                Strongly Discouraged     Remeasure/reassess PCT    As 28 day mortality risk marker: \"Change in Procalcitonin Result\" (>80% or <=80%) if Day 0 (or Day 1) and Day 4 values are available. Refer to [email or web address]    Change in PCT <=80%  A decrease of PCT levels below or equal to 80% defines a positive change in PCT test result representing a higher risk for 28-day all-cause mortality of patients diagnosed with severe sepsis for septic shock.    Change in PCT >80%  A decrease of PCT levels of more than 80% defines a negative change in PCT result representing a lower risk for 28-day " all-cause mortality of patients diagnosed with severe sepsis or septic shock.      TSH RFX ON ABNORMAL TO FREE T4 - Normal   CBC WITH AUTO DIFFERENTIAL - Normal   LACTIC ACID, PLASMA - Normal   BNP (IN-HOUSE) - Normal    Narrative:     This assay is used as an aid in the diagnosis of individuals suspected of having heart failure. It can be used as an aid in the diagnosis of acute decompensated heart failure (ADHF) in patients presenting with signs and symptoms of ADHF to the emergency department (ED). In addition, NT-proBNP of <300 pg/mL indicates ADHF is not likely.    Age Range Result Interpretation  NT-proBNP Concentration (pg/mL:      <50             Positive            >450                   Gray                 300-450                    Negative             <300    50-75           Positive            >900                  Gray                300-900                  Negative            <300      >75             Positive            >1800                  Gray                300-1800                  Negative            <300   BLOOD CULTURE   BLOOD CULTURE   URINALYSIS, MICROSCOPIC ONLY   CBC AND DIFFERENTIAL    Narrative:     The following orders were created for panel order CBC & Differential.  Procedure                               Abnormality         Status                     ---------                               -----------         ------                     CBC Auto Differential[[id number]]        Normal              Final result                 Please view results for these tests on the individual orders.       EKG:   ECG 12 Lead Altered Mental Status   Preliminary Result   HEART RATE=64  bpm   RR Qqiniasb=916  ms   MO Typdxant=149  ms   P Horizontal Axis=11  deg   P Front Axis=31  deg   QRSD Sdchbvbm=219  ms   QT Vulmthda=778  ms   YLdB=784  ms   QRS Axis=-61  deg   T Wave Axis=38  deg   - ABNORMAL ECG -   Sinus rhythm   Prolonged MO interval   Abnormal R-wave progression, late transition   Inferior  infarct, old   Date and Time of Study:2025 09:30:36          Meds given in ED:   Medications   iopamidol (ISOVUE-370) 76 % injection 100 mL (95 mL Intravenous Given 3/16/25 1128)   furosemide (LASIX) injection 40 mg (40 mg Intravenous Given 3/16/25 1150)   enoxaparin sodium (LOVENOX) syringe 100 mg (100 mg Subcutaneous Given 3/16/25 1218)   cephalexin (KEFLEX) capsule 500 mg (500 mg Oral Given 3/16/25 1217)       Imaging results:  CT Angiogram Chest Pulmonary Embolism  Result Date: 3/16/2025   1. Pulmonary embolism seen in the distal right lower lobar pulmonary artery, extending into the superior and posterior basilar segmental and subsegmental pulmonary arteries of the right lower lobe. No evidence of right heart strain. No pulmonary infarct seen. 2. Three-vessel coronary artery atherosclerotic calcifications and aortic valve calcification. 3. Small amount of cylindrical bronchiolectasis 4. Left adrenal nodule, suspect adenoma. If clinical signs or symptoms of adrenal hyperfunction, biochemical evaluation may be appropriate  Call Report: Dr. Guajardo was notified by telephone of the above findings on 2025 at 11:57 a.m.  This report was finalized on 3/16/2025 11:57 AM by Dr. Eric Franks M.D on Workstation: [id number]      XR Chest 1 View  Result Date: 3/16/2025  Low lung volumes with vascular crowding. No focal opacities.  This report was finalized on 3/16/2025 10:13 AM by Dr. Eric Franks M.D on Workstation: [id number]        Ambulatory status:   - assist x 1     Social issues:   Social History     Socioeconomic History    Marital status:    Tobacco Use    Smoking status: Former     Current packs/day: 0.00     Average packs/day: 1 pack/day for 49.0 years (49.0 ttl pk-yrs)     Types: Cigarettes     Start date: 1956     Quit date: 4/3/1980     Years since quittin.9    Smokeless tobacco: Never    Tobacco comments:     Greg quit 42 plus years ago   Vaping Use    Vaping  status: Never Used   Substance and Sexual Activity    Alcohol use: Not Currently     Comment: Next to no liquour    Drug use: No    Sexual activity: Not Currently     Partners: Female     Birth control/protection: Depo-provera, Vasectomy       Peripheral Neurovascular  Peripheral Neurovascular (Adult)  Peripheral Neurovascular WDL: .WDL except, neurovascular assessment lower  RLE Neurovascular Assessment  Temperature RLE: warm  Color RLE: no discoloration  Sensation RLE: numbness present    Neuro Cognitive  Neuro Cognitive (Adult)  Cognitive/Neuro/Behavioral WDL: WDL  [place] Coma Scale  Best Eye Response: 4-->(E4) spontaneous  Best Motor Response: 6-->(M6) obeys commands  Best Verbal Response: 5-->(V5) oriented  [place] Coma Scale Score: 15    Learning  Learning Assessment  Learning Readiness and Ability: no barriers identified    Respiratory  Respiratory WDL  Respiratory WDL: .WDL except, all  Rhythm/Pattern, Respiratory: shortness of breath    Abdominal Pain       Pain Assessments  Pain (Adult)  (0-10) Pain Rating: Rest: 5  (0-10) Pain Rating: Activity: 5  Pain Location: toe  Pain Side/Orientation: right    NIH Stroke Scale       Jacobo Clarke RN  03/16/25 12:50 EDT

## 2025-03-16 NOTE — ED PROVIDER NOTES
EMERGENCY DEPARTMENT ENCOUNTER  Room Number:  32/32  Date of encounter:  3/16/2025  PCP: Elder Block MD  Patient Care Team:  Elder Block MD as PCP - General (Family Medicine)     HPI:  Context: Eric Kramer is a 84 y.o. male who presents to the ED c/o chief complaint of confusion and multiple falls.  History supplied by patient's wife as well as patient.  Patient has history of dementia, has been more confused for the last week.  No focal deficits, no facial droop, no difficulty of speech.  Patient has had multiple falls, last fall last night.  No head injuries occurred, patient did scrape his knee, complains of minor aches and pains but no concern for fracture, no neck or back pain.  Family reports concern for infection.  No fever shakes chills or night sweats, no runny nose congestion sore throat or cough, no vomiting or diarrhea, no urinary complaints.  Patient skinned his toe 2 weeks ago, family reports that they have been putting topical antibiotics on it, has been looking better but noticed some redness and swelling of the toe today.    MEDICAL HISTORY REVIEW  Reviewed in EPIC    PAST MEDICAL HISTORY  Active Ambulatory Problems     Diagnosis Date Noted    Type 2 diabetes mellitus with diabetic polyneuropathy, without long-term current use of insulin     Stroke     Prostatitis     Osteoarthritis     Nephrolithiasis     Memory impairment     Malignant neoplasm in situ of colon     Insomnia     Hypertension     Diabetic peripheral neuropathy     Benign enlargement of prostate     Arteriosclerosis of coronary artery     Diabetic ulcer of toe of right foot associated with diabetes mellitus due to underlying condition, limited to breakdown of skin 09/06/2019    MRSA bacteremia 09/07/2019    Hospital discharge follow-up 09/12/2019    Medicare annual wellness visit, subsequent 12/09/2019    Hypothyroidism due to acquired atrophy of thyroid 12/10/2019    Other hyperlipidemia 12/02/2020    High risk  medication use 06/05/2023    B12 deficiency 10/11/2023    Seborrheic keratosis 10/11/2023    Respiratory failure 12/12/2023    CAD (coronary artery disease) 12/28/2023    Hospital discharge follow-up 12/28/2023    Pneumonia of both lungs due to infectious organism 12/28/2023    Thoracic aortic aneurysm without rupture 12/28/2023    Adrenal nodule 12/28/2023    Acquired hypothyroidism 03/05/2024    Left adrenal mass 03/05/2024    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 behavioral disturbance, psychotic disturbance, mood disturbance, or anxiety 10/16/2024    Vascular dementia 10/16/2024    Difficulty walking 12/12/2024    Primary osteoarthritis of both knees 12/12/2024     Resolved Ambulatory Problems     Diagnosis Date Noted    No Resolved Ambulatory Problems     Past Medical History:   Diagnosis Date    Acute foot pain, left     Acute UTI     Basal cell carcinoma (BCC) of face     Cataract     Chest pain     Edema     Hyperlipidemia     Hypothyroidism     Renal azotemia     Testosterone deficiency     Type 2 diabetes mellitus     Vitamin D deficiency        PAST SURGICAL HISTORY  Past Surgical History:   Procedure Laterality Date    ANGIOPLASTY Right     Cath Laser Angioplasty/right ventricle    APPENDECTOMY      CATARACT EXTRACTION Left     COLECTOMY PARTIAL / TOTAL      due to colon cancer, removed 13 inches of colon    CORONARY ANGIOPLASTY WITH STENT PLACEMENT      TOE SURGERY Right 07/2021       FAMILY HISTORY  Family History   Problem Relation Age of Onset    Colon cancer Mother     Hypertension Mother         colon cancer    Osteoarthritis Mother     Emphysema Father         smoker/tobacco use    Hypertension Father         CPOD    Osteoarthritis Father     Rheum arthritis Sister     Colon cancer Maternal Grandmother     Cataracts Maternal Grandmother     Hypertension Maternal Grandmother         colon cance    Colon cancer Other     Hypertension Sister         She had 2 kinds ???    Hypertension  Maternal Uncle         colon cancer    Hypertension Maternal Uncle         colon cancer       SOCIAL HISTORY  Social History     Socioeconomic History    Marital status:    Tobacco Use    Smoking status: Former     Current packs/day: 0.00     Average packs/day: 1 pack/day for 49.0 years (49.0 ttl pk-yrs)     Types: Cigarettes     Start date: 1956     Quit date: 4/3/1980     Years since quittin.9    Smokeless tobacco: Never    Tobacco comments:     Greg quit 42 plus years ago   Vaping Use    Vaping status: Never Used   Substance and Sexual Activity    Alcohol use: Not Currently     Comment: Next to no liquour    Drug use: No    Sexual activity: Not Currently     Partners: Female     Birth control/protection: Depo-provera, Vasectomy       ALLERGIES  Patient has no known allergies.    The patient's allergies have been reviewed    REVIEW OF SYSTEMS  All systems reviewed and negative except for those discussed in HPI.     PHYSICAL EXAM  I have reviewed the triage vital signs and nursing notes.  ED Triage Vitals   Temp Heart Rate Resp BP SpO2   25 0901 25 0901 25 0901 25 0903 25 0901   97.9 °F (36.6 °C) 80 18 (!) 152/106 94 %      Temp src Heart Rate Source Patient Position BP Location FiO2 (%)   -- -- -- -- --              General: No acute distress.  HENT: NCAT, PERRL, Nares patent.  Eyes: no scleral icterus.  Neck: trachea midline, no ROM limitations.  CV: regular rhythm, regular rate.  Respiratory: normal effort, CTAB.  Abdomen: soft, nondistended, NTTP, no rebound tenderness, no guarding or rigidity.  Musculoskeletal: no deformity.  Neuro: Alert and oriented, no facial droop, speech clear, no dysarthria or aphasia, moves all extremities well, sensation intact light touch all extremities, no focal deficits  Skin: warm, dry.  Healing abrasion on the tip of the right great toe with erythema and swelling of the toe, no fluctuance or drainage, foot is otherwise normal in  appearance.    LAB RESULTS  Recent Results (from the past 24 hours)   Comprehensive Metabolic Panel    Collection Time: 03/16/25  9:27 AM    Specimen: Arm, Right; Blood   Result Value Ref Range    Glucose 220 (H) 65 - 99 mg/dL    BUN 26 (H) 8 - 23 mg/dL    Creatinine 1.59 (H) 0.76 - 1.27 mg/dL    Sodium 140 136 - 145 mmol/L    Potassium 3.9 3.5 - 5.2 mmol/L    Chloride 101 98 - 107 mmol/L    CO2 25.9 22.0 - 29.0 mmol/L    Calcium 9.1 8.6 - 10.5 mg/dL    Total Protein 6.4 6.0 - 8.5 g/dL    Albumin 3.3 (L) 3.5 - 5.2 g/dL    ALT (SGPT) 10 1 - 41 U/L    AST (SGOT) 18 1 - 40 U/L    Alkaline Phosphatase 65 39 - 117 U/L    Total Bilirubin 0.6 0.0 - 1.2 mg/dL    Globulin 3.1 gm/dL    A/G Ratio 1.1 g/dL    BUN/Creatinine Ratio 16.4 7.0 - 25.0    Anion Gap 13.1 5.0 - 15.0 mmol/L    eGFR 42.8 (L) >60.0 mL/min/1.73   Lipase    Collection Time: 03/16/25  9:27 AM    Specimen: Arm, Right; Blood   Result Value Ref Range    Lipase 123 (H) 13 - 60 U/L   Procalcitonin    Collection Time: 03/16/25  9:27 AM    Specimen: Arm, Right; Blood   Result Value Ref Range    Procalcitonin 0.07 0.00 - 0.25 ng/mL   High Sensitivity Troponin T    Collection Time: 03/16/25  9:27 AM    Specimen: Arm, Right; Blood   Result Value Ref Range    HS Troponin T 39 (H) <22 ng/L   TSH Rfx On Abnormal To Free T4    Collection Time: 03/16/25  9:27 AM    Specimen: Arm, Right; Blood   Result Value Ref Range    TSH 2.320 0.270 - 4.200 uIU/mL   CBC Auto Differential    Collection Time: 03/16/25  9:27 AM    Specimen: Arm, Right; Blood   Result Value Ref Range    WBC 6.77 3.40 - 10.80 10*3/mm3    RBC 4.33 4.14 - 5.80 10*6/mm3    Hemoglobin 13.1 13.0 - 17.7 g/dL    Hematocrit 41.0 37.5 - 51.0 %    MCV 94.7 79.0 - 97.0 fL    MCH 30.3 26.6 - 33.0 pg    MCHC 32.0 31.5 - 35.7 g/dL    RDW 13.7 12.3 - 15.4 %    RDW-SD 48.6 37.0 - 54.0 fl    MPV 9.5 6.0 - 12.0 fL    Platelets 288 140 - 450 10*3/mm3    Neutrophil % 66.4 42.7 - 76.0 %    Lymphocyte % 22.0 19.6 - 45.3 %     Monocyte % 9.2 5.0 - 12.0 %    Eosinophil % 1.9 0.3 - 6.2 %    Basophil % 0.4 0.0 - 1.5 %    Immature Grans % 0.1 0.0 - 0.5 %    Neutrophils, Absolute 4.49 1.70 - 7.00 10*3/mm3    Lymphocytes, Absolute 1.49 0.70 - 3.10 10*3/mm3    Monocytes, Absolute 0.62 0.10 - 0.90 10*3/mm3    Eosinophils, Absolute 0.13 0.00 - 0.40 10*3/mm3    Basophils, Absolute 0.03 0.00 - 0.20 10*3/mm3    Immature Grans, Absolute 0.01 0.00 - 0.05 10*3/mm3    nRBC 0.0 0.0 - 0.2 /100 WBC   ECG 12 Lead Altered Mental Status    Collection Time: 03/16/25  9:30 AM   Result Value Ref Range    QT Interval 392 ms    QTC Interval 404 ms   High Sensitivity Troponin T 1Hr    Collection Time: 03/16/25 10:44 AM    Specimen: Blood   Result Value Ref Range    HS Troponin T 37 (H) <22 ng/L    Troponin T Numeric Delta -2 ng/L    Troponin T % Delta -5 Abnormal if >/= 20%   Lactic Acid, Plasma    Collection Time: 03/16/25 10:44 AM    Specimen: Blood   Result Value Ref Range    Lactate 1.9 0.5 - 2.0 mmol/L   BNP    Collection Time: 03/16/25 10:44 AM    Specimen: Blood   Result Value Ref Range    proBNP 411.0 0.0 - 1,800.0 pg/mL   Respiratory Panel PCR w/COVID-19(SARS-CoV-2) PRASANNA/AARON/MADELINE/PAD/COR/JORGE In-House, NP Swab in [facility], 2 HR TAT - Swab, Nasopharynx    Collection Time: 03/16/25 10:46 AM    Specimen: Nasopharynx; Swab   Result Value Ref Range    ADENOVIRUS, PCR Not Detected Not Detected    Coronavirus 229E Not Detected Not Detected    Coronavirus HKU1 Not Detected Not Detected    Coronavirus NL63 Not Detected Not Detected    Coronavirus OC43 Not Detected Not Detected    COVID19 Not Detected Not Detected - Ref. Range    Human Metapneumovirus Not Detected Not Detected    Human Rhinovirus/Enterovirus Not Detected Not Detected    Influenza A PCR Not Detected Not Detected    Influenza B PCR Not Detected Not Detected    Parainfluenza Virus 1 Not Detected Not Detected    Parainfluenza Virus 2 Not Detected Not Detected    Parainfluenza Virus 3 Not Detected Not  Detected    Parainfluenza Virus 4 Not Detected Not Detected    RSV, PCR Not Detected Not Detected    Bordetella pertussis pcr Not Detected Not Detected    Bordetella parapertussis PCR Not Detected Not Detected    Chlamydophila pneumoniae PCR Not Detected Not Detected    Mycoplasma pneumo by PCR Not Detected Not Detected   Urinalysis With Microscopic If Indicated (No Culture) - Urine, Catheter    Collection Time: 03/16/25 10:55 AM    Specimen: Urine, Catheter   Result Value Ref Range    Color, UA Yellow Yellow, Straw    Appearance, UA Clear Clear    pH, UA 7.0 5.0 - 8.0    Specific Gravity, UA 1.011 1.005 - 1.030    Glucose, UA Negative Negative    Ketones, UA Negative Negative    Bilirubin, UA Negative Negative    Blood, UA Negative Negative    Protein, UA 30 mg/dL (1+) (A) Negative    Leuk Esterase, UA Negative Negative    Nitrite, UA Negative Negative    Urobilinogen, UA 1.0 E.U./dL 0.2 - 1.0 E.U./dL   Urinalysis, Microscopic Only - Urine, Catheter    Collection Time: 03/16/25 10:55 AM    Specimen: Urine, Catheter   Result Value Ref Range    RBC, UA 0-2 None Seen, 0-2 /HPF    WBC, UA 0-2 None Seen, 0-2 /HPF    Bacteria, UA None Seen None Seen /HPF    Squamous Epithelial Cells, UA None Seen None Seen, 0-2 /HPF    Hyaline Casts, UA None Seen None Seen /LPF    Methodology Automated Microscopy        I ordered the above labs and reviewed the results.    RADIOLOGY  CT Angiogram Chest Pulmonary Embolism  Result Date: 3/16/2025  CT ANGIOGRAM CHEST PULMONARY EMBOLISM-  INDICATION: Hypoxic, abnormal radiograph  COMPARISON: CT chest January 18, 2024  TECHNIQUE: CTA chest with IV contrast. Coronal and sagittal reformats. Three dimensional reconstructions. Radiation dose reduction techniques were utilized, including automated exposure control and exposure modulation based on body size.  FINDINGS:  Pulmonary arteries: Streak artifact from contrast in the venous system. Pulmonary embolism seen in the right lower lobar pulmonary  artery, extending into the superior segmental and posterior segmental pulmonary arteries and distally into subsegmental pulmonary arteries. Favor artifact in the anterior and lingula segmental pulmonary artery of the left upper lobe.  Chest wall: Gynecomastia. Fat-containing mass seen in the right upper back posterior to the scapula, series 6, axial mage 47, measures 7.5 x 3.0 cm, suspect lipoma. No lymphadenopathy.  Mediastinum: Three-vessel coronary artery atherosclerotic calcification. Aortic valve calcification. Heart is at upper limits in size. No pericardial effusion. No mediastinal or hilar lymphadenopathy.  Lungs/pleura: No effusions. Small amount of cylindrical bronchiolectasis. Small amount of posterior dependent and bibasilar subsegmental atelectasis.  Upper abdomen: Incidental splenule. Left adrenal nodule, series 6, axial image 145, measures 2.3 cm, unchanged, suspect adenoma. Fluid attenuating cyst seen in the superior pole right kidney. Nonobstructing nephrolithiasis in the superior pole left kidney, measures 1 to 2 mm.  Osseous structures: Severe thoracic spondylosis/degenerative disc disease.       1. Pulmonary embolism seen in the distal right lower lobar pulmonary artery, extending into the superior and posterior basilar segmental and subsegmental pulmonary arteries of the right lower lobe. No evidence of right heart strain. No pulmonary infarct seen. 2. Three-vessel coronary artery atherosclerotic calcifications and aortic valve calcification. 3. Small amount of cylindrical bronchiolectasis 4. Left adrenal nodule, suspect adenoma. If clinical signs or symptoms of adrenal hyperfunction, biochemical evaluation may be appropriate  Call Report: Dr. Guajardo was notified by telephone of the above findings on March 16, 2025 at 11:57 a.m.  This report was finalized on 3/16/2025 11:57 AM by Dr. Eric Franks M.D on Workstation: [id number]      XR Chest 1 View  Result Date: 3/16/2025  XR CHEST 1 VW-    INDICATION: Altered mental status  COMPARISON: Chest radiograph December 14, 2023  TECHNIQUE: 1 view chest  FINDINGS:  Low lung volumes. Vascular crowding at the bases. No focal opacity. No effusions. Stable mediastinum. Surgical clips in the left upper quadrant.      Low lung volumes with vascular crowding. No focal opacities.  This report was finalized on 3/16/2025 10:13 AM by Dr. Eric Franks M.D on Workstation: [id number]      CT Head Without Contrast  Result Date: 3/16/2025  CT SCAN OF THE BRAIN WITHOUT CONTRAST  HISTORY: Confusion.  The CT scan was performed as an emergency procedure through the brain without contrast. There is mild diffuse atrophy and chronic small vessel ischemic change. There is no evidence of acute intracranial hemorrhage or mass effect. The visualized sinuses and mastoid air cells are clear.      Radiation dose reduction techniques were utilized, including automated exposure control and exposure modulation based on body size.   This report was finalized on 3/16/2025 10:06 AM by Dr. Lee Wang M.D on Workstation: [id number]        I ordered the above noted radiological studies. I reviewed the images and results. I agree with the radiologist interpretation.    PROCEDURES  Procedures    MEDICATIONS GIVEN IN ER  Medications   enoxaparin sodium (LOVENOX) syringe 100 mg (has no administration in time range)   cephalexin (KEFLEX) capsule 500 mg (has no administration in time range)   iopamidol (ISOVUE-370) 76 % injection 100 mL (95 mL Intravenous Given 3/16/25 1128)   furosemide (LASIX) injection 40 mg (40 mg Intravenous Given 3/16/25 1150)       PROGRESS, DATA ANALYSIS, CONSULTS, AND MEDICAL DECISION MAKING  A complete history and physical exam have been performed.  All available laboratory and imaging results have been reviewed by myself prior to disposition.    MDM    After the initial H&P, I discussed pertinent information from history and physical exam with patient/family.   Discussed differential diagnosis.  Discussed plan for ED evaluation/workup/treatment.  All questions answered.  Patient/family is agreeable with plan.  ED Course as of 03/16/25 1216   Sun Mar 16, 2025   0916 My differential diagnosis for altered mental status includes but is not limited to:  Hypoglycemia, hyperglycemia, DKA, overdose, ethanol intoxication, thiamine deficiency, niacin deficiency, hypothymia, hyperviscosity, Ye's disease, hyponatremia, hypernatremia, liver failure, kidney failure, hyper or hypothyroid, no insufficiency, hypoxia, hypercarbia, carbon monoxide poisoning, postanoxic encephalopathy, ischemic stroke, intracranial bleed, subarachnoid hemorrhage, brain tumor, closed head injury, epidural hematoma, epidural hematoma, seizure activity, postictal state, syncopal episode, disseminated encephalomyelitis, central pontine myelinolysis, post cardiac arrest, bacterial meningitis, viral meningitis, fungal meningitis, encephalitis, brain abscess, subdural empyema, hysteria, catatonic state, malingering, hypertensive encephalopathy, vasculitis, TTP, DIC     [JG]   0916 My differential diagnosis for generalized weakness includes but is not limited to:  Neuromuscular weakness   CVA  Hemorrhagic stroke  Multiple sclerosis  Amyotrophic Lateral Sclerosis (ALS) (UMN & LMN)  Spinal and bulbar muscular atrophy (Jesus's syndrome)  Spinal cord disease: Infection (Epidural abscess)  Infarction/ischemia  Trauma (Spinal Cord Syndromes)  Inflammation (Transverse Myelitis)  Degenerative (Spinal muscular atrophy)  Tumor  Peripheral nerve disease: Guillain-Muse syndrome  Toxins (Ciguatera)  Tick paralysis  Diabetic peripheral neuropathy  NMJ disease: Myasthenia gravis crisis  Botulism  Organophosphate toxicity  Lambert-Eaton myasthenic syndrome  Rhabdomyolysis  Dermatomyositis  Polymyositis  Alcoholic myopathy  Non-neuromuscular weakness   ACS  Arrhythmia/Syncope  Severe infection/Sepsis  Hypoglycemia  Periodic  paralysis (electrolyte disturbance, K, Mg, Ca)   Hypokalemic periodic paralysis  Thyrotoxic periodic paralysis  Respiratory failure  Symptomatic Anemia  Severe dehydration  Hypothyroidism  Polypharmacy  Malignancy           [JG]   0938 EKG independently viewed and contemporaneously interpreted by ED physician. Time: 9:30 AM.  Rate 64.  Interpretation: Normal sinus rhythm, left axis deviation, first-degree AV block, delayed R wave progression, inferior Q waves, no acute ST changes. [JG]   0939   When compared with prior EKG on 12/12/2023, no acute changes are present. [JG]   1006 Phone call with radiologist.  I had previously reviewed the images. I discussed the patient, imaging, and their interpretation.  CT head imaging is negative for acute pathology.  See dictated report for final interpretation.     [JG]   1014 Patient desaturating to 88% on room air.  Chest x-ray shows bilateral pulmonary infiltrates.  Checking RVP, obtaining blood cultures lactic acid and procalcitonin. [JG]   1015 Chest x-ray in PACS, patient has bilateral pulmonary infiltrates per my read. [JG]   1130 Chest x-ray read as vascular crowding, patient afebrile, no leukocytosis or left shift.  Obtain BNP, giving Lasix. [JG]   1145 Reviewed CT angiogram in PACS, no proximal pulmonary embolism per my read. [JG]   1157 Phone call with radiologist.  I had previously reviewed the images. I discussed the patient, imaging, and their interpretation.  They report patient has acute segmental/subsegmental pulmonary embolism in right lower lobe, no signs of heart strain.  See dictated report for final interpretation.     [JG]   1158 Giving therapeutic Lovenox for PE.  Patient with pulmonary embolism with hypoxia, plan for admission, patient qualifies for inpatient.  Consulting hospitalist. [JG]   1207 Patient reassessed.  Discussed ED findings, differential diagnosis, and the need for admission for evaluation/treatment.  They are agreeable to admission and  all questions were answered.     [JG]   1213 Phone call with Dr. Infante [facility].  Discussed the patient, relevant history, exam, diagnostics, ED findings/progress, and concerns. They agree to admit the patient to telemetry. Care assumed by the admitting physician at this time.     [JG]      ED Course User Index  [JG] Maximiliano Guajardo MD       AS OF 12:16 EDT VITALS:    BP - 161/96  HR - 65  TEMP - 97.9 °F (36.6 °C)  O2 SATS - (!) 87%    DIAGNOSIS  Final diagnoses:   Acute pulmonary embolism without acute cor pulmonale, unspecified pulmonary embolism type   Hypoxia   Elevated troponin   Altered mental status, unspecified altered mental status type   Multiple falls   Cellulitis of great toe of right foot     Critical care:  Total critical care time of 52 minutes is exclusive of any other billable procedures and includes time spent with direct patient care and observation, retrospective chart review, management of acute condition, and consultation with other physicians.      DISPOSITION  ADMISSION    Discussed treatment plan and reason for admission with pt/family and admitting physician.  Pt/family voiced understanding of the plan for admission for further testing/treatment as needed.        Maximiliano Guajardo MD  03/16/25 0132

## 2025-03-16 NOTE — ED NOTES
Pt arrives a wound check in his toe on the right side. Pt has had multiple falls over the past few weeks. Pt's wife states he is saying things that don't make sense

## 2025-03-16 NOTE — PROGRESS NOTES
"[facility] Clinical Pharmacy Services: Enoxaparin Consult    Eric Kramer has a pharmacy consult to dose full-dose enoxaparin per Dr. Soni's request.     Indication: DVT/PE (active thrombosis)  Home Anticoagulation: None     Relevant clinical data and objective history reviewed:  84 y.o. male 188 cm (74\") 96.2 kg (212 lb)   Body mass index is 27.22 kg/m².   Results from last 7 days   Lab Units 03/16/25  0927   PLATELETS 10*3/mm3 288     Estimated Creatinine Clearance: 47.9 mL/min (A) (by C-G formula based on SCr of 1.59 mg/dL (H)).    Assessment/Plan  -Will start patient on enoxaparin  100mg (1mg/kg) subcutaneous every 12 hours, based on patient's weight and  renal function. Consult order will be discontinued but pharmacy will continue to follow.     Rose Marie Sherwood, PharmD, BCPS, [facility]  Clinical Pharmacy Specialist, Emergency Medicine   Phone: [phone]      "

## 2025-03-16 NOTE — PROGRESS NOTES
"[facility] Clinical Pharmacy Services: Vancomycin Pharmacokinetic Initial Consult Note    Eric Kramer is a 84 y.o. male who is on day 1 of pharmacy to dose vancomycin.    Indication: Skin and Soft Tissue  Consulting Provider: Dr. 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       Results from last 7 days   Lab Units 03/16/25  0927   CREATININE mg/dL 1.59*   WBC 10*3/mm3 6.77     Assessment/Plan:    Vancomycin Dose:   1250 mg IV every  24  hours  Predictive AUC level for the dose ordered is 540 mg/L.hr, which is within the target of 400-600 mg/L.hr  Vanc Trough has been ordered for 3/18 at 1400     Pharmacy will follow patient's kidney function and will adjust doses and obtain levels as necessary. Thank you for involving pharmacy in this patient's care. Please contact pharmacy with any questions or concerns.                           Rose Marie Sherwood, PharmD, BCPS, [facility]  Clinical Pharmacy Specialist, Emergency Medicine   Phone: [phone]      "

## 2025-03-16 NOTE — H&P
[facility] Admission H&P    Patient Care Team:  Elder Block MD as PCP - General (Family Medicine)    Chief complaint: Swelling and redness to right big toe, falls at home    History of Present Illness    This is an 84-year-old male with history of prior stroke, vascular dementia, Alzheimer's, hypertension, diabetes, CAD, hypothyroidism amongst numerous other medical issues as outlined below who presented to the emergency room for evaluation of his right big toe.  He skinned it a couple of weeks ago and over the past couple of days his wife is noticed that has become more red and swollen.  She has been putting antibiotic ointment on it.  Workup in the emergency room was concerning for cellulitis of the toe.  Additionally he was found to be hypoxic and thusly worked up with CT angiogram of the chest which revealed acute pulmonary embolism.  Patient denies any shortness of breath or chest pain.  He is currently satting 100% on 1 L of oxygen.  His wife states that he has had increased swelling of his bilateral lower extremities over the past few days to weeks, left greater than right.  I have been asked to admit him for further care.    Past Medical History:   Diagnosis Date    Acute foot pain, left     Acute UTI     Arteriosclerosis of coronary artery     Basal cell carcinoma (BCC) of face     Benign enlargement of prostate     Cataract     Chest pain     Diabetic peripheral neuropathy     Edema     Hyperlipidemia     Hypertension     Hypothyroidism     Insomnia     Malignant neoplasm in situ of colon     Memory impairment     MMSE 27/30-5/20/2019    Nephrolithiasis     Osteoarthritis     Prostatitis     Renal azotemia     Stroke     stroke syndrome    Testosterone deficiency     Type 2 diabetes mellitus     Vitamin D deficiency      Past Surgical History:   Procedure Laterality Date    ANGIOPLASTY Right     Cath Laser Angioplasty/right ventricle    APPENDECTOMY      CATARACT EXTRACTION Left     COLECTOMY PARTIAL /  TOTAL      due to colon cancer, removed 13 inches of colon    CORONARY ANGIOPLASTY WITH STENT PLACEMENT      TOE SURGERY Right 2021     Family History   Problem Relation Age of Onset    Colon cancer Mother     Hypertension Mother         colon cancer    Osteoarthritis Mother     Emphysema Father         smoker/tobacco use    Hypertension Father         CPOD    Osteoarthritis Father     Rheum arthritis Sister     Colon cancer Maternal Grandmother     Cataracts Maternal Grandmother     Hypertension Maternal Grandmother         colon cance    Colon cancer Other     Hypertension Sister         She had 2 kinds ???    Hypertension Maternal Uncle         colon cancer    Hypertension Maternal Uncle         colon cancer     Social History     Tobacco Use    Smoking status: Former     Current packs/day: 0.00     Average packs/day: 1 pack/day for 49.0 years (49.0 ttl pk-yrs)     Types: Cigarettes     Start date: 1956     Quit date: 4/3/1980     Years since quittin.9    Smokeless tobacco: Never    Tobacco comments:     Greg quit 42 plus years ago   Vaping Use    Vaping status: Never Used   Substance Use Topics    Alcohol use: Not Currently     Comment: Next to no liquour    Drug use: No     (Not in a hospital admission)    Allergies:  Patient has no known allergies.    Review of Systems   Constitutional:  Negative for chills, fatigue and fever.   HENT:  Negative for congestion, sore throat and trouble swallowing.    Eyes:  Negative for visual disturbance.   Respiratory:  Negative for cough, chest tightness and shortness of breath.    Cardiovascular:  Negative for chest pain, palpitations and leg swelling.   Gastrointestinal:  Negative for abdominal pain, blood in stool, constipation, diarrhea, nausea and vomiting.   Endocrine: Negative for polydipsia and polyuria.   Genitourinary:  Negative for difficulty urinating, dysuria, frequency, hematuria and urgency.   Musculoskeletal:  Negative for arthralgias, joint  swelling and myalgias.   Skin:  Negative for rash and wound.   Neurological:  Negative for dizziness, weakness, light-headedness and numbness.        PHYSICAL EXAM    Vital Signs  tMax 24 hrs:  Temp (24hrs), Av.9 °F (36.6 °C), Min:97.9 °F (36.6 °C), Max:97.9 °F (36.6 °C)    Vitals Ranges:  Temp:  [97.9 °F (36.6 °C)] 97.9 °F (36.6 °C)  Heart Rate:  [52-80] 58  Resp:  [18] 18  BP: (152-162)/() 161/96    Physical Exam  Vitals and nursing note reviewed.   Constitutional:       General: He is not in acute distress.     Appearance: He is well-developed. He is not ill-appearing.   HENT:      Head: Normocephalic and atraumatic.      Nose: Nose normal.      Mouth/Throat:      Mouth: Mucous membranes are not dry.   Eyes:      Conjunctiva/sclera: Conjunctivae normal.      Pupils: Pupils are equal, round, and reactive to light.   Neck:      Vascular: No JVD.   Cardiovascular:      Rate and Rhythm: Normal rate and regular rhythm.      Heart sounds: No murmur heard.     No gallop.   Pulmonary:      Effort: Pulmonary effort is normal. No accessory muscle usage or respiratory distress.      Breath sounds: No decreased breath sounds or wheezing.   Abdominal:      General: Bowel sounds are normal. There is no distension.      Palpations: Abdomen is soft.      Tenderness: There is no abdominal tenderness. There is no guarding or rebound.   Musculoskeletal:         General: No deformity. Normal range of motion.      Cervical back: Normal range of motion and neck supple.   Lymphadenopathy:      Cervical: No cervical adenopathy.   Skin:     General: Skin is warm and dry.      Findings: No erythema or rash.      Comments: There is an area of denuded skin over the tip of the right great toe with surrounding erythema and swelling   Neurological:      Mental Status: He is alert and oriented to person, place, and time.      Cranial Nerves: No cranial nerve deficit.         Results Review:  Results from last 7 days   Lab Units  03/16/25  0927   WBC 10*3/mm3 6.77   HEMOGLOBIN g/dL 13.1   HEMATOCRIT % 41.0   PLATELETS 10*3/mm3 288     Results from last 7 days   Lab Units 03/16/25  0927   SODIUM mmol/L 140   POTASSIUM mmol/L 3.9   CHLORIDE mmol/L 101   CO2 mmol/L 25.9   BUN mg/dL 26*   CREATININE mg/dL 1.59*   CALCIUM mg/dL 9.1   BILIRUBIN mg/dL 0.6   ALK PHOS U/L 65   ALT (SGPT) U/L 10   AST (SGOT) U/L 18   GLUCOSE mg/dL 220*        I reviewed the patient's new clinical results.  I reviewed the patient's new imaging results and agree with the interpretation.        Active Hospital Problems    Diagnosis  POA    **Pulmonary embolism [I26.99]  Yes    Cellulitis of right toe [L03.031]  Unknown    Vascular dementia [F01.50]  Yes    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 behavioral disturbance, psychotic disturbance, mood disturbance, or anxiety [G30.1, F02.B0]  Yes    Acquired hypothyroidism [E03.9]  Yes    CAD (coronary artery disease) [I25.10]  Yes    Type 2 diabetes mellitus with diabetic polyneuropathy, without long-term current use of insulin [E11.42]  Yes    Hypertension [I10]  Yes    Diabetic peripheral neuropathy [E11.42]  Yes      Resolved Hospital Problems   No resolved problems to display.       Assessment & Plan    The patient will be admitted.  Will initiate Lovenox for the acute PE.  He is hemodynamically stable.  His oxygen saturation is 100% on 1 L.  No evidence of heart strain on CTA.  I will check an echocardiogram and a lower extremity Doppler.  He does also appear to have at least cellulitis of the right great toe.  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  I will cover him with vancomycin and Zosyn.  Ask podiatry to evaluate.  Check an x-ray.  He needs better blood sugar control and I will review his medication list once available and in the meantime start him on sliding scale insulin.  His renal function appears at baseline and his blood pressure  appears stable.  Per description from his wife it sounds like he does suffer from sundowning and we will utilize delirium precautions.  Additional plans based on his clinical course.    I discussed the patients findings and my recommendations with patient and family      Giuseppe Soni MD  03/16/25  13:57 EDT

## 2025-03-17 ENCOUNTER — APPOINTMENT (OUTPATIENT)
Dept: CARDIOLOGY | Facility: HOSPITAL | Age: 85
End: 2025-03-17
Payer: MEDICARE

## 2025-03-17 LAB
ANION GAP SERPL CALCULATED.3IONS-SCNC: 15.5 MMOL/L (ref 5–15)
BASOPHILS # BLD AUTO: 0.05 10*3/MM3 (ref 0–0.2)
BASOPHILS NFR BLD AUTO: 0.7 % (ref 0–1.5)
BH CV LOWER VASCULAR LEFT COMMON FEMORAL AUGMENT: NORMAL
BH CV LOWER VASCULAR LEFT COMMON FEMORAL COMPETENT: NORMAL
BH CV LOWER VASCULAR LEFT COMMON FEMORAL COMPRESS: NORMAL
BH CV LOWER VASCULAR LEFT COMMON FEMORAL PHASIC: NORMAL
BH CV LOWER VASCULAR LEFT COMMON FEMORAL SPONT: NORMAL
BH CV LOWER VASCULAR LEFT DISTAL FEMORAL COMPRESS: NORMAL
BH CV LOWER VASCULAR LEFT GASTRONEMIUS COMPRESS: NORMAL
BH CV LOWER VASCULAR LEFT GREATER SAPH AK COMPRESS: NORMAL
BH CV LOWER VASCULAR LEFT GREATER SAPH BK COMPRESS: NORMAL
BH CV LOWER VASCULAR LEFT LESSER SAPH COMPRESS: NORMAL
BH CV LOWER VASCULAR LEFT MID FEMORAL AUGMENT: NORMAL
BH CV LOWER VASCULAR LEFT MID FEMORAL COMPETENT: NORMAL
BH CV LOWER VASCULAR LEFT MID FEMORAL COMPRESS: NORMAL
BH CV LOWER VASCULAR LEFT MID FEMORAL PHASIC: NORMAL
BH CV LOWER VASCULAR LEFT MID FEMORAL SPONT: NORMAL
BH CV LOWER VASCULAR LEFT PERONEAL COMPRESS: NORMAL
BH CV LOWER VASCULAR LEFT POPLITEAL AUGMENT: NORMAL
BH CV LOWER VASCULAR LEFT POPLITEAL COMPETENT: NORMAL
BH CV LOWER VASCULAR LEFT POPLITEAL COMPRESS: NORMAL
BH CV LOWER VASCULAR LEFT POPLITEAL PHASIC: NORMAL
BH CV LOWER VASCULAR LEFT POPLITEAL SPONT: NORMAL
BH CV LOWER VASCULAR LEFT POSTERIOR TIBIAL COMPRESS: NORMAL
BH CV LOWER VASCULAR LEFT PROFUNDA FEMORAL COMPRESS: NORMAL
BH CV LOWER VASCULAR LEFT PROXIMAL FEMORAL COMPRESS: NORMAL
BH CV LOWER VASCULAR LEFT SAPHENOFEMORAL JUNCTION COMPRESS: NORMAL
BH CV LOWER VASCULAR RIGHT COMMON FEMORAL AUGMENT: NORMAL
BH CV LOWER VASCULAR RIGHT COMMON FEMORAL COMPETENT: NORMAL
BH CV LOWER VASCULAR RIGHT COMMON FEMORAL COMPRESS: NORMAL
BH CV LOWER VASCULAR RIGHT COMMON FEMORAL PHASIC: NORMAL
BH CV LOWER VASCULAR RIGHT COMMON FEMORAL SPONT: NORMAL
BH CV LOWER VASCULAR RIGHT DISTAL FEMORAL COMPRESS: NORMAL
BH CV LOWER VASCULAR RIGHT GASTRONEMIUS COMPRESS: NORMAL
BH CV LOWER VASCULAR RIGHT GREATER SAPH AK COMPRESS: NORMAL
BH CV LOWER VASCULAR RIGHT GREATER SAPH BK COMPRESS: NORMAL
BH CV LOWER VASCULAR RIGHT LESSER SAPH COMPRESS: NORMAL
BH CV LOWER VASCULAR RIGHT MID FEMORAL AUGMENT: NORMAL
BH CV LOWER VASCULAR RIGHT MID FEMORAL COMPETENT: NORMAL
BH CV LOWER VASCULAR RIGHT MID FEMORAL COMPRESS: NORMAL
BH CV LOWER VASCULAR RIGHT MID FEMORAL PHASIC: NORMAL
BH CV LOWER VASCULAR RIGHT MID FEMORAL SPONT: NORMAL
BH CV LOWER VASCULAR RIGHT PERONEAL COMPRESS: NORMAL
BH CV LOWER VASCULAR RIGHT POPLITEAL AUGMENT: NORMAL
BH CV LOWER VASCULAR RIGHT POPLITEAL COMPETENT: NORMAL
BH CV LOWER VASCULAR RIGHT POPLITEAL COMPRESS: NORMAL
BH CV LOWER VASCULAR RIGHT POPLITEAL PHASIC: NORMAL
BH CV LOWER VASCULAR RIGHT POPLITEAL SPONT: NORMAL
BH CV LOWER VASCULAR RIGHT POSTERIOR TIBIAL COMPRESS: NORMAL
BH CV LOWER VASCULAR RIGHT PROFUNDA FEMORAL COMPRESS: NORMAL
BH CV LOWER VASCULAR RIGHT PROXIMAL FEMORAL COMPRESS: NORMAL
BH CV LOWER VASCULAR RIGHT SAPHENOFEMORAL JUNCTION COMPRESS: NORMAL
BUN SERPL-MCNC: 25 MG/DL (ref 8–23)
BUN/CREAT SERPL: 15.2 (ref 7–25)
CALCIUM SPEC-SCNC: 9.5 MG/DL (ref 8.6–10.5)
CHLORIDE SERPL-SCNC: 101 MMOL/L (ref 98–107)
CO2 SERPL-SCNC: 24.5 MMOL/L (ref 22–29)
CREAT SERPL-MCNC: 1.64 MG/DL (ref 0.76–1.27)
DEPRECATED RDW RBC AUTO: 45.9 FL (ref 37–54)
EGFRCR SERPLBLD CKD-EPI 2021: 41.2 ML/MIN/1.73
EOSINOPHIL # BLD AUTO: 0.13 10*3/MM3 (ref 0–0.4)
EOSINOPHIL NFR BLD AUTO: 1.7 % (ref 0.3–6.2)
ERYTHROCYTE [DISTWIDTH] IN BLOOD BY AUTOMATED COUNT: 13.7 % (ref 12.3–15.4)
GLUCOSE BLDC GLUCOMTR-MCNC: 127 MG/DL (ref 70–130)
GLUCOSE BLDC GLUCOMTR-MCNC: 128 MG/DL (ref 70–130)
GLUCOSE BLDC GLUCOMTR-MCNC: 172 MG/DL (ref 70–130)
GLUCOSE BLDC GLUCOMTR-MCNC: 208 MG/DL (ref 70–130)
GLUCOSE SERPL-MCNC: 114 MG/DL (ref 65–99)
HCT VFR BLD AUTO: 39.9 % (ref 37.5–51)
HGB BLD-MCNC: 13.6 G/DL (ref 13–17.7)
IMM GRANULOCYTES # BLD AUTO: 0.02 10*3/MM3 (ref 0–0.05)
IMM GRANULOCYTES NFR BLD AUTO: 0.3 % (ref 0–0.5)
LYMPHOCYTES # BLD AUTO: 1.67 10*3/MM3 (ref 0.7–3.1)
LYMPHOCYTES NFR BLD AUTO: 21.8 % (ref 19.6–45.3)
MCH RBC QN AUTO: 31.1 PG (ref 26.6–33)
MCHC RBC AUTO-ENTMCNC: 34.1 G/DL (ref 31.5–35.7)
MCV RBC AUTO: 91.1 FL (ref 79–97)
MONOCYTES # BLD AUTO: 0.65 10*3/MM3 (ref 0.1–0.9)
MONOCYTES NFR BLD AUTO: 8.5 % (ref 5–12)
NEUTROPHILS NFR BLD AUTO: 5.13 10*3/MM3 (ref 1.7–7)
NEUTROPHILS NFR BLD AUTO: 67 % (ref 42.7–76)
NRBC BLD AUTO-RTO: 0 /100 WBC (ref 0–0.2)
PLATELET # BLD AUTO: 311 10*3/MM3 (ref 140–450)
PMV BLD AUTO: 9.5 FL (ref 6–12)
POTASSIUM SERPL-SCNC: 3.5 MMOL/L (ref 3.5–5.2)
RBC # BLD AUTO: 4.38 10*6/MM3 (ref 4.14–5.8)
SODIUM SERPL-SCNC: 141 MMOL/L (ref 136–145)
WBC NRBC COR # BLD AUTO: 7.65 10*3/MM3 (ref 3.4–10.8)

## 2025-03-17 PROCEDURE — 36415 COLL VENOUS BLD VENIPUNCTURE: CPT | Performed by: INTERNAL MEDICINE

## 2025-03-17 PROCEDURE — 83735 ASSAY OF MAGNESIUM: CPT

## 2025-03-17 PROCEDURE — 25010000002 VANCOMYCIN HCL 1.25 G RECONSTITUTED SOLUTION 1 EACH VIAL: Performed by: INTERNAL MEDICINE

## 2025-03-17 PROCEDURE — 85025 COMPLETE CBC W/AUTO DIFF WBC: CPT | Performed by: INTERNAL MEDICINE

## 2025-03-17 PROCEDURE — 80048 BASIC METABOLIC PNL TOTAL CA: CPT | Performed by: INTERNAL MEDICINE

## 2025-03-17 PROCEDURE — 25810000003 SODIUM CHLORIDE 0.9 % SOLUTION 250 ML FLEX CONT: Performed by: INTERNAL MEDICINE

## 2025-03-17 PROCEDURE — 97161 PT EVAL LOW COMPLEX 20 MIN: CPT

## 2025-03-17 PROCEDURE — 97165 OT EVAL LOW COMPLEX 30 MIN: CPT

## 2025-03-17 PROCEDURE — 63710000001 INSULIN LISPRO (HUMAN) PER 5 UNITS: Performed by: INTERNAL MEDICINE

## 2025-03-17 PROCEDURE — 25010000002 ENOXAPARIN PER 10 MG: Performed by: INTERNAL MEDICINE

## 2025-03-17 PROCEDURE — 84132 ASSAY OF SERUM POTASSIUM: CPT

## 2025-03-17 PROCEDURE — 82948 REAGENT STRIP/BLOOD GLUCOSE: CPT

## 2025-03-17 PROCEDURE — 93970 EXTREMITY STUDY: CPT

## 2025-03-17 PROCEDURE — 93970 EXTREMITY STUDY: CPT | Performed by: SURGERY

## 2025-03-17 PROCEDURE — 93005 ELECTROCARDIOGRAM TRACING: CPT

## 2025-03-17 PROCEDURE — 97116 GAIT TRAINING THERAPY: CPT

## 2025-03-17 PROCEDURE — 25010000002 PIPERACILLIN SOD-TAZOBACTAM PER 1 G: Performed by: INTERNAL MEDICINE

## 2025-03-17 RX ORDER — ALPRAZOLAM 1 MG/1
1 TABLET ORAL NIGHTLY PRN
Status: COMPLETED | OUTPATIENT
Start: 2025-03-17 | End: 2025-03-17

## 2025-03-17 RX ORDER — GUAIFENESIN 600 MG/1
600 TABLET, EXTENDED RELEASE ORAL EVERY 12 HOURS SCHEDULED
Status: DISCONTINUED | OUTPATIENT
Start: 2025-03-17 | End: 2025-03-18 | Stop reason: HOSPADM

## 2025-03-17 RX ADMIN — METOPROLOL TARTRATE 25 MG: 25 TABLET, FILM COATED ORAL at 20:51

## 2025-03-17 RX ADMIN — DULOXETINE 60 MG: 60 CAPSULE, DELAYED RELEASE ORAL at 10:02

## 2025-03-17 RX ADMIN — ENOXAPARIN SODIUM 100 MG: 100 INJECTION SUBCUTANEOUS at 04:27

## 2025-03-17 RX ADMIN — ATORVASTATIN CALCIUM 40 MG: 20 TABLET, FILM COATED ORAL at 10:02

## 2025-03-17 RX ADMIN — ENOXAPARIN SODIUM 100 MG: 100 INJECTION SUBCUTANEOUS at 10:02

## 2025-03-17 RX ADMIN — ENOXAPARIN SODIUM 100 MG: 100 INJECTION SUBCUTANEOUS at 20:50

## 2025-03-17 RX ADMIN — GUAIFENESIN 600 MG: 600 TABLET, MULTILAYER, EXTENDED RELEASE ORAL at 13:54

## 2025-03-17 RX ADMIN — SODIUM CHLORIDE 1250 MG: 9 INJECTION, SOLUTION INTRAVENOUS at 13:54

## 2025-03-17 RX ADMIN — SODIUM CHLORIDE 3.38 G: 9 INJECTION, SOLUTION INTRAVENOUS at 20:50

## 2025-03-17 RX ADMIN — Medication 10 ML: at 10:03

## 2025-03-17 RX ADMIN — METOPROLOL TARTRATE 25 MG: 25 TABLET, FILM COATED ORAL at 10:02

## 2025-03-17 RX ADMIN — Medication 10 ML: at 21:09

## 2025-03-17 RX ADMIN — ALPRAZOLAM 1 MG: 1 TABLET ORAL at 22:56

## 2025-03-17 RX ADMIN — SODIUM CHLORIDE 3.38 G: 9 INJECTION, SOLUTION INTRAVENOUS at 04:27

## 2025-03-17 RX ADMIN — ASPIRIN 81 MG: 81 TABLET, COATED ORAL at 10:01

## 2025-03-17 RX ADMIN — SODIUM CHLORIDE 3.38 G: 9 INJECTION, SOLUTION INTRAVENOUS at 11:49

## 2025-03-17 RX ADMIN — GUAIFENESIN 600 MG: 600 TABLET, MULTILAYER, EXTENDED RELEASE ORAL at 20:55

## 2025-03-17 RX ADMIN — SENNOSIDES AND DOCUSATE SODIUM 2 TABLET: 50; 8.6 TABLET ORAL at 10:02

## 2025-03-17 RX ADMIN — INSULIN LISPRO 2 UNITS: 100 INJECTION, SOLUTION INTRAVENOUS; SUBCUTANEOUS at 11:49

## 2025-03-17 RX ADMIN — HYDROCHLOROTHIAZIDE 25 MG: 12.5 TABLET ORAL at 10:01

## 2025-03-17 RX ADMIN — INSULIN LISPRO 3 UNITS: 100 INJECTION, SOLUTION INTRAVENOUS; SUBCUTANEOUS at 21:09

## 2025-03-17 RX ADMIN — LISINOPRIL 10 MG: 10 TABLET ORAL at 10:02

## 2025-03-17 RX ADMIN — LEVOTHYROXINE SODIUM 25 MCG: 0.03 TABLET ORAL at 10:03

## 2025-03-17 RX ADMIN — MEMANTINE HYDROCHLORIDE 10 MG: 10 TABLET, FILM COATED ORAL at 10:03

## 2025-03-17 NOTE — PLAN OF CARE
Goal Outcome Evaluation:  Plan of Care Reviewed With: patient           Outcome Evaluation: Pt admitted to [facility] for hypoxia 2/2 pulmonary embolism and R 1st toe cellulitis. Pt lives w/ his spouse in a 1 story home. States he is (I)<>Mod (I) w/ BADLs and uses a cane for functional mobility -- reports 4 fall within the last week. Today, pt was SBA for bed mobility. Performed STS, functional mobility around unit, and sink side ADLs w/ CGA/HHA. Unsteady at times, but no overt LOB. Impulsivity noted and required intermittent cueing for safety and redirection. OT will continue to follow to maxizmize strength, balance, and activity tolerance while inpatient. Recommend d/c w/ HHOT and assist from family.    Anticipated Discharge Disposition (OT): home with home health, home with assist

## 2025-03-17 NOTE — PROGRESS NOTES
" LOS: 1 day     Name: Eric Kramer  Age: 84 y.o.  Sex: male  :  1940  MRN: [id number]         Primary Care Physician: Elder Block MD    Subjective   Subjective  He is on room air.  He reports a little bit of upper airway congestion.  Denies shortness of breath.  Says his toe hurts.    Objective   Vital Signs  Temp:  [98.4 °F (36.9 °C)-98.8 °F (37.1 °C)] 98.8 °F (37.1 °C)  Heart Rate:  [79-87] 87  Resp:  [18] 18  BP: (136-161)/(77-96) 146/89  Body mass index is 27.22 kg/m².    Objective:  General Appearance:  Comfortable and in no acute distress.    Vital signs: (most recent): Blood pressure 146/89, pulse 87, temperature 98.8 °F (37.1 °C), temperature source Oral, resp. rate 18, height 188 cm (74\"), weight 96.2 kg (212 lb), SpO2 100%.    Lungs:  Normal effort and normal respiratory rate.    Heart: Normal rate.  Regular rhythm.    Abdomen: Abdomen is soft.  Bowel sounds are normal.   There is no abdominal tenderness.     Extremities: There is no dependent edema or local swelling.    Neurological: Patient is alert and oriented to person, place and time.    Skin:  Warm and dry.                Results Review:       I reviewed the patient's new clinical results.    Results from last 7 days   Lab Units 25  0445 25  0927   WBC 10*3/mm3 7.65 6.77   HEMOGLOBIN g/dL 13.6 13.1   PLATELETS 10*3/mm3 311 288     Results from last 7 days   Lab Units 25  0445 25  0927   SODIUM mmol/L 141 140   POTASSIUM mmol/L 3.5 3.9   CHLORIDE mmol/L 101 101   CO2 mmol/L 24.5 25.9   BUN mg/dL 25* 26*   CREATININE mg/dL 1.64* 1.59*   CALCIUM mg/dL 9.5 9.1   GLUCOSE mg/dL 114* 220*                 Scheduled Meds:   aspirin, 81 mg, Oral, Daily  atorvastatin, 40 mg, Oral, Daily  DULoxetine, 60 mg, Oral, Daily  enoxaparin sodium, 1 mg/kg, Subcutaneous, Q12H  hydroCHLOROthiazide, 25 mg, Oral, Daily  insulin lispro, 2-7 Units, Subcutaneous, 4x Daily AC & at Bedtime  levothyroxine, 25 mcg, Oral, Q AM  lisinopril, " 10 mg, Oral, Q24H  memantine, 10 mg, Oral, Daily  metoprolol tartrate, 25 mg, Oral, BID  piperacillin-tazobactam, 3.375 g, Intravenous, Q8H  senna-docusate sodium, 2 tablet, Oral, BID  sodium chloride, 10 mL, Intravenous, Q12H  vancomycin, 1,250 mg, Intravenous, Q24H      PRN Meds:     acetaminophen **OR** acetaminophen **OR** acetaminophen    senna-docusate sodium **AND** polyethylene glycol **AND** bisacodyl **AND** bisacodyl    dextrose    dextrose    glucagon (human recombinant)    nitroglycerin    ondansetron    Pharmacy to dose vancomycin    sodium chloride    sodium chloride  Continuous Infusions:  Pharmacy to dose vancomycin,         Assessment & Plan   Active Hospital Problems    Diagnosis  POA    **Pulmonary embolism [I26.99]  Yes    Cellulitis of right toe [L03.031]  Unknown    Vascular dementia [F01.50]  Yes    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 behavioral disturbance, psychotic disturbance, mood disturbance, or anxiety [G30.1, F02.B0]  Yes    Acquired hypothyroidism [E03.9]  Yes    CAD (coronary artery disease) [I25.10]  Yes    Type 2 diabetes mellitus with diabetic polyneuropathy, without long-term current use of insulin [E11.42]  Yes    Hypertension [I10]  Yes    Diabetic peripheral neuropathy [E11.42]  Yes      Resolved Hospital Problems   No resolved problems to display.       Assessment & Plan    -Continue therapeutic Lovenox for finding of pulmonary embolism.  Lower extremity Doppler negative and echocardiogram shows no evidence of heart strain.  He is on room air and denies any shortness of breath or chest pain.  -Continue broad-spectrum antibiotics.  Will await evaluation from podiatry regarding his right great toe.  X-ray noted.  Blood cultures negative.  -Blood sugars are reasonably controlled today.  Continue present regimen  -Blood pressure overall better controlled today  -Renal function appears at baseline  -Confusion overnight consistent with delirium on top of underlying  dementia.    Dispo  To be determined    Expected Discharge Date: 3/19/2025; Expected Discharge Time:      Giuseppe Soni MD  [facility]  03/17/25  12:30 EDT

## 2025-03-17 NOTE — PLAN OF CARE
Problem: Adult Inpatient Plan of Care  Goal: Plan of Care Review  Outcome: Progressing  Flowsheets (Taken 3/17/2025 0750)  Progress: no change  Outcome Evaluation: Patient is demented, disoriented to situation and date. No respiratory distress noted, fall risk prevention protocol maintained.  Plan of Care Reviewed With: patient   Goal Outcome Evaluation:  Plan of Care Reviewed With: patient        Progress: no change  Outcome Evaluation: Patient is demented, disoriented to situation and date. No respiratory distress noted, fall risk prevention protocol maintained.

## 2025-03-17 NOTE — CONSULTS
Podiatry Consult Note      Patient: Eric Kramer Admit Date: 03/16/2025    Age: 84 y.o.   PCP: Elder Block MD    MRN: [id number]  Room: [facility]        Subjective     Chief Complaint     Chief Complaint   Patient presents with    Wound Check    Fall        HPI     84-year-old male with past medical history of previous stroke, vascular dementia and Alzheimer's, diabetes mellitus type 2, CAD.  Podiatry consulted with concern for ulceration to the right big toe.  He initially  presented to the emergency room at [facility] due to the toe ulceration.  He says again did a couple of weeks ago according to family members and it has since become more red and swollen.  He also was worked up for hypoxia in the ED and was shown to have pulmonary embolism on CT angiogram.  He has family in the room with him today that is helping assist with the story.  He has a long history of surgery with complications to the right foot and has had previous second toe amputation.    Past Medical History     Past Medical History:   Diagnosis Date    Acute foot pain, left     Acute UTI     Arteriosclerosis of coronary artery     Basal cell carcinoma (BCC) of face     Benign enlargement of prostate     Cataract     Chest pain     Diabetic peripheral neuropathy     Edema     Hyperlipidemia     Hypertension     Hypothyroidism     Insomnia     Malignant neoplasm in situ of colon     Memory impairment     MMSE 27/30-5/20/2019    Nephrolithiasis     Osteoarthritis     Prostatitis     Renal azotemia     Stroke     stroke syndrome    Testosterone deficiency     Type 2 diabetes mellitus     Vitamin D deficiency         Past Surgical History:   Procedure Laterality Date    ANGIOPLASTY Right     Cath Laser Angioplasty/right ventricle    APPENDECTOMY      CATARACT EXTRACTION Left     COLECTOMY PARTIAL / TOTAL      due to colon cancer, removed 13 inches of colon    CORONARY ANGIOPLASTY WITH STENT PLACEMENT      TOE SURGERY Right 07/2021        No  Known Allergies     Social History     Tobacco Use   Smoking Status Former    Current packs/day: 0.00    Average packs/day: 1 pack/day for 49.0 years (49.0 ttl pk-yrs)    Types: Cigarettes    Start date: 1956    Quit date: 4/3/1980    Years since quittin.9   Smokeless Tobacco Never   Tobacco Comments    Greg quit 42 plus years ago        Objective   Physical Exam    Vitals:    25 1320   BP: 138/75   Pulse: 74   Resp: 18   Temp: 98.1 °F (36.7 °C)   SpO2: 96%        Vascular: DP and PT pulses palpable bilateral, capillary refill normal to all digits.  Dermatology: Skin texture and turgor normal.  Xerosis bilateral feet  - Ulcerations:   Tip of right hallux measuring 3 x 3 cm without depth.  No deep tunneling or probing.  Mostly granular wound base.  Very minimal edema and erythema surrounding.  Musculoskeletal: MS and ROM within normal limits bilateral lower extremities.   Prior right second toe amputation.  Range of motion at first MPJ right foot is absent  Neurologic: Light touch sensation diminished bilaterally.      Labs     Lab Results   Component Value Date    HGBA1C 6.40 (H) 10/16/2024    POCGLU 172 (H) 2025        CBC:      Lab 25  0445 25  0927   WBC 7.65 6.77   HEMOGLOBIN 13.6 13.1   HEMATOCRIT 39.9 41.0   PLATELETS 311 288   NEUTROS ABS 5.13 4.49   IMMATURE GRANS (ABS) 0.02 0.01   LYMPHS ABS 1.67 1.49   MONOS ABS 0.65 0.62   EOS ABS 0.13 0.13   MCV 91.1 94.7          Results for orders placed or performed during the hospital encounter of 25   Blood Culture - Blood, Arm, Right    Specimen: Arm, Right; Blood   Result Value Ref Range    Blood Culture No growth at 24 hours         Duplex Venous Lower Extremity - Bilateral CAR    Normal bilateral lower extremity venous duplex scan.       Assessment/Plan      84-year-old diabetic male with right hallux ulceration:  - Continue antibiotics for soft tissue cellulitis coverage.  - No concern for deep infection or osteomyelitis  at this time.  - Daily Betadine wet-to-dry dressing changes per nursing, appreciate assistance.  - Weightbearing as tolerated right foot.  - No plans for surgical intervention for podiatry at this time.  - Okay for discharge from my perspective, thank you for the consult.  Signing off at this time      Scottie Laboy DPM  Office: [phone]

## 2025-03-17 NOTE — THERAPY EVALUATION
Patient Name: Eric Kramer  : 1940    MRN: [id number]                              Today's Date: 3/17/2025       Admit Date: 3/16/2025    Visit Dx:     ICD-10-CM ICD-9-CM   1. Acute pulmonary embolism without acute cor pulmonale, unspecified pulmonary embolism type  I26.99 415.19   2. Hypoxia  R09.02 799.02   3. Elevated troponin  R79.89 790.6   4. Altered mental status, unspecified altered mental status type  R41.82 780.97   5. Multiple falls  R29.6 V15.88   6. Cellulitis of great toe of right foot  L03.031 681.10     Patient Active Problem List   Diagnosis    Type 2 diabetes mellitus with diabetic polyneuropathy, without long-term current use of insulin    Stroke    Prostatitis    Osteoarthritis    Nephrolithiasis    Memory impairment    Malignant neoplasm in situ of colon    Insomnia    Hypertension    Diabetic peripheral neuropathy    Benign enlargement of prostate    Arteriosclerosis of coronary artery    Diabetic ulcer of toe of right foot associated with diabetes mellitus due to underlying condition, limited to breakdown of skin    MRSA bacteremia    Hospital discharge follow-up    Medicare annual wellness visit, subsequent    Hypothyroidism due to acquired atrophy of thyroid    Other hyperlipidemia    High risk medication use    B12 deficiency    Seborrheic keratosis    Respiratory failure    CAD (coronary artery disease)    Hospital discharge follow-up    Pneumonia of both lungs due to infectious organism    Thoracic aortic aneurysm without rupture    Adrenal nodule    Acquired hypothyroidism    Left adrenal mass    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 behavioral disturbance, psychotic disturbance, mood disturbance, or anxiety    Vascular dementia    Difficulty walking    Primary osteoarthritis of both knees    Pulmonary embolism    Cellulitis of right toe     Past Medical History:   Diagnosis Date    Acute foot pain, left     Acute UTI     Arteriosclerosis of coronary artery     Basal  cell carcinoma (BCC) of face     Benign enlargement of prostate     Cataract     Chest pain     Diabetic peripheral neuropathy     Edema     Hyperlipidemia     Hypertension     Hypothyroidism     Insomnia     Malignant neoplasm in situ of colon     Memory impairment     MMSE 27/30-5/20/2019    Nephrolithiasis     Osteoarthritis     Prostatitis     Renal azotemia     Stroke     stroke syndrome    Testosterone deficiency     Type 2 diabetes mellitus     Vitamin D deficiency      Past Surgical History:   Procedure Laterality Date    ANGIOPLASTY Right     Cath Laser Angioplasty/right ventricle    APPENDECTOMY      CATARACT EXTRACTION Left     COLECTOMY PARTIAL / TOTAL      due to colon cancer, removed 13 inches of colon    CORONARY ANGIOPLASTY WITH STENT PLACEMENT      TOE SURGERY Right 07/2021      General Information       Row Name 03/17/25 [facility]8          Physical Therapy Time and Intention    Document Type evaluation  -     Mode of Treatment co-treatment;physical therapy;occupational therapy  -       Row Name 03/17/25 1228          General Information    Patient Profile Reviewed yes  -     Prior Level of Function independent:;gait;transfer;bed mobility  -     Existing Precautions/Restrictions fall  -     Barriers to Rehab cognitive status  Dementia  -       Row Name 03/17/25 1228          Living Environment    Current Living Arrangements home  -     People in Home spouse  -       Row Name 03/17/25 1228          Cognition    Orientation Status (Cognition) oriented to;person;other (see comments)  Dementia  -       Row Name 03/17/25 1228          Safety Issues/Impairments Affecting Functional Mobility    Impairments Affecting Function (Mobility) balance;cognition;endurance/activity tolerance;strength  -     Comment, Safety Issues/Impairments (Mobility) Co treatment medically appropriate and necessary due to patient acuity level, activity tolerance and safety of patient and staff. Evaluation  established to achieve all goals in POC.  -               User Key  (r) = Recorded By, (t) = Taken By, (c) = Cosigned By      Initials Name Provider Type     Melania Velásquez PT Physical Therapist                   Mobility       Row Name 03/17/25 1229          Bed Mobility    Bed Mobility supine-sit  -     Supine-Sit Cloud (Bed Mobility) standby assist  -     Assistive Device (Bed Mobility) head of bed elevated  -       Row Name 03/17/25 1229          Sit-Stand Transfer    Sit-Stand Cloud (Transfers) contact guard;verbal cues  -       Row Name 03/17/25 1229          Gait/Stairs (Locomotion)    [place] Level (Gait) verbal cues;contact guard  -     Assistive Device (Gait) other (see comments)  No AD  -     Distance in Feet (Gait) 150  -     Deviations/Abnormal Patterns (Gait) conchis decreased;gait speed decreased  -     Bilateral Gait Deviations forward flexed posture  -               User Key  (r) = Recorded By, (t) = Taken By, (c) = Cosigned By      Initials Name Provider Type     Melania Velásquez PT Physical Therapist                   Obj/Interventions       Row Name 03/17/25 1230          Range of Motion Comprehensive    General Range of Motion bilateral lower extremity ROM WFL  -[facility] Name 03/17/25 1230          Strength Comprehensive (MMT)    General Manual Muscle Testing (MMT) Assessment lower extremity strength deficits identified  -     Comment, General Manual Muscle Testing (MMT) Assessment Generalized weakness, BLE grossly 4/5  -       Row Name 03/17/25 1230          Balance    Balance Assessment sitting static balance;sitting dynamic balance;standing static balance;standing dynamic balance  -     Static Sitting Balance supervision  -     Dynamic Sitting Balance standby assist  -     Position, Sitting Balance unsupported;sitting edge of bed  -     Static Standing Balance standby assist;verbal cues  -     Dynamic Standing Balance contact  "guard;verbal cues  -     Position/Device Used, Standing Balance unsupported  -     Balance Interventions sitting;standing;sit to stand;static;dynamic  -       Row Name 03/17/25 1230          Sensory Assessment (Somatosensory)    Sensory Assessment (Somatosensory) LE sensation intact  -               User Key  (r) = Recorded By, (t) = Taken By, (c) = Cosigned By      Initials Name Provider Type     Melania Velásquez, PT Physical Therapist                   Goals/Plan       Row Name 03/17/25 1238          Bed Mobility Goal 1 (PT)    Activity/Assistive Device (Bed Mobility Goal 1, PT) bed mobility activities, all  -     [place] Level/Cues Needed (Bed Mobility Goal 1, PT) independent  -     Time Frame (Bed Mobility Goal 1, PT) 1 week  -       Row Name 03/17/25 1230          Transfer Goal 1 (PT)    Activity/Assistive Device (Transfer Goal 1, PT) transfers, all  -     [place] Level/Cues Needed (Transfer Goal 1, PT) supervision required  -     Time Frame (Transfer Goal 1, PT) 1 week  -[facility] Name 03/17/25 1232          Gait Training Goal 1 (PT)    Activity/Assistive Device (Gait Training Goal 1, PT) gait (walking locomotion)  -     [place] Level (Gait Training Goal 1, PT) supervision required  -     Distance (Gait Training Goal 1, PT) 300ft  -     Time Frame (Gait Training Goal 1, PT) 1 week  -       Row Name 03/17/25 1237          Therapy Assessment/Plan (PT)    Planned Therapy Interventions (PT) balance training;bed mobility training;gait training;home exercise program;patient/family education;ROM (range of motion);strengthening;stretching;transfer training;stair training  -               User Key  (r) = Recorded By, (t) = Taken By, (c) = Cosigned By      Initials Name Provider Type     Melania Velásquez, PT Physical Therapist                   Clinical Impression       Row Name 03/17/25 1235          Pain    Pre/Posttreatment Pain Comment Reports pain \"all over\" - " unable to localize or rate  -       Row Name 03/17/25 1230          Plan of Care Review    Plan of Care Reviewed With patient  -     Outcome Evaluation Pt is an 83 yo M admitted from home with increased confusion and multiple falls. Work-up revealed acute PE R lower lobe. Pt also with R 1st toe wound - cellulitis. Pt with hx of dementia, lives with his wife and reports independence at BL with no AD, but has a rwx if needed. Pt reports 4 falls in the last week. Pt presents to PT with impaired strength, endurance, and balance limiting overall mobility. Pt transferred to EOB with SBA, STS with CGA, and ambulated 150ft with CGA and no AD. Pt fairly steady with no overt LOB, though did try to demo how he has fallen before with noted unsteadiness then. Directional and safety cues provided throughout. Gait distance limited by fatigue, returned to room and agreeable to sitting UIC, left with needs met and encouraged to call out for assist. PT will continue to follow peripherally, pt is likely close to his BL but encourage continued mobility with nsg as tolerated. Anticipate DC home with wife's assist and HHPT.  -       Row Name 03/17/25 1230          Therapy Assessment/Plan (PT)    Patient/Family Therapy Goals Statement (PT) Return to OF  -     Rehab Potential (PT) fair  -     Criteria for Skilled Interventions Met (PT) yes  -     Therapy Frequency (PT) 3 times/wk  -       Row Name 03/17/25 1230          Vital Signs    O2 Delivery Pre Treatment room air  -     O2 Delivery Intra Treatment room air  -     O2 Delivery Post Treatment room air  -       Row Name 03/17/25 1230          Positioning and Restraints    Pre-Treatment Position in bed  -     Post Treatment Position chair  -     In Chair notified nsg;reclined;call light within reach;encouraged to call for assist;exit alarm on  -               User Key  (r) = Recorded By, (t) = Taken By, (c) = Cosigned By      Initials Name Provider Type      Melania Velásquez, PT Physical Therapist                   Outcome Measures       Row Name 03/17/25 1235 03/17/25 1001       How much help from another person do you currently need...    Turning from your back to your side while in flat bed without using bedrails? 4  - 4  -DC    Moving from lying on back to sitting on the side of a flat bed without bedrails? 4  - 4  -DC    Moving to and from a bed to a chair (including a wheelchair)? 3  - 4  -DC    Standing up from a chair using your arms (e.g., wheelchair, bedside chair)? 3  - 4  -DC    Climbing 3-5 steps with a railing? 3  - 3  -DC    To walk in hospital room? 3  - 3  -DC    AM-PAC 6 Clicks Score (PT) 20  - 22  -NV    Highest Level of Mobility Goal 6 --> Walk 10 steps or more  - 7 --> Walk 25 feet or more  -NV      Row Name 03/17/25 1228          Modified [place] Scale    Modified [place] Scale 4 - Moderately severe disability.  Unable to walk without assistance, and unable to attend to own bodily needs without assistance.  -KG       Row Name 03/17/25 1235 03/17/25 1228       Functional Assessment    Outcome Measure Options AM-PAC 6 Clicks Basic Mobility (PT)  - AM-PAC 6 Clicks Daily Activity (OT);Modified [place]  -KG              User Key  (r) = Recorded By, (t) = Taken By, (c) = Cosigned By      Initials Name Provider Type    NV Heather Guo, RN Registered Nurse     Melania Velásquez, PT Physical Therapist    KG Hany Jni OT Occupational Therapist                                 Physical Therapy Education       Title: PT OT SLP Therapies (In Progress)       Topic: Physical Therapy (In Progress)       Point: Mobility training (Done)       Learning Progress Summary            Patient Acceptance, E,TB,D, VU,NR by  at 3/17/2025 1235                      Point: Home exercise program (Not Started)       Learner Progress:  Not documented in this visit.              Point: Body mechanics (Done)       Learning Progress Summary             Patient Acceptance, E,TB,D, VU,NR by  at 3/17/2025 1235                      Point: Precautions (Done)       Learning Progress Summary            Patient Acceptance, E,TB,D, VU,NR by  at 3/17/2025 1235                                      User Key       Initials Effective Dates Name Provider Type Discipline     04/08/22 -  Melania Velásquez, PT Physical Therapist PT                  PT Recommendation and Plan  Planned Therapy Interventions (PT): balance training, bed mobility training, gait training, home exercise program, patient/family education, ROM (range of motion), strengthening, stretching, transfer training, stair training  Outcome Evaluation: Pt is an 83 yo M admitted from home with increased confusion and multiple falls. Work-up revealed acute PE R lower lobe. Pt also with R 1st toe wound - cellulitis. Pt with hx of dementia, lives with his wife and reports independence at BL with no AD, but has a rwx if needed. Pt reports 4 falls in the last week. Pt presents to PT with impaired strength, endurance, and balance limiting overall mobility. Pt transferred to EOB with SBA, STS with CGA, and ambulated 150ft with CGA and no AD. Pt fairly steady with no overt LOB, though did try to demo how he has fallen before with noted unsteadiness then. Directional and safety cues provided throughout. Gait distance limited by fatigue, returned to room and agreeable to sitting UIC, left with needs met and encouraged to call out for assist. PT will continue to follow peripherally, pt is likely close to his BL but encourage continued mobility with nsg as tolerated. Anticipate DC home with wife's assist and HHPT.     Time Calculation:   PT Evaluation Complexity  History, PT Evaluation Complexity: 1-2 personal factors and/or comorbidities  Examination of Body Systems (PT Eval Complexity): total of 3 or more elements  Clinical Presentation (PT Evaluation Complexity): stable  Clinical Decision Making (PT Evaluation  Complexity): low complexity  Overall Complexity (PT Evaluation Complexity): low complexity     PT Charges       Row Name 03/17/25 1236             Time Calculation    Start Time 1018  -      Stop Time 1028  -      Time Calculation (min) 10 min  -      PT Received On 03/17/25  -      PT - Next Appointment 03/19/25  -      PT Goal Re-Cert Due Date 03/24/25  -         Time Calculation- PT    Total Timed Code Minutes- PT 8 minute(s)  -         Timed Charges    26254 - Gait Training Minutes  8  -BH         Total Minutes    Timed Charges Total Minutes 8  -BH       Total Minutes 8  -BH                User Key  (r) = Recorded By, (t) = Taken By, (c) = Cosigned By      Initials Name Provider Type     Melania Velásquez, PT Physical Therapist                  Therapy Charges for Today       Code Description Service Date Service Provider Modifiers Qty    [id number]  GAIT TRAINING EA 15 MIN 3/17/2025 Melania Velásquez, PT GP 1    21121435721 HC PT EVAL LOW COMPLEXITY 2 3/17/2025 Melania Velásquez, PT GP 1            PT G-Codes  Outcome Measure Options: AM-PAC 6 Clicks Basic Mobility (PT)  AM-PAC 6 Clicks Score (PT): 20  AM-PAC 6 Clicks Score (OT): 21  Modified [place] Scale: 4 - Moderately severe disability.  Unable to walk without assistance, and unable to attend to own bodily needs without assistance.  PT Discharge Summary  Anticipated Discharge Disposition (PT): home with home health, home with assist    Melania Velásquez, PT  3/17/2025

## 2025-03-17 NOTE — PLAN OF CARE
Problem: Adult Inpatient Plan of Care  Goal: Plan of Care Review  3/17/2025 1537 by Heather Guo, RN  Outcome: Progressing  Flowsheets (Taken 3/17/2025 1537)  Progress: no change  Plan of Care Reviewed With: patient  3/17/2025 1537 by Heather Guo, RN  Outcome: Progressing  Flowsheets (Taken 3/17/2025 1537)  Progress: no change  Plan of Care Reviewed With: patient   Goal Outcome Evaluation:  Plan of Care Reviewed With: patient        Progress: no change                Pt has been pleasantly confused. Pt is still getting IV antibiotics. Pt had BLE doppler. Pt remains on room air. VSS Anticipate discharge in the next day or two.

## 2025-03-17 NOTE — PLAN OF CARE
Goal Outcome Evaluation:  Plan of Care Reviewed With: patient           Outcome Evaluation: Pt is an 83 yo M admitted from home with increased confusion and multiple falls. Work-up revealed acute PE R lower lobe. Pt also with R 1st toe wound - cellulitis. Pt with hx of dementia, lives with his wife and reports independence at BL with no AD, but has a rwx if needed. Pt reports 4 falls in the last week. Pt presents to PT with impaired strength, endurance, and balance limiting overall mobility. Pt transferred to EOB with SBA, STS with CGA, and ambulated 150ft with CGA and no AD. Pt fairly steady with no overt LOB, though did try to demo how he has fallen before with noted unsteadiness then. Directional and safety cues provided throughout. Gait distance limited by fatigue, returned to room and agreeable to sitting UIC, left with needs met and encouraged to call out for assist. PT will continue to follow peripherally, pt is likely close to his BL but encourage continued mobility with nsg as tolerated. Anticipate DC home with wife's assist and HHPT.    Anticipated Discharge Disposition (PT): home with home health, home with assist

## 2025-03-17 NOTE — THERAPY EVALUATION
Patient Name: Eric Kramer  : 1940    MRN: [id number]                              Today's Date: 3/17/2025       Admit Date: 3/16/2025    Visit Dx:     ICD-10-CM ICD-9-CM   1. Acute pulmonary embolism without acute cor pulmonale, unspecified pulmonary embolism type  I26.99 415.19   2. Hypoxia  R09.02 799.02   3. Elevated troponin  R79.89 790.6   4. Altered mental status, unspecified altered mental status type  R41.82 780.97   5. Multiple falls  R29.6 V15.88   6. Cellulitis of great toe of right foot  L03.031 681.10     Patient Active Problem List   Diagnosis    Type 2 diabetes mellitus with diabetic polyneuropathy, without long-term current use of insulin    Stroke    Prostatitis    Osteoarthritis    Nephrolithiasis    Memory impairment    Malignant neoplasm in situ of colon    Insomnia    Hypertension    Diabetic peripheral neuropathy    Benign enlargement of prostate    Arteriosclerosis of coronary artery    Diabetic ulcer of toe of right foot associated with diabetes mellitus due to underlying condition, limited to breakdown of skin    MRSA bacteremia    Hospital discharge follow-up    Medicare annual wellness visit, subsequent    Hypothyroidism due to acquired atrophy of thyroid    Other hyperlipidemia    High risk medication use    B12 deficiency    Seborrheic keratosis    Respiratory failure    CAD (coronary artery disease)    Hospital discharge follow-up    Pneumonia of both lungs due to infectious organism    Thoracic aortic aneurysm without rupture    Adrenal nodule    Acquired hypothyroidism    Left adrenal mass    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 behavioral disturbance, psychotic disturbance, mood disturbance, or anxiety    Vascular dementia    Difficulty walking    Primary osteoarthritis of both knees    Pulmonary embolism    Cellulitis of right toe     Past Medical History:   Diagnosis Date    Acute foot pain, left     Acute UTI     Arteriosclerosis of coronary artery     Basal  cell carcinoma (BCC) of face     Benign enlargement of prostate     Cataract     Chest pain     Diabetic peripheral neuropathy     Edema     Hyperlipidemia     Hypertension     Hypothyroidism     Insomnia     Malignant neoplasm in situ of colon     Memory impairment     MMSE 27/30-5/20/2019    Nephrolithiasis     Osteoarthritis     Prostatitis     Renal azotemia     Stroke     stroke syndrome    Testosterone deficiency     Type 2 diabetes mellitus     Vitamin D deficiency      Past Surgical History:   Procedure Laterality Date    ANGIOPLASTY Right     Cath Laser Angioplasty/right ventricle    APPENDECTOMY      CATARACT EXTRACTION Left     COLECTOMY PARTIAL / TOTAL      due to colon cancer, removed 13 inches of colon    CORONARY ANGIOPLASTY WITH STENT PLACEMENT      TOE SURGERY Right 07/2021      General Information       Row Name 03/17/25 1220          OT Time and Intention    Subjective Information no complaints  -KG     Document Type evaluation  -KG     Mode of Treatment co-treatment;physical therapy;occupational therapy  -KG     Patient Effort good  -KG     Symptoms Noted During/After Treatment none  -KG       Row Name 03/17/25 1220          General Information    Patient Profile Reviewed yes  -KG     Prior Level of Function --  (I)<>Mod (I) w/ BADLs and uses a cane for functional mobility  -KG     Existing Precautions/Restrictions fall  -KG     Barriers to Rehab none identified  -KG       Row Name 03/17/25 1220          Living Environment    Current Living Arrangements home  -KG     People in Home spouse  -KG       Row Name 03/17/25 1220          Safety Issues/Impairments Affecting Functional Mobility    Safety Issues Affecting Function (Mobility) impulsivity;insight into deficits/self-awareness;judgment  -KG     Impairments Affecting Function (Mobility) balance;cognition;endurance/activity tolerance;strength  -KG     Cognitive Impairments, Mobility Safety/Performance attention;impulsivity;insight into  deficits/self-awareness;judgment  -KG               User Key  (r) = Recorded By, (t) = Taken By, (c) = Cosigned By      Initials Name Provider Type    Hany Pedroza OT Occupational Therapist                     Mobility/ADL's       Row Name 03/17/25 1222          Bed Mobility    Bed Mobility supine-sit  -KG     Supine-Sit [place] (Bed Mobility) standby assist  -KG       Row Name 03/17/25 1222          Transfers    Transfers sit-stand transfer;stand-sit transfer  -KG       Row Name 03/17/25 1222          Sit-Stand Transfer    Sit-Stand [place] (Transfers) contact guard  -KG       Row Name 03/17/25 1222          Stand-Sit Transfer    Stand-Sit [place] (Transfers) contact guard  -KG       Row Name 03/17/25 1222          Functional Mobility    Functional Mobility- Ind. Level contact guard assist  from EOB to hallway, around unit, to sink, and then to recliner (simulating > household distance) -- unsteady and impulsive at times, cues for redirection, no LOB  -KG       Row Name 03/17/25 1222          Activities of Daily Living    BADL Assessment/Intervention grooming  -KG       Row Name 03/17/25 1222          Grooming Assessment/Training    [place] Level (Grooming) oral care regimen;contact guard assist  -KG     Position (Grooming) sink side  -KG               User Key  (r) = Recorded By, (t) = Taken By, (c) = Cosigned By      Initials Name Provider Type    Hany Pedroza OT Occupational Therapist                   Obj/Interventions       Row Name 03/17/25 1223          Sensory Assessment (Somatosensory)    Sensory Assessment (Somatosensory) sensation intact  -KG       Row Name 03/17/25 1223          Vision Assessment/Intervention    Visual Impairment/Limitations WNL  -KG       Row Name 03/17/25 1223          Range of Motion Comprehensive    General Range of Motion no range of motion deficits identified  -KG       Row Name 03/17/25 1223          Strength Comprehensive (MMT)    Comment, General  Manual Muscle Testing (MMT) Assessment BUE strength 4/5 grossly  -KG       Row Name 03/17/25 1223          Motor Skills    Motor Skills functional endurance  -KG     Functional Endurance WFL  -KG       Row Name 03/17/25 1223          Balance    Balance Assessment sitting static balance;sitting dynamic balance;sit to stand dynamic balance;standing static balance;standing dynamic balance  -KG     Static Sitting Balance standby assist  -KG     Dynamic Sitting Balance standby assist  -KG     Position, Sitting Balance sitting edge of bed  -KG     Sit to Stand Dynamic Balance contact guard  -KG     Static Standing Balance contact guard  -KG     Dynamic Standing Balance contact guard  -KG     Position/Device Used, Standing Balance supported  -KG     Balance Interventions sitting;standing;sit to stand;supported;static;dynamic;occupation based/functional task  -KG               User Key  (r) = Recorded By, (t) = Taken By, (c) = Cosigned By      Initials Name Provider Type    KG Hany Jin, OT Occupational Therapist                   Goals/Plan       Row Name 03/17/25 1227          Bed Mobility Goal 1 (OT)    Activity/Assistive Device (Bed Mobility Goal 1, OT) sit to supine;supine to sit  -KG     [place] Level/Cues Needed (Bed Mobility Goal 1, OT) modified independence  -KG     Time Frame (Bed Mobility Goal 1, OT) short term goal (STG);2 weeks  -KG     Progress/Outcomes (Bed Mobility Goal 1, OT) new goal  -KG       Row Name 03/17/25 1227          Transfer Goal 1 (OT)    Activity/Assistive Device (Transfer Goal 1, OT) sit-to-stand/stand-to-sit;bed-to-chair/chair-to-bed;toilet  -KG     [place] Level/Cues Needed (Transfer Goal 1, OT) standby assist  -KG     Time Frame (Transfer Goal 1, OT) short term goal (STG);2 weeks  -KG     Progress/Outcome (Transfer Goal 1, OT) new goal  -KG       Row Name 03/17/25 1227          Bathing Goal 1 (OT)    Activity/Device (Bathing Goal 1, OT) upper body bathing;lower body bathing   -KG     [place] Level/Cues Needed (Bathing Goal 1, OT) standby assist  -KG     Time Frame (Bathing Goal 1, OT) short term goal (STG);2 weeks  -KG     Progress/Outcomes (Bathing Goal 1, OT) new goal  -KG       Row Name 03/17/25 1227          Dressing Goal 1 (OT)    Activity/Device (Dressing Goal 1, OT) upper body dressing;lower body dressing  -KG     [place]/Cues Needed (Dressing Goal 1, OT) standby assist  -KG     Time Frame (Dressing Goal 1, OT) short term goal (STG);2 weeks  -KG     Progress/Outcome (Dressing Goal 1, OT) new goal  -KG       Row Name 03/17/25 1227          Toileting Goal 1 (OT)    Activity/Device (Toileting Goal 1, OT) adjust/manage clothing;perform perineal hygiene  -KG     [place] Level/Cues Needed (Toileting Goal 1, OT) standby assist  -KG     Time Frame (Toileting Goal 1, OT) short term goal (STG);2 weeks  -KG     Progress/Outcome (Toileting Goal 1, OT) new goal  -KG       Row Name 03/17/25 1227          Grooming Goal 1 (OT)    Activity/Device (Grooming Goal 1, OT) oral care;wash face, hands  -KG     [place] (Grooming Goal 1, OT) standby assist  -KG     Time Frame (Grooming Goal 1, OT) short term goal (STG);2 weeks  -KG     Progress/Outcome (Grooming Goal 1, OT) new goal  -KG       Row Name 03/17/25 1227          Therapy Assessment/Plan (OT)    Planned Therapy Interventions (OT) activity tolerance training;adaptive equipment training;BADL retraining;functional balance retraining;occupation/activity based interventions;ROM/therapeutic exercise;patient/caregiver education/training;strengthening exercise;transfer/mobility retraining  -KG               User Key  (r) = Recorded By, (t) = Taken By, (c) = Cosigned By      Initials Name Provider Type    KG Hany Jin, OT Occupational Therapist                   Clinical Impression       Row Name 03/17/25 1224          Pain Assessment    Pretreatment Pain Rating 0/10 - no pain  -KG     Posttreatment Pain Rating 0/10 - no pain   -KG       Row Name 03/17/25 1224          Plan of Care Review    Plan of Care Reviewed With patient  -KG     Outcome Evaluation Pt admitted to [facility] for hypoxia 2/2 pulmonary embolism and R 1st toe cellulitis. Pt lives w/ his spouse in a 1 story home. States he is (I)<>Mod (I) w/ BADLs and uses a cane for functional mobility -- reports 4 fall within the last week. Today, pt was SBA for bed mobility. Performed STS, functional mobility around unit, and sink side ADLs w/ CGA/HHA. Unsteady at times, but no overt LOB. Impulsivity noted and required intermittent cueing for safety and redirection. OT will continue to follow to maxizmize strength, balance, and activity tolerance while inpatient. Recommend d/c w/ HHOT and assist from family.  -KG       Row Name 03/17/25 1224          Therapy Assessment/Plan (OT)    Rehab Potential (OT) fair  -KG     Criteria for Skilled Therapeutic Interventions Met (OT) skilled treatment is necessary  -KG     Therapy Frequency (OT) 3 times/wk  -KG       Row Name 03/17/25 1224          Therapy Plan Review/Discharge Plan (OT)    Anticipated Discharge Disposition (OT) home with home health;home with assist  -KG       Row Name 03/17/25 1224          Positioning and Restraints    Pre-Treatment Position in bed  -KG     Post Treatment Position chair  -KG     In Chair notified nsg;reclined;call light within reach;encouraged to call for assist;exit alarm on  -KG               User Key  (r) = Recorded By, (t) = Taken By, (c) = Cosigned By      Initials Name Provider Type    KG Hany Jin, OT Occupational Therapist                   Outcome Measures       Row Name 03/17/25 1226          How much help from another is currently needed...    Putting on and taking off regular lower body clothing? 3  -KG     Bathing (including washing, rinsing, and drying) 3  -KG     Toileting (which includes using toilet bed pan or urinal) 3  -KG     Putting on and taking off regular upper body clothing 4  -KG      Taking care of personal grooming (such as brushing teeth) 4  -KG     Eating meals 4  -KG     AM-PAC 6 Clicks Score (OT) 21  -KG       Row Name 03/17/25 1001          How much help from another person do you currently need...    Turning from your back to your side while in flat bed without using bedrails? 4  -DC     Moving from lying on back to sitting on the side of a flat bed without bedrails? 4  -DC     Moving to and from a bed to a chair (including a wheelchair)? 4  -DC     Standing up from a chair using your arms (e.g., wheelchair, bedside chair)? 4  -DC     Climbing 3-5 steps with a railing? 3  -DC     To walk in hospital room? 3  -DC     AM-PAC 6 Clicks Score (PT) 22  -DC       Row Name 03/17/25 1228          Modified [place]asha Scale    Modified Crawford Scale 4 - Moderately severe disability.  Unable to walk without assistance, and unable to attend to own bodily needs without assistance.  -KG       Row Name 03/17/25 1228          Functional Assessment    Outcome Measure Options AM-PAC 6 Clicks Daily Activity (OT);Modified Crawford  -KG               User Key  (r) = Recorded By, (t) = Taken By, (c) = Cosigned By      Initials Name Provider Type    DC Heather Guo, RN Registered Nurse    KG Hany Jin, KARON Occupational Therapist                      OT Recommendation and Plan  Planned Therapy Interventions (OT): activity tolerance training, adaptive equipment training, BADL retraining, functional balance retraining, occupation/activity based interventions, ROM/therapeutic exercise, patient/caregiver education/training, strengthening exercise, transfer/mobility retraining  Therapy Frequency (OT): 3 times/wk  Plan of Care Review  Plan of Care Reviewed With: patient  Outcome Evaluation: Pt admitted to [facility] for hypoxia 2/2 pulmonary embolism and R 1st toe cellulitis. Pt lives w/ his spouse in a 1 story home. States he is (I)<>Mod (I) w/ BADLs and uses a cane for functional mobility -- reports 4 fall within the last  week. Today, pt was SBA for bed mobility. Performed STS, functional mobility around unit, and sink side ADLs w/ CGA/HHA. Unsteady at times, but no overt LOB. Impulsivity noted and required intermittent cueing for safety and redirection. OT will continue to follow to maxizmize strength, balance, and activity tolerance while inpatient. Recommend d/c w/ HHOT and assist from family.     Time Calculation:   Evaluation Complexity (OT)  Review Occupational Profile/Medical/Therapy History Complexity: brief/low complexity  Assessment, Occupational Performance/Identification of Deficit Complexity: 1-3 performance deficits  Clinical Decision Making Complexity (OT): problem focused assessment/low complexity  Overall Complexity of Evaluation (OT): low complexity     Time Calculation- OT       Row Name 03/17/25 1229             Time Calculation- OT    OT Start Time 1017  -KG      OT Stop Time 1030  -KG      OT Time Calculation (min) 13 min  -KG      OT Non-Billable Time (min) 13 min  -KG      OT Received On 03/17/25  -KG      OT - Next Appointment 03/19/25  -KG      OT Goal Re-Cert Due Date 03/31/25  -KG         Untimed Charges    OT Eval/Re-eval Minutes 13  -KG         Total Minutes    Untimed Charges Total Minutes 13  -KG       Total Minutes 13  -KG                User Key  (r) = Recorded By, (t) = Taken By, (c) = Cosigned By      Initials Name Provider Type    KG Hany Jin OT Occupational Therapist                  Therapy Charges for Today       Code Description Service Date Service Provider Modifiers Qty    [id number] HC OT EVAL LOW COMPLEXITY 3 3/17/2025 Hany Jin OT GO 1                 Hany Jin OT  3/17/2025

## 2025-03-17 NOTE — PROGRESS NOTES
Discharge Planning Assessment  [facility]     Patient Name: Eric Kramer  MRN: [id number]  Today's Date: 3/17/2025    Admit Date: 3/16/2025    Plan: Return home with family   Discharge Needs Assessment       Row Name 03/17/25 1546       Living Environment    People in Home spouse    Name(s) of People in Home Tequila Aguilar    Current Living Arrangements home    Potentially Unsafe Housing Conditions none    Primary Care Provided by self    Provides Primary Care For no one    Family Caregiver if Needed spouse    Family Caregiver Names Tequila Aguilar [phone]    Quality of Family Relationships helpful    Able to Return to Prior Arrangements yes       Resource/Environmental Concerns    Resource/Environmental Concerns none    Transportation Concerns none       Transition Planning    Patient/Family Anticipates Transition to home with family    Patient/Family Anticipated Services at Transition none    Transportation Anticipated family or friend will provide       Discharge Needs Assessment    Readmission Within the Last 30 Days no previous admission in last 30 days    Equipment Currently Used at Home grab bar;cane, straight;walker, standard    Concerns to be Addressed denies needs/concerns at this time    Anticipated Changes Related to Illness none    Equipment Needed After Discharge none                   Discharge Plan       Row Name 03/17/25 9042       Plan    Plan Return home with family    Patient/Family in Agreement with Plan yes    Plan Comments Spoke with patient at bedside.  He lives with wife Autumn [phone] in a quad level house.  He uses a cane, grab bars in the BR.  He has never used HH or been to SNF.  PCP is Dr. Elder Block and pharmacy is [facility] in [facility].  Patient drives, wife can drive but does not like to but friends will help if needed.  He plans to return home at [place].  CCP will follow.  Kristen HUGGINS                  Selected Continued Care - Episodes Includes continued care and service  providers with selected services from the active episodes listed below          Expected Discharge Date and Time       Expected Discharge Date Expected Discharge Time    Mar 19, 2025            Demographic Summary       Row Name 03/17/25 1545       General Information    Admission Type inpatient    Arrived From home    Referral Source admission list    Reason for Consult discharge planning    Preferred Language English                   Functional Status       Row Name 03/17/25 1545       Functional Status    Usual Activity Tolerance moderate    Current Activity Tolerance moderate       Functional Status, IADL    Medications independent    Meal Preparation independent;assistive person  Wife helps if needed    Housekeeping assistive person;independent    Laundry independent;assistive person    Shopping assistive person;independent       Mental Status    General Appearance WDL WDL       Mental Status Summary    Recent Changes in Mental Status/Cognitive Functioning no changes                                 Becky S. [place], RN

## 2025-03-18 ENCOUNTER — READMISSION MANAGEMENT (OUTPATIENT)
Dept: CALL CENTER | Facility: HOSPITAL | Age: 85
End: 2025-03-18
Payer: MEDICARE

## 2025-03-18 VITALS
RESPIRATION RATE: 18 BRPM | TEMPERATURE: 98.1 F | OXYGEN SATURATION: 98 % | DIASTOLIC BLOOD PRESSURE: 81 MMHG | SYSTOLIC BLOOD PRESSURE: 136 MMHG | BODY MASS INDEX: 27.21 KG/M2 | WEIGHT: 212 LBS | HEART RATE: 68 BPM | HEIGHT: 74 IN

## 2025-03-18 LAB
ANION GAP SERPL CALCULATED.3IONS-SCNC: 11 MMOL/L (ref 5–15)
BUN SERPL-MCNC: 26 MG/DL (ref 8–23)
BUN/CREAT SERPL: 15.3 (ref 7–25)
CALCIUM SPEC-SCNC: 10 MG/DL (ref 8.6–10.5)
CHLORIDE SERPL-SCNC: 102 MMOL/L (ref 98–107)
CO2 SERPL-SCNC: 29 MMOL/L (ref 22–29)
CREAT SERPL-MCNC: 1.7 MG/DL (ref 0.76–1.27)
DEPRECATED RDW RBC AUTO: 46.1 FL (ref 37–54)
EGFRCR SERPLBLD CKD-EPI 2021: 39.5 ML/MIN/1.73
ERYTHROCYTE [DISTWIDTH] IN BLOOD BY AUTOMATED COUNT: 13.6 % (ref 12.3–15.4)
GLUCOSE BLDC GLUCOMTR-MCNC: 140 MG/DL (ref 70–130)
GLUCOSE BLDC GLUCOMTR-MCNC: 150 MG/DL (ref 70–130)
GLUCOSE SERPL-MCNC: 133 MG/DL (ref 65–99)
HCT VFR BLD AUTO: 43.2 % (ref 37.5–51)
HGB BLD-MCNC: 14.3 G/DL (ref 13–17.7)
MAGNESIUM SERPL-MCNC: 1.9 MG/DL (ref 1.6–2.4)
MCH RBC QN AUTO: 30.1 PG (ref 26.6–33)
MCHC RBC AUTO-ENTMCNC: 33.1 G/DL (ref 31.5–35.7)
MCV RBC AUTO: 90.9 FL (ref 79–97)
PLATELET # BLD AUTO: 367 10*3/MM3 (ref 140–450)
PMV BLD AUTO: 9.5 FL (ref 6–12)
POTASSIUM SERPL-SCNC: 3.4 MMOL/L (ref 3.5–5.2)
POTASSIUM SERPL-SCNC: 3.6 MMOL/L (ref 3.5–5.2)
QT INTERVAL: 401 MS
QTC INTERVAL: 415 MS
RBC # BLD AUTO: 4.75 10*6/MM3 (ref 4.14–5.8)
SODIUM SERPL-SCNC: 142 MMOL/L (ref 136–145)
WBC NRBC COR # BLD AUTO: 8.2 10*3/MM3 (ref 3.4–10.8)

## 2025-03-18 PROCEDURE — 80048 BASIC METABOLIC PNL TOTAL CA: CPT | Performed by: INTERNAL MEDICINE

## 2025-03-18 PROCEDURE — 25010000002 PIPERACILLIN SOD-TAZOBACTAM PER 1 G: Performed by: INTERNAL MEDICINE

## 2025-03-18 PROCEDURE — 25010000002 ENOXAPARIN PER 10 MG: Performed by: INTERNAL MEDICINE

## 2025-03-18 PROCEDURE — 93010 ELECTROCARDIOGRAM REPORT: CPT | Performed by: INTERNAL MEDICINE

## 2025-03-18 PROCEDURE — 82948 REAGENT STRIP/BLOOD GLUCOSE: CPT

## 2025-03-18 PROCEDURE — 63710000001 INSULIN LISPRO (HUMAN) PER 5 UNITS: Performed by: INTERNAL MEDICINE

## 2025-03-18 PROCEDURE — 85027 COMPLETE CBC AUTOMATED: CPT | Performed by: INTERNAL MEDICINE

## 2025-03-18 RX ORDER — DOXYCYCLINE HYCLATE 50 MG/1
100 CAPSULE ORAL 2 TIMES DAILY
Qty: 28 CAPSULE | Refills: 0 | Status: SHIPPED | OUTPATIENT
Start: 2025-03-18 | End: 2025-03-25

## 2025-03-18 RX ADMIN — DULOXETINE 60 MG: 60 CAPSULE, DELAYED RELEASE ORAL at 08:15

## 2025-03-18 RX ADMIN — SODIUM CHLORIDE 3.38 G: 9 INJECTION, SOLUTION INTRAVENOUS at 05:51

## 2025-03-18 RX ADMIN — HYDROCHLOROTHIAZIDE 25 MG: 12.5 TABLET ORAL at 08:15

## 2025-03-18 RX ADMIN — GUAIFENESIN 600 MG: 600 TABLET, MULTILAYER, EXTENDED RELEASE ORAL at 08:15

## 2025-03-18 RX ADMIN — LEVOTHYROXINE SODIUM 25 MCG: 0.03 TABLET ORAL at 05:51

## 2025-03-18 RX ADMIN — INSULIN LISPRO 2 UNITS: 100 INJECTION, SOLUTION INTRAVENOUS; SUBCUTANEOUS at 08:14

## 2025-03-18 RX ADMIN — MEMANTINE HYDROCHLORIDE 10 MG: 10 TABLET, FILM COATED ORAL at 08:15

## 2025-03-18 RX ADMIN — METOPROLOL TARTRATE 25 MG: 25 TABLET, FILM COATED ORAL at 08:15

## 2025-03-18 RX ADMIN — ATORVASTATIN CALCIUM 40 MG: 20 TABLET, FILM COATED ORAL at 08:15

## 2025-03-18 RX ADMIN — ASPIRIN 81 MG: 81 TABLET, COATED ORAL at 08:15

## 2025-03-18 RX ADMIN — ENOXAPARIN SODIUM 100 MG: 100 INJECTION SUBCUTANEOUS at 08:14

## 2025-03-18 RX ADMIN — LISINOPRIL 10 MG: 10 TABLET ORAL at 08:15

## 2025-03-18 RX ADMIN — Medication 10 ML: at 08:17

## 2025-03-18 NOTE — PLAN OF CARE
Problem: Adult Inpatient Plan of Care  Goal: Plan of Care Review  Outcome: Progressing  Flowsheets (Taken 3/18/2025 0609)  Progress: improving  Outcome Evaluation: Patient is alert but very forgetful, on room air. No complains of respiratory distress, on high dose of lovenox q12 hours for PE. Fall risk prevention protoscol maintained. Planned to discharge home with family.  Plan of Care Reviewed With: patient   Goal Outcome Evaluation:  Plan of Care Reviewed With: patient        Progress: improving  Outcome Evaluation: Patient is alert but very forgetful, on room air. No complains of respiratory distress, on high dose of lovenox q12 hours for PE. Fall risk prevention protoscol maintained. Planned to discharge home with family.

## 2025-03-18 NOTE — OUTREACH NOTE
Prep Survey      Flowsheet Row Responses   [facility] patient discharged from? Rule   Is LACE score < 7 ? No   Eligibility [facility]   Date of Admission 03/16/25   Date of Discharge 03/18/25   Discharge Disposition Home or Self Care   Discharge diagnosis Pulmonary embolism   Does the patient have one of the following disease processes/diagnoses(primary or secondary)? Other   Does the patient have Home health ordered? No   Is there a DME ordered? No   Prep survey completed? Yes            Shantell HYDE - Registered Nurse

## 2025-03-18 NOTE — PROGRESS NOTES
Continued Stay Note  [facility]     Patient Name: Eric Kramer  MRN: [id number]  Today's Date: 3/18/2025    Admit Date: 3/16/2025    Plan: Return home with spouse   Discharge Plan       Row Name 03/18/25 1152       Plan    Plan Return home with spouse    Patient/Family in Agreement with Plan yes    Plan Comments Spoke with wife Autumn by telephone.  She does not feel HH is needed, states she will call PCP office if she feels it is needed.  She will transport patient home.  Kristen HUGGINS                    Expected Discharge Date and Time       Expected Discharge Date Expected Discharge Time    Mar 18, 2025               Becky S. [place], RN

## 2025-03-18 NOTE — DISCHARGE SUMMARY
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       DETAILS OF HOSPITAL STAY     Pertinent Test Results and Procedures Performed    Chest x-ray:  Low lung volumes with vascular crowding. No focal opacities.     Head CT:  The CT scan was performed as an emergency procedure through the brain   without contrast. There is mild diffuse atrophy and chronic small vessel   ischemic change. There is no evidence of acute intracranial hemorrhage   or mass effect. The visualized sinuses and mastoid air cells are clear.     CT angiogram of the chest:  1. Pulmonary embolism seen in the distal right lower lobar pulmonary   artery, extending into the superior and posterior basilar segmental and   subsegmental pulmonary arteries of the right lower lobe. No evidence of   right heart strain. No pulmonary infarct seen.   2. Three-vessel coronary artery atherosclerotic calcifications and   aortic valve calcification.   3. Small amount of cylindrical bronchiolectasis   4. Left adrenal nodule, suspect adenoma. If clinical signs or symptoms   of adrenal hyperfunction, biochemical evaluation may be appropriate     Bilateral lower extremity Doppler negative for DVT    2D echocardiogram:    Left ventricular  systolic function is normal. Calculated left ventricular EF = 58.1%    Left ventricular wall thickness is consistent with mild concentric hypertrophy.    Left ventricular diastolic function is consistent with (grade I) impaired relaxation.    RV normal in size and function.    Aortic valve sclerosis.    Mild dilation of the proximal aorta is present (3.9 cm).    X-ray of the right foot:  Orthopedic plate and screw fixator is demonstrated along the   distal first metatarsal crossing the metatarsal phalangeal joint which   is fused. There is bone deformity as well as overgrowth, no typical   cortical bone destruction for osteomyelitis seen. Indeterminant marginal   erosion involving the articular surface of the distal phalanx, medially,   this could be degenerative or secondary to septic arthropathy. Favor the   former. Amputation of the second toe. Inferior calcaneal spur with   calcification along the plantar aponeurosis and moderate midfoot joint   degeneration seen. The remainder is unremarkable.     HPI  This is an 84-year-old male with history of prior stroke, vascular dementia, Alzheimer's, hypertension, diabetes, CAD, hypothyroidism amongst numerous other medical issues as outlined below who presented to the emergency room for evaluation of his right big toe. He skinned it a couple of weeks ago and over the past couple of days his wife is noticed that has become more red and swollen. She has been putting antibiotic ointment on it. Workup in the emergency room was concerning for cellulitis of the toe. Additionally he was found to be hypoxic and thusly worked up with CT angiogram of the chest which revealed acute pulmonary embolism. Patient denies any shortness of breath or chest pain. He is currently satting 100% on 1 L of oxygen. His wife states that he has had increased swelling of his bilateral lower extremities over the past few days to weeks, left greater than right. I have been asked to admit him for  further care.     Hospital Course  Upon admission the patient was started on full dose Lovenox.  He was started on broad-spectrum antibiotics for cellulitis of the right great toe.  He was evaluated by podiatry.  X-ray was obtained.  Podiatry does not suspect any deeper infection and recommend completing a course of treatment for skin and soft tissue infection.  They have signed off.  He was quickly weaned to room air and has no complaints of shortness of breath or chest pain.  He underwent lower extremity Doppler and echocardiogram that were unremarkable.  He has been transition from Lovenox to Eliquis.  He was evaluated by therapy services who recommended discharge home.  He will be released today in stable condition.  Recommend follow-up with primary care physician in 1 week.    Physical Exam at Discharge:  General: No acute distress, AAOx3  HEENT: EOMI, PERRL  Cardiovascular: +s1 and s2, RRR  Lungs: No rhonchi or wheezing  Abdomen: soft, nontender    Consults:   Consults       Date and Time Order Name Status Description    3/16/2025  1:55 PM Inpatient Podiatry Consult Completed     3/16/2025 11:59 AM [facility] (on-call MD unless specified) Details                Condition on Discharge: Stable, improved    Discharge Disposition  Home or Self Care    Discharge Medications     Discharge Medications        New Medications        Instructions Start Date   amoxicillin-clavulanate 875-125 MG per tablet  Commonly known as: AUGMENTIN   1 tablet, Oral, 2 Times Daily      apixaban 5 MG tablet tablet  Commonly known as: ELIQUIS   Take 2 tablets by mouth Every 12 (Twelve) Hours for 7 days, THEN 1 tablet Every 12 (Twelve) Hours for 23 days. Indications: DVT/PE (active thrombosis)   Start Date: March 18, 2025     doxycycline 50 MG capsule  Commonly known as: VIBRAMYCIN   100 mg, Oral, 2 Times Daily             Continue These Medications        Instructions Start Date   ALPRAZolam 2 MG tablet  Commonly known as: XANAX   2 mg, Oral,  Nightly PRN      aspirin 81 MG EC tablet   81 mg, Daily      atorvastatin 40 MG tablet  Commonly known as: LIPITOR   40 mg, Oral, Daily      B-12 1000 MCG sublingual tablet   One sublingual tab dissolved on tongue daily      DULoxetine 60 MG capsule  Commonly known as: CYMBALTA   60 mg, Oral, Daily      glipizide 5 MG tablet  Commonly known as: Glucotrol   5 mg, Oral, Daily With Breakfast, NOTE NEW DIRECTIONS!!!      hydroCHLOROthiazide 25 MG tablet   25 mg, Oral, Daily      levothyroxine 25 MCG tablet  Commonly known as: SYNTHROID, LEVOTHROID   25 mcg, Oral, Daily      memantine 10 MG tablet  Commonly known as: Namenda   1 BID      metoprolol tartrate 25 MG tablet  Commonly known as: LOPRESSOR   25 mg, Oral, 2 Times Daily      nitroglycerin 0.4 MG SL tablet  Commonly known as: Nitrostat   0.4 mg, Sublingual, Every 5 Minutes PRN, Take no more than 3 doses in 15 minutes.      ramipril 10 MG capsule  Commonly known as: ALTACE   10 mg, Oral, Daily      traMADol 50 MG tablet  Commonly known as: ULTRAM   One to two tablets QID prn pain.             Stop These Medications      metFORMIN 1000 MG tablet  Commonly known as: GLUCOPHAGE              Discharge Diet:   Diet Instructions       Diet: Regular/House Diet, Diabetic Diets; Consistent Carbohydrate; Regular (IDDSI 7); Thin (IDDSI 0)      Discharge Diet:  Regular/House Diet  Diabetic Diets       Diabetic Diet: Consistent Carbohydrate    Texture: Regular (IDDSI 7)    Fluid Consistency: Thin (IDDSI 0)            Activity at Discharge:   Activity Instructions       Activity as Tolerated              Follow-up Appointments  Future Appointments   Date Time Provider Department [place]   6/12/2025  9:45 AM Elder Block MD [id number]     Additional Instructions for the Follow-ups that You Need to Schedule       Discharge Follow-up with PCP   As directed       Currently Documented PCP:    Elder Block MD    PCP Phone Number:    [phone]     Follow Up  Details: 1 week                Test Results Pending at Discharge  Pending Labs       Order Current Status    Blood Culture - Blood, Arm, Left Preliminary result    Blood Culture - Blood, Arm, Right Preliminary result            I have examined and discussed discharge planning with the patient today.    Giuseppe Soni MD  03/18/25  10:46 EDT    Time: Discharge greater than 30 min

## 2025-03-18 NOTE — PROGRESS NOTES
Case Management Discharge Note      Final Note: DC'd home         Selected Continued Care - Discharged on 3/18/2025 Admission date: 3/16/2025 - Discharge disposition: Home or Self Care                Selected Continued Care - Episodes Includes continued care and service providers with selected services from the active episodes listed below          Transportation Services  Private: Car    Final Discharge Disposition Code: 01 - home or self-care

## 2025-03-19 ENCOUNTER — TRANSITIONAL CARE MANAGEMENT TELEPHONE ENCOUNTER (OUTPATIENT)
Dept: CALL CENTER | Facility: HOSPITAL | Age: 85
End: 2025-03-19
Payer: MEDICARE

## 2025-03-19 NOTE — PROGRESS NOTES
Enter Query Response Below      Query Response: Cellulitis of great toe of right foot due to diabetes mellitus              If applicable, please update the problem list.

## 2025-03-19 NOTE — OUTREACH NOTE
Call Center TCM Note      Flowsheet Row Responses   [facility], [place], operated by [facility] patient discharged from? [facility]   Does the patient have one of the following disease processes/diagnoses(primary or secondary)? Other   TCM attempt successful? Yes   Call start time 1517   Call end time 1521   Discharge diagnosis Pulmonary embolism   Meds reviewed with patient/caregiver? Yes   Is the patient having any side effects they believe may be caused by any medication additions or changes? No   Does the patient have all medications ordered at discharge? Yes   Prescription comments New rx's Amoxicillin-clavulante, Doxycycline, Eliquis Starter Pack in place   Is the patient taking all medications as directed (includes completed medication regime)? Yes   Does the patient have an appointment with their PCP within 7-14 days of discharge? Yes   Has home health visited the patient within 72 hours of discharge? N/A   Psychosocial issues? No   Did the patient receive a copy of their discharge instructions? Yes   Nursing interventions Reviewed instructions with patient   What is the patient's perception of their health status since discharge? Improving   Is the patient/caregiver able to teach back signs and symptoms related to disease process for when to call PCP? Yes   Is the patient/caregiver able to teach back signs and symptoms related to disease process for when to call 911? Yes   Is the patient/caregiver able to teach back the hierarchy of who to call/visit for symptoms/problems? PCP, Specialist, Home health nurse, Urgent Care, ED, 911 Yes   If the patient is a current smoker, are they able to teach back resources for cessation? Not a smoker   TCM call completed? Yes   Wrap up additional comments D/C DX: Rt great toe infection,  Pulm embolism - Pt doing ok, toe looks slightly improved. No questions at this time. TCM APPT with PCP office is 03/25/2025   Call end time 1521            Yandy Langston MA    3/19/2025, 15:23 EDT

## 2025-03-21 LAB
BACTERIA SPEC AEROBE CULT: NORMAL
BACTERIA SPEC AEROBE CULT: NORMAL

## 2025-03-24 ENCOUNTER — PATIENT OUTREACH (OUTPATIENT)
Dept: CASE MANAGEMENT | Facility: OTHER | Age: 85
End: 2025-03-24
Payer: MEDICARE

## 2025-03-24 NOTE — PROGRESS NOTES
Transitional Care Follow Up Visit  Subjective     Eric Kramer is a 84 y.o. male who presents for a transitional care management visit.    Within 48 business hours after discharge our office contacted him via telephone to coordinate his care and needs.      I reviewed and discussed the details of that call along with the discharge summary, hospital problems, inpatient lab results, inpatient diagnostic studies, and consultation reports with Eric.     Current outpatient and discharge medications have been reconciled for the patient.  Reviewed by: Sandra Grigsby MD          3/18/2025     6:04 PM   Date of TCM Phone Call   [facility]   Date of Admission 3/16/2025   Date of Discharge 3/18/2025   Discharge Disposition Home or Self Care     Risk for Readmission (LACE) Score: 11 (3/18/2025  6:00 AM)      History of Present Illness   Patient presents today for a hospital discharge follow-up where he was diagnosed with pulmonary embolism, cellulitis of the right toe, vascular dementia, type 2 diabetes.  He was started on antibiotics and Eliquis.  Breathing ok and no fevers.  Metformin was stopped bc gfr was 39.  No med was started to replace it.  He needs recheck of the bmp  Dm- not checking sugars.      Course During Hospital Stay:    Hospital Course  Upon admission the patient was started on full dose Lovenox.  He was started on broad-spectrum antibiotics for cellulitis of the right great toe.  He was evaluated by podiatry.  X-ray was obtained.  Podiatry does not suspect any deeper infection and recommend completing a course of treatment for skin and soft tissue infection.  They have signed off.  He was quickly weaned to room air and has no complaints of shortness of breath or chest pain.  He underwent lower extremity Doppler and echocardiogram that were unremarkable.  He has been transition from Lovenox to Eliquis.  He was evaluated by therapy services who recommended discharge home.  He will  "be released today in stable condition.  Recommend follow-up with primary care physician in 1 week.        The following portions of the patient's history were reviewed and updated as appropriate: allergies, current medications, past family history, past medical history, past social history, past surgical history, and problem list.    Review of Systems    Objective   /80 (BP Location: Right arm, Patient Position: Sitting)   Pulse 57   Temp 97.9 °F (36.6 °C)   Resp 14   Ht 188 cm (74\")   Wt 97.7 kg (215 lb 6.4 oz)   SpO2 97%   BMI 27.66 kg/m²   Physical Exam  Vitals and nursing note reviewed.   Constitutional:       Appearance: Normal appearance. He is well-developed.   Cardiovascular:      Rate and Rhythm: Normal rate and regular rhythm.      Heart sounds: Normal heart sounds. No murmur heard.  Pulmonary:      Effort: Pulmonary effort is normal. No respiratory distress.      Breath sounds: Normal breath sounds. No stridor. No wheezing or rhonchi.   Skin:     Comments: Rt big toe well healing cellutlitis   Neurological:      General: No focal deficit present.      Mental Status: He is alert and oriented to person, place, and time. He is not disoriented.   Psychiatric:         Mood and Affect: Mood normal.         Behavior: Behavior normal.         Assessment & Plan   Problems Addressed this Visit       Hospital discharge follow-up - Primary    Type 2 diabetes mellitus with diabetic polyneuropathy, without long-term current use of insulin    Relevant Orders    Hemoglobin A1c    Ambulatory Referral to Podiatry    CAD (coronary artery disease)    Vascular dementia    Pulmonary embolism    Cellulitis of right toe     Other Visit Diagnoses         Renal insufficiency        Relevant Orders    Basic Metabolic Panel          Diagnoses         Codes Comments      Hospital discharge follow-up    -  Primary ICD-10-CM: Z09  ICD-9-CM: V67.59       Other acute pulmonary embolism, unspecified whether acute cor " pulmonale present     ICD-10-CM: I26.99  ICD-9-CM: 415.19       Cellulitis of right toe     ICD-10-CM: L03.031  ICD-9-CM: 681.10       Coronary artery disease involving native coronary artery of native heart without angina pectoris     ICD-10-CM: I25.10  ICD-9-CM: 414.01       Moderate vascular dementia without behavioral disturbance, psychotic disturbance, mood disturbance, or anxiety     ICD-10-CM: F01.B0  ICD-9-CM: 290.40       Type 2 diabetes mellitus with diabetic polyneuropathy, without long-term current use of insulin     ICD-10-CM: E11.42  ICD-9-CM: 250.60, 357.2       Renal insufficiency     ICD-10-CM: N28.9  ICD-9-CM: 593.9                Labs today and will decide what to do with metformin based on the gfr and A1c    Podiatry referral.        I requested and reviewed all records, labs, and diagnostics from the hospital with patient and family.  Patient is to follow-up with specialists as discussed and directed.

## 2025-03-24 NOTE — OUTREACH NOTE
AMBULATORY CASE MANAGEMENT NOTE    Names and Relationships of Patient/Support Persons: Contact: GEOFF KARIMI; Relationship: Emergency Contact -     CCM Interim Update    Called and spoke to patient's wife for CCM services. Introduced self, role and reason for the call. Patient scheduled for Hospital f/u visit tomorrow at PCP office. Wife verbalizes frustration for not being able to get an appointment with PCP but will see other provider. Explained to wife that this is due to availability in scheduling. He continues on Eliquis and antibiotics. Wife assists with medication management. She denies patient have any SOB, toe infection is improving. Patient scheduled to be seen in office tomorrow 3/25. She denies urgent needs at this time. Encouraged to keep my number should they need further assistance or resources in the future.     Education Documentation  No documentation found.        Omar WHITMAN  Ambulatory Case Management    3/24/2025, 11:04 EDT

## 2025-03-25 ENCOUNTER — OFFICE VISIT (OUTPATIENT)
Dept: FAMILY MEDICINE CLINIC | Facility: CLINIC | Age: 85
End: 2025-03-25
Payer: MEDICARE

## 2025-03-25 VITALS
BODY MASS INDEX: 27.64 KG/M2 | HEART RATE: 57 BPM | OXYGEN SATURATION: 97 % | SYSTOLIC BLOOD PRESSURE: 130 MMHG | RESPIRATION RATE: 14 BRPM | TEMPERATURE: 97.9 F | HEIGHT: 74 IN | WEIGHT: 215.4 LBS | DIASTOLIC BLOOD PRESSURE: 80 MMHG

## 2025-03-25 DIAGNOSIS — I25.10 CORONARY ARTERY DISEASE INVOLVING NATIVE CORONARY ARTERY OF NATIVE HEART WITHOUT ANGINA PECTORIS: ICD-10-CM

## 2025-03-25 DIAGNOSIS — F01.B0 MODERATE VASCULAR DEMENTIA WITHOUT BEHAVIORAL DISTURBANCE, PSYCHOTIC DISTURBANCE, MOOD DISTURBANCE, OR ANXIETY: ICD-10-CM

## 2025-03-25 DIAGNOSIS — I26.99 OTHER ACUTE PULMONARY EMBOLISM, UNSPECIFIED WHETHER ACUTE COR PULMONALE PRESENT: ICD-10-CM

## 2025-03-25 DIAGNOSIS — E11.42 TYPE 2 DIABETES MELLITUS WITH DIABETIC POLYNEUROPATHY, WITHOUT LONG-TERM CURRENT USE OF INSULIN: ICD-10-CM

## 2025-03-25 DIAGNOSIS — Z09 HOSPITAL DISCHARGE FOLLOW-UP: Primary | ICD-10-CM

## 2025-03-25 DIAGNOSIS — N28.9 RENAL INSUFFICIENCY: ICD-10-CM

## 2025-03-25 DIAGNOSIS — L03.031 CELLULITIS OF RIGHT TOE: ICD-10-CM

## 2025-03-26 ENCOUNTER — READMISSION MANAGEMENT (OUTPATIENT)
Dept: CALL CENTER | Facility: HOSPITAL | Age: 85
End: 2025-03-26
Payer: MEDICARE

## 2025-03-26 LAB
BUN SERPL-MCNC: 32 MG/DL (ref 8–27)
BUN/CREAT SERPL: 20 (ref 10–24)
CALCIUM SERPL-MCNC: 9.6 MG/DL (ref 8.6–10.2)
CHLORIDE SERPL-SCNC: 101 MMOL/L (ref 96–106)
CO2 SERPL-SCNC: 24 MMOL/L (ref 20–29)
CREAT SERPL-MCNC: 1.57 MG/DL (ref 0.76–1.27)
EGFRCR SERPLBLD CKD-EPI 2021: 43 ML/MIN/1.73
GLUCOSE SERPL-MCNC: 215 MG/DL (ref 70–99)
HBA1C MFR BLD: 7 % (ref 4.8–5.6)
POTASSIUM SERPL-SCNC: 4.5 MMOL/L (ref 3.5–5.2)
SODIUM SERPL-SCNC: 140 MMOL/L (ref 134–144)

## 2025-03-26 NOTE — OUTREACH NOTE
Medical Week 2 Survey      Flowsheet Row Responses   [facility] patient discharged from? [place]   Does the patient have one of the following disease processes/diagnoses(primary or secondary)? Other   Week 2 attempt successful? No   Unsuccessful attempts Attempt 1   Revoke Other  [ACM program]   Revoke Other  [ACM program]            Fatoumata STARKEY - Registered Nurse

## 2025-03-27 ENCOUNTER — APPOINTMENT (OUTPATIENT)
Dept: GENERAL RADIOLOGY | Facility: HOSPITAL | Age: 85
End: 2025-03-27
Payer: MEDICARE

## 2025-03-27 ENCOUNTER — APPOINTMENT (OUTPATIENT)
Dept: CT IMAGING | Facility: HOSPITAL | Age: 85
End: 2025-03-27
Payer: MEDICARE

## 2025-03-27 ENCOUNTER — HOSPITAL ENCOUNTER (INPATIENT)
Facility: HOSPITAL | Age: 85
LOS: 2 days | End: 2025-03-29
Attending: EMERGENCY MEDICINE | Admitting: INTERNAL MEDICINE
Payer: MEDICARE

## 2025-03-27 DIAGNOSIS — I61.9 INTRAPARENCHYMAL HEMORRHAGE OF BRAIN: Primary | ICD-10-CM

## 2025-03-27 LAB
ABO GROUP BLD: NORMAL
ALBUMIN SERPL-MCNC: 3.9 G/DL (ref 3.5–5.2)
ALBUMIN/GLOB SERPL: 1.1 G/DL
ALP SERPL-CCNC: 93 U/L (ref 39–117)
ALT SERPL W P-5'-P-CCNC: 15 U/L (ref 1–41)
ANION GAP SERPL CALCULATED.3IONS-SCNC: 13 MMOL/L (ref 5–15)
APTT PPP: 35.9 SECONDS (ref 22.7–35.4)
AST SERPL-CCNC: 17 U/L (ref 1–40)
BASOPHILS # BLD AUTO: 0.03 10*3/MM3 (ref 0–0.2)
BASOPHILS NFR BLD AUTO: 0.3 % (ref 0–1.5)
BILIRUB SERPL-MCNC: 0.6 MG/DL (ref 0–1.2)
BLD GP AB SCN SERPL QL: NEGATIVE
BUN SERPL-MCNC: 22 MG/DL (ref 8–23)
BUN/CREAT SERPL: 15.8 (ref 7–25)
CALCIUM SPEC-SCNC: 10 MG/DL (ref 8.6–10.5)
CHLORIDE SERPL-SCNC: 99 MMOL/L (ref 98–107)
CK SERPL-CCNC: 76 U/L (ref 20–200)
CO2 SERPL-SCNC: 25 MMOL/L (ref 22–29)
CREAT SERPL-MCNC: 1.39 MG/DL (ref 0.76–1.27)
DEPRECATED RDW RBC AUTO: 46.9 FL (ref 37–54)
EGFRCR SERPLBLD CKD-EPI 2021: 50.3 ML/MIN/1.73
EOSINOPHIL # BLD AUTO: 0 10*3/MM3 (ref 0–0.4)
EOSINOPHIL NFR BLD AUTO: 0 % (ref 0.3–6.2)
ERYTHROCYTE [DISTWIDTH] IN BLOOD BY AUTOMATED COUNT: 13.9 % (ref 12.3–15.4)
GLOBULIN UR ELPH-MCNC: 3.6 GM/DL
GLUCOSE BLDC GLUCOMTR-MCNC: 143 MG/DL (ref 70–130)
GLUCOSE BLDC GLUCOMTR-MCNC: 147 MG/DL (ref 70–130)
GLUCOSE BLDC GLUCOMTR-MCNC: 229 MG/DL (ref 70–130)
GLUCOSE BLDC GLUCOMTR-MCNC: 263 MG/DL (ref 70–130)
GLUCOSE SERPL-MCNC: 225 MG/DL (ref 65–99)
HCT VFR BLD AUTO: 43.7 % (ref 37.5–51)
HGB BLD-MCNC: 14.5 G/DL (ref 13–17.7)
HOLD SPECIMEN: NORMAL
HOLD SPECIMEN: NORMAL
IMM GRANULOCYTES # BLD AUTO: 0.06 10*3/MM3 (ref 0–0.05)
IMM GRANULOCYTES NFR BLD AUTO: 0.6 % (ref 0–0.5)
INR PPP: 1.35 (ref 0.9–1.1)
LYMPHOCYTES # BLD AUTO: 0.82 10*3/MM3 (ref 0.7–3.1)
LYMPHOCYTES NFR BLD AUTO: 8.4 % (ref 19.6–45.3)
MCH RBC QN AUTO: 30.6 PG (ref 26.6–33)
MCHC RBC AUTO-ENTMCNC: 33.2 G/DL (ref 31.5–35.7)
MCV RBC AUTO: 92.2 FL (ref 79–97)
MONOCYTES # BLD AUTO: 0.25 10*3/MM3 (ref 0.1–0.9)
MONOCYTES NFR BLD AUTO: 2.5 % (ref 5–12)
NEUTROPHILS NFR BLD AUTO: 8.65 10*3/MM3 (ref 1.7–7)
NEUTROPHILS NFR BLD AUTO: 88.2 % (ref 42.7–76)
NRBC BLD AUTO-RTO: 0 /100 WBC (ref 0–0.2)
PLATELET # BLD AUTO: 378 10*3/MM3 (ref 140–450)
PMV BLD AUTO: 9 FL (ref 6–12)
POTASSIUM SERPL-SCNC: 3.5 MMOL/L (ref 3.5–5.2)
PROT SERPL-MCNC: 7.5 G/DL (ref 6–8.5)
PROTHROMBIN TIME: 16.7 SECONDS (ref 11.7–14.2)
RBC # BLD AUTO: 4.74 10*6/MM3 (ref 4.14–5.8)
RH BLD: POSITIVE
SODIUM SERPL-SCNC: 137 MMOL/L (ref 136–145)
T&S EXPIRATION DATE: NORMAL
WBC NRBC COR # BLD AUTO: 9.81 10*3/MM3 (ref 3.4–10.8)
WHOLE BLOOD HOLD COAG: NORMAL
WHOLE BLOOD HOLD SPECIMEN: NORMAL

## 2025-03-27 PROCEDURE — 25010000002 PROTHROMBIN COMPLEX CONC HUMAN 1000 UNITS KIT: Performed by: EMERGENCY MEDICINE

## 2025-03-27 PROCEDURE — 86850 RBC ANTIBODY SCREEN: CPT | Performed by: EMERGENCY MEDICINE

## 2025-03-27 PROCEDURE — 70450 CT HEAD/BRAIN W/O DYE: CPT

## 2025-03-27 PROCEDURE — 25810000003 SODIUM CHLORIDE 0.9 % SOLUTION: Performed by: INTERNAL MEDICINE

## 2025-03-27 PROCEDURE — 99223 1ST HOSP IP/OBS HIGH 75: CPT

## 2025-03-27 PROCEDURE — 25010000002 LEVETRIRACETAM PER 10 MG: Performed by: EMERGENCY MEDICINE

## 2025-03-27 PROCEDURE — 93005 ELECTROCARDIOGRAM TRACING: CPT | Performed by: EMERGENCY MEDICINE

## 2025-03-27 PROCEDURE — 86900 BLOOD TYPING SEROLOGIC ABO: CPT | Performed by: EMERGENCY MEDICINE

## 2025-03-27 PROCEDURE — 25010000002 NICARDIPINE IN NACL 20-0.86 MG/200ML-% SOLUTION: Performed by: EMERGENCY MEDICINE

## 2025-03-27 PROCEDURE — 86901 BLOOD TYPING SEROLOGIC RH(D): CPT | Performed by: EMERGENCY MEDICINE

## 2025-03-27 PROCEDURE — 71045 X-RAY EXAM CHEST 1 VIEW: CPT

## 2025-03-27 PROCEDURE — 85730 THROMBOPLASTIN TIME PARTIAL: CPT | Performed by: EMERGENCY MEDICINE

## 2025-03-27 PROCEDURE — 85025 COMPLETE CBC W/AUTO DIFF WBC: CPT | Performed by: EMERGENCY MEDICINE

## 2025-03-27 PROCEDURE — 85610 PROTHROMBIN TIME: CPT | Performed by: EMERGENCY MEDICINE

## 2025-03-27 PROCEDURE — 25010000002 NICARDIPINE HCL IN NACL 20-0.9 MG/200ML-% SOLUTION: Performed by: EMERGENCY MEDICINE

## 2025-03-27 PROCEDURE — 99291 CRITICAL CARE FIRST HOUR: CPT

## 2025-03-27 PROCEDURE — 82948 REAGENT STRIP/BLOOD GLUCOSE: CPT

## 2025-03-27 PROCEDURE — 93010 ELECTROCARDIOGRAM REPORT: CPT | Performed by: STUDENT IN AN ORGANIZED HEALTH CARE EDUCATION/TRAINING PROGRAM

## 2025-03-27 PROCEDURE — 30283B1 TRANSFUSION OF NONAUTOLOGOUS 4-FACTOR PROTHROMBIN COMPLEX CONCENTRATE INTO VEIN, PERCUTANEOUS APPROACH: ICD-10-PCS | Performed by: EMERGENCY MEDICINE

## 2025-03-27 PROCEDURE — 36415 COLL VENOUS BLD VENIPUNCTURE: CPT

## 2025-03-27 PROCEDURE — 82550 ASSAY OF CK (CPK): CPT | Performed by: EMERGENCY MEDICINE

## 2025-03-27 PROCEDURE — 99222 1ST HOSP IP/OBS MODERATE 55: CPT | Performed by: STUDENT IN AN ORGANIZED HEALTH CARE EDUCATION/TRAINING PROGRAM

## 2025-03-27 PROCEDURE — 80053 COMPREHEN METABOLIC PANEL: CPT | Performed by: EMERGENCY MEDICINE

## 2025-03-27 PROCEDURE — 25010000002 PROTHROMBIN COMPLEX CONC HUMAN 500 UNITS KIT: Performed by: EMERGENCY MEDICINE

## 2025-03-27 PROCEDURE — 25010000002 LORAZEPAM PER 2 MG: Performed by: INTERNAL MEDICINE

## 2025-03-27 RX ORDER — SODIUM CHLORIDE 0.9 % (FLUSH) 0.9 %
10 SYRINGE (ML) INJECTION AS NEEDED
Status: DISCONTINUED | OUTPATIENT
Start: 2025-03-27 | End: 2025-03-29

## 2025-03-27 RX ORDER — ACETAMINOPHEN 325 MG/1
650 TABLET ORAL EVERY 4 HOURS PRN
Status: DISCONTINUED | OUTPATIENT
Start: 2025-03-27 | End: 2025-03-28

## 2025-03-27 RX ORDER — SODIUM CHLORIDE 0.9 % (FLUSH) 0.9 %
10 SYRINGE (ML) INJECTION EVERY 12 HOURS SCHEDULED
Status: DISCONTINUED | OUTPATIENT
Start: 2025-03-27 | End: 2025-03-29

## 2025-03-27 RX ORDER — ACETAMINOPHEN 650 MG/1
650 SUPPOSITORY RECTAL EVERY 4 HOURS PRN
Status: DISCONTINUED | OUTPATIENT
Start: 2025-03-27 | End: 2025-03-28

## 2025-03-27 RX ORDER — SODIUM CHLORIDE 0.9 % (FLUSH) 0.9 %
10 SYRINGE (ML) INJECTION AS NEEDED
Status: DISCONTINUED | OUTPATIENT
Start: 2025-03-27 | End: 2025-03-29 | Stop reason: HOSPADM

## 2025-03-27 RX ORDER — ONDANSETRON 4 MG/1
4 TABLET, ORALLY DISINTEGRATING ORAL EVERY 6 HOURS PRN
Status: DISCONTINUED | OUTPATIENT
Start: 2025-03-27 | End: 2025-03-28

## 2025-03-27 RX ORDER — ONDANSETRON 2 MG/ML
4 INJECTION INTRAMUSCULAR; INTRAVENOUS EVERY 6 HOURS PRN
Status: DISCONTINUED | OUTPATIENT
Start: 2025-03-27 | End: 2025-03-28

## 2025-03-27 RX ORDER — LORAZEPAM 2 MG/ML
1 INJECTION INTRAMUSCULAR NIGHTLY
Status: DISCONTINUED | OUTPATIENT
Start: 2025-03-27 | End: 2025-03-28

## 2025-03-27 RX ORDER — NITROGLYCERIN 0.4 MG/1
0.4 TABLET SUBLINGUAL
Status: DISCONTINUED | OUTPATIENT
Start: 2025-03-27 | End: 2025-03-28

## 2025-03-27 RX ORDER — SODIUM CHLORIDE 9 MG/ML
40 INJECTION, SOLUTION INTRAVENOUS AS NEEDED
Status: DISCONTINUED | OUTPATIENT
Start: 2025-03-27 | End: 2025-03-29

## 2025-03-27 RX ORDER — DEXTROSE MONOHYDRATE 25 G/50ML
25 INJECTION, SOLUTION INTRAVENOUS
Status: DISCONTINUED | OUTPATIENT
Start: 2025-03-27 | End: 2025-03-28

## 2025-03-27 RX ORDER — IBUPROFEN 600 MG/1
1 TABLET ORAL
Status: DISCONTINUED | OUTPATIENT
Start: 2025-03-27 | End: 2025-03-28

## 2025-03-27 RX ORDER — LEVETIRACETAM 500 MG/5ML
1000 INJECTION, SOLUTION, CONCENTRATE INTRAVENOUS ONCE
Status: COMPLETED | OUTPATIENT
Start: 2025-03-27 | End: 2025-03-27

## 2025-03-27 RX ORDER — LIDOCAINE HYDROCHLORIDE 20 MG/ML
JELLY TOPICAL AS NEEDED
Status: DISCONTINUED | OUTPATIENT
Start: 2025-03-27 | End: 2025-03-29

## 2025-03-27 RX ORDER — NICOTINE POLACRILEX 4 MG
15 LOZENGE BUCCAL
Status: DISCONTINUED | OUTPATIENT
Start: 2025-03-27 | End: 2025-03-28

## 2025-03-27 RX ORDER — SODIUM CHLORIDE 9 MG/ML
50 INJECTION, SOLUTION INTRAVENOUS CONTINUOUS
Status: ACTIVE | OUTPATIENT
Start: 2025-03-27 | End: 2025-03-28

## 2025-03-27 RX ADMIN — Medication 10 ML: at 17:31

## 2025-03-27 RX ADMIN — LEVETIRACETAM 1000 MG: 100 INJECTION INTRAVENOUS at 09:32

## 2025-03-27 RX ADMIN — NICARDIPINE HYDROCHLORIDE 5 MG/HR: 0.1 INJECTION, SOLUTION INTRAVENOUS at 23:27

## 2025-03-27 RX ADMIN — NICARDIPINE HYDROCHLORIDE 5 MG/HR: 0.1 INJECTION, SOLUTION INTRAVENOUS at 15:30

## 2025-03-27 RX ADMIN — Medication 10 ML: at 21:34

## 2025-03-27 RX ADMIN — LIDOCAINE HYDROCHLORIDE 6 ML: 20 JELLY TOPICAL at 17:30

## 2025-03-27 RX ADMIN — MUPIROCIN 1 APPLICATION: 20 OINTMENT TOPICAL at 21:34

## 2025-03-27 RX ADMIN — Medication 10 ML: at 10:35

## 2025-03-27 RX ADMIN — NICARDIPINE HYDROCHLORIDE 7.5 MG/HR: 0.1 INJECTION, SOLUTION INTRAVENOUS at 10:34

## 2025-03-27 RX ADMIN — SODIUM CHLORIDE 50 ML/HR: 9 INJECTION, SOLUTION INTRAVENOUS at 18:39

## 2025-03-27 RX ADMIN — LORAZEPAM 1 MG: 2 INJECTION INTRAMUSCULAR; INTRAVENOUS at 21:32

## 2025-03-27 RX ADMIN — NICARDIPINE HYDROCHLORIDE 5 MG/HR: 0.1 INJECTION, SOLUTION INTRAVENOUS at 19:33

## 2025-03-27 RX ADMIN — NICARDIPINE HYDROCHLORIDE 5 MG/HR: 0.1 INJECTION, SOLUTION INTRAVENOUS at 08:40

## 2025-03-27 RX ADMIN — MUPIROCIN 1 APPLICATION: 20 OINTMENT TOPICAL at 17:30

## 2025-03-27 RX ADMIN — NICARDIPINE HYDROCHLORIDE 7.5 MG/HR: 0.1 INJECTION, SOLUTION INTRAVENOUS at 12:49

## 2025-03-27 NOTE — PLAN OF CARE
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 Family would not like \"heroic measures\" but are not ready to discuss comfort care. Cortrak was unable to be placed. NPO currently. Pt has been taking xanax 2mg nightly for over 10 years. Iv ativan will be used until oral route is established.                                          "

## 2025-03-27 NOTE — CONSULTS
[facility] NEUROSURGERY CONSULT NOTE    Patient name: Eric Kramer  Referring Provider: Dr. Melendrez  Reason for Consultation: stroke    Patient Care Team:  Elder Block MD as PCP - General (Family Medicine)    Chief complaint: Right-sided weakness and aphasia    Subjective .     History of present illness:    Patient is a 84 y.o.  male with T2DM, hypertension, CAD, history of colon cancer, Alzheimer dementia, and was also hospitalized last week with PE for which he was started on Eliquis.  Patient presents to the ED via EMS after his wife found him down at about 630 this morning, his last known well was around midnight last night. Wife reports he took his eliquis last night. Workup in the ED reveals large left parieto-occipital IPH with intraventricular extension. He was hypertensive on arrival with SBP >200. His wife states that they had checked his BP recently and his SBP was 130's. He is very lethargic on exam, can not keep his eyes open, and can only mumble his name. Moves all extremities spontaneously except for his right arm. His family reports he hasn't really been himself the last year and was recently diagnosed with Alzheimer's dementia.  They state he has been especially confused the past several weeks and feel that it has been progressing. They are undecided on code status but know for sure that he would not want to be intubated. He is currently receiving K-centra.     Review of Systems  Review of Systems   Unable to perform ROS: Acuity of condition       History  PAST MEDICAL HISTORY  Past Medical History:   Diagnosis Date    Acute foot pain, left     Acute UTI     Arteriosclerosis of coronary artery     Basal cell carcinoma (BCC) of face     Benign enlargement of prostate     Cataract     Chest pain     Diabetic peripheral neuropathy     Edema     Hyperlipidemia     Hypertension     Hypothyroidism     Insomnia     Malignant neoplasm in situ of colon     Memory impairment     MMSE 27/30-5/20/2019     Nephrolithiasis     Osteoarthritis     Prostatitis     Renal azotemia     Stroke     stroke syndrome    Testosterone deficiency     Type 2 diabetes mellitus     Vitamin D deficiency        PAST SURGICAL HISTORY  Past Surgical History:   Procedure Laterality Date    ANGIOPLASTY Right     Cath Laser Angioplasty/right ventricle    APPENDECTOMY      CATARACT EXTRACTION Left     COLECTOMY PARTIAL / TOTAL      due to colon cancer, removed 13 inches of colon    CORONARY ANGIOPLASTY WITH STENT PLACEMENT      TOE SURGERY Right 2021       FAMILY HISTORY  Family History   Problem Relation Age of Onset    Colon cancer Mother     Hypertension Mother         colon cancer    Osteoarthritis Mother     Emphysema Father         smoker/tobacco use    Hypertension Father         CPOD    Osteoarthritis Father     Rheum arthritis Sister     Colon cancer Maternal Grandmother     Cataracts Maternal Grandmother     Hypertension Maternal Grandmother         colon cance    Colon cancer Other     Hypertension Sister         She had 2 kinds ???    Hypertension Maternal Uncle         colon cancer    Hypertension Maternal Uncle         colon cancer       SOCIAL HISTORY  Social History     Tobacco Use    Smoking status: Former     Current packs/day: 0.00     Average packs/day: 1 pack/day for 49.0 years (49.0 ttl pk-yrs)     Types: Cigarettes     Start date: 1956     Quit date: 4/3/1980     Years since quittin.0    Smokeless tobacco: Never    Tobacco comments:     Greg quit 42 plus years ago   Vaping Use    Vaping status: Never Used   Substance Use Topics    Alcohol use: Not Currently     Comment: Next to no liquour    Drug use: No         Allergies:  Patient has no known allergies.    MEDICATIONS:  Medications Prior to Admission   Medication Sig Dispense Refill Last Dose/Taking    ALPRAZolam (XANAX) 2 MG tablet Take 1 tablet by mouth At Night As Needed for Sleep. 90 tablet 1 Taking As Needed    Apixaban Starter Pack tablet therapy  pack Take 2 tablets by mouth Every 12 (Twelve) Hours for 7 days, THEN 1 tablet Every 12 (Twelve) Hours for 23 days. Indications: DVT/PE (active thrombosis) 74 tablet 0 Taking    aspirin 81 MG EC tablet Take 1 tablet by mouth Daily.   Taking    atorvastatin (LIPITOR) 40 MG tablet Take 1 tablet by mouth Daily. 90 tablet 11 Taking    Cyanocobalamin (B-12) 1000 MCG sublingual tablet One sublingual tab dissolved on tongue daily (Patient taking differently: Take 1 tablet by mouth Daily.) 90 each 3 Taking Differently    DULoxetine (CYMBALTA) 60 MG capsule Take 1 capsule by mouth Daily. 90 capsule 3 Taking    hydroCHLOROthiazide 25 MG tablet Take 1 tablet by mouth Daily. 90 tablet 3 Taking    levothyroxine (SYNTHROID, LEVOTHROID) 25 MCG tablet Take 1 tablet by mouth Daily. 90 tablet 3 Taking    memantine (Namenda) 10 MG tablet 1 BID (Patient taking differently: Take 1 tablet by mouth 2 (Two) Times a Day.) 180 tablet 1 Taking Differently    metoprolol tartrate (LOPRESSOR) 25 MG tablet Take 1 tablet by mouth 2 (Two) Times a Day. 180 tablet 3 Taking    ramipril (ALTACE) 10 MG capsule Take 1 capsule by mouth Daily. 90 capsule 3 Taking    traMADol (ULTRAM) 50 MG tablet One to two tablets QID prn pain. (Patient taking differently: Take 2 tablets by mouth Every 6 (Six) Hours As Needed.) 240 tablet 3 Taking Differently    glipizide (Glucotrol) 5 MG tablet Take 1 tablet by mouth Daily With Breakfast. NOTE NEW DIRECTIONS!!! 90 tablet 3     nitroglycerin (Nitrostat) 0.4 MG SL tablet Place 1 tablet under the tongue Every 5 (Five) Minutes As Needed for Chest Pain. Take no more than 3 doses in 15 minutes. 25 tablet 12          Current Facility-Administered Medications:     niCARdipine (CARDENE) 20 mg in 200 mL NS infusion, 5-15 mg/hr, Intravenous, Titrated, Brain Melendrez MD, Last Rate: 75 mL/hr at 03/27/25 1034, 7.5 mg/hr at 03/27/25 1034    sodium chloride 0.9 % flush 10 mL, 10 mL, Intravenous, PRN, Brain Melnedrez MD    sodium  chloride 0.9 % flush 10 mL, 10 mL, Intravenous, Q12H, Selene Torres APRN, 10 mL at 03/27/25 1035    sodium chloride 0.9 % flush 10 mL, 10 mL, Intravenous, PRN, Selene Torres, APRN    sodium chloride 0.9 % infusion 40 mL, 40 mL, Intravenous, PRN, Selene Torres APRN    COMORBID CONDITIONS:  Cancer including (primary or metastatic), Cerebral hemorrhage/intracranial hemorrhage, Coagulopathy due to antiplatelet agent/anticoagulant/tPA, Diabetes Type 2, Hypertension, and CAD, PE, and dementia    Objective     Results Review:  LABS:  Results from last 7 days   Lab Units 03/27/25  0825   WBC 10*3/mm3 9.81   HEMOGLOBIN g/dL 14.5   HEMATOCRIT % 43.7   PLATELETS 10*3/mm3 378     Results from last 7 days   Lab Units 03/27/25  0825 03/25/25  1431   SODIUM mmol/L 137 140   POTASSIUM mmol/L 3.5 4.5   CHLORIDE mmol/L 99 101   CO2 mmol/L 25.0 24   BUN mg/dL 22 32*   CREATININE mg/dL 1.39* 1.57*   CALCIUM mg/dL 10.0 9.6   BILIRUBIN mg/dL 0.6  --    ALK PHOS U/L 93  --    ALT (SGPT) U/L 15  --    AST (SGOT) U/L 17  --    GLUCOSE mg/dL 225* 215*         DIAGNOSTICS:  CTH: There is a large intraparenchymal hemorrhage involving the  parieto-occipital region on the left measuring approximately 4.7 cm in  size with intraventricular extension. There is localized edema. There is  slight prominence of the left temporal horn as compared to the CT  examination 03/16/2025 consistent with trapping.    CXR: no focal pulmonary consolidation. Cardiomegaly. Ectatic aorta.    Results Review:   I reviewed the patient's new clinical results.  I personally viewed and interpreted the patient's Chart, labs, medications, and imaging has been reviewed by and discussed with Dr. Saravia     Vital Signs   Temp:  [98.7 °F (37.1 °C)] 98.7 °F (37.1 °C)  Heart Rate:  [] 104  Resp:  [16] 16  BP: (115-204)/() 122/75    Physical Exam:  Physical Exam  Vitals reviewed.   Constitutional:       Appearance: He is ill-appearing.    HENT:      Head: Normocephalic and atraumatic.   Eyes:      Pupils: Pupils are equal, round, and reactive to light.   Neurological:      Mental Status: He is lethargic.      GCS: GCS eye subscore is 3. GCS verbal subscore is 2. GCS motor subscore is 6.       Neurological Exam  Mental Status  Arousable to tactile stimuli. Oriented only to person. Expressive aphasia present.  Able to tell me his name but speech is very mumbled. Does not attempt to speak or communicate with me otherwise. Able to follow 1-2 simple 1-step commands. .    Cranial Nerves  CN III, IV, VI: Pupils equal round and reactive to light bilaterally.  No obvious facial weakness. Eyes midline. Unable to assess EOM or visual acuity.    Motor  Normal muscle bulk throughout. Normal muscle tone. Right hemiparesis.  No movement to RUE, spontaneously moves other extremities. Weak  to command in left hand. .    Sensory Right-sided hemispatial neglect:  Grimaces to pain in RUE.    Gait    Not tested due to mental status.      Assessment & Plan       Intraparenchymal hemorrhage of brain      Problem List Items Addressed This Visit       * (Principal) Intraparenchymal hemorrhage of brain - Primary      84-year-old male with dementia and recent PE anticoagulated Eliquis presents with aphasia and right-sided weakness after being found down by his wife this morning.  Last known well was around midnight last night.  CT head shows large left parieto-occipital IPH with ventricular extension.  There is 3 mm of midline shift but currently no hydrocephalus. ICH score 3. IPH likely hypertensive in nature as his SBP was >200 on arrival. He has received k-centra and started on keppra. Exam limited as he is very lethargic.  He is able to move all extremities except for his right arm and appears to have right-sided neglect.  Case reviewed with Dr. Saravia, no indication for EVD or cranial decompression at this moment. We will repeat CTH at noon given he is on Eliquis,  "again in the morning, and with any neuro decline. Family still determining code status but are open to EVD if needed. They do not want surgical decompression or intubation.     PLAN:     ICU supportive care  Q1 neuro checks, notify TAE of any decline  SBP < 140  Repeat CTH at noon, in the AM, and with any decline  Keppra  Hold AC    ADDENDUM: Official report from CTH done at noon still pending but I spoke to Dr. Ordonez on the phone and he states that repeat CTH is stable without significant change from prior. No change to plan above.     I discussed the patient's findings and my recommendations with patient, family, nursing staff, primary care team, and Dr. Saravia      I spent 65 minutes caring for Eric Kramer on this date of service. This time includes time spent by me in the following activities: preparing for the visit, reviewing tests, obtaining and/or reviewing a separately obtained history, performing a medically appropriate examination and/or evaluation, counseling and educating the patient/family/caregiver, ordering medications, tests, or procedures, referring and communicating with other health care professionals, documenting information in the medical record, independently interpreting results and communicating that information with the patient/family/caregiver, and care coordination        During patient visit, I utilized appropriate personal protective equipment including gloves and mask.  Mask used was standard procedure mask. Appropriate PPE was worn during the entire visit.  Hand hygiene was completed before and after.     Selene Torres, APRN  03/27/25  10:50 EDT    \"Dictated utilizing Dragon dictation\".     "

## 2025-03-27 NOTE — ED PROVIDER NOTES
EMERGENCY DEPARTMENT ENCOUNTER    Room Number:  I380/1  PCP: Elder Block MD  Historian: EMS      HPI:  Chief Complaint: Right-sided deficit  A complete HPI/ROS/PMH/PSH/SH/FH are unobtainable due to: None  Context: Eric Kramer is a 84 y.o. male who presents to the ED c/o right-sided deficit.  Patient has history of diabetes at home.  Patient is able to ambulate,  take care of himself.  Patient is on blood thinners.  Last known patient was normal last night at midnight.  Patient was found on the floor this morning at 645.  Patient unable to get up.  Unable to use his right side.  His speech is limited.  Follows some commands.  Recent acute pulmonary embolism diagnosis.            PAST MEDICAL HISTORY  Active Ambulatory Problems     Diagnosis Date Noted    Type 2 diabetes mellitus with diabetic polyneuropathy, without long-term current use of insulin     Stroke     Prostatitis     Osteoarthritis     Nephrolithiasis     Memory impairment     Malignant neoplasm in situ of colon     Insomnia     Hypertension     Diabetic peripheral neuropathy     Benign enlargement of prostate     Arteriosclerosis of coronary artery     Diabetic ulcer of toe of right foot associated with diabetes mellitus due to underlying condition, limited to breakdown of skin 09/06/2019    MRSA bacteremia 09/07/2019    Hospital discharge follow-up 09/12/2019    Medicare annual wellness visit, subsequent 12/09/2019    Hypothyroidism due to acquired atrophy of thyroid 12/10/2019    Other hyperlipidemia 12/02/2020    High risk medication use 06/05/2023    B12 deficiency 10/11/2023    Seborrheic keratosis 10/11/2023    Respiratory failure 12/12/2023    CAD (coronary artery disease) 12/28/2023    Hospital discharge follow-up 12/28/2023    Pneumonia of both lungs due to infectious organism 12/28/2023    Thoracic aortic aneurysm without rupture 12/28/2023    Adrenal nodule 12/28/2023    Acquired hypothyroidism 03/05/2024    Left adrenal mass  03/05/2024    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 behavioral disturbance, psychotic disturbance, mood disturbance, or anxiety 10/16/2024    Vascular dementia 10/16/2024    Difficulty walking 12/12/2024    Primary osteoarthritis of both knees 12/12/2024    Pulmonary embolism 03/16/2025    Cellulitis of right toe 03/16/2025     Resolved Ambulatory Problems     Diagnosis Date Noted    No Resolved Ambulatory Problems     Past Medical History:   Diagnosis Date    Acute foot pain, left     Acute UTI     Basal cell carcinoma (BCC) of face     Cataract     Chest pain     Edema     Hyperlipidemia     Hypothyroidism     Renal azotemia     Testosterone deficiency     Type 2 diabetes mellitus     Vitamin D deficiency          PAST SURGICAL HISTORY  Past Surgical History:   Procedure Laterality Date    ANGIOPLASTY Right     Cath Laser Angioplasty/right ventricle    APPENDECTOMY      CATARACT EXTRACTION Left     COLECTOMY PARTIAL / TOTAL      due to colon cancer, removed 13 inches of colon    CORONARY ANGIOPLASTY WITH STENT PLACEMENT      TOE SURGERY Right 07/2021         FAMILY HISTORY  Family History   Problem Relation Age of Onset    Colon cancer Mother     Hypertension Mother         colon cancer    Osteoarthritis Mother     Emphysema Father         smoker/tobacco use    Hypertension Father         CPOD    Osteoarthritis Father     Rheum arthritis Sister     Colon cancer Maternal Grandmother     Cataracts Maternal Grandmother     Hypertension Maternal Grandmother         colon cance    Colon cancer Other     Hypertension Sister         She had 2 kinds ???    Hypertension Maternal Uncle         colon cancer    Hypertension Maternal Uncle         colon cancer         SOCIAL HISTORY  Social History     Socioeconomic History    Marital status:    Tobacco Use    Smoking status: Former     Current packs/day: 0.00     Average packs/day: 1 pack/day for 49.0 years (49.0 ttl pk-yrs)     Types: Cigarettes     Start  date: 1956     Quit date: 4/3/1980     Years since quittin.0    Smokeless tobacco: Never    Tobacco comments:     Greg quit 42 plus years ago   Vaping Use    Vaping status: Never Used   Substance and Sexual Activity    Alcohol use: Not Currently     Comment: Next to no liquour    Drug use: No    Sexual activity: Not Currently     Partners: Female     Birth control/protection: Depo-provera, Vasectomy         ALLERGIES  Patient has no known allergies.        REVIEW OF SYSTEMS  Review of Systems   Right-sided weakness      PHYSICAL EXAM  ED Triage Vitals [25 0823]   Temp Heart Rate Resp BP SpO2   -- 91 16 (!) 204/120 95 %      Temp src Heart Rate Source Patient Position BP Location FiO2 (%)   -- Monitor -- -- --       Physical Exam      GENERAL: Patient is awake.  Can answer some questions.  Can follow some commands  HENT: nares patent  EYES: no scleral icterus  CV: regular rhythm, normal rate  RESPIRATORY: normal effort  ABDOMEN: soft  MUSCULOSKELETAL: no deformity. Bilateral great toes wrapped  NEURO: Patient is awake.  Unable to move right side  PSYCH:  calm, cooperative  SKIN: warm, dry    Vital signs and nursing notes reviewed.    NIH 14      LAB RESULTS  Recent Results (from the past 24 hours)   Comprehensive Metabolic Panel    Collection Time: 25  8:25 AM    Specimen: Blood   Result Value Ref Range    Glucose 225 (H) 65 - 99 mg/dL    BUN 22 8 - 23 mg/dL    Creatinine 1.39 (H) 0.76 - 1.27 mg/dL    Sodium 137 136 - 145 mmol/L    Potassium 3.5 3.5 - 5.2 mmol/L    Chloride 99 98 - 107 mmol/L    CO2 25.0 22.0 - 29.0 mmol/L    Calcium 10.0 8.6 - 10.5 mg/dL    Total Protein 7.5 6.0 - 8.5 g/dL    Albumin 3.9 3.5 - 5.2 g/dL    ALT (SGPT) 15 1 - 41 U/L    AST (SGOT) 17 1 - 40 U/L    Alkaline Phosphatase 93 39 - 117 U/L    Total Bilirubin 0.6 0.0 - 1.2 mg/dL    Globulin 3.6 gm/dL    A/G Ratio 1.1 g/dL    BUN/Creatinine Ratio 15.8 7.0 - 25.0    Anion Gap 13.0 5.0 - 15.0 mmol/L    eGFR 50.3 (L) >60.0  mL/min/1.73   Protime-INR    Collection Time: 03/27/25  8:25 AM    Specimen: Blood   Result Value Ref Range    Protime 16.7 (H) 11.7 - 14.2 Seconds    INR 1.35 (H) 0.90 - 1.10   aPTT    Collection Time: 03/27/25  8:25 AM    Specimen: Blood   Result Value Ref Range    PTT 35.9 (H) 22.7 - 35.4 seconds   Type & Screen    Collection Time: 03/27/25  8:25 AM    Specimen: Blood   Result Value Ref Range    ABO Type O     RH type Positive     Antibody Screen Negative     T&S Expiration Date 3/30/2025 11:59:59 PM    Green Top (Gel)    Collection Time: 03/27/25  8:25 AM   Result Value Ref Range    Extra Tube Hold for add-ons.    Lavender Top    Collection Time: 03/27/25  8:25 AM   Result Value Ref Range    Extra Tube hold for add-on    Gold Top - SST    Collection Time: 03/27/25  8:25 AM   Result Value Ref Range    Extra Tube Hold for add-ons.    Light Blue Top    Collection Time: 03/27/25  8:25 AM   Result Value Ref Range    Extra Tube Hold for add-ons.    CBC Auto Differential    Collection Time: 03/27/25  8:25 AM    Specimen: Blood   Result Value Ref Range    WBC 9.81 3.40 - 10.80 10*3/mm3    RBC 4.74 4.14 - 5.80 10*6/mm3    Hemoglobin 14.5 13.0 - 17.7 g/dL    Hematocrit 43.7 37.5 - 51.0 %    MCV 92.2 79.0 - 97.0 fL    MCH 30.6 26.6 - 33.0 pg    MCHC 33.2 31.5 - 35.7 g/dL    RDW 13.9 12.3 - 15.4 %    RDW-SD 46.9 37.0 - 54.0 fl    MPV 9.0 6.0 - 12.0 fL    Platelets 378 140 - 450 10*3/mm3    Neutrophil % 88.2 (H) 42.7 - 76.0 %    Lymphocyte % 8.4 (L) 19.6 - 45.3 %    Monocyte % 2.5 (L) 5.0 - 12.0 %    Eosinophil % 0.0 (L) 0.3 - 6.2 %    Basophil % 0.3 0.0 - 1.5 %    Immature Grans % 0.6 (H) 0.0 - 0.5 %    Neutrophils, Absolute 8.65 (H) 1.70 - 7.00 10*3/mm3    Lymphocytes, Absolute 0.82 0.70 - 3.10 10*3/mm3    Monocytes, Absolute 0.25 0.10 - 0.90 10*3/mm3    Eosinophils, Absolute 0.00 0.00 - 0.40 10*3/mm3    Basophils, Absolute 0.03 0.00 - 0.20 10*3/mm3    Immature Grans, Absolute 0.06 (H) 0.00 - 0.05 10*3/mm3    nRBC 0.0 0.0  - 0.2 /100 WBC   CK    Collection Time: 03/27/25  8:25 AM    Specimen: Blood   Result Value Ref Range    Creatine Kinase 76 20 - 200 U/L   POC Glucose Once    Collection Time: 03/27/25  8:27 AM    Specimen: Blood   Result Value Ref Range    Glucose 229 (H) 70 - 130 mg/dL   ECG 12 Lead Stroke Evaluation    Collection Time: 03/27/25  8:46 AM   Result Value Ref Range    QT Interval 362 ms    QTC Interval 463 ms   POC Glucose Once    Collection Time: 03/27/25 10:35 AM    Specimen: Blood   Result Value Ref Range    Glucose 263 (H) 70 - 130 mg/dL       Ordered the above labs and reviewed the results.        RADIOLOGY  XR Chest 1 View  Result Date: 3/27/2025  XR CHEST 1 VW-  HISTORY: Male who is 84 years-old, acute stroke protocol  TECHNIQUE: Frontal views of the chest  COMPARISON: 3/16/2025  FINDINGS: The heart is enlarged. Aorta is ectatic, calcified. Pulmonary vasculature is mildly congested. No focal pulmonary consolidation, pleural effusion, or pneumothorax. No acute osseous process.      No focal pulmonary consolidation. Cardiomegaly. Ectatic aorta.  This report was finalized on 3/27/2025 9:05 AM by Dr. Cristo Barcenas M.D on Workstation: [id number]      CT Head Without Contrast Stroke Protocol  Result Date: 3/27/2025  CT HEAD WITHOUT CONTRAST  HISTORY: Found down, unresponsive.  COMPARISON: CT head 03/16/2025.  FINDINGS: There is a large intraparenchymal hemorrhage involving the parieto-occipital region on the left measuring approximately 4.7 x 4.7 x 4.3 cm in size. There is surrounding edema. There is intraventricular extension of the hemorrhage. There is 3 mm of midline shift to the right. There is no evidence of uncal herniation. The left temporal horn is slightly more prominent as compared to 03/16/2025 suggesting an element of trapping.      There is a large intraparenchymal hemorrhage involving the parieto-occipital region on the left measuring approximately 4.7 cm in size with intraventricular extension.  There is localized edema. There is slight prominence of the left temporal horn as compared to the CT examination 03/16/2025 consistent with trapping.  AI analysis of LVO was utilized.  Radiation dose reduction techniques were utilized, including automated exposure control and exposure modulation based on body size.         Ordered the above noted radiological studies.  CT head independent interpreted by me and does show intracranial hemorrhage          PROCEDURES  Critical Care    Performed by: Brain Melendrez MD  Authorized by: Karson Martinez MD    Critical care provider statement:     Critical care time (minutes):  45    Critical care time was exclusive of:  Separately billable procedures and treating other patients    Critical care was necessary to treat or prevent imminent or life-threatening deterioration of the following conditions:  CNS failure or compromise    Critical care was time spent personally by me on the following activities:  Ordering and performing treatments and interventions, ordering and review of laboratory studies, discussions with consultants, discussions with primary provider, ordering and review of radiographic studies and re-evaluation of patient's condition        EKG          EKG time: 846  Rhythm/Rate: Normal sinus rhythm 94   P waves and AL: First-degree AV block  QRS, axis: Normal QRS.  Occasional PVC  ST and T waves: Nonspecific ST-T wave    Interpreted Contemporaneously by me, independently viewed  Changed compared to prior 3/18/2025.  PVCs are new      MEDICATIONS GIVEN IN ER  Medications   sodium chloride 0.9 % flush 10 mL (has no administration in time range)   niCARdipine (CARDENE) 20 mg in 200 mL NS infusion (7.5 mg/hr Intravenous New Bag 3/27/25 1034)   sodium chloride 0.9 % flush 10 mL (10 mL Intravenous Given 3/27/25 1035)   sodium chloride 0.9 % flush 10 mL (has no administration in time range)   sodium chloride 0.9 % infusion 40 mL (has no administration in time  range)   prothrombin complex conc human (KCentra) IV solution 2,184 Units (2,184 Units Intravenous Given 3/27/25 0900)   levETIRAcetam (KEPPRA) injection 1,000 mg (1,000 mg Intravenous Given 3/27/25 0932)                   MEDICAL DECISION MAKING, PROGRESS, and CONSULTS    All labs have been independently reviewed by me.  All radiology studies have been reviewed by me and I have also reviewed the radiology report.   EKG's independently viewed and interpreted by me.  Discussion below represents my analysis of pertinent findings related to patient's condition, differential diagnosis, treatment plan and final disposition.      Additional sources:  - Discussed/ obtained information from independent historians: History obtained from family as well as from EMS patient can give very limited history    - External (non-ED) record review: Epic reviewed and patient admitted 3/16/2025 for pulmonary embolism    - Chronic or social conditions impacting care: None    - Shared decision making: None      Orders placed during this visit:  Orders Placed This Encounter   Procedures    XR Chest 1 View    CT Head Without Contrast Stroke Protocol    CT Head Without Contrast    [place] Draw    Comprehensive Metabolic Panel    Protime-INR    aPTT    CBC Auto Differential    CK    Hemoglobin A1c    Lipid Panel    NPO Diet NPO Type: Strict NPO    Initiate Department's Acute Stroke Process (Team D, Code 19, etc.)    Perform NIH Stroke Scale    Measure Actual Weight    Notify Provider    Notify Provider for SBP Greater Than 140 for Hemorrhagic Stroke Patient    Head of Bed 30 Degrees or Less    Undress and Gown    Vital Signs    No Hypotonic Fluids    Nursing Dysphagia Screening (Complete Prior to Giving anything PO)    RN to Place Order SLP Consult (IF swallow screen failed) - Eval & Treat Choosing Reason of RN Dysphagia Screen Failed    Neuro Checks    Vital Signs    Continuous Pulse Oximetry    Telemetry - Place Orders & Notify Provider of  Results When Patient Experiences Acute Chest Pain, Dysrhythmia or Respiratory Distress    Notify Provider    Head of Bed    Turn Patient    Nursing Dysphagia Screening (Complete Prior to Giving Anything By Mouth)    RN to Place Order SLP Consult - Eval & Treat Choosing Reason of RN Dysphagia Screen Failed    Nurse to Call MD or Nutrition Services for Diet if Patient Passes Dysphagia Screen    Intake and Output    Neuro Checks    NIHSS Assessment    Order CT Head Without Contrast for Neurological Decline    Provide Stroke Education Material    Saline Lock & Maintain IV Access    Place Sequential Compression Device    Maintain Sequential Compression Device    Inpatient Neurology Consult Stroke    Inpatient Neurology Consult Stroke    Neurosurgery (on-call MD unless specified)    Pulmonology (on-call MD unless specified)    Notify Stroke Coordinator    Inpatient Case Management  Consult    Inpatient Diabetes Educator Consult    Inpatient Neurosurgery Consult    OT Consult: Eval & Treat    PT Consult: Eval & Treat As Tolerated    Oxygen Therapy- Nasal Cannula; Titrate 1-6 LPM Per SpO2; 90 - 95%    SLP Consult: Eval & Treat RN Dysphagia Screen Failed    SLP Consult: Eval & Treat Communication Disorder    POC Glucose Once    POC Glucose Once    POC Glucose Q6H    POC Glucose Once    ECG 12 Lead Stroke Evaluation    Type & Screen    Insert Large-Bore Peripheral IV - RIGHT AC Preferred    Insert Peripheral IV    Inpatient Admission    CBC & Differential    Green Top (Gel)    Lavender Top    Gold Top - SST    Light Blue Top         Additional orders considered but not ordered:  None        Differential diagnosis includes but is not limited to:    Acute CVA versus intraparenchymal hemorrhage      Independent interpretation of labs, radiology studies, and discussions with consultants:  ED Course as of 03/27/25 1126   u Mar 27, 2025   0834 08:34 EDT  Patient presents for right-sided weakness.  Team D was called  on arrival.  Patient has large left sided intracranial hemorrhage.  Was just here for pulmonary embolism earlier this month and was started on blood thinners.  Discussed with stroke neurologist on arrival and then after CT.  We will give him Kcentra understanding that he just had a PE but his current problem is large intracranial hemorrhage.  Patient will be started on Cardene for his blood pressure.  Will consult neurosurgery. [SL]   0901 09:02 EDT  Discussed with Dr. Martinez who will admit to ICU.  Awaiting neurosurgery.  kCentra given.  Blood pressure improving.  Patient still able to open eyes and follow commands.  Family en route. [SL]   0913 09:13 EDT  Discussed with neurosurgery who is here at bedside evaluating patient right now. [SL]   0944 09:44 EDT  Patient's family is here.  Patient has been discussed.  First impression is that they would not want surgical intervention.  They will be discussing with neurosurgery [SL]      ED Course User Index  [] Brain Melendrez MD                 DIAGNOSIS  Final diagnoses:   Intraparenchymal hemorrhage of brain         DISPOSITION  admit            Latest Documented Vital Signs:  As of 11:26 EDT  BP- 147/74 HR- 99 Temp- 98 °F (36.7 °C) (Oral) O2 sat- 94%              --    Please note that portions of this were completed with a voice recognition program.       Note Disclaimer: At [facility], we believe that sharing information builds trust and better relationships. You are receiving this note because you are receiving care at [facility] or recently visited. It is possible you will see health information before a provider has talked with you about it. This kind of information can be easy to misunderstand. To help you fully understand what it means for your health, we urge you to discuss this note with your provider.            Brain Melendrez MD  03/27/25 7742

## 2025-03-27 NOTE — H&P
[facility]    CC: UTO    HPI:  Mr. Kramer is a 83yo male recently diagnosed with PE on anticoagulation presented to the ER today with altered mental status and hemiplegia of sudden onset. He was found to have left parieto-occipital hemorrhage with intraventricular extension.  History is obtained from the chart as patient unable to provide.    Past Medical History:   Diagnosis Date    Acute foot pain, left     Acute UTI     Arteriosclerosis of coronary artery     Basal cell carcinoma (BCC) of face     Benign enlargement of prostate     Cataract     Chest pain     Diabetic peripheral neuropathy     Edema     Hyperlipidemia     Hypertension     Hypothyroidism     Insomnia     Malignant neoplasm in situ of colon     Memory impairment     MMSE -2019    Nephrolithiasis     Osteoarthritis     Prostatitis     Pulmonary embolism     Renal azotemia     Stroke     stroke syndrome    Stroke 2025    left hemisphere hemmoraghic stroke    Testosterone deficiency     Type 2 diabetes mellitus     Vitamin D deficiency      Social History     Socioeconomic History    Marital status:    Tobacco Use    Smoking status: Former     Current packs/day: 0.00     Average packs/day: 1 pack/day for 49.0 years (49.0 ttl pk-yrs)     Types: Cigarettes     Start date: 1956     Quit date: 4/3/1980     Years since quittin.0    Smokeless tobacco: Never    Tobacco comments:     Greg quit 42 plus years ago   Vaping Use    Vaping status: Never Used   Substance and Sexual Activity    Alcohol use: Not Currently     Comment: Next to no liquour    Drug use: No    Sexual activity: Not Currently     Partners: Female     Birth control/protection: Depo-provera, Vasectomy     Family History   Problem Relation Age of Onset    Colon cancer Mother     Hypertension Mother         colon cancer    Osteoarthritis Mother     Emphysema Father         smoker/tobacco use    Hypertension Father         CPOD    Osteoarthritis  Father     Rheum arthritis Sister     Colon cancer Maternal Grandmother     Cataracts Maternal Grandmother     Hypertension Maternal Grandmother         colon cance    Colon cancer Other     Hypertension Sister         She had 2 kinds ???    Hypertension Maternal Uncle         colon cancer    Hypertension Maternal Uncle         colon cancer     MED:S reviewed as per chart  ALL :UTO  ROS: UTO    Vital Sign Min/Max for last 24 hours  Temp  Min: 98 °F (36.7 °C)  Max: 98.9 °F (37.2 °C)   BP  Min: 115/74  Max: 204/120   Pulse  Min: 90  Max: 106   Resp  Min: 16  Max: 16   SpO2  Min: 88 %  Max: 97 %   Flow (L/min) (Oxygen Therapy)  Min: 4  Max: 4   Weight  Min: 94.2 kg (207 lb 10.8 oz)  Max: 96.8 kg (213 lb 6.5 oz)     GEN:   appears ill, somnolent  HEENT: PERRL, normal sclera, mmm, no JVD, trachea midline, neck supple  CHEST: CTA bilat, no wheezes, no crackles, no use of accessory muscles  CV: tachy, regular, no m/g/r  ABD: soft, nt, nd +bs, no hepatosplenomegaly  EXT: no c/c/e  SKIN: no rashes, no xanthomas, nl turgor, warm, dry  LYMPH: no palpable cervical or supraclavicular lymphadenopathy  NEURO: CN 2-12 intact and symmetric bilaterally  PSYCH: deferred  MSK: normal muscular development, no kyphoscoliosis,    Ct head:3/27: reviewed:  There is a large intraparenchymal hemorrhage involving the   parieto-occipital region on the left measuring approximately 4.7 cm in size with intraventricular extension. There is localized edema. There is slight prominence of the left temporal horn as compared to the CT examination 03/16/2025 consistent with trapping.     A/P:  Large ICH -- blood pressure control, continue to follow orders as per TAE/neurology -- prognosis is poor. Continue goals of care discussions  Dementia  HTN  Recent PE  HTN  Hypothyroidism  7. Dnr/dni    D/w wife at bedside  Prognosis poor, discussed with Dr. Ma    CC 35 mins.

## 2025-03-27 NOTE — NURSING NOTE
Attempted cortrak for >30 mins. Could not get the tube to pass the distal esophagus. Both nares attempted.

## 2025-03-27 NOTE — CONSULTS
Neurology Consult Note    Consult Date: 3/27/2025    Referring MD: No ref. provider found    Reason for Consult I have been asked to see the patient in neurological consultation to render advice and opinion regarding right-sided weakness and aphasia    Eric Kramer is a 84 y.o. male with past medical history of hypertension, hypothyroidism, hyperlipidemia, type 2 diabetes mellitus with peripheral neuropathy, pulmonary embolism on Eliquis at home, coronary artery disease, vascular dementia presented to the ED with altered mental status and dense right hemiplegia, systolic blood pressure while in the ED was in 200s, initial CT scan showed acute left parieto-occipital hemorrhage with intraventricular extension.    Since patient is lethargic drowsy cannot provide any history much of the history has been obtained from patient's son and wife at bedside, noting to wife last night before going to bed he was slightly confused today morning when he he was found down at around 645.    Has a history of dementia he usually has sundowning spells recently he was found knocking on patient's neighbors door and unclear reasons and was very confused.  Has been getting on and off progressively confused and also having cognitive problems        Past Medical History:   Diagnosis Date    Acute foot pain, left     Acute UTI     Arteriosclerosis of coronary artery     Basal cell carcinoma (BCC) of face     Benign enlargement of prostate     Cataract     Chest pain     Diabetic peripheral neuropathy     Edema     Hyperlipidemia     Hypertension     Hypothyroidism     Insomnia     Malignant neoplasm in situ of colon     Memory impairment     MMSE 27/30-5/20/2019    Nephrolithiasis     Osteoarthritis     Prostatitis     Renal azotemia     Stroke     stroke syndrome    Testosterone deficiency     Type 2 diabetes mellitus     Vitamin D deficiency        Exam  BP (!) 188/116   Pulse 95   Resp 16   Wt 96.8 kg (213 lb 6.5 oz)   SpO2 91%   BMI  27.40 kg/m²   Gen: NAD, vitals reviewed  MS: Does not open eyes to voice, does not follow commands  CN: no blink to threat b/l, PERRL, Conjugate gaze,   Motor: Spontaneous movement left upper left lower right upper right lower slight withdrawal      DATA:    Lab Results   Component Value Date    GLUCOSE 215 (H) 03/25/2025    CALCIUM 9.6 03/25/2025     03/25/2025    K 4.5 03/25/2025    CO2 24 03/25/2025     03/25/2025    BUN 32 (H) 03/25/2025    CREATININE 1.57 (H) 03/25/2025    EGFRIFAFRI 46 (L) 12/07/2021    EGFRIFNONA 39 (L) 12/07/2021    BCR 20 03/25/2025    ANIONGAP 11.0 03/18/2025     Lab Results   Component Value Date    WBC 8.20 03/18/2025    HGB 14.3 03/18/2025    HCT 43.2 03/18/2025    MCV 90.9 03/18/2025     03/18/2025   Vitals   Afebrile  Pulse rate 90s  Respiratory rate 16  Systolic blood pressure 180s to 200      Lab review:   Sodium 140  Creatinine 1.57  Blood glucose 215  Normal LFTs    A1c 7.0  B12 1323    WBC 8.2  Hemoglobin 14 and platelets 367    Imaging review:   CThead showed a large left parieto-occipital hemorrhage with intraventricular extension    Diagnoses:  Acute large left parietal IPH with intraventricular extension  Hypertensive emergency    Type 2 diabetes mellitus with peripheral neuropathy  Hypertension  Hyperlipidemia  Pulmonary embolism on Eliquis    Pre-stroke MRS: 3    Etiology of the bleed most likely hypertensive versus other differential including cerebral amyloid    Systolic blood pressure goal strictly less than 140  Eliquis has been reversed  No aspirin or anticoagulation    Considering his baseline dementia and cognitive issues and left parieto-occipital large hemorrhage I do not think he will make meaningful recovery from this even by surgical intervention.    Spoke about this with patient's son and wife at bedside they both agree.  They want to keep him as comfortable as possible    I would consult palliative care team and transfer him to [facility] before  he clinically deteriorates.      MDM   Reviewed: Previous charts, nursing notes and vitals   Reviewed: Previous labs and CT scan    Interpretation: Labs and CT scan   Total time providing critical care is :30-74 minutes. This excluded time spent performing separately reportable procedures and services  Consults :Neurology/Stroke    Please note that portions of this note were completed with a voice recognition program.     Mingo Ma MD  Neuro Hospitalist /Vascular Neurology.

## 2025-03-27 NOTE — PLAN OF CARE
Goal Outcome Evaluation:              Outcome Evaluation: New orders received. Discussed with RN. Not appropriate for clinical swallow evaluation. Speech to follow as appropriate, pending GOC.

## 2025-03-28 ENCOUNTER — APPOINTMENT (OUTPATIENT)
Dept: CT IMAGING | Facility: HOSPITAL | Age: 85
End: 2025-03-28
Payer: MEDICARE

## 2025-03-28 LAB
ALBUMIN SERPL-MCNC: 3.2 G/DL (ref 3.5–5.2)
ALBUMIN/GLOB SERPL: 1 G/DL
ALP SERPL-CCNC: 77 U/L (ref 39–117)
ALT SERPL W P-5'-P-CCNC: 10 U/L (ref 1–41)
ANION GAP SERPL CALCULATED.3IONS-SCNC: 12.4 MMOL/L (ref 5–15)
AST SERPL-CCNC: 15 U/L (ref 1–40)
BASOPHILS # BLD AUTO: 0.03 10*3/MM3 (ref 0–0.2)
BASOPHILS NFR BLD AUTO: 0.3 % (ref 0–1.5)
BILIRUB SERPL-MCNC: 0.9 MG/DL (ref 0–1.2)
BUN SERPL-MCNC: 22 MG/DL (ref 8–23)
BUN/CREAT SERPL: 16.1 (ref 7–25)
CALCIUM SPEC-SCNC: 9.3 MG/DL (ref 8.6–10.5)
CHLORIDE SERPL-SCNC: 105 MMOL/L (ref 98–107)
CHOLEST SERPL-MCNC: 117 MG/DL (ref 0–200)
CO2 SERPL-SCNC: 23.6 MMOL/L (ref 22–29)
CREAT SERPL-MCNC: 1.37 MG/DL (ref 0.76–1.27)
DEPRECATED RDW RBC AUTO: 47.5 FL (ref 37–54)
EGFRCR SERPLBLD CKD-EPI 2021: 51.2 ML/MIN/1.73
EOSINOPHIL # BLD AUTO: 0 10*3/MM3 (ref 0–0.4)
EOSINOPHIL NFR BLD AUTO: 0 % (ref 0.3–6.2)
ERYTHROCYTE [DISTWIDTH] IN BLOOD BY AUTOMATED COUNT: 13.9 % (ref 12.3–15.4)
GLOBULIN UR ELPH-MCNC: 3.1 GM/DL
GLUCOSE BLDC GLUCOMTR-MCNC: 158 MG/DL (ref 70–130)
GLUCOSE BLDC GLUCOMTR-MCNC: 193 MG/DL (ref 70–130)
GLUCOSE SERPL-MCNC: 169 MG/DL (ref 65–99)
HCT VFR BLD AUTO: 42.3 % (ref 37.5–51)
HDLC SERPL-MCNC: 39 MG/DL (ref 40–60)
HGB BLD-MCNC: 13.3 G/DL (ref 13–17.7)
IMM GRANULOCYTES # BLD AUTO: 0.05 10*3/MM3 (ref 0–0.05)
IMM GRANULOCYTES NFR BLD AUTO: 0.5 % (ref 0–0.5)
LDLC SERPL CALC-MCNC: 62 MG/DL (ref 0–100)
LDLC/HDLC SERPL: 1.61 {RATIO}
LYMPHOCYTES # BLD AUTO: 1.75 10*3/MM3 (ref 0.7–3.1)
LYMPHOCYTES NFR BLD AUTO: 17.2 % (ref 19.6–45.3)
MCH RBC QN AUTO: 29.2 PG (ref 26.6–33)
MCHC RBC AUTO-ENTMCNC: 31.4 G/DL (ref 31.5–35.7)
MCV RBC AUTO: 93 FL (ref 79–97)
MONOCYTES # BLD AUTO: 0.93 10*3/MM3 (ref 0.1–0.9)
MONOCYTES NFR BLD AUTO: 9.1 % (ref 5–12)
NEUTROPHILS NFR BLD AUTO: 7.44 10*3/MM3 (ref 1.7–7)
NEUTROPHILS NFR BLD AUTO: 72.9 % (ref 42.7–76)
NRBC BLD AUTO-RTO: 0 /100 WBC (ref 0–0.2)
PLATELET # BLD AUTO: 377 10*3/MM3 (ref 140–450)
PMV BLD AUTO: 9.1 FL (ref 6–12)
POTASSIUM SERPL-SCNC: 3.7 MMOL/L (ref 3.5–5.2)
PROT SERPL-MCNC: 6.3 G/DL (ref 6–8.5)
QT INTERVAL: 362 MS
QTC INTERVAL: 463 MS
RBC # BLD AUTO: 4.55 10*6/MM3 (ref 4.14–5.8)
SODIUM SERPL-SCNC: 141 MMOL/L (ref 136–145)
TRIGL SERPL-MCNC: 77 MG/DL (ref 0–150)
VLDLC SERPL-MCNC: 16 MG/DL (ref 5–40)
WBC NRBC COR # BLD AUTO: 10.2 10*3/MM3 (ref 3.4–10.8)

## 2025-03-28 PROCEDURE — 80061 LIPID PANEL: CPT

## 2025-03-28 PROCEDURE — 63710000001 INSULIN REGULAR HUMAN PER 5 UNITS: Performed by: INTERNAL MEDICINE

## 2025-03-28 PROCEDURE — 99233 SBSQ HOSP IP/OBS HIGH 50: CPT

## 2025-03-28 PROCEDURE — 82948 REAGENT STRIP/BLOOD GLUCOSE: CPT

## 2025-03-28 PROCEDURE — 25010000002 LORAZEPAM PER 2 MG: Performed by: INTERNAL MEDICINE

## 2025-03-28 PROCEDURE — 70450 CT HEAD/BRAIN W/O DYE: CPT

## 2025-03-28 PROCEDURE — 80053 COMPREHEN METABOLIC PANEL: CPT | Performed by: INTERNAL MEDICINE

## 2025-03-28 PROCEDURE — 25010000002 MORPHINE PER 10 MG: Performed by: INTERNAL MEDICINE

## 2025-03-28 PROCEDURE — 99231 SBSQ HOSP IP/OBS SF/LOW 25: CPT | Performed by: PSYCHIATRY & NEUROLOGY

## 2025-03-28 PROCEDURE — 85025 COMPLETE CBC W/AUTO DIFF WBC: CPT | Performed by: INTERNAL MEDICINE

## 2025-03-28 PROCEDURE — 25010000002 NICARDIPINE HCL IN NACL 20-0.9 MG/200ML-% SOLUTION: Performed by: EMERGENCY MEDICINE

## 2025-03-28 RX ORDER — GLYCOPYRROLATE 0.2 MG/ML
0.4 INJECTION INTRAMUSCULAR; INTRAVENOUS
Status: DISCONTINUED | OUTPATIENT
Start: 2025-03-28 | End: 2025-03-29 | Stop reason: HOSPADM

## 2025-03-28 RX ORDER — PROCHLORPERAZINE 25 MG
25 SUPPOSITORY, RECTAL RECTAL EVERY 12 HOURS PRN
Status: DISCONTINUED | OUTPATIENT
Start: 2025-03-28 | End: 2025-03-29

## 2025-03-28 RX ORDER — ONDANSETRON 2 MG/ML
4 INJECTION INTRAMUSCULAR; INTRAVENOUS EVERY 6 HOURS PRN
Status: DISCONTINUED | OUTPATIENT
Start: 2025-03-28 | End: 2025-03-29 | Stop reason: HOSPADM

## 2025-03-28 RX ORDER — PROCHLORPERAZINE EDISYLATE 5 MG/ML
5 INJECTION INTRAMUSCULAR; INTRAVENOUS EVERY 6 HOURS PRN
Status: DISCONTINUED | OUTPATIENT
Start: 2025-03-28 | End: 2025-03-29

## 2025-03-28 RX ORDER — LORAZEPAM 2 MG/ML
2 INJECTION INTRAMUSCULAR
Status: DISCONTINUED | OUTPATIENT
Start: 2025-03-28 | End: 2025-03-29 | Stop reason: HOSPADM

## 2025-03-28 RX ORDER — ACETAMINOPHEN 160 MG/5ML
650 SOLUTION ORAL EVERY 4 HOURS PRN
Status: DISCONTINUED | OUTPATIENT
Start: 2025-03-28 | End: 2025-03-29

## 2025-03-28 RX ORDER — MORPHINE SULFATE 2 MG/ML
4 INJECTION, SOLUTION INTRAMUSCULAR; INTRAVENOUS
Status: DISCONTINUED | OUTPATIENT
Start: 2025-03-28 | End: 2025-03-29 | Stop reason: HOSPADM

## 2025-03-28 RX ORDER — DIPHENOXYLATE HYDROCHLORIDE AND ATROPINE SULFATE 2.5; .025 MG/1; MG/1
1 TABLET ORAL
Status: DISCONTINUED | OUTPATIENT
Start: 2025-03-28 | End: 2025-03-29

## 2025-03-28 RX ORDER — HYDROMORPHONE HYDROCHLORIDE 2 MG/ML
1.5 INJECTION, SOLUTION INTRAMUSCULAR; INTRAVENOUS; SUBCUTANEOUS
Status: DISCONTINUED | OUTPATIENT
Start: 2025-03-28 | End: 2025-03-29 | Stop reason: HOSPADM

## 2025-03-28 RX ORDER — BISACODYL 10 MG
10 SUPPOSITORY, RECTAL RECTAL DAILY PRN
Status: DISCONTINUED | OUTPATIENT
Start: 2025-03-28 | End: 2025-03-29 | Stop reason: HOSPADM

## 2025-03-28 RX ORDER — LORAZEPAM 2 MG/ML
0.5 INJECTION INTRAMUSCULAR
Status: DISCONTINUED | OUTPATIENT
Start: 2025-03-28 | End: 2025-03-29

## 2025-03-28 RX ORDER — PROCHLORPERAZINE MALEATE 10 MG
5 TABLET ORAL EVERY 6 HOURS PRN
Status: DISCONTINUED | OUTPATIENT
Start: 2025-03-28 | End: 2025-03-29

## 2025-03-28 RX ORDER — LORAZEPAM 2 MG/ML
0.5 CONCENTRATE ORAL
Status: DISCONTINUED | OUTPATIENT
Start: 2025-03-28 | End: 2025-03-29 | Stop reason: HOSPADM

## 2025-03-28 RX ORDER — MORPHINE SULFATE 20 MG/ML
20 SOLUTION ORAL
Refills: 0 | Status: DISCONTINUED | OUTPATIENT
Start: 2025-03-28 | End: 2025-03-29 | Stop reason: HOSPADM

## 2025-03-28 RX ORDER — LORAZEPAM 2 MG/ML
1 INJECTION INTRAMUSCULAR
Status: DISCONTINUED | OUTPATIENT
Start: 2025-03-28 | End: 2025-03-29 | Stop reason: HOSPADM

## 2025-03-28 RX ORDER — MORPHINE SULFATE 20 MG/ML
10 SOLUTION ORAL
Refills: 0 | Status: DISCONTINUED | OUTPATIENT
Start: 2025-03-28 | End: 2025-03-29 | Stop reason: HOSPADM

## 2025-03-28 RX ORDER — HYDROMORPHONE HYDROCHLORIDE 1 MG/ML
0.5 INJECTION, SOLUTION INTRAMUSCULAR; INTRAVENOUS; SUBCUTANEOUS
Refills: 0 | Status: DISCONTINUED | OUTPATIENT
Start: 2025-03-28 | End: 2025-03-29 | Stop reason: HOSPADM

## 2025-03-28 RX ORDER — LORAZEPAM 2 MG/ML
1 INJECTION INTRAMUSCULAR
Status: DISCONTINUED | OUTPATIENT
Start: 2025-03-28 | End: 2025-03-29

## 2025-03-28 RX ORDER — LORAZEPAM 2 MG/ML
1 CONCENTRATE ORAL
Status: DISCONTINUED | OUTPATIENT
Start: 2025-03-28 | End: 2025-03-29 | Stop reason: HOSPADM

## 2025-03-28 RX ORDER — PROMETHAZINE HYDROCHLORIDE 25 MG/1
12.5 TABLET ORAL EVERY 4 HOURS PRN
Status: DISCONTINUED | OUTPATIENT
Start: 2025-03-28 | End: 2025-03-29

## 2025-03-28 RX ORDER — LORAZEPAM 2 MG/ML
2 CONCENTRATE ORAL
Status: DISCONTINUED | OUTPATIENT
Start: 2025-03-28 | End: 2025-03-29 | Stop reason: HOSPADM

## 2025-03-28 RX ORDER — LORAZEPAM 2 MG/ML
2 INJECTION INTRAMUSCULAR
Status: DISCONTINUED | OUTPATIENT
Start: 2025-03-28 | End: 2025-03-29

## 2025-03-28 RX ORDER — PROMETHAZINE HYDROCHLORIDE 25 MG/1
6.25 TABLET ORAL EVERY 4 HOURS PRN
Status: DISCONTINUED | OUTPATIENT
Start: 2025-03-28 | End: 2025-03-29

## 2025-03-28 RX ORDER — BISACODYL 5 MG/1
5 TABLET, DELAYED RELEASE ORAL DAILY PRN
Status: DISCONTINUED | OUTPATIENT
Start: 2025-03-28 | End: 2025-03-29

## 2025-03-28 RX ORDER — ECHINACEA PURPUREA EXTRACT 125 MG
1 TABLET ORAL AS NEEDED
Status: DISCONTINUED | OUTPATIENT
Start: 2025-03-28 | End: 2025-03-29 | Stop reason: HOSPADM

## 2025-03-28 RX ORDER — ATROPINE SULFATE 10 MG/ML
2 SOLUTION/ DROPS OPHTHALMIC 2 TIMES DAILY PRN
Status: DISCONTINUED | OUTPATIENT
Start: 2025-03-28 | End: 2025-03-29

## 2025-03-28 RX ORDER — ACETAMINOPHEN 650 MG/1
650 SUPPOSITORY RECTAL EVERY 4 HOURS PRN
Status: DISCONTINUED | OUTPATIENT
Start: 2025-03-28 | End: 2025-03-29 | Stop reason: HOSPADM

## 2025-03-28 RX ORDER — GLYCOPYRROLATE 0.2 MG/ML
0.4 INJECTION INTRAMUSCULAR; INTRAVENOUS
Status: DISCONTINUED | OUTPATIENT
Start: 2025-03-28 | End: 2025-03-29

## 2025-03-28 RX ORDER — MORPHINE SULFATE 20 MG/ML
5 SOLUTION ORAL
Refills: 0 | Status: DISCONTINUED | OUTPATIENT
Start: 2025-03-28 | End: 2025-03-29 | Stop reason: HOSPADM

## 2025-03-28 RX ORDER — SCOPOLAMINE 1 MG/3D
1 PATCH, EXTENDED RELEASE TRANSDERMAL
Status: DISCONTINUED | OUTPATIENT
Start: 2025-03-28 | End: 2025-03-29 | Stop reason: HOSPADM

## 2025-03-28 RX ORDER — GLYCOPYRROLATE 0.2 MG/ML
0.2 INJECTION INTRAMUSCULAR; INTRAVENOUS
Status: DISCONTINUED | OUTPATIENT
Start: 2025-03-28 | End: 2025-03-29

## 2025-03-28 RX ORDER — LORAZEPAM 2 MG/ML
0.5 INJECTION INTRAMUSCULAR
Status: DISCONTINUED | OUTPATIENT
Start: 2025-03-28 | End: 2025-03-29 | Stop reason: HOSPADM

## 2025-03-28 RX ORDER — MORPHINE SULFATE 10 MG/ML
6 INJECTION, SOLUTION INTRAMUSCULAR; INTRAVENOUS
Status: DISCONTINUED | OUTPATIENT
Start: 2025-03-28 | End: 2025-03-29 | Stop reason: HOSPADM

## 2025-03-28 RX ORDER — AMOXICILLIN 250 MG
2 CAPSULE ORAL 2 TIMES DAILY
Status: DISCONTINUED | OUTPATIENT
Start: 2025-03-28 | End: 2025-03-29

## 2025-03-28 RX ORDER — MORPHINE SULFATE 2 MG/ML
2 INJECTION, SOLUTION INTRAMUSCULAR; INTRAVENOUS
Status: DISCONTINUED | OUTPATIENT
Start: 2025-03-28 | End: 2025-03-29 | Stop reason: HOSPADM

## 2025-03-28 RX ORDER — DIPHENHYDRAMINE HCL 25 MG
25 CAPSULE ORAL EVERY 6 HOURS PRN
Status: DISCONTINUED | OUTPATIENT
Start: 2025-03-28 | End: 2025-03-29

## 2025-03-28 RX ORDER — ONDANSETRON 4 MG/1
4 TABLET, ORALLY DISINTEGRATING ORAL EVERY 6 HOURS PRN
Status: DISCONTINUED | OUTPATIENT
Start: 2025-03-28 | End: 2025-03-29

## 2025-03-28 RX ORDER — PROMETHAZINE HYDROCHLORIDE 12.5 MG/1
12.5 SUPPOSITORY RECTAL EVERY 4 HOURS PRN
Status: DISCONTINUED | OUTPATIENT
Start: 2025-03-28 | End: 2025-03-29

## 2025-03-28 RX ORDER — PROMETHAZINE HYDROCHLORIDE 12.5 MG/1
6.25 SUPPOSITORY RECTAL EVERY 4 HOURS PRN
Status: DISCONTINUED | OUTPATIENT
Start: 2025-03-28 | End: 2025-03-29

## 2025-03-28 RX ORDER — ACETAMINOPHEN 325 MG/1
650 TABLET ORAL EVERY 4 HOURS PRN
Status: DISCONTINUED | OUTPATIENT
Start: 2025-03-28 | End: 2025-03-29

## 2025-03-28 RX ORDER — POLYETHYLENE GLYCOL 3350 17 G/17G
17 POWDER, FOR SOLUTION ORAL DAILY PRN
Status: DISCONTINUED | OUTPATIENT
Start: 2025-03-28 | End: 2025-03-29

## 2025-03-28 RX ORDER — GLYCOPYRROLATE 0.2 MG/ML
0.2 INJECTION INTRAMUSCULAR; INTRAVENOUS
Status: DISCONTINUED | OUTPATIENT
Start: 2025-03-28 | End: 2025-03-29 | Stop reason: HOSPADM

## 2025-03-28 RX ORDER — DIPHENHYDRAMINE HYDROCHLORIDE 50 MG/ML
25 INJECTION, SOLUTION INTRAMUSCULAR; INTRAVENOUS EVERY 6 HOURS PRN
Status: DISCONTINUED | OUTPATIENT
Start: 2025-03-28 | End: 2025-03-29

## 2025-03-28 RX ADMIN — Medication 10 ML: at 08:18

## 2025-03-28 RX ADMIN — NICARDIPINE HYDROCHLORIDE 5 MG/HR: 0.1 INJECTION, SOLUTION INTRAVENOUS at 10:12

## 2025-03-28 RX ADMIN — LORAZEPAM 1 MG: 2 INJECTION INTRAMUSCULAR; INTRAVENOUS at 22:46

## 2025-03-28 RX ADMIN — Medication 10 ML: at 08:19

## 2025-03-28 RX ADMIN — INSULIN HUMAN 2 UNITS: 100 INJECTION, SOLUTION PARENTERAL at 06:34

## 2025-03-28 RX ADMIN — NICARDIPINE HYDROCHLORIDE 7.5 MG/HR: 0.1 INJECTION, SOLUTION INTRAVENOUS at 07:34

## 2025-03-28 RX ADMIN — LORAZEPAM 0.5 MG: 2 INJECTION INTRAMUSCULAR; INTRAVENOUS at 21:34

## 2025-03-28 RX ADMIN — MORPHINE SULFATE 4 MG: 2 INJECTION, SOLUTION INTRAMUSCULAR; INTRAVENOUS at 22:46

## 2025-03-28 RX ADMIN — LORAZEPAM 0.5 MG: 2 INJECTION INTRAMUSCULAR; INTRAVENOUS at 17:24

## 2025-03-28 RX ADMIN — NICARDIPINE HYDROCHLORIDE 5 MG/HR: 0.1 INJECTION, SOLUTION INTRAVENOUS at 03:36

## 2025-03-28 RX ADMIN — MORPHINE SULFATE 2 MG: 2 INJECTION, SOLUTION INTRAMUSCULAR; INTRAVENOUS at 17:24

## 2025-03-28 RX ADMIN — MUPIROCIN 1 APPLICATION: 20 OINTMENT TOPICAL at 08:18

## 2025-03-28 RX ADMIN — LORAZEPAM 0.5 MG: 2 INJECTION INTRAMUSCULAR; INTRAVENOUS at 14:25

## 2025-03-28 RX ADMIN — Medication 10 ML: at 21:34

## 2025-03-28 RX ADMIN — MORPHINE SULFATE 2 MG: 2 INJECTION, SOLUTION INTRAMUSCULAR; INTRAVENOUS at 21:34

## 2025-03-28 NOTE — SIGNIFICANT NOTE
03/28/25 0824   OTHER   Discipline occupational therapist   Rehab Time/Intention   Session Not Performed   (discuss medical status with RN and agree hold OT today. Will follow for medical status.)   Recommendation   OT - Next Appointment 03/29/25

## 2025-03-28 NOTE — PROGRESS NOTES
[facility]     Mar/chart reviewed  Follow up ICH  Patient unable to provide subjective    Vital Sign Min/Max for last 24 hours  Temp  Min: 98 °F (36.7 °C)  Max: 99.3 °F (37.4 °C)   BP  Min: 106/73  Max: 153/90   Pulse  Min: 86  Max: 120   Resp  Min: 16  Max: 18   SpO2  Min: 90 %  Max: 100 %   Flow (L/min) (Oxygen Therapy)  Min: 4  Max: 6   Weight  Min: 94 kg (207 lb 3.7 oz)  Max: 94.2 kg (207 lb 10.8 oz)     Appears ill, poorly responsive   Right pupil slightly larger than left, normal sclera  mmm, no jvd, trachea midline, neck supple,  chest cta bilaterally, no crackles, no wheezes,   rrr,   soft, nt, nd +bs,  no c/c/ e  Skin warm, dry no rashes    Labs: 3/28: reviewed:  Glucoe 69  Bun 22  Cr 1.37  Bicab 23  Wbc 10  Hgb 13  Plts 377    3/28: ct head;  No significant change in the size or contour of the   intraparenchymal hemorrhage involving the parieto-occipital region on   the left. The the volume of intraventricular blood in the trace amount   of subarachnoid blood appear unchanged. There is slight enlargement of   the left temporal horn consistent with trapping, similar in appearance   as compared to the prior study.     A/P:  Large ICH in a patient on eliquis at time of admission-- blood pressure control, continue to follow orders as per TAE/neurology --  Dementia  HTN  Recent PE  HTN  Hypothyroidism  7. Dnr/dni    Defer further goals of care/prognosis discussions to neuro/tae  Was unable to get feeding tube placed yesterday

## 2025-03-28 NOTE — CONSULTS
Internal Medicine Consult  INTERNAL MEDICINE   [facility]       Patient Identification:  Name: Eric Kramer  Age: 84 y.o.  Sex: male  :  1940  MRN: [id number]                   Primary Care Physician: Elder Block MD                               Requesting physician: Dr. Karson Montes De Oca  Date of consultation: 3/28/2025    Reason for consultation: Transferred out of ICU to comfort care and resumption of care.    History of Present Illness:   Patient is a 84-year-old male who has complicated past medical history including history of dementia, hypertension, was hospitalized last week with pulmonary embolism and started on anticoagulation therapy, type 2 diabetes hypothyroidism colon cancer coronary artery disease and osteoarthritis presented to the hospital on 3/27/2025 when he was found unresponsive by the family members.  Evaluation in the emergency room revealed large intracranial hemorrhage and systolic blood pressure greater than 200.  Specifically his intracranial hemorrhage was located in the left parietal occipital area with intraventricular extension.  Patient received Kcentra while care discussion was made about intervention versus changing care to comfort and palliation given his progressive decline due to underlying dementia in the last year or so.  Patient was admitted to ICU and seen by neurosurgery and neurology service.  Patient family member did not want intubation or CPR and his CODE STATUS was changed to no code.  Eventually decision was made to pursue end-of-life comfort care because of the slim chance of any meaningful recovery with good quality of life after this event.  Internal medicine service was asked to assume care out of ICU.  Patient himself is unable to engage in information exchange.  Information was gathered from the caring nurse.      Past Medical History:  Past Medical History:   Diagnosis Date    Acute foot pain, left     Acute UTI     Arteriosclerosis  of coronary artery     Basal cell carcinoma (BCC) of face     Benign enlargement of prostate     Cataract     Chest pain     COVID     Diabetic peripheral neuropathy     Edema     Hyperlipidemia     Hypertension     Hypothyroidism     Insomnia     Malignant neoplasm in situ of colon     Memory impairment     MMSE 27/30-5/20/2019    Nephrolithiasis     Osteoarthritis     Prostatitis     Pulmonary embolism     Renal azotemia     Stroke     stroke syndrome    Stroke 03/27/2025    left hemisphere hemmoraghic stroke    Testosterone deficiency     Type 2 diabetes mellitus     Vitamin D deficiency      Past Surgical History:  Past Surgical History:   Procedure Laterality Date    ANGIOPLASTY Right     Cath Laser Angioplasty/right ventricle    APPENDECTOMY      CATARACT EXTRACTION Left     COLECTOMY PARTIAL / TOTAL      due to colon cancer, removed 13 inches of colon    CORONARY ANGIOPLASTY WITH STENT PLACEMENT      TOE SURGERY Right 07/2021      Home Meds:  Medications Prior to Admission   Medication Sig Dispense Refill Last Dose/Taking    ALPRAZolam (XANAX) 2 MG tablet Take 1 tablet by mouth At Night As Needed for Sleep. 90 tablet 1 3/26/2025    Apixaban Starter Pack tablet therapy pack Take 2 tablets by mouth Every 12 (Twelve) Hours for 7 days, THEN 1 tablet Every 12 (Twelve) Hours for 23 days. Indications: DVT/PE (active thrombosis) 74 tablet 0 3/26/2025    aspirin 81 MG EC tablet Take 1 tablet by mouth Daily.   3/26/2025    atorvastatin (LIPITOR) 40 MG tablet Take 1 tablet by mouth Daily. 90 tablet 11 3/26/2025    Cyanocobalamin (B-12) 1000 MCG sublingual tablet One sublingual tab dissolved on tongue daily (Patient taking differently: Take 1 tablet by mouth Daily.) 90 each 3 3/26/2025    DULoxetine (CYMBALTA) 60 MG capsule Take 1 capsule by mouth Daily. 90 capsule 3 3/26/2025    glipizide (Glucotrol) 5 MG tablet Take 1 tablet by mouth Daily With Breakfast. NOTE NEW DIRECTIONS!!! 90 tablet 3 3/26/2025     hydroCHLOROthiazide 25 MG tablet Take 1 tablet by mouth Daily. 90 tablet 3 3/26/2025    levothyroxine (SYNTHROID, LEVOTHROID) 25 MCG tablet Take 1 tablet by mouth Daily. 90 tablet 3 3/26/2025    memantine (Namenda) 10 MG tablet 1 BID (Patient taking differently: Take 1 tablet by mouth Daily.) 180 tablet 1 Taking Differently    metoprolol tartrate (LOPRESSOR) 25 MG tablet Take 1 tablet by mouth 2 (Two) Times a Day. 180 tablet 3 3/26/2025    ramipril (ALTACE) 10 MG capsule Take 1 capsule by mouth Daily. 90 capsule 3 3/26/2025    traMADol (ULTRAM) 50 MG tablet One to two tablets QID prn pain. (Patient taking differently: Take 2 tablets by mouth Every 6 (Six) Hours As Needed.) 240 tablet 3 3/26/2025    nitroglycerin (Nitrostat) 0.4 MG SL tablet Place 1 tablet under the tongue Every 5 (Five) Minutes As Needed for Chest Pain. Take no more than 3 doses in 15 minutes. 25 tablet 12 Unknown     Current Meds:     Current Facility-Administered Medications:     acetaminophen (TYLENOL) tablet 650 mg, 650 mg, Oral, Q4H PRN **OR** acetaminophen (TYLENOL) 160 MG/5ML oral solution 650 mg, 650 mg, Oral, Q4H PRN **OR** acetaminophen (TYLENOL) suppository 650 mg, 650 mg, Rectal, Q4H PRN, Karson Martinez MD    atropine 1 % ophthalmic solution 2 drop, 2 drop, Sublingual, BID PRN, Karson Martinez MD    sennosides-docusate (PERICOLACE) 8.6-50 MG per tablet 2 tablet, 2 tablet, Oral, BID **AND** polyethylene glycol (MIRALAX) packet 17 g, 17 g, Oral, Daily PRN **AND** bisacodyl (DULCOLAX) EC tablet 5 mg, 5 mg, Oral, Daily PRN **AND** bisacodyl (DULCOLAX) suppository 10 mg, 10 mg, Rectal, Daily PRN, Karson Martinez MD    diphenhydrAMINE (BENADRYL) capsule 25 mg, 25 mg, Oral, Q6H PRN **OR** diphenhydrAMINE (BENADRYL) injection 25 mg, 25 mg, Intravenous, Q6H PRN, Karson Martinez MD    diphenoxylate-atropine (LOMOTIL) 2.5-0.025 MG per tablet 1 tablet, 1 tablet, Oral, Q2H PRN, Karson Martinez MD    glycopyrrolate (ROBINUL) injection 0.2 mg,  0.2 mg, Intravenous, Q2H PRN **OR** glycopyrrolate (ROBINUL) injection 0.2 mg, 0.2 mg, Subcutaneous, Q2H PRN **OR** glycopyrrolate (ROBINUL) injection 0.4 mg, 0.4 mg, Intravenous, Q2H PRN **OR** glycopyrrolate (ROBINUL) injection 0.4 mg, 0.4 mg, Subcutaneous, Q2H PRN, Karson Martinez MD    morphine injection 2 mg, 2 mg, Intravenous, Q1H PRN **OR** morphine concentrated solution 5 mg, 5 mg, Sublingual, Q1H PRN **OR** HYDROmorphone (DILAUDID) injection 0.5 mg, 0.5 mg, Intravenous, Q1H PRN, Karson Martinez MD    morphine injection 4 mg, 4 mg, Intravenous, Q1H PRN **OR** morphine concentrated solution 10 mg, 10 mg, Sublingual, Q1H PRN **OR** HYDROmorphone (DILAUDID) injection 1 mg, 1 mg, Intravenous, Q1H PRN, Karson Martinez MD    Morphine (PF) injection 6 mg, 6 mg, Intravenous, Q1H PRN **OR** morphine concentrated solution 20 mg, 20 mg, Sublingual, Q1H PRN **OR** HYDROmorphone (DILAUDID) injection 1.5 mg, 1.5 mg, Intravenous, Q1H PRN, Karson Martinez MD    Lidocaine HCl gel (XYLOCAINE) urethral/mucosal syringe, , Topical, PRN, Karson Martinez MD, 6 mL at 03/27/25 1730    LORazepam (ATIVAN) injection 0.5 mg, 0.5 mg, Intravenous, Q1H PRN, 0.5 mg at 03/28/25 1425 **OR** LORazepam (ATIVAN) injection 0.5 mg, 0.5 mg, Subcutaneous, Q1H PRN **OR** LORazepam (ATIVAN) 2 MG/ML concentrated solution 0.5 mg, 0.5 mg, Sublingual, Q1H PRN, Karson Martinez MD    LORazepam (ATIVAN) injection 1 mg, 1 mg, Intravenous, Q1H PRN **OR** LORazepam (ATIVAN) injection 1 mg, 1 mg, Subcutaneous, Q1H PRN **OR** LORazepam (ATIVAN) 2 MG/ML concentrated solution 1 mg, 1 mg, Sublingual, Q1H PRN, Karson Martinez MD    LORazepam (ATIVAN) injection 2 mg, 2 mg, Intravenous, Q1H PRN **OR** LORazepam (ATIVAN) injection 2 mg, 2 mg, Subcutaneous, Q1H PRN **OR** LORazepam (ATIVAN) 2 MG/ML concentrated solution 2 mg, 2 mg, Sublingual, Q1H PRN, Karson Martinez MD    magic mouthwash oral suspension (mixture) (diphenhydrAMINE HCl - aluminum & magnesium  hydroxide-simethicone - lidocaine viscous) solution 55 mL, 5 mL, Swish & Spit, Q4H PRN, Karson Martinez MD    ondansetron ODT (ZOFRAN-ODT) disintegrating tablet 4 mg, 4 mg, Oral, Q6H PRN **OR** ondansetron (ZOFRAN) injection 4 mg, 4 mg, Intravenous, Q6H PRN, Karson Martinez MD    Polyvinyl Alcohol-Povidone PF (ARTIFICIAL TEARS) 1.4-0.6 % ophthalmic solution 1 drop, 1 drop, Both Eyes, Q30 Min PRN, Karson Martinez MD    prochlorperazine (COMPAZINE) injection 5 mg, 5 mg, Intravenous, Q6H PRN **OR** prochlorperazine (COMPAZINE) tablet 5 mg, 5 mg, Oral, Q6H PRN **OR** prochlorperazine (COMPAZINE) suppository 25 mg, 25 mg, Rectal, Q12H PRN, Karson Martinez MD    promethazine (PHENERGAN) tablet 12.5 mg, 12.5 mg, Oral, Q4H PRN **OR** promethazine (PHENERGAN) suppository 12.5 mg, 12.5 mg, Rectal, Q4H PRN, Karson Martinez MD    promethazine (PHENERGAN) tablet 6.25 mg, 6.25 mg, Oral, Q4H PRN **OR** promethazine (PHENERGAN) suppository 6.25 mg, 6.25 mg, Rectal, Q4H PRN, Karson Martinez MD    scopolamine patch 1 mg/72 hr, 1 patch, Transdermal, Q72H PRN, Karson Martinez MD    sodium chloride 0.9 % flush 10 mL, 10 mL, Intravenous, PRN, Brain Melendrez MD    sodium chloride 0.9 % flush 10 mL, 10 mL, Intravenous, Q12H, Selene Torres APRN, 10 mL at 03/28/25 0819    sodium chloride 0.9 % flush 10 mL, 10 mL, Intravenous, PRN, Selene Torres APRN    sodium chloride 0.9 % flush 10 mL, 10 mL, Intravenous, Q12H, Karson Martinez MD, 10 mL at 03/28/25 0818    sodium chloride 0.9 % flush 10 mL, 10 mL, Intravenous, PRN, Karson Martinez MD    sodium chloride 0.9 % infusion 40 mL, 40 mL, Intravenous, PRN, Selene Torres, APRGEORGES    sodium chloride 0.9 % infusion 40 mL, 40 mL, Intravenous, PRN, Karson Martinez MD    sodium chloride 0.9 % infusion, 50 mL/hr, Intravenous, Continuous, Karson Martinez MD, Last Rate: 50 mL/hr at 03/27/25 1839, 50 mL/hr at 03/27/25 1839    sodium chloride nasal spray 1 spray, 1  "spray, Each DONTA Lozada Kellie, Scott P, MD  Allergies:  No Known Allergies  Social History:   Social History     Tobacco Use    Smoking status: Former     Current packs/day: 0.00     Average packs/day: 1 pack/day for 49.0 years (49.0 ttl pk-yrs)     Types: Cigarettes     Start date: 1956     Quit date: 4/3/1980     Years since quittin.0    Smokeless tobacco: Never    Tobacco comments:     Greg quit 42 plus years ago   Substance Use Topics    Alcohol use: Not Currently     Comment: Next to no liquour      Family History:  Family History   Problem Relation Age of Onset    Colon cancer Mother     Hypertension Mother         colon cancer    Osteoarthritis Mother     Emphysema Father         smoker/tobacco use    Hypertension Father         CPOD    Osteoarthritis Father     Rheum arthritis Sister     Colon cancer Maternal Grandmother     Cataracts Maternal Grandmother     Hypertension Maternal Grandmother         colon cance    Colon cancer Other     Hypertension Sister         She had 2 kinds ???    Hypertension Maternal Uncle         colon cancer    Hypertension Maternal Uncle         colon cancer          Review of Systems  See history of present illness and past medical history.  Could not be obtained from the patient.      Vitals:   /76   Pulse 89   Temp 98.9 °F (37.2 °C) (Oral)   Resp 19   Ht 177.8 cm (70\")   Wt 94 kg (207 lb 3.7 oz)   SpO2 94%   BMI 29.73 kg/m²   I/O:   Intake/Output Summary (Last 24 hours) at 3/28/2025 1525  Last data filed at 3/28/2025 1128  Gross per 24 hour   Intake 1250.5 ml   Output 650 ml   Net 600.5 ml     Exam:  Patient is examined using the personal protective equipment as per guidelines from infection control for this particular patient as enacted.  Hand washing was performed before and after patient interaction.  General Appearance:  Awake talkative confused and does not engage in any meaningful information exchange or follows command.  He is currently laying " naked and removing things from him and is not directable.   Head:    Normocephalic, without obvious abnormality, atraumatic   Eyes:  Pupils reactive   Ears:    Normal external ear canals, both ears   Nose:   Nares normal   Throat: Dry oral mucosa   Neck: Spontaneously moving his head and neck   Back:     Symmetric, no curvature, ROM normal, no CVA tenderness   Lungs:     Clear to auscultation bilaterally, respirations unlabored   Chest Wall:    No tenderness or deformity    Heart:  S1-S2 tachycardic   Abdomen:   Soft nontender   Extremities:   Extremities normal, atraumatic, no cyanosis or edema   Pulses:   Pulses palpable in all extremities; symmetric all extremities   Skin: Abrasions and scabs on his lower extremities noted   Neurologic: Confused spontaneously moving upper and lower extremities with right-sided weakness and spontaneously talking.       Data Review:      I reviewed the patient's new clinical results.  Results from last 7 days   Lab Units 03/28/25  0421 03/27/25  0825   WBC 10*3/mm3 10.20 9.81   HEMOGLOBIN g/dL 13.3 14.5   PLATELETS 10*3/mm3 377 378     Results from last 7 days   Lab Units 03/28/25  0421 03/27/25  0825 03/25/25  1431   SODIUM mmol/L 141 137 140   POTASSIUM mmol/L 3.7 3.5 4.5   CHLORIDE mmol/L 105 99 101   CO2 mmol/L 23.6 25.0 24   BUN mg/dL 22 22 32*   CREATININE mg/dL 1.37* 1.39* 1.57*   CALCIUM mg/dL 9.3 10.0 9.6   GLUCOSE mg/dL 169* 225* 215*     CT Head Without Contrast  Result Date: 3/28/2025  No significant change in the size or contour of the intraparenchymal hemorrhage involving the parieto-occipital region on the left. The the volume of intraventricular blood in the trace amount of subarachnoid blood appear unchanged. There is slight enlargement of the left temporal horn consistent with trapping, similar in appearance as compared to the prior study.      Radiation dose reduction techniques were utilized, including automated exposure modulation based on body size.  This  report was finalized on 3/28/2025 7:39 AM by Dr. Jerry Ordonez M.D on Workstation: [id number]      CT Head Without Contrast Stroke Protocol  Result Date: 3/27/2025  There is a large intraparenchymal hemorrhage involving the parieto-occipital region on the left measuring approximately 4.7 cm in size with intraventricular extension. There is localized edema. There is slight prominence of the left temporal horn as compared to the CT examination 03/16/2025 consistent with trapping.  AI analysis of LVO was utilized.  Radiation dose reduction techniques were utilized, including automated exposure control and exposure modulation based on body size.   This report was finalized on 3/27/2025 3:17 PM by Dr. Jerry Ordonez M.D on Workstation: [id number]      XR Chest 1 View  Result Date: 3/27/2025  No focal pulmonary consolidation. Cardiomegaly. Ectatic aorta.  This report was finalized on 3/27/2025 9:05 AM by Dr. Cristo Barcenas M.D on Workstation: [id number]      CT Angiogram Chest Pulmonary Embolism  Result Date: 3/16/2025   1. Pulmonary embolism seen in the distal right lower lobar pulmonary artery, extending into the superior and posterior basilar segmental and subsegmental pulmonary arteries of the right lower lobe. No evidence of right heart strain. No pulmonary infarct seen. 2. Three-vessel coronary artery atherosclerotic calcifications and aortic valve calcification. 3. Small amount of cylindrical bronchiolectasis 4. Left adrenal nodule, suspect adenoma. If clinical signs or symptoms of adrenal hyperfunction, biochemical evaluation may be appropriate  Call Report: Dr. Guajardo was notified by telephone of the above findings on March 16, 2025 at 11:57 a.m.  This report was finalized on 3/16/2025 11:57 AM by Dr. Eric Franks M.D on Workstation: [id number]      XR Chest 1 View  Result Date: 3/16/2025  Low lung volumes with vascular crowding. No focal opacities.  This report was finalized on 3/16/2025 10:13 AM by  Dr. Eric Franks M.D on Workstation: [id number]      ECG 12 Lead Stroke Evaluation   Final Result   HEART RATE=94  bpm   RR Leiythya=283  ms   DE Oibcedss=518  ms   P Horizontal Axis=24  deg   P Front Axis=52  deg   QRSD Gminjbhx=747  ms   QT Jkogjqbm=452  ms   UCvF=173  ms   QRS Axis=-57  deg   T Wave Axis=61  deg   - ABNORMAL ECG -   Sinus rhythm   Multiple premature complexes, vent & supraven   Prolonged DE interval   Consider  left atrial enlargement   Left axis deviation   Abnormal R-wave progression, late transition   When compared with ECG of 18-Mar-2025 00:05:26,   Significant rate increase   PVCs are new   Electronically Signed By: Bennett Moody ([facility]) 2025-03-28 13:04:08   Date and Time of Study:2025-03-27 08:46:13      Telemetry Scan   Final Result      Telemetry Scan   Final Result      Telemetry Scan   Final Result      Telemetry Scan   Final Result      Telemetry Scan   Final Result      Telemetry Scan   Final Result      Telemetry Scan   Final Result          Assessment:  Active Hospital Problems    Diagnosis  POA    **Intraparenchymal hemorrhage of brain [I61.9]  Yes   Recent pulmonary embolism  Dementia  DNR status  Renal insufficiency    Plan:  Admit the patient to palliative care unit  Consult Dr. Garcia service for resumption of care in a.m. as primary attending to continue palliative care treatment in the structured setting.  Continue with the transfer order for comfort and palliation.    Xander Lott MD   3/28/2025  15:25 EDT    Parts of this note may be an electronic transcription/translation of spoken language to printed text using the Dragon dictation system.

## 2025-03-28 NOTE — PLAN OF CARE
Goal Outcome Evaluation:        Problem: Adult Inpatient Plan of Care  Goal: Plan of Care Review  Outcome: Progressing  Goal: Patient-Specific Goal (Individualized)  Outcome: Progressing  Goal: Absence of Hospital-Acquired Illness or Injury  Outcome: Progressing  Intervention: Identify and Manage Fall Risk  Recent Flowsheet Documentation  Taken 3/28/2025 0400 by Milan Reyes RN  Safety Promotion/Fall Prevention:   safety round/check completed   room organization consistent   clutter free environment maintained  Taken 3/28/2025 0300 by Milan Reyes RN  Safety Promotion/Fall Prevention:   safety round/check completed   room organization consistent   clutter free environment maintained  Taken 3/28/2025 0200 by Milan Reyes RN  Safety Promotion/Fall Prevention:   safety round/check completed   room organization consistent   clutter free environment maintained  Taken 3/28/2025 0100 by Milan Reyes RN  Safety Promotion/Fall Prevention:   safety round/check completed   room organization consistent   clutter free environment maintained  Taken 3/28/2025 0000 by Milan Reyes RN  Safety Promotion/Fall Prevention:   safety round/check completed   room organization consistent   clutter free environment maintained  Taken 3/27/2025 2300 by Milan Reyes RN  Safety Promotion/Fall Prevention:   safety round/check completed   room organization consistent   clutter free environment maintained  Taken 3/27/2025 2200 by Milan Reyes RN  Safety Promotion/Fall Prevention:   safety round/check completed   room organization consistent   clutter free environment maintained  Taken 3/27/2025 2100 by Milan Reyes RN  Safety Promotion/Fall Prevention:   safety round/check completed   room organization consistent   clutter free environment maintained  Taken 3/27/2025 2000 by Milan Reyes RN  Safety Promotion/Fall Prevention:   safety round/check completed   room organization consistent   clutter free environment  maintained  Intervention: Prevent Skin Injury  Recent Flowsheet Documentation  Taken 3/28/2025 0400 by Milan Reyes RN  Body Position:   turned   left  Taken 3/28/2025 0200 by Milan Reyes RN  Body Position:   turned   right  Taken 3/28/2025 0000 by Milan Reyes RN  Body Position:   turned   left  Taken 3/27/2025 2200 by Milan Reyes RN  Body Position:   turned   left  Taken 3/27/2025 2000 by Milan Reyes RN  Body Position:   turned   right  Intervention: Prevent and Manage VTE (Venous Thromboembolism) Risk  Recent Flowsheet Documentation  Taken 3/28/2025 0400 by Milan Reyes RN  VTE Prevention/Management:   bilateral   SCDs (sequential compression devices) on  Taken 3/28/2025 0000 by Milan Reyes RN  VTE Prevention/Management:   bilateral   SCDs (sequential compression devices) on  Taken 3/27/2025 2000 by Milan Reyes RN  VTE Prevention/Management:   bilateral   SCDs (sequential compression devices) on  Intervention: Prevent Infection  Recent Flowsheet Documentation  Taken 3/28/2025 0400 by Milan Reyes RN  Infection Prevention:   rest/sleep promoted   personal protective equipment utilized   hand hygiene promoted   equipment surfaces disinfected   single patient room provided  Taken 3/28/2025 0300 by Milan Reyes RN  Infection Prevention:   rest/sleep promoted   personal protective equipment utilized   hand hygiene promoted   equipment surfaces disinfected   single patient room provided  Taken 3/28/2025 0200 by Milan Reyes RN  Infection Prevention:   rest/sleep promoted   personal protective equipment utilized   hand hygiene promoted   equipment surfaces disinfected   single patient room provided  Taken 3/28/2025 0100 by Milan Reyes RN  Infection Prevention:   rest/sleep promoted   personal protective equipment utilized   hand hygiene promoted   equipment surfaces disinfected   single patient room provided  Taken 3/28/2025 0000 by Milan Reyes RN  Infection Prevention:   rest/sleep  promoted   personal protective equipment utilized   hand hygiene promoted   equipment surfaces disinfected   single patient room provided  Taken 3/27/2025 2300 by Milan Reyes RN  Infection Prevention:   rest/sleep promoted   personal protective equipment utilized   hand hygiene promoted   equipment surfaces disinfected   single patient room provided  Taken 3/27/2025 2200 by Milan Reyes RN  Infection Prevention:   rest/sleep promoted   personal protective equipment utilized   hand hygiene promoted   equipment surfaces disinfected   single patient room provided  Taken 3/27/2025 2100 by Milan Reyes RN  Infection Prevention:   rest/sleep promoted   personal protective equipment utilized   hand hygiene promoted   equipment surfaces disinfected   single patient room provided  Taken 3/27/2025 2000 by Milan Reyes RN  Infection Prevention:   rest/sleep promoted   personal protective equipment utilized   hand hygiene promoted   equipment surfaces disinfected   single patient room provided  Goal: Optimal Comfort and Wellbeing  Outcome: Progressing  Intervention: Provide Person-Centered Care  Recent Flowsheet Documentation  Taken 3/28/2025 0400 by Milan Reyes RN  Trust Relationship/Rapport: care explained  Taken 3/28/2025 0000 by Milan Reyes RN  Trust Relationship/Rapport: care explained  Taken 3/27/2025 2000 by Milan Reyes RN  Trust Relationship/Rapport: care explained  Goal: Readiness for Transition of Care  Outcome: Progressing     Problem: Fall Injury Risk  Goal: Absence of Fall and Fall-Related Injury  Outcome: Progressing  Intervention: Identify and Manage Contributors  Recent Flowsheet Documentation  Taken 3/28/2025 0400 by Milan Reyes RN  Medication Review/Management:   medications reviewed   high-risk medications identified  Taken 3/28/2025 0300 by Milan Reyes RN  Medication Review/Management:   medications reviewed   high-risk medications identified  Taken 3/28/2025 0200 by Milan Reyes  RN  Medication Review/Management:   medications reviewed   high-risk medications identified  Taken 3/28/2025 0100 by Milan Reyes RN  Medication Review/Management:   medications reviewed   high-risk medications identified  Taken 3/28/2025 0000 by Milan Reyes RN  Medication Review/Management:   medications reviewed   high-risk medications identified  Taken 3/27/2025 2300 by Milan Reyes RN  Medication Review/Management:   medications reviewed   high-risk medications identified  Taken 3/27/2025 2200 by Milan Reyes RN  Medication Review/Management:   medications reviewed   high-risk medications identified  Taken 3/27/2025 2100 by Milan Reyes RN  Medication Review/Management:   medications reviewed   high-risk medications identified  Taken 3/27/2025 2000 by Milan Reyes RN  Medication Review/Management:   medications reviewed   high-risk medications identified  Intervention: Promote Injury-Free Environment  Recent Flowsheet Documentation  Taken 3/28/2025 0400 by Milan Reyes RN  Safety Promotion/Fall Prevention:   safety round/check completed   room organization consistent   clutter free environment maintained  Taken 3/28/2025 0300 by Milan Reyes RN  Safety Promotion/Fall Prevention:   safety round/check completed   room organization consistent   clutter free environment maintained  Taken 3/28/2025 0200 by Milan Reyes RN  Safety Promotion/Fall Prevention:   safety round/check completed   room organization consistent   clutter free environment maintained  Taken 3/28/2025 0100 by Milan Reyes RN  Safety Promotion/Fall Prevention:   safety round/check completed   room organization consistent   clutter free environment maintained  Taken 3/28/2025 0000 by Milan Reyes RN  Safety Promotion/Fall Prevention:   safety round/check completed   room organization consistent   clutter free environment maintained  Taken 3/27/2025 2300 by Milan Reyes RN  Safety Promotion/Fall Prevention:   safety  round/check completed   room organization consistent   clutter free environment maintained  Taken 3/27/2025 2200 by Milan Reyes RN  Safety Promotion/Fall Prevention:   safety round/check completed   room organization consistent   clutter free environment maintained  Taken 3/27/2025 2100 by Milan Reyes RN  Safety Promotion/Fall Prevention:   safety round/check completed   room organization consistent   clutter free environment maintained  Taken 3/27/2025 2000 by Milan Reyes RN  Safety Promotion/Fall Prevention:   safety round/check completed   room organization consistent   clutter free environment maintained  Goal: Absence of Fall and Fall-Related Injury  Outcome: Progressing  Intervention: Identify and Manage Contributors  Recent Flowsheet Documentation  Taken 3/28/2025 0400 by Milan Reyes RN  Medication Review/Management:   medications reviewed   high-risk medications identified  Taken 3/28/2025 0300 by Milan Reyes RN  Medication Review/Management:   medications reviewed   high-risk medications identified  Taken 3/28/2025 0200 by Milan Reyes RN  Medication Review/Management:   medications reviewed   high-risk medications identified  Taken 3/28/2025 0100 by Milan Reyes RN  Medication Review/Management:   medications reviewed   high-risk medications identified  Taken 3/28/2025 0000 by Milan Reyes RN  Medication Review/Management:   medications reviewed   high-risk medications identified  Taken 3/27/2025 2300 by Milan Reyes RN  Medication Review/Management:   medications reviewed   high-risk medications identified  Taken 3/27/2025 2200 by Milan Reyes RN  Medication Review/Management:   medications reviewed   high-risk medications identified  Taken 3/27/2025 2100 by Milan Reyes RN  Medication Review/Management:   medications reviewed   high-risk medications identified  Taken 3/27/2025 2000 by Milan Reyes RN  Medication Review/Management:   medications reviewed   high-risk  medications identified  Intervention: Promote Injury-Free Environment  Recent Flowsheet Documentation  Taken 3/28/2025 0400 by Milan Reyes RN  Safety Promotion/Fall Prevention:   safety round/check completed   room organization consistent   clutter free environment maintained  Taken 3/28/2025 0300 by Milan Reyes RN  Safety Promotion/Fall Prevention:   safety round/check completed   room organization consistent   clutter free environment maintained  Taken 3/28/2025 0200 by Milan Reyes RN  Safety Promotion/Fall Prevention:   safety round/check completed   room organization consistent   clutter free environment maintained  Taken 3/28/2025 0100 by Milan Reyes RN  Safety Promotion/Fall Prevention:   safety round/check completed   room organization consistent   clutter free environment maintained  Taken 3/28/2025 0000 by Milan Reyes RN  Safety Promotion/Fall Prevention:   safety round/check completed   room organization consistent   clutter free environment maintained  Taken 3/27/2025 2300 by Milan Reyes RN  Safety Promotion/Fall Prevention:   safety round/check completed   room organization consistent   clutter free environment maintained  Taken 3/27/2025 2200 by Milan Reyes RN  Safety Promotion/Fall Prevention:   safety round/check completed   room organization consistent   clutter free environment maintained  Taken 3/27/2025 2100 by Milan Reyes RN  Safety Promotion/Fall Prevention:   safety round/check completed   room organization consistent   clutter free environment maintained  Taken 3/27/2025 2000 by Milan Reyes RN  Safety Promotion/Fall Prevention:   safety round/check completed   room organization consistent   clutter free environment maintained     Problem: Skin Injury Risk Increased  Goal: Skin Health and Integrity  Outcome: Progressing  Intervention: Optimize Skin Protection  Recent Flowsheet Documentation  Taken 3/28/2025 0400 by Milan Reyes RN  Activity Management: bedrest  Head  of Bed (HOB) Positioning: HOB at 30-45 degrees  Taken 3/28/2025 0200 by Milan Reyes RN  Head of Bed (HOB) Positioning: HOB at 30-45 degrees  Taken 3/28/2025 0000 by Milan Reyes RN  Activity Management: bedrest  Head of Bed (HOB) Positioning: HOB at 30-45 degrees  Taken 3/27/2025 2200 by Milan Reyes RN  Head of Bed (HOB) Positioning: HOB at 30-45 degrees  Taken 3/27/2025 2000 by Milan Reyes RN  Activity Management: bedrest  Head of Bed (HOB) Positioning: HOB at 30-45 degrees     Problem: Stroke, Intracerebral Hemorrhage  Goal: Optimal Coping  Outcome: Progressing  Goal: Effective Bowel Elimination  Outcome: Progressing  Goal: Optimal Cerebral Tissue Perfusion  Outcome: Progressing  Goal: Optimal Cognitive Function  Outcome: Progressing  Goal: Effective Communication Skills  Outcome: Progressing  Goal: Optimal Functional Ability  Outcome: Progressing  Intervention: Optimize Functional Ability  Recent Flowsheet Documentation  Taken 3/28/2025 0400 by Milan Reyes RN  Activity Management: bedrest  Taken 3/28/2025 0000 by Milan Reyes RN  Activity Management: bedrest  Taken 3/27/2025 2000 by Milan Reyes RN  Activity Management: bedrest  Goal: Improved Oral Intake  Outcome: Progressing  Goal: Optimal Pain Control and Function  Outcome: Progressing  Goal: Effective Oxygenation and Ventilation  Outcome: Progressing  Intervention: Optimize Oxygenation and Ventilation  Recent Flowsheet Documentation  Taken 3/28/2025 0400 by Milan Reyes RN  Head of Bed (HOB) Positioning: HOB at 30-45 degrees  Taken 3/28/2025 0200 by Milan Reyes RN  Head of Bed (HOB) Positioning: HOB at 30-45 degrees  Taken 3/28/2025 0000 by Milan Reyes RN  Head of Bed (HOB) Positioning: HOB at 30-45 degrees  Taken 3/27/2025 2200 by Milan Reyes RN  Head of Bed (HOB) Positioning: HOB at 30-45 degrees  Taken 3/27/2025 2000 by Milan Reyes RN  Head of Bed (HOB) Positioning: HOB at 30-45 degrees  Goal: Improved Sensorimotor  Function  Outcome: Progressing  Intervention: Optimize Range of Motion, Motor Control and Function  Recent Flowsheet Documentation  Taken 3/28/2025 0400 by Milan Reyes RN  Positioning/Transfer Devices:   pillows   in use  Taken 3/28/2025 0200 by Milan Reyes RN  Positioning/Transfer Devices:   pillows   in use  Taken 3/28/2025 0000 by Milan Reyes RN  Positioning/Transfer Devices:   pillows   in use  Taken 3/27/2025 2200 by Milan Reyes RN  Positioning/Transfer Devices:   pillows   in use  Taken 3/27/2025 2000 by Milan Reyes RN  Positioning/Transfer Devices:   pillows   in use  Range of Motion: ROM (range of motion) performed  Goal: Safe and Effective Swallow  Outcome: Progressing  Goal: Effective Urinary Elimination  Outcome: Progressing

## 2025-03-28 NOTE — PLAN OF CARE
Goal Outcome Evaluation:              Outcome Evaluation: Speech following. Discussed with RN who reports pt is not appropriate for PO trials. Will s/o at this time. Please re-consult as warranted.

## 2025-03-28 NOTE — CASE MANAGEMENT/SOCIAL WORK
Discharge Planning Assessment  [facility]     Patient Name: Eric Kramer  MRN: [id number]  Today's Date: 3/28/2025    Admit Date: 3/27/2025    Plan: Pending   Discharge Needs Assessment       Row Name 03/28/25 0939       Living Environment    People in Home spouse    Current Living Arrangements home    Primary Care Provided by self    Family Caregiver if Needed spouse;child(mell), adult    Able to Return to Prior Arrangements yes       Resource/Environmental Concerns    Resource/Environmental Concerns none       Transition Planning    Transportation Anticipated family or friend will provide       Discharge Needs Assessment    Equipment Currently Used at Home walker, rolling    Concerns to be Addressed discharge planning                   Discharge Plan       Row Name 03/28/25 0939       Plan    Plan Pending    Patient/Family in Agreement with Plan yes    Plan Comments CCP spoke with patient's spouse over the phone; explained role, verified facesheet, and discussed dc plan. Patient has a walker a home he uses that belonged to his mother in law. He lives with his spouse in a 4 level home with no steps to where he sleeps and no steps to enter. He has used HH in the past when he lived in [place]. He has no history of SNF. Unable to place cotrak yesterday. CCP will follow for discharge planning needs pending clinical course. PARKER, CHENTEW                  Selected Continued Care - Episodes Includes continued care and service providers with selected services from the active episodes listed below             Demographic Summary       Row Name 03/28/25 0938       General Information    Admission Type inpatient    Arrived From home    Referral Source admission list    Reason for Consult discharge planning    Preferred Language English       Contact Information    Permission Granted to Share Info With family/designee                   Functional Status       Row Name 03/28/25 0939       Functional Status    Usual Activity  Tolerance moderate    Current Activity Tolerance moderate       Functional Status, IADL    Medications assistive equipment    Meal Preparation assistive equipment    Housekeeping assistive equipment    Laundry assistive equipment    Shopping assistive equipment       Mental Status    General Appearance WDL WDL                   Psychosocial    No documentation.                  Abuse/Neglect    No documentation.                  Legal    No documentation.                  Substance Abuse    No documentation.                  Patient Forms    No documentation.                     JAMAL Martini

## 2025-03-28 NOTE — PROGRESS NOTES
[facility] NEUROSURGERY INTRACRANIAL PROGRESS NOTE    PATIENT IDENTIFICATION:   Name:  Eric Kramer      MRN:  [id number]     84 y.o.  male               CC: L IPH/IVH      Subjective     Interval History:  No neurological improvement overnight. Dr. Saravia spoke to family last night and they do not want any sort of neurosurgical intervention or intubation. BP improved w/ cardene      Objective     Vital signs in last 24 hours:  Temp:  [98 °F (36.7 °C)-99.3 °F (37.4 °C)] 98.4 °F (36.9 °C)  Heart Rate:  [] 92  Resp:  [18] 18  BP: (106-147)/() 114/65      Intake/Output this shift:  No intake/output data recorded.      Intake/Output last 3 shifts:  I/O last 3 completed shifts:  In: 845.5 [I.V.:845.5]  Out: 650 [Urine:650]    LABS:  Results from last 7 days   Lab Units 03/28/25  0421 03/27/25  0825   WBC 10*3/mm3 10.20 9.81   HEMOGLOBIN g/dL 13.3 14.5   HEMATOCRIT % 42.3 43.7   PLATELETS 10*3/mm3 377 378      Results from last 7 days   Lab Units 03/28/25  0421 03/27/25  0825 03/27/25  0825 03/25/25  1431   SODIUM mmol/L 141  --  137 140   POTASSIUM mmol/L 3.7  --  3.5 4.5   CHLORIDE mmol/L 105  --  99 101   CO2 mmol/L 23.6  --  25.0 24   BUN mg/dL 22  --  22 32*   CREATININE mg/dL 1.37*  --  1.39* 1.57*   GLUCOSE mg/dL 169*   < > 225* 215*   CALCIUM mg/dL 9.3  --  10.0 9.6    < > = values in this interval not displayed.         IMAGING STUDIES:  CT 3/28: stable, no significant interval change    I personally viewed and interpreted the patient's chart, labs, medications, and imaging reviewed and discussed with Dr. Saravia.    Meds reviewed/changed: Yes  Cardene for SBP less than 140      Physical Exam:    General:   Briefly opens eyes to stimulation. Groans/grimaces to pain  CN II:    No visual threat appreciated  CN III IV VI:   Eyes midline , PERRL   CN VII:   No obvious facial weakness.  Motor:    Spontaneous movement in BLE. Localizes to pain in RUE. I did not see his LUE move but RN reports spontaneous  "movement.   Extremities:   Wearing SCD    Assessment & Plan     ASSESSMENT:      Intraparenchymal hemorrhage of brain    84-year-old male with dementia and anticoagulated on Eliquis for PE found to have large left-sided IPH with intraventricular extension after being found down by his wife yesterday.  Unfortunately he has not improved overnight and remains minimally responsive. Repeat CTH stable.  Dr. Saravia spoke to family yesterday and they do not wish for any neurosurgical intervention or intubation.  I spoke to family again today and they still do not wish for any intervention and would like palliative consult. I believe this is appropriate and will order.  Given CT head is stable with no plan for intervention and palliative consult we will sign off.  We will be available if needed. No further recommendations at this time.  Please feel free to contact us for any questions or concerns.     PLAN:     Palliative consult  Recommend SBP < 140 or per neurology  Recommend to continue holding AC        I discussed the patient's findings and my recommendations with patient, family, nursing staff, and Dr. Saravia    During patient visit, I utilized appropriate personal protective equipment including mask and gloves.  Mask used was standard procedure mask. Appropriate PPE was worn during the entire visit.  Hand hygiene was completed before and after.      LOS: 1 day       Selene Torres, APRN  3/28/2025  10:55 EDT    \"Dictated utilizing Dragon dictation\".     "

## 2025-03-28 NOTE — PROGRESS NOTES
"Neurology Consult Note    Consult Date: 3/28/2025    Referring MD: Karson Martinez MD    Reason for Consult I have been asked to see the patient in neurological consultation to render advice and opinion regarding right-sided weakness and aphasia    Interval events  Family has decided to move forward with comfort measures only      Past Medical History:   Diagnosis Date    Acute foot pain, left     Acute UTI     Arteriosclerosis of coronary artery     Basal cell carcinoma (BCC) of face     Benign enlargement of prostate     Cataract     Chest pain     COVID     Diabetic peripheral neuropathy     Edema     Hyperlipidemia     Hypertension     Hypothyroidism     Insomnia     Malignant neoplasm in situ of colon     Memory impairment     MMSE 27/30-5/20/2019    Nephrolithiasis     Osteoarthritis     Prostatitis     Pulmonary embolism     Renal azotemia     Stroke     stroke syndrome    Stroke 03/27/2025    left hemisphere hemmoraghic stroke    Testosterone deficiency     Type 2 diabetes mellitus     Vitamin D deficiency        Exam  /80 (BP Location: Left arm, Patient Position: Lying)   Pulse 93   Temp 99.1 °F (37.3 °C) (Oral)   Resp 16   Ht 177.8 cm (70\")   Wt 94 kg (207 lb 3.7 oz)   SpO2 92%   BMI 29.73 kg/m²   Resting comfortably in bed in no apparent distress.  Awake.      DATA:    Lab Results   Component Value Date    GLUCOSE 169 (H) 03/28/2025    CALCIUM 9.3 03/28/2025     03/28/2025    K 3.7 03/28/2025    CO2 23.6 03/28/2025     03/28/2025    BUN 22 03/28/2025    CREATININE 1.37 (H) 03/28/2025    EGFRIFAFRI 46 (L) 12/07/2021    EGFRIFNONA 39 (L) 12/07/2021    BCR 16.1 03/28/2025    ANIONGAP 12.4 03/28/2025     Lab Results   Component Value Date    WBC 10.20 03/28/2025    HGB 13.3 03/28/2025    HCT 42.3 03/28/2025    MCV 93.0 03/28/2025     03/28/2025     Imaging review:   CThead showed a large left parieto-occipital hemorrhage with intraventricular " extension    Diagnoses:  84-year-old with dementia here with large left posterior parietal infarct with extension to the ventricular system.  ICH score 4.    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    Type 2 diabetes mellitus with peripheral neuropathy  Hypertension  Hyperlipidemia  Pulmonary embolism on Eliquis    Neurology to sign off    Francisca Ridley MD

## 2025-03-28 NOTE — PLAN OF CARE
Goal Outcome Evaluation:              Outcome Evaluation: Palliative transfer from ICU. Pt restless/throwing off pillows and blankets upon arrival. Morphine and ativan given x 1. Per report, family went home for the night and will return tomorrow. Continue with comfort care.                              Arm band on/IV intact

## 2025-03-28 NOTE — CONSULTS
Purpose of the visit was to evaluate for: goals of care/advanced care planning, support for patient/family, hospice referral/discussion, pain/symptom management, withdrawal of interventions, transfer to comfort care bed/unit, and comfort care. Spoke with MD and RN as well as family and discussed palliative care, goals of care, care options, resuscitation status, [facility] status, and discharge options.      Assessment:  Patient is an 85 YO male with a history of Alzheimer's dementia, HTN, recent PE on Eliquis, hypothyroidism, DM II, hx colon CA, CAD, osteoarthritis. He presented to the hospital from home after being found unresponsive, with AMS and R hemiplegia secondary to a large ICH with intraventricular extension. Despite being minimally responsive for almost 2 days, upon assessment he was somewhat alert and verbally responsive albeit confused. He denied pain but nonverbal indicators included restless and non-purposeful movement.  PPS: 20%    Cultural and spiritual needs have been assessed, no further needs identified.    Recommendations/Plan: transfer to [facility] for palliative care.    Other Comments: I met with this patient's spouse Keena, son Linus, daughter Carina and her  at bedside to offer support and to discuss their goals of care for the patient. The patient was unable to meaningfully participate in discussion due to AMS. Keena and the children have good insight into the patient's condition and expected prognosis. They shared that his dementia has been progressing rapidly, and while this sudden decline is unexpected they had been preparing for the inevitable worsening of his condition. They all agree that they should not artificially prolong his life if there is no expectation of making a meaningful recovery; they do not want to pursue CPR, intubation, or artificial nutrition. They want to instead pursue comfort-based end-of-life care on 4, with evaluation for HSB. They want  to manage the patient's chronic foot pain and increasing agitation so that he can be comfortable for what time he has left. They would like to pursue feeding for pleasure if it can be done safely.     Dr. MARY ELLEN Martinez and bedside RN Blanca notified of their wishes and orders received.    We will continue to provide support with this transition.     Kathy Ross, Palliative Care Referral RN

## 2025-03-29 VITALS
RESPIRATION RATE: 18 BRPM | DIASTOLIC BLOOD PRESSURE: 87 MMHG | BODY MASS INDEX: 29.67 KG/M2 | HEART RATE: 72 BPM | WEIGHT: 207.23 LBS | TEMPERATURE: 97.9 F | SYSTOLIC BLOOD PRESSURE: 139 MMHG | HEIGHT: 70 IN | OXYGEN SATURATION: 89 %

## 2025-03-29 PROBLEM — N18.31 STAGE 3A CHRONIC KIDNEY DISEASE: Status: ACTIVE | Noted: 2025-03-29

## 2025-03-29 PROBLEM — G31.1 SENILE DEGENERATION OF BRAIN: Status: ACTIVE | Noted: 2025-03-29

## 2025-03-29 PROBLEM — Z89.421: Status: ACTIVE | Noted: 2025-03-29

## 2025-03-29 PROBLEM — F02.80 DEMENTIA DUE TO ANOTHER GENERAL MEDICAL CONDITION: Status: ACTIVE | Noted: 2025-01-01

## 2025-03-29 PROBLEM — S06.A0XA MIDLINE SHIFT OF BRAIN DUE TO HEMATOMA: Status: ACTIVE | Noted: 2025-01-01

## 2025-03-29 PROBLEM — Z86.711 HISTORY OF PULMONARY EMBOLISM: Status: ACTIVE | Noted: 2025-03-29

## 2025-03-29 PROBLEM — E11.65 TYPE 2 DIABETES MELLITUS WITH HYPERGLYCEMIA: Status: ACTIVE | Noted: 2025-01-01

## 2025-03-29 PROBLEM — G93.6 VASOGENIC BRAIN EDEMA: Status: ACTIVE | Noted: 2025-01-01

## 2025-03-29 PROBLEM — I50.32 CHRONIC HEART FAILURE WITH PRESERVED EJECTION FRACTION (HFPEF): Status: ACTIVE | Noted: 2025-03-29

## 2025-03-29 PROBLEM — I61.5 INTRAVENTRICULAR HEMORRHAGE: Status: ACTIVE | Noted: 2025-03-29

## 2025-03-29 PROBLEM — Z51.5 PALLIATIVE CARE BY SPECIALIST: Status: ACTIVE | Noted: 2025-01-01

## 2025-03-29 PROBLEM — I61.9 INTRACEREBRAL HEMORRHAGE: Status: ACTIVE | Noted: 2025-01-01

## 2025-03-29 PROBLEM — S06.2XAA MIDLINE SHIFT OF BRAIN DUE TO HEMATOMA: Status: ACTIVE | Noted: 2025-03-29

## 2025-03-29 PROCEDURE — 25010000002 MORPHINE PER 10 MG: Performed by: INTERNAL MEDICINE

## 2025-03-29 PROCEDURE — 99222 1ST HOSP IP/OBS MODERATE 55: CPT | Performed by: INTERNAL MEDICINE

## 2025-03-29 PROCEDURE — 25010000002 LORAZEPAM PER 2 MG: Performed by: INTERNAL MEDICINE

## 2025-03-29 RX ORDER — MORPHINE SULFATE 20 MG/ML
20 SOLUTION ORAL
Refills: 0 | Status: CANCELLED | OUTPATIENT
Start: 2025-03-29 | End: 2025-04-04

## 2025-03-29 RX ORDER — MORPHINE SULFATE 20 MG/ML
5 SOLUTION ORAL
Refills: 0 | Status: CANCELLED | OUTPATIENT
Start: 2025-03-29 | End: 2025-04-04

## 2025-03-29 RX ORDER — LORAZEPAM 2 MG/ML
2 INJECTION INTRAMUSCULAR
Status: CANCELLED | OUTPATIENT
Start: 2025-03-29 | End: 2025-04-02

## 2025-03-29 RX ORDER — LORAZEPAM 2 MG/ML
0.5 INJECTION INTRAMUSCULAR
Status: CANCELLED | OUTPATIENT
Start: 2025-03-29 | End: 2025-04-02

## 2025-03-29 RX ORDER — GLYCOPYRROLATE 0.2 MG/ML
0.2 INJECTION INTRAMUSCULAR; INTRAVENOUS
Status: CANCELLED | OUTPATIENT
Start: 2025-03-29

## 2025-03-29 RX ORDER — GLYCOPYRROLATE 0.2 MG/ML
0.4 INJECTION INTRAMUSCULAR; INTRAVENOUS
Status: CANCELLED | OUTPATIENT
Start: 2025-03-29

## 2025-03-29 RX ORDER — ONDANSETRON 2 MG/ML
4 INJECTION INTRAMUSCULAR; INTRAVENOUS EVERY 6 HOURS PRN
Status: CANCELLED | OUTPATIENT
Start: 2025-03-29

## 2025-03-29 RX ORDER — MORPHINE SULFATE 2 MG/ML
2 INJECTION, SOLUTION INTRAMUSCULAR; INTRAVENOUS
Status: CANCELLED | OUTPATIENT
Start: 2025-03-29 | End: 2025-04-04

## 2025-03-29 RX ORDER — ACETAMINOPHEN 650 MG/1
650 SUPPOSITORY RECTAL EVERY 4 HOURS PRN
Status: CANCELLED | OUTPATIENT
Start: 2025-03-29

## 2025-03-29 RX ORDER — SODIUM CHLORIDE 0.9 % (FLUSH) 0.9 %
10 SYRINGE (ML) INJECTION AS NEEDED
Status: CANCELLED | OUTPATIENT
Start: 2025-03-29

## 2025-03-29 RX ORDER — ECHINACEA PURPUREA EXTRACT 125 MG
1 TABLET ORAL AS NEEDED
Status: CANCELLED | OUTPATIENT
Start: 2025-03-29

## 2025-03-29 RX ORDER — BISACODYL 10 MG
10 SUPPOSITORY, RECTAL RECTAL DAILY PRN
Status: CANCELLED | OUTPATIENT
Start: 2025-03-29

## 2025-03-29 RX ORDER — HYDROMORPHONE HYDROCHLORIDE 1 MG/ML
0.5 INJECTION, SOLUTION INTRAMUSCULAR; INTRAVENOUS; SUBCUTANEOUS
Refills: 0 | Status: CANCELLED | OUTPATIENT
Start: 2025-03-29 | End: 2025-04-04

## 2025-03-29 RX ORDER — MORPHINE SULFATE 4 MG/ML
4 INJECTION, SOLUTION INTRAMUSCULAR; INTRAVENOUS
Status: CANCELLED | OUTPATIENT
Start: 2025-03-29 | End: 2025-04-04

## 2025-03-29 RX ORDER — LORAZEPAM 2 MG/ML
1 CONCENTRATE ORAL
Status: CANCELLED | OUTPATIENT
Start: 2025-03-29 | End: 2025-04-02

## 2025-03-29 RX ORDER — LORAZEPAM 2 MG/ML
0.5 CONCENTRATE ORAL
Status: CANCELLED | OUTPATIENT
Start: 2025-03-29 | End: 2025-04-02

## 2025-03-29 RX ORDER — MORPHINE SULFATE 10 MG/ML
6 INJECTION, SOLUTION INTRAMUSCULAR; INTRAVENOUS
Status: CANCELLED | OUTPATIENT
Start: 2025-03-29 | End: 2025-04-04

## 2025-03-29 RX ORDER — MORPHINE SULFATE 20 MG/ML
10 SOLUTION ORAL
Refills: 0 | Status: CANCELLED | OUTPATIENT
Start: 2025-03-29 | End: 2025-04-04

## 2025-03-29 RX ORDER — HYDROMORPHONE HYDROCHLORIDE 2 MG/ML
1.5 INJECTION, SOLUTION INTRAMUSCULAR; INTRAVENOUS; SUBCUTANEOUS
Refills: 0 | Status: CANCELLED | OUTPATIENT
Start: 2025-03-29 | End: 2025-04-04

## 2025-03-29 RX ORDER — SCOPOLAMINE 1 MG/3D
1 PATCH, EXTENDED RELEASE TRANSDERMAL
Status: CANCELLED | OUTPATIENT
Start: 2025-03-29

## 2025-03-29 RX ORDER — LORAZEPAM 2 MG/ML
1 INJECTION INTRAMUSCULAR
Status: CANCELLED | OUTPATIENT
Start: 2025-03-29 | End: 2025-04-02

## 2025-03-29 RX ORDER — LORAZEPAM 2 MG/ML
2 CONCENTRATE ORAL
Status: CANCELLED | OUTPATIENT
Start: 2025-03-29 | End: 2025-04-02

## 2025-03-29 RX ADMIN — MORPHINE SULFATE 4 MG: 2 INJECTION, SOLUTION INTRAMUSCULAR; INTRAVENOUS at 12:40

## 2025-03-29 RX ADMIN — LORAZEPAM 2 MG: 2 INJECTION INTRAMUSCULAR; INTRAVENOUS at 12:40

## 2025-03-29 RX ADMIN — MORPHINE SULFATE 4 MG: 2 INJECTION, SOLUTION INTRAMUSCULAR; INTRAVENOUS at 08:57

## 2025-03-29 RX ADMIN — Medication 10 ML: at 08:58

## 2025-03-29 RX ADMIN — MORPHINE SULFATE 4 MG: 2 INJECTION, SOLUTION INTRAMUSCULAR; INTRAVENOUS at 04:56

## 2025-03-29 RX ADMIN — LORAZEPAM 1 MG: 2 INJECTION INTRAMUSCULAR; INTRAVENOUS at 04:57

## 2025-03-29 RX ADMIN — LORAZEPAM 2 MG: 2 INJECTION INTRAMUSCULAR; INTRAVENOUS at 08:57

## 2025-03-29 NOTE — PLAN OF CARE
"Goal Outcome Evaluation:      Patient resting comfortably this shift. Patient did have a fall last night, restless this am, ativan increased. Pre medication prior to repositioning with 4mg morphine and 2mg ativan. PPS 10%.  HSB admitted today. Family visited this shift and just want him to be \"safe and comfortable.\" Will cont comfort care.                                      "

## 2025-03-29 NOTE — NURSING NOTE
Pt comfortable after medication. After discussion with wife and patient's past pain problems, patient will be premedicated before turns for pain management. Pt premedicated with 4mg morphine, and ativan 1mg for restlessness. Pt voiding appropriately. No current needs, plan of care ongoing.

## 2025-03-29 NOTE — CONSULTS
HSB Admission: 3/29/25  [facility] ID: [id number]  Diagnosis: intraparenchymal hemorrhage (I61.9) vasogenic brain edema (G93.6) intraventricula hemorrhage (I61.9) dementia (F02.80)  Symptom Management: anxiety/dyspnea/pain    Met with wife Lauren Kramer, son Linus, and dtr Carina  at bedside. Explanation of services provided with a focus on HSB ([facility] Scattered [facility]). Answered all questions, discussed medications, symptom management needs, and goals of care. [facility] services selected. [facility] consent forms completed and signed by spouse. [facility] information provided and encouraged to reach out with any needs.    Benefit change completed and copy left with Leidy Canales CCP. [facility] daily visits will begin tomorrow 3/30/25.    Please call with any questions, concerns, or change in patient status. Thank you for allowing us the opportunity to participate in the care of this patient/family.    Iesha Olmos, RN, BSN  [facility] Admissions  [phone]

## 2025-03-29 NOTE — NURSING NOTE
Patient fell tonight. Staff responded to bed alarm and found patient down on his Right side, on the floor. Staff assisted patient back to bed. Pt's toe was bleeding, appears previously traumatized and wound had reopened after fall. Pt has some swelling to right side of forehead. Spouse called and updated of patients condition, open wound and new swelling. Spouse does not wish to do any imaging or pursue treatment for fall. Comfort measures continue. Provider updated and no new orders placed. PRN ativan and morphine given after fall.

## 2025-03-29 NOTE — H&P
[facility]  Palliative Care/Hospice Admit/Consult Note     Referring Provider: Karson Martinez MD   Reason for Consultation: Palliative/Hospice Care  Date of Admission:  3/27/2025    Patient Care Team:  Elder Block MD as PCP - General (Family Medicine)  Omar Garcia, RN as Ambulatory  ([facility])  Waldemar Garcia MD as Attending Provider (Hospice and Palliative Medicine)    Chief complaint:  ICH    History of present illness:  The patient is a 84 y.o. male who presented to the ED 3/27/2025 with a right-sided deficit.  Patient has history of diabetes.  The patient was hospitalized 3/16 - 3/18/2025 for PE. He was discharged  on Eliquis. Normally, patient is able to ambulate and take care of himself.   Last known normal was 3/26/2025 around midnight.  Patient was found on the floor 3/27/2025 around 0645.  Patient unable to get up.  Unable to use his right side.  His speech is limited.  Follows some commands.      Neurology reported a  history of dementia. He has had sundowning spells recently and he was found knocking on patient's neighbors door and unclear reasons and was very confused.  Has been getting on and off progressively confused and also having cognitive problems     CT head on admission:  There is a large intraparenchymal hemorrhage involving the parieto-occipital region on the left measuring approximately 4.7 cm in size with intraventricular extension. There is localized edema. There is slight prominence of the left temporal horn as compared to the CT examination 03/16/2025 consistent with trapping.    Neurosurgery evaluated the patient and reported:  In discussion with the family, per patient's wishes and progressive cognitive decline they would not want any sort of surgical intervention including hematoma evacuation or EVD placement. Additionally they would not want the patient to be intubated.     Subsequently, the patient was transferred to [facility] for  palliative/hospice care. I was asked to assume the patient's care.    No family present at the time of my evaluation. The patient was not awake and no ROS obtainable.     The patient's RN reported the patient did fall last night and has a small hematoma right lateral forehead. The patient's wife was notified and no workup recommended.    Review of Systems  Review of systems could not be obtained due to   patient sedation status. patient unresponsive.    Palliative Performance Scale  Palliative Performance Scale Score: 20%  [place] Symptom Assessment System Revised  Pain Score: no pain   ESAS Tiredness Score: 6  ESAS Nausea Score: No nausea  ESAS Depression Score: No depression  ESAS Anxiety Score: 4  ESAS Drowsiness Score: 4  ESAS Lack of Appetite Score: 8  ESAS Wellbeing Score: 6  ESAS Dyspnea Score: No shortness of breath  ESAS Other Problem Score: Best possible response  ESAS Source of Information: healthcare professional caregiver  ESAS Intervention: medicated/see MAR  ESAS Intervention Response: tolerated    History  Past Medical History:   Diagnosis Date    Acute foot pain, left     Acute UTI     Arteriosclerosis of coronary artery     Basal cell carcinoma (BCC) of face     Benign enlargement of prostate     Cataract     Chest pain     COVID     Diabetic peripheral neuropathy     Edema     Hyperlipidemia     Hypertension     Hypothyroidism     Insomnia     Malignant neoplasm in situ of colon     Memory impairment     MMSE 27/30-5/20/2019    Nephrolithiasis     Osteoarthritis     Prostatitis     Pulmonary embolism     Renal azotemia     Stroke     stroke syndrome    Stroke 03/27/2025    left hemisphere hemmoraghic stroke    Testosterone deficiency     Type 2 diabetes mellitus     Vitamin D deficiency    ,   Past Surgical History:   Procedure Laterality Date    ANGIOPLASTY Right     Cath Laser Angioplasty/right ventricle    APPENDECTOMY      CATARACT EXTRACTION Left     COLECTOMY PARTIAL / TOTAL      due to  colon cancer, removed 13 inches of colon    CORONARY ANGIOPLASTY WITH STENT PLACEMENT      TOE SURGERY Right 2021   ,   Family History   Problem Relation Age of Onset    Colon cancer Mother     Hypertension Mother         colon cancer    Osteoarthritis Mother     Emphysema Father         smoker/tobacco use    Hypertension Father         CPOD    Osteoarthritis Father     Rheum arthritis Sister     Colon cancer Maternal Grandmother     Cataracts Maternal Grandmother     Hypertension Maternal Grandmother         colon cance    Colon cancer Other     Hypertension Sister         She had 2 kinds ???    Hypertension Maternal Uncle         colon cancer    Hypertension Maternal Uncle         colon cancer   , and   Social History     Socioeconomic History    Marital status:    Tobacco Use    Smoking status: Former     Current packs/day: 0.00     Average packs/day: 1 pack/day for 49.0 years (49.0 ttl pk-yrs)     Types: Cigarettes     Start date: 1956     Quit date: 4/3/1980     Years since quittin.0    Smokeless tobacco: Never    Tobacco comments:     Greg quit 42 plus years ago   Vaping Use    Vaping status: Never Used   Substance and Sexual Activity    Alcohol use: Not Currently     Comment: Next to no liquour    Drug use: No    Sexual activity: Not Currently     Partners: Female     Birth control/protection: Depo-provera, Vasectomy     E-cigarette/Vaping    E-cigarette/Vaping Use Never User     Passive Exposure No     Counseling Given No      E-cigarette/Vaping Substances    Nicotine No     THC No     Flavoring No      E-cigarette/Vaping Devices    Disposable No     Pre-filled or Refillable Cartridge No     Refillable Tank No     Pre-filled Pod No       Allergy Patient has no known allergies.    Vital Signs   Temp:  [98.2 °F (36.8 °C)-99.1 °F (37.3 °C)] 98.2 °F (36.8 °C)  Heart Rate:  [] 88  Resp:  [16-20] 18  BP: (109-177)/(68-94) 177/94  Device (Oxygen Therapy): room airFlow (L/min) (Oxygen  Therapy):  [2] 2SpO2:  [92 %-100 %] 95 %    Physical Exam:  General Appearance:   Not awake and appears in no acute distress lying slightly on his right side, chronically ill appearing elderly male   Head:    Normocephalic, without obvious abnormality   Eyes:            Lids and lashes normal, conjunctivae and sclerae normal, no icterus   Ears:    Ears appear intact with no abnormalities noted   Throat:   No oral lesions, oral mucosa slightly dry with mouth open   Neck:   No adenopathy, supple, trachea midline, no thyromegaly   Back:     No scoliosis present   Lungs:     Clear to auscultation with coarse breath sounds, end inspiratory wheeze/rhonchi right > left, at times partially obstructed upper airways, respirations with increased inspiratory effort and increased tidal volume breaths    Heart:    Regular rhythm and normal rate       Abdomen:     Soft and non-tender, non-distended   Genitalia:    Deferred   Extremities:  No edema, pale and no cyanosis    Pulses:  Radial pulses palpable    Skin:   No bleeding         Neurologic:  Not awake to test      Results Review:   I reviewed the patient's new clinical results.    Impression:      Intraparenchymal hemorrhage of brain    Palliative care by specialist    Vasogenic brain edema    Intraventricular hemorrhage    Midline shift of brain due to hematoma    Hypertension    CAD (coronary artery disease)    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 behavioral disturbance, psychotic disturbance, mood disturbance, or anxiety    Cellulitis of right great toe, hallux deformity    Stage 3a chronic kidney disease    Type 2 diabetes mellitus with hyperglycemia    Chronic heart failure with preserved ejection fraction (HFpEF)    Senile degeneration of brain    Dementia due to another general medical condition    Diabetic peripheral neuropathy    Diabetic ulcer of toe of right foot associated with diabetes mellitus due to underlying condition, limited to breakdown of  skin    Acquired hypothyroidism    History of pulmonary embolism    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 No family present at the time of my evaluation. I reviewed with the RN. I examined the patient. With medications previously administered, the patient appears comfortable. The patient has required 1 dose of 4 mg morphine, 3 doses yesterday after transfer, and 1 dose of 1 mg Ativan, 3 doses of 0.5 mg yesterday, thus far today. Medications to be continued and adjusted as needed for symptom management. No attempts at resuscitation will be made.     Await hospice evaluation for hospice scattered bed status.      Waldemar Garcia MD  Hospice and Palliative Medicine  03/29/25  10:14 EDT

## 2025-03-30 NOTE — NURSING NOTE
Pt PPS 10%. Pt opens eyes for turns, but otherwise rest comfortably. Pt has no current needs, premedication of 4mg morphine and 2mg ativan continued. Plan of kindness ongoing.

## 2025-03-30 NOTE — PLAN OF CARE
Care Management Note      Chart reviewed. Plan of care discussed with neurosurgery NP, bedside nurse and MSW today. She is POD 2 L4-S1 ALIF. Plan is for her to return home with her . RW was ordered 4/11. She has no other discharge planning needs at this time.    Goal Outcome Evaluation:  Plan of Care Reviewed With: spouse, child        Progress: declining  Outcome Evaluation: Pt unresponsive. Premed 4mg morphine, 2mg Ativan. Spouse and son visited this afternoon. Pt appears comfortable and in no acute pain/distress at this time.

## 2025-03-30 NOTE — H&P
[facility]  Palliative Care/Hospice Admit/Consult Note     Referring Provider: Karson Martinez MD   Reason for Consultation: Palliative/Hospice Care  Date of Admission:  3/29/2025    Patient Care Team:  Elder Block MD as PCP - General (Family Medicine)  Omar Garcia, RN as Ambulatory  ([facility])  Waldemar Garcia MD as Attending Provider (Hospice and Palliative Medicine)    Chief complaint:  ICH    History of present illness:  The patient is a 84 y.o. male who presented to the ED 3/27/2025 with a right-sided deficit.  Patient has history of diabetes.  The patient was hospitalized 3/16 - 3/18/2025 for PE. He was discharged  on Eliquis. Normally, patient is able to ambulate and take care of himself.   Last known normal was 3/26/2025 around midnight.  Patient was found on the floor 3/27/2025 around 0645.  Patient unable to get up.  Unable to use his right side.  His speech was limited.  Followed some commands initially.      Neurology reported a  history of dementia. He has had sundowning spells recently and he was found knocking on patient's neighbors door and unclear reasons and was very confused.  Has been getting on and off progressively confused and also having cognitive problems     CT head on admission:  There is a large intraparenchymal hemorrhage involving the parieto-occipital region on the left measuring approximately 4.7 cm in size with intraventricular extension. There is localized edema. There is slight prominence of the left temporal horn as compared to the CT examination 03/16/2025 consistent with trapping.    Neurosurgery evaluated the patient and reported:  In discussion with the family, per patient's wishes and progressive cognitive decline they would not want any sort of surgical intervention including hematoma evacuation or EVD placement. Additionally they would not want the patient to be intubated.     Subsequently, the patient was transferred  to [facility] for palliative/hospice care.  Hospice evaluated the patient and reviewed with the patient's family and all agreed to discharge from acute care and readmitted as a hospice scattered bed patient 3/29/2025.    No family present at the time of my evaluation.  With medications previously administered, the patient appears comfortable.  She the patient was not awake and no ROS obtainable.     Review of Systems  Review of systems could not be obtained due to   patient sedation status. patient unresponsive.    Palliative Performance Scale  Palliative Performance Scale Score: 10%  [place] Symptom Assessment System Revised  Pain Score: no pain   ESAS Tiredness Score: Worst possible tiredness  ESAS Nausea Score: No nausea  ESAS Depression Score: No depression  ESAS Anxiety Score: No anxiety  ESAS Drowsiness Score: Worst possible drowsiness  ESAS Lack of Appetite Score: unable to assess  ESAS Wellbeing Score: 1  ESAS Dyspnea Score: No shortness of breath  ESAS Other Problem Score: Best possible response  ESAS Source of Information: healthcare professional caregiver  ESAS Intervention: medicated/see MAR  ESAS Intervention Response: tolerated    History  Past Medical History:   Diagnosis Date    Acute foot pain, left     Acute UTI     Arteriosclerosis of coronary artery     Basal cell carcinoma (BCC) of face     Benign enlargement of prostate     Cataract     Chest pain     COVID     Diabetic peripheral neuropathy     Edema     Hyperlipidemia     Hypertension     Hypothyroidism     Insomnia     Malignant neoplasm in situ of colon     Memory impairment     MMSE 27/30-5/20/2019    Nephrolithiasis     Osteoarthritis     Prostatitis     Pulmonary embolism     Renal azotemia     Stroke     stroke syndrome    Stroke 03/27/2025    left hemisphere hemmoraghic stroke    Testosterone deficiency     Type 2 diabetes mellitus     Vitamin D deficiency    ,   Past Surgical History:   Procedure Laterality Date    ANGIOPLASTY Right      Cath Laser Angioplasty/right ventricle    APPENDECTOMY      CATARACT EXTRACTION Left     COLECTOMY PARTIAL / TOTAL      due to colon cancer, removed 13 inches of colon    CORONARY ANGIOPLASTY WITH STENT PLACEMENT      TOE SURGERY Right 2021   ,   Family History   Problem Relation Age of Onset    Colon cancer Mother     Hypertension Mother         colon cancer    Osteoarthritis Mother     Emphysema Father         smoker/tobacco use    Hypertension Father         CPOD    Osteoarthritis Father     Rheum arthritis Sister     Colon cancer Maternal Grandmother     Cataracts Maternal Grandmother     Hypertension Maternal Grandmother         colon cance    Colon cancer Other     Hypertension Sister         She had 2 kinds ???    Hypertension Maternal Uncle         colon cancer    Hypertension Maternal Uncle         colon cancer   , and   Social History     Socioeconomic History    Marital status:    Tobacco Use    Smoking status: Former     Current packs/day: 0.00     Average packs/day: 1 pack/day for 49.0 years (49.0 ttl pk-yrs)     Types: Cigarettes     Start date: 1956     Quit date: 4/3/1980     Years since quittin.0    Smokeless tobacco: Never    Tobacco comments:     Greg quit 42 plus years ago   Vaping Use    Vaping status: Never Used   Substance and Sexual Activity    Alcohol use: Not Currently     Comment: Next to no liquour    Drug use: No    Sexual activity: Not Currently     Partners: Female     Birth control/protection: Depo-provera, Vasectomy     E-cigarette/Vaping    E-cigarette/Vaping Use Never User     Passive Exposure No     Counseling Given No      E-cigarette/Vaping Substances    Nicotine No     THC No     Flavoring No      E-cigarette/Vaping Devices    Disposable No     Pre-filled or Refillable Cartridge No     Refillable Tank No     Pre-filled Pod No       Allergy Patient has no known allergies.    Vital Signs   Temp:  [97.9 °F (36.6 °C)-98 °F (36.7 °C)] 98 °F (36.7 °C)  Heart Rate:   [70-72] 70  Resp:  [18] 18  BP: (139)/(87) 139/87  Device (Oxygen Therapy): room air SpO2:  [76 %-89 %] 76 %    Physical Exam:  General Appearance:   Not awake and appears in no acute distress lying slightly on his right side, chronically ill appearing elderly male   Head:    Normocephalic, without obvious abnormality   Eyes:            Lids and lashes normal, conjunctivae and sclerae normal, no icterus   Ears:    Ears appear intact with no abnormalities noted   Throat:   No oral lesions, oral mucosa slightly dry with mouth open   Neck:   No adenopathy, supple, trachea midline, no thyromegaly   Back:     No scoliosis present   Lungs:     Clear to auscultation with coarse breath sounds, respirations with slight increased inspiratory effort     Heart:    Regular rhythm and normal rate       Abdomen:     Soft and non-tender, non-distended   Genitalia:    Deferred   Extremities:  No edema, pale and ashen and cyanosis evident    Pulses:  Radial pulses palpable    Skin:   No bleeding         Neurologic:  Not awake to test      Results Review:   I reviewed the patient's new clinical results.    Impression:      Intraparenchymal hemorrhage of brain    Hospice care patient    Vasogenic brain edema    Intraventricular hemorrhage    Midline shift of brain due to hematoma    Hypertension    CAD (coronary artery disease)    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 behavioral disturbance, psychotic disturbance, mood disturbance, or anxiety    Stage 3a chronic kidney disease    Type 2 diabetes mellitus with hyperglycemia    Chronic heart failure with preserved ejection fraction (HFpEF)    Senile degeneration of brain    Dementia due to another general medical condition    Diabetic peripheral neuropathy    Acquired hypothyroidism    History of pulmonary embolism    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 No family present at  the time of my evaluation. I reviewed with the RN. I examined the patient. With medications previously administered, the patient appears comfortable. The patient has required 2 doses of 4 mg morphine and 2 doses of 2 mg Ativan thus far today. Medications will be continued and adjusted as needed for symptom management. No attempts at resuscitation will be made.       Waldemar Garcia MD  Hospice and Palliative Medicine  03/30/25  08:35 EDT

## 2025-03-30 NOTE — DISCHARGE SUMMARY
[facility]    Date of Admission:   3/27/2025  Date of Discharge:   3/29/2025    Patient Care Team:  Elder Blokc MD as PCP - General (Family Medicine)  Omar Garcia RN as Ambulatory  (Population Health)  Waldemar Garcia MD as Attending Provider (Hospice and Palliative Medicine)    Discharge Diagnosis:     Intraparenchymal hemorrhage of brain    Palliative care by specialist    Vasogenic brain edema    Intraventricular hemorrhage    Midline shift of brain due to hematoma    Hypertension    CAD (coronary artery disease)    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 behavioral disturbance, psychotic disturbance, mood disturbance, or anxiety    Cellulitis of right great toe, hallux deformity    Stage 3a chronic kidney disease    Type 2 diabetes mellitus with hyperglycemia    Chronic heart failure with preserved ejection fraction (HFpEF)    Senile degeneration of brain    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 male who presented to the ED 3/27/2025 with a right-sided deficit.  Patient has history of diabetes.  The patient was hospitalized 3/16 - 3/18/2025 for PE. He was discharged  on Eliquis. Normally, patient is able to ambulate and take care of himself.   Last known normal was 3/26/2025 around midnight.  Patient was found on the floor 3/27/2025 around 0645.  Patient unable to get up.  Unable to use his right side.    CT head on admission:  There is a large intraparenchymal hemorrhage involving the parieto-occipital region on the left measuring approximately 4.7 cm in size with intraventricular extension. There is localized edema.  There is slight prominence of the left temporal horn as compared to the CT examination 03/16/2025 consistent with trapping.      Neurosurgery evaluated the patient and reported:  In discussion with the family, per patient's wishes and progressive cognitive decline they would not want any sort of surgical intervention including hematoma evacuation or EVD placement. Additionally they would not want the patient to be intubated.     I was asked to assume the patient's care.  Please see my history and physical performed 3/29/2025 at 0805 hrs.  Subsequently, I was called that hospice evaluated the patient and all agreed to discharge her from acute care and readmit him as a hospice scattered bed patient.    Procedures Performed: None     Consults:   Consults       Date and Time Order Name Status Description    3/28/2025  2:22 PM Inpatient Hospitalist Consult Completed     3/27/2025  9:42 AM Inpatient Neurosurgery Consult Completed     3/27/2025  8:34 AM Neurosurgery (on-call MD unless specified) Completed     3/27/2025  8:23 AM Inpatient Neurology Consult Stroke Completed     3/27/2025  8:23 AM Inpatient Neurology Consult Stroke Completed     3/16/2025  1:55 PM Inpatient Podiatry Consult Completed             Pertinent Test Results: Reviewed    Condition on Discharge: Poor    Palliative Performance Scale  Palliative Performance Scale Score: 10%  [place] Symptom Assessment System Revised  Pain Score: 2   ESAS Tiredness Score: Worst possible tiredness  ESAS Nausea Score: No nausea  ESAS Depression Score: No depression  ESAS Anxiety Score: No anxiety  ESAS Drowsiness Score: Worst possible drowsiness  ESAS Lack of Appetite Score: Worst lack of appetite  ESAS Wellbeing Score: Best wellbeing  ESAS Dyspnea Score: No shortness of breath  ESAS Other Problem Score: Best possible response  ESAS Source of Information: healthcare professional caregiver  ESAS Intervention: medicated/see MAR  ESAS Intervention Response:  tolerated    Vital Signs  Temp:  [97.9 °F (36.6 °C)-98.2 °F (36.8 °C)] 97.9 °F (36.6 °C)  Heart Rate:  [] 72  Resp:  [18] 18  BP: (139-177)/(87-94) 139/87  Device (Oxygen Therapy): room air SpO2:  [89 %-95 %] 89 %    Physical Exam: Please see my history and physical performed 3/29/2025 at 0805 hrs.    Discharge Disposition  Hospice/Medical Facility ([facility])    Discharge Medications: Same MAR      Discharge Diet: As tolerated      Activity at Discharge: As tolerated      Follow-up Appointments  Future Appointments   Date Time Provider Department Center   6/12/2025  9:45 AM Elder Block MD [id number]         Test Results Pending at Discharge: None       Waldemar Garcia MD  03/29/25  20:57 EDT    Time: I spent 70 minutes on this discharge activity which included: face-to-face encounter with the patient, reviewing the data in the system, coordination of the care with the nursing staff as well as documentation and entering orders.

## 2025-03-31 NOTE — PROGRESS NOTES
Case Management Discharge Note      Final Note: [facility] scattered bed on 3/29/25. FAINA Canales RN, CCP.         Selected Continued Care - Discharged on 3/29/2025 Admission date: 3/27/2025 - Discharge disposition: Hospice/Medical Facility ([facility] - [facility])      Destination Coordination complete.      Service Provider Services Address Phone Fax Patient Preferred    [facility] [address] [phone] --              Durable Medical Equipment    No services have been selected for the patient.                Dialysis/Infusion    No services have been selected for the patient.                Home Medical Care    No services have been selected for the patient.                Therapy    No services have been selected for the patient.                Community Resources    No services have been selected for the patient.                Community & DME    No services have been selected for the patient.                    Selected Continued Care - Episodes Includes continued care and service providers with selected services from the active episodes listed below               Final Discharge Disposition Code: 51 - hospice medical facility

## 2025-03-31 NOTE — PROGRESS NOTES
SB team SW met with patient and family to explain role of SB team SW and assess for needs. Patient was lying in his hospital bed, unresponsive with no signs of pain or discomfort. Patient's wife  were at the bedside. Patient is currently GIP at [facility]. SW sat with family to provide supportive presence. Patient is currently a 10% PPS and is unresponsive, therefore SW unable to complete PHQ9. Patient has two adult children that are both involved in patient's care. Patient was living at home with his wife prior to this hospital stay. Family would like for patient to remain in a SB for EOL care due to his imminent prognosis. SW educated on bereavement services provided by [facility] and family voiced understanding.  SW will visit on a weekly and PRN basis to offer EOL support and assist with needs.

## 2025-03-31 NOTE — PLAN OF CARE
Goal Outcome Evaluation:  Plan of Care Reviewed With: patient, spouse, child        Progress: declining  Outcome Evaluation: PPS 10%. Pt bedbound, non responsive, no po intake. Premedicating with Morphine 4mg, Ativan 2mg q4hrs prn. One additional Morphine dose administered for grimacing and moaning. F/C to bsd. Nsg to cont with plan of care.

## 2025-03-31 NOTE — NURSING NOTE
Pt Premedicated with 4mg morphine and 2 mg ativan. Pt comfortable for turns. Plan of comfort and kindness ongoing.

## 2025-03-31 NOTE — PROGRESS NOTES
[facility]  Palliative Care/Hospice Follow Up Note       LOS: 2 days   Patient Care Team:  Elder Block MD as PCP - General (Family Medicine)  Omar Garcia RN as Ambulatory  ([facility])  Waldemar Garcia MD as Attending Provider (Hospice and Palliative Medicine)    Chief Complaint:  ICH    Interval History:     Patient Complaints: None  Patient Denies: None  History taken from: RN  Review of Systems:  As above.    Palliative Performance Scale  Palliative Performance Scale Score: 10%  [place] Symptom Assessment System Revised  Pain Score: no pain   ESAS Tiredness Score: Worst possible tiredness  ESAS Nausea Score: No nausea  ESAS Depression Score: No depression  ESAS Anxiety Score: No anxiety  ESAS Drowsiness Score: Worst possible drowsiness  ESAS Lack of Appetite Score: Worst lack of appetite  ESAS Wellbeing Score: Best wellbeing  ESAS Dyspnea Score: No shortness of breath  ESAS Other Problem Score: Best possible response  ESAS Source of Information: healthcare professional caregiver  ESAS Intervention: medicated/see MAR  ESAS Intervention Response: tolerated    Vital Signs  Temp:  [98.9 °F (37.2 °C)-100.5 °F (38.1 °C)] 100.5 °F (38.1 °C)  Heart Rate:  [69-80] 69  Resp:  [16-19] 19  BP: (127-128)/(62-71) 128/62  Device (Oxygen Therapy): room air SpO2:  [82 %-84 %] 82 %    Physical Exam:  General Appearance:    Not awake and appears in no acute distress lying slightly on his right side, chronically ill-appearing elderly male   Throat:   No oral lesions, oral mucosa somewhat moist   Neck:   No adenopathy, supple, trachea midline   Lungs:     Clear to auscultation with coarse inspiratory and expiratory breath sounds, respirations with slight increase inspiratory effort and rate and increased tidal volume breath     Heart:    Regular rhythm and normal rate   Abdomen:     Soft and non-tender, non-distended   Extremities:   No edema, pale and ashen and cyanosis evident     Pulses:   Radial pulses palpable        Results Review:     I reviewed the patient's new clinical results.    Medication Reviewed.    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  With medications previously administered, the patient appears comfortable.  The patient has required 3 doses of 4 mg IV morphine, 6 doses yesterday, and 3 doses of 2 mg IV Ativan, 6 doses yesterday, thus far today.  Medications will be continued and adjusted as needed for symptom management for comfort.  Gradual decline is evident.    Plan for disposition:  BERNARDO Garcia MD  Hospice and Palliative Medicine  03/31/25  09:55 EDT

## 2025-03-31 NOTE — PROGRESS NOTES
Discharge Planning Assessment  [facility]     Patient Name: Eric Kramer  MRN: [id number]  Today's Date: 3/31/2025    Admit Date: 3/27/2025    Plan: [facility] scattered bed on 3/29/25. FAINA Canales RN, CCP.   Discharge Needs Assessment    No documentation.                  Discharge Plan       Row Name 03/31/25 1315       Plan    Plan [facility] scattered bed on 3/29/25. FAINA Canales RN, CCP.    Plan Comments The patient transferred to [facility] from ICU on 3/28/25 for palliative care. [facility] scattered bed on 3/29/25. FAINA Canales RN, CCP.    Final Discharge Disposition Code 51 - hospice medical facility    Final Note [facility] scattered bed on 3/29/25. FAINA Canales RN, CCP.                  Continued Care and Services - Discharged on 3/29/2025 Admission date: 3/27/2025 - Discharge disposition: Hospice/Medical Facility ([facility])      Destination Coordination complete.      Service Provider Request Status Services Address Phone Fax Patient Preferred    [facility]  Selected Inpatient Hospice [facility] [facility] [address] [phone] --                  Selected Continued Care - Episodes Includes continued care and service providers with selected services from the active episodes listed below          Expected Discharge Date and Time       Expected Discharge Date Expected Discharge Time    Mar 29, 2025            Demographic Summary    No documentation.                  Functional Status    No documentation.                  Psychosocial    No documentation.                  Abuse/Neglect    No documentation.                  Legal    No documentation.                  Substance Abuse    No documentation.                  Patient Forms    No documentation.                     Leidy Canales, RN

## 2025-03-31 NOTE — PROGRESS NOTES
[facility] Visit Report    Eric Kramer  [id number]  3/31/2025    Admission R/T [facility] Dx: yes    Reason for [facility] Admission:   Patient is a 84 y.o. male with a primary [facility] diagnosis of nontraumatic intracerebral hemorrhage. Admitted to  for GIP for symptom management of pain, dyspnea, restlessness, anxiety. No active isolations     PPS: 10%    Medications in 24 hours:  -IV morphine 4mg PRN x7  -IV aitvan 2mg PRN x7    Recommendations:  Continue to monitor for signs of decline and provide comfort measures. Contact [facility] at [phone] with any questions or concerns and at TOD.     Assessment:  Patient is in bed with son and spouse at bedside. PT was unresponsive for this visit. Per son and spouse at bedside pt was moaning and grimacing with the last turn. Family requested an extra dose of both medications and when this nurse visited extra dose appeared effective. Pt did have tongue and mandibular obstruction and with the help of his son was turned more into the recovery position and he sounded much better after repositioning. Pt was warm to touch with palpable peripheral pulses. F/c patent with diminished concentrated UOP and hypoactive b/s. Lungs clear on ra.     Collaboration:  Spoke with pts family at the bedside with condition update and support provided. Training provided regarding assessment findings, disease progression, symptom management, recommendations and expected length of stay. Family v/u of pts condition and all questions were answered. Collaborated with  RN and CCP about pts condition and recommendations.    Disposition:  Patient meets GIP criteria, requiring frequent administration and continued titration of parenteral medications to achieve and maintain symptom management. Patient appears to be unsafe for transport at this time, requiring increasing symptom management and frequent RN assessment. If patient were to stabilize  he would return home with family and [facility] support.  Will continue [facility] RN visits to monitor for changes, assess needs and provide support.       Beverly Resendiz RN  [facility] Visit Nurse  Scattered Bed Team

## 2025-04-01 NOTE — PLAN OF CARE
Goal Outcome Evaluation:      Patient has a PPS of 10%.  Minimally responsive to repositioning.  Respirations even and non labored.  As needed IV medications in place and given every 4 hours:  Morphine 4 mg, Ativan 2 mg, Robinul 0.2 mg.  Scopalamine patch placed in addition to Robinul for secretion management.  Supportive family at the bedside this day.

## 2025-04-01 NOTE — PROGRESS NOTES
[facility] Visit Report     Eric Kramer  [id number]  4/1/2025     Admission R/T [facility] Dx: yes     Reason for [facility] Admission:   Patient is a 84 y.o. male with a primary [facility] diagnosis of nontraumatic intracerebral hemorrhage. Admitted to [facility] for GIP for symptom management of pain, dyspnea, restlessness, anxiety. No active isolations      PPS: 10%     Medications in 24 hours:  -IV morphine 4mg PRN x7  -IV aitvan 2mg PRN x7  -IV robinul 0.2mg PRN x3  -scop patch     Recommendations:  Continue to monitor for signs of decline and provide comfort measures. Contact [facility] at [phone] with any questions or concerns and at TOD.      Assessment:  Patient is in bed with son and spouse at bedside turned in the recovery position to his left side. . PT was unresponsive for this visit and appeared comfortable with no non-verbal s/s of pain/distress/soa.  Pt was warm to touch with weaker peripheral pulses than yesterday. F/c patent with diminished concentrated UOP and hypoactive b/s. Lungs clear on ra with calm unlabored respirations.      Collaboration:  Spoke with pts family at the bedside with condition update and support provided. Training provided regarding assessment findings, disease progression, symptom management, recommendations and expected length of stay. Family v/u of pts condition and all questions were answered. Collaborated with [facility] RN and CCP about pts condition and recommendations.     Disposition:  Patient meets GIP criteria, requiring frequent administration and continued titration of parenteral medications to achieve and maintain symptom management. Patient appears to be unsafe for transport at this time, requiring increasing symptom management and frequent RN assessment. If patient were to stabilize he would return home with family and [facility] support.  Will continue [facility] RN visits to monitor for changes, assess needs and provide  support.         Beverly Resendiz RN  [facility] Visit Nurse  Scattered Bed Team

## 2025-04-01 NOTE — PROGRESS NOTES
[facility]  Palliative Care/Hospice Follow Up Note       LOS: 3 days   Patient Care Team:  Elder Block MD as PCP - General (Family Medicine)  Omar Garcia RN as Ambulatory  ([facility])  Waldemar Garcia MD as Attending Provider (Hospice and Palliative Medicine)    Chief Complaint:  ICH    Interval History:     Patient Complaints: None  Patient Denies: None  History taken from: RN  Review of Systems:  As above.    Palliative Performance Scale  Palliative Performance Scale Score: 10%  [place] Symptom Assessment System Revised  Pain Score: 1   ESAS Tiredness Score: 9  ESAS Nausea Score: No nausea  ESAS Depression Score: 1  ESAS Anxiety Score: 2  ESAS Drowsiness Score: Worst possible drowsiness  ESAS Lack of Appetite Score: 9  ESAS Wellbeing Score: 1  ESAS Dyspnea Score: 1  ESAS Other Problem Score: Best possible response  ESAS Source of Information: healthcare professional caregiver  ESAS Intervention: medicated/see MAR  ESAS Intervention Response: tolerated    Vital Signs  Temp:  [97 °F (36.1 °C)-99.4 °F (37.4 °C)] 97 °F (36.1 °C)  Heart Rate:  [] 101  Resp:  [24] 24  BP: (149-166)/(78-84) 149/78  Device (Oxygen Therapy): room air SpO2:  [77 %-79 %] 79 %    Physical Exam:  General Appearance:    Not awake and appears in no acute distress lying slightly on his right side, chronically ill-appearing elderly male   Throat:   No oral lesions, oral mucosa somewhat dry with mouth open    Neck:   No adenopathy, supple, trachea midline   Lungs:     Clear to auscultation with coarse inspiratory and expiratory breath sounds, respirations with slight increase inspiratory effort and rate and increased tidal volume breath     Heart:    Regular rhythm and tachycardia    Abdomen:     Soft and non-tender, non-distended   Extremities:   No edema, pale and ashen and cyanosis evident    Pulses:   Radial pulses palpable        Results Review:     I reviewed the patient's new  clinical results.    Medication Reviewed.    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  I reviewed with the patient's RN.  With medications previously administered, the patient appears comfortable.  The patient has required glycopyrrolate for some airway congestion.  The patient has required 2 doses of 4 mg IV morphine, 7 doses yesterday, and 2 doses of 2 mg IV Ativan, 6 doses yesterday, thus far today.  Medications will be continued and adjusted as needed for symptom management for comfort.  Continued gradual decline is evident.    Plan for disposition:  BERNARDO Garcia MD  Hospice and Palliative Medicine  04/01/25  08:47 EDT

## 2025-04-02 NOTE — PLAN OF CARE
Goal Outcome Evaluation:              Outcome Evaluation: PPS 10%. Premed with Morphine 4mg and Ativan 2mg. Robinul 0.2 given x2. F/C. New IV. No family at bedside overnight.

## 2025-04-02 NOTE — PROGRESS NOTES
[facility] Visit Report    Eric Kramer  [id number]  4/2/2025    Admission R/T [facility] Dx: yes     [facility]   Patient is a 84 y.o. male with a primary [facility] diagnosis of nontraumatic intracerebral hemorrhage. Admitted to [facility] for GIP for symptom management of pain, dyspnea, restlessness, anxiety. No active isolations      PPS: 10%     Medications in 24 hours:  -IV morphine 4mg PRN x6  -IV aitvan 2mg PRN x6  -IV robinul 0.2mg PRN x5  -scop patch     VS: Temp:  [97.4 °F (36.3 °C)-97.9 °F (36.6 °C)] 97.9 °F (36.6 °C)  Heart Rate:  [85-97] 85  Resp:  [16-20] 20  BP: (116-124)/(62-71) 116/62    Recommendations:  Continue to monitor for signs of decline and provide comfort measures. Contact [facility] at [phone] with any questions or concerns and at TOD.      Assessment:  Patient is lying in bed on his back. Audible congestion is present. Son asked if patient could be turned on his side. Patient was turned to his right side into a recovery position. Patient tolerated turn and congestion improved. Patient is unresponsive with a PPS of 10%. Patient is warm to touch. Respirations are shallow. Upper accessory muscle movement with minimal diaphragm involvement.  Beltran is patent with diminished concentrated urine output. Patient is showing no signs of distress or discomfort. Patient appears to be actively dying.      Collaboration:  Spoke with pts family at the bedside with condition update and support provided. Training provided regarding assessment findings, disease progression, symptom management, recommendations and expected length of stay. Family v/u of pts condition and all questions were answered. Collaborated with [facility] BHAVNA Parks about pts condition and visit.     Disposition:  Patient meets GIP criteria, requiring frequent administration and continued titration of parenteral medications to achieve and maintain symptom management. Patient  appears to be unsafe for transport at this time, requiring increasing symptom management and frequent RN assessment. If patient were to stabilize he would return home with family and [facility] support.  Will continue [facility] RN visits to monitor for changes, assess needs and provide support.        Joaquin Hawk, RN  [facility] Visit Nurse  Scattered Bed Team

## 2025-04-02 NOTE — PROGRESS NOTES
[facility]  Palliative Care/Hospice Follow Up Note       LOS: 4 days   Patient Care Team:  Elder Block MD as PCP - General (Family Medicine)  Omar Garcia RN as Ambulatory  ([facility])  Waldemar Garcia MD as Attending Provider (Hospice and Palliative Medicine)    Chief Complaint:  ICH    Interval History:     Patient Complaints: None  Patient Denies: None  History taken from: RN  Review of Systems:  As above.    Palliative Performance Scale  Palliative Performance Scale Score: 10%  [place] Symptom Assessment System Revised  Pain Score: no pain   ESAS Tiredness Score: Worst possible tiredness  ESAS Nausea Score: No nausea  ESAS Depression Score: No depression  ESAS Anxiety Score: No anxiety  ESAS Drowsiness Score: Worst possible drowsiness  ESAS Lack of Appetite Score: Worst lack of appetite  ESAS Wellbeing Score: 5  ESAS Dyspnea Score: No shortness of breath  ESAS Other Problem Score: Best possible response  ESAS Source of Information: healthcare professional caregiver  ESAS Intervention: medicated/see MAR  ESAS Intervention Response: tolerated    Vital Signs  Temp:  [97.9 °F (36.6 °C)-99.8 °F (37.7 °C)] 99.8 °F (37.7 °C)  Heart Rate:  [] 107  Resp:  [16-20] 16  BP: (113-116)/(62-73) 113/73  Device (Oxygen Therapy): room air SpO2:  [74 %-84 %] 84 %    Physical Exam:  General Appearance:    Not awake and appears in no acute distress lying on his right side, chronically ill-appearing elderly male   Throat:   No oral lesions, oral mucosa somewhat dry with mouth open    Neck:   No adenopathy, supple, trachea midline   Lungs:     Clear to auscultation with less coarse inspiratory and expiratory breath sounds, respirations with slight increase inspiratory effort and decreased tidal volume breath     Heart:    Regular rhythm and tachycardia    Abdomen:     Soft and non-tender, non-distended   Extremities:   No edema, pale and ashen and cyanosis evident    Pulses:    Radial pulses palpable        Results Review:     I reviewed the patient's new clinical results.    Medication Reviewed.    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  I reviewed with the patient's RN.  With medications previously administered, the patient appears comfortable.  The patient has required glycopyrrolate for airway congestion.  The patient has required 4 doses of 4 mg IV morphine, 6 doses yesterday, and 4 doses of 2 mg IV Ativan, 6 doses yesterday, thus far today.  Medications will be continued and adjusted as needed for symptom management for comfort.  Continued gradual decline is evident.    Plan for disposition:  BERNARDO Garcia MD  Hospice and Palliative Medicine  04/02/25  16:23 EDT

## 2025-04-03 NOTE — DISCHARGE SUMMARY
[facility]  Discharge As      Date of Admisssion:  3/29/2025  Date of Death:  4/3/2025  Time of Death:  7:00 AM    Patient Care Team:  Elder Block MD as PCP - General (Family Medicine)  Omar Garcia, RN as Ambulatory  ([facility])  Waldemar Garcia MD as Attending Provider (Hospice and Palliative Medicine)    Final Diagnosis:     Intraparenchymal hemorrhage of brain    Hospice care patient    Vasogenic brain edema    Intraventricular hemorrhage    Midline shift of brain due to hematoma    Hypertension    CAD (coronary artery disease)    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 behavioral disturbance, psychotic disturbance, mood disturbance, or anxiety    Stage 3a chronic kidney disease    Type 2 diabetes mellitus with hyperglycemia    Chronic heart failure with preserved ejection fraction (HFpEF)    Senile degeneration of brain    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 male who presented to the ED 3/27/2025 with a right-sided deficit.  Patient has history of diabetes.  The patient was hospitalized 3/16 - 3/18/2025 for PE. He was discharged  on Eliquis. Normally, patient is able to ambulate and take care of himself.   Last known normal was 3/26/2025 around midnight.  Patient was found on the floor 3/27/2025 around 0645.  Patient unable to get up.  Unable to use his right side.  His speech was limited.  Followed some commands initially.       Neurology reported a  history of dementia. He has had sundowning spells recently and he was found knocking on patient's neighbors door and unclear reasons and was very confused.  Has been getting on and off progressively confused and also having cognitive problems      CT head on admission:  There is a  large intraparenchymal hemorrhage involving the parieto-occipital region on the left measuring approximately 4.7 cm in size with intraventricular extension. There is localized edema. There is slight prominence of the left temporal horn as compared to the CT examination 03/16/2025 consistent with trapping.     Neurosurgery evaluated the patient and reported:  In discussion with the family, per patient's wishes and progressive cognitive decline they would not want any sort of surgical intervention including hematoma evacuation or EVD placement. Additionally they would not want the patient to be intubated.      Subsequently, the patient was transferred to [facility] for palliative/hospice care.  Hospice evaluated the patient and reviewed with the patient's family and all agreed to discharge from acute care and readmitted as a hospice scattered bed patient 3/29/2025.  Medications were provided and adjusted as needed for symptom management for comfort.  The patient had been demonstrating gradual decline over the last several days.  Earlier this a.m., I was called that the patient's respirations ceased and no pulse palpable. No heart sounds audible. I pronounced the patient at 0700 hours.      Waldemar Garcia MD  Hospice and Palliative Medicine  04/03/25  08:32 EDT

## 2025-04-03 NOTE — PLAN OF CARE
Goal Outcome Evaluation:           Progress: declining  Outcome Evaluation: PPS 10%. Premed with Dilaudid 1.5mg (changed from Morphine), Ativan 2mg, and Robinul 0.4mg. F/C. Tachypnea. No family at bedside overnight.

## 2025-04-03 NOTE — PLAN OF CARE
Goal Outcome Evaluation:      Patient has a PPS of 10%.  Non responsive to voice and touch.  Terminal congestion is present.  He is in recovery position for secretion management.  As needed IV medications in place given every 4 hours:  Morphine 4 mg, Ativan 2 mg and Robinul 0.2 mg.  Supportive family visiting this afternoon.

## 2025-04-03 NOTE — PROGRESS NOTES
Case Management Discharge Note      Final Note: The patient  on 4/3/25 @ 07:00. FAINA Canales RN, CCP.         Selected Continued Care - Discharged on 4/3/2025 Admission date: 3/29/2025 - Discharge disposition:       Destination Coordination complete.      Service Provider Services Address Phone Fax Patient Preferred    [facility] [address] [facility] [address] [phone] --              Durable Medical Equipment    No services have been selected for the patient.                Dialysis/Infusion    No services have been selected for the patient.                Home Medical Care    No services have been selected for the patient.                Therapy    No services have been selected for the patient.                Community Resources    No services have been selected for the patient.                Community & DME    No services have been selected for the patient.                    Selected Continued Care - Episodes Includes continued care and service providers with selected services from the active episodes listed below          Selected Continued Care - Prior Encounters Includes continued care and service providers with selected services from prior encounters from 2024 to 4/3/2025      Discharged on 3/29/2025 Admission date: 3/27/2025 - Discharge disposition: Hospice/Medical Facility ([facility])      Destination       Service Provider Services Address Phone Fax Patient Preferred    [facility] [address] [phone] --                               Final Discharge Disposition Code: 20 -